# Patient Record
Sex: FEMALE | Race: WHITE | Employment: FULL TIME | URBAN - METROPOLITAN AREA
[De-identification: names, ages, dates, MRNs, and addresses within clinical notes are randomized per-mention and may not be internally consistent; named-entity substitution may affect disease eponyms.]

---

## 2017-01-25 RX ORDER — FLUTICASONE PROPIONATE 50 MCG
1 SPRAY, SUSPENSION (ML) NASAL DAILY
Qty: 16 G | Refills: 3 | Status: SHIPPED | OUTPATIENT
Start: 2017-01-25 | End: 2017-01-26 | Stop reason: SDUPTHER

## 2017-01-26 RX ORDER — FLUTICASONE PROPIONATE 50 MCG
1 SPRAY, SUSPENSION (ML) NASAL DAILY
Qty: 16 G | Refills: 3 | Status: SHIPPED | OUTPATIENT
Start: 2017-01-26 | End: 2017-09-06 | Stop reason: SDUPTHER

## 2017-03-27 ENCOUNTER — TELEPHONE (OUTPATIENT)
Dept: INTERNAL MEDICINE | Age: 57
End: 2017-03-27

## 2017-03-27 RX ORDER — MONTELUKAST SODIUM 10 MG/1
10 TABLET ORAL DAILY
Qty: 90 TABLET | Refills: 3 | Status: SHIPPED | OUTPATIENT
Start: 2017-03-27 | End: 2017-09-06 | Stop reason: SDUPTHER

## 2017-04-05 ENCOUNTER — TELEPHONE (OUTPATIENT)
Dept: INTERNAL MEDICINE | Age: 57
End: 2017-04-05

## 2017-04-06 RX ORDER — RIZATRIPTAN BENZOATE 10 MG/1
TABLET, ORALLY DISINTEGRATING ORAL
Qty: 27 TABLET | Refills: 3 | Status: SHIPPED | OUTPATIENT
Start: 2017-04-06 | End: 2017-09-06 | Stop reason: SDUPTHER

## 2017-04-28 ENCOUNTER — OFFICE VISIT (OUTPATIENT)
Dept: INTERNAL MEDICINE CLINIC | Age: 57
End: 2017-04-28

## 2017-04-28 VITALS
DIASTOLIC BLOOD PRESSURE: 78 MMHG | HEART RATE: 73 BPM | OXYGEN SATURATION: 98 % | BODY MASS INDEX: 23.91 KG/M2 | SYSTOLIC BLOOD PRESSURE: 128 MMHG | TEMPERATURE: 99.6 F | WEIGHT: 135 LBS

## 2017-04-28 DIAGNOSIS — R50.9 FEVER, UNSPECIFIED FEVER CAUSE: ICD-10-CM

## 2017-04-28 DIAGNOSIS — N20.0 KIDNEY STONES: Primary | ICD-10-CM

## 2017-04-28 DIAGNOSIS — R10.9 FLANK PAIN: ICD-10-CM

## 2017-04-28 LAB
BILIRUBIN, POC: NORMAL
BLOOD URINE, POC: NORMAL
CLARITY, POC: CLEAR
COLOR, POC: YELLOW
GLUCOSE URINE, POC: NORMAL
INFLUENZA A ANTIBODY: NORMAL
INFLUENZA B ANTIBODY: NORMAL
KETONES, POC: NORMAL
LEUKOCYTE EST, POC: NORMAL
NITRITE, POC: NORMAL
PH, POC: 6
PROTEIN, POC: NORMAL
SPECIFIC GRAVITY, POC: 1.02
UROBILINOGEN, POC: NORMAL

## 2017-04-28 PROCEDURE — 99213 OFFICE O/P EST LOW 20 MIN: CPT | Performed by: NURSE PRACTITIONER

## 2017-04-28 PROCEDURE — 81002 URINALYSIS NONAUTO W/O SCOPE: CPT | Performed by: NURSE PRACTITIONER

## 2017-04-28 PROCEDURE — 87804 INFLUENZA ASSAY W/OPTIC: CPT | Performed by: NURSE PRACTITIONER

## 2017-04-28 ASSESSMENT — ENCOUNTER SYMPTOMS
SORE THROAT: 0
BACK PAIN: 1
ABDOMINAL PAIN: 1
SINUS PRESSURE: 0
RHINORRHEA: 0
COUGH: 0
WHEEZING: 0

## 2017-04-30 LAB — URINE CULTURE, ROUTINE: NORMAL

## 2017-05-03 ENCOUNTER — TELEPHONE (OUTPATIENT)
Dept: INTERNAL MEDICINE | Age: 57
End: 2017-05-03

## 2017-05-04 ENCOUNTER — OFFICE VISIT (OUTPATIENT)
Dept: INTERNAL MEDICINE | Age: 57
End: 2017-05-04

## 2017-05-04 VITALS
DIASTOLIC BLOOD PRESSURE: 78 MMHG | TEMPERATURE: 99.3 F | SYSTOLIC BLOOD PRESSURE: 120 MMHG | WEIGHT: 136 LBS | BODY MASS INDEX: 24.09 KG/M2

## 2017-05-04 DIAGNOSIS — R50.9 FEVER, UNSPECIFIED FEVER CAUSE: ICD-10-CM

## 2017-05-04 DIAGNOSIS — M25.561 ARTHRALGIA OF BOTH KNEES: ICD-10-CM

## 2017-05-04 DIAGNOSIS — M25.562 ARTHRALGIA OF BOTH KNEES: ICD-10-CM

## 2017-05-04 DIAGNOSIS — N20.0 KIDNEY STONES: ICD-10-CM

## 2017-05-04 DIAGNOSIS — R73.01 ELEVATED FASTING BLOOD SUGAR: ICD-10-CM

## 2017-05-04 DIAGNOSIS — G43.709 CHRONIC MIGRAINE WITHOUT AURA WITHOUT STATUS MIGRAINOSUS, NOT INTRACTABLE: ICD-10-CM

## 2017-05-04 LAB
BASOPHILS ABSOLUTE: 0 K/UL (ref 0–0.2)
BASOPHILS RELATIVE PERCENT: 0.6 %
EOSINOPHILS ABSOLUTE: 0.1 K/UL (ref 0–0.6)
EOSINOPHILS RELATIVE PERCENT: 1.9 %
HCT VFR BLD CALC: 42 % (ref 36–48)
HEMOGLOBIN: 13.9 G/DL (ref 12–16)
LYMPHOCYTES ABSOLUTE: 1.5 K/UL (ref 1–5.1)
LYMPHOCYTES RELATIVE PERCENT: 22.4 %
MCH RBC QN AUTO: 28.7 PG (ref 26–34)
MCHC RBC AUTO-ENTMCNC: 33.2 G/DL (ref 31–36)
MCV RBC AUTO: 86.6 FL (ref 80–100)
MONOCYTES ABSOLUTE: 0.7 K/UL (ref 0–1.3)
MONOCYTES RELATIVE PERCENT: 10.7 %
NEUTROPHILS ABSOLUTE: 4.2 K/UL (ref 1.7–7.7)
NEUTROPHILS RELATIVE PERCENT: 64.4 %
PDW BLD-RTO: 13.5 % (ref 12.4–15.4)
PLATELET # BLD: 254 K/UL (ref 135–450)
PMV BLD AUTO: 8 FL (ref 5–10.5)
RBC # BLD: 4.85 M/UL (ref 4–5.2)
WBC # BLD: 6.5 K/UL (ref 4–11)

## 2017-05-04 PROCEDURE — 99214 OFFICE O/P EST MOD 30 MIN: CPT | Performed by: INTERNAL MEDICINE

## 2017-05-04 ASSESSMENT — PATIENT HEALTH QUESTIONNAIRE - PHQ9
1. LITTLE INTEREST OR PLEASURE IN DOING THINGS: 0
2. FEELING DOWN, DEPRESSED OR HOPELESS: 0
SUM OF ALL RESPONSES TO PHQ9 QUESTIONS 1 & 2: 0
SUM OF ALL RESPONSES TO PHQ QUESTIONS 1-9: 0

## 2017-05-04 ASSESSMENT — ENCOUNTER SYMPTOMS
BACK PAIN: 0
ABDOMINAL PAIN: 0
CHEST TIGHTNESS: 0
COLOR CHANGE: 0
WHEEZING: 0

## 2017-05-05 ENCOUNTER — TELEPHONE (OUTPATIENT)
Dept: INTERNAL MEDICINE | Age: 57
End: 2017-05-05

## 2017-05-05 DIAGNOSIS — M19.90 ARTHRITIS: Primary | ICD-10-CM

## 2017-05-05 LAB
ALBUMIN SERPL-MCNC: 4 G/DL (ref 3.4–5)
ANA INTERPRETATION: NORMAL
ANION GAP SERPL CALCULATED.3IONS-SCNC: 16 MMOL/L (ref 3–16)
ANTI-NUCLEAR ANTIBODY (ANA): NEGATIVE
BUN BLDV-MCNC: 15 MG/DL (ref 7–20)
CALCIUM SERPL-MCNC: 9.4 MG/DL (ref 8.3–10.6)
CHLORIDE BLD-SCNC: 100 MMOL/L (ref 99–110)
CO2: 29 MMOL/L (ref 21–32)
CREAT SERPL-MCNC: 0.6 MG/DL (ref 0.6–1.1)
ESTIMATED AVERAGE GLUCOSE: 122.6 MG/DL
GFR AFRICAN AMERICAN: >60
GFR NON-AFRICAN AMERICAN: >60
GLUCOSE BLD-MCNC: 99 MG/DL (ref 70–99)
HBA1C MFR BLD: 5.9 %
PHOSPHORUS: 3.4 MG/DL (ref 2.5–4.9)
POTASSIUM SERPL-SCNC: 3.4 MMOL/L (ref 3.5–5.1)
RHEUMATOID FACTOR: <10 IU/ML
SEDIMENTATION RATE, ERYTHROCYTE: 46 MM/HR (ref 0–30)
SODIUM BLD-SCNC: 145 MMOL/L (ref 136–145)
URIC ACID, SERUM: 2.8 MG/DL (ref 2.6–6)

## 2017-05-10 ENCOUNTER — TELEPHONE (OUTPATIENT)
Dept: INTERNAL MEDICINE | Age: 57
End: 2017-05-10

## 2017-05-10 ENCOUNTER — HOSPITAL ENCOUNTER (OUTPATIENT)
Dept: OTHER | Age: 57
Discharge: HOME | End: 2017-05-10
Attending: INTERNAL MEDICINE | Admitting: INTERNAL MEDICINE

## 2017-05-10 DIAGNOSIS — M25.471 SWOLLEN R ANKLE: Primary | ICD-10-CM

## 2017-05-10 DIAGNOSIS — M25.471 SWOLLEN R ANKLE: ICD-10-CM

## 2017-05-11 ENCOUNTER — EMPLOYEE WELLNESS (OUTPATIENT)
Dept: OTHER | Age: 57
End: 2017-05-11

## 2017-05-11 LAB
CHOLESTEROL, TOTAL: 247 MG/DL (ref 0–199)
GLUCOSE BLD-MCNC: 91 MG/DL (ref 70–99)
HDLC SERPL-MCNC: 53 MG/DL (ref 40–60)
LDL CHOLESTEROL CALCULATED: 165 MG/DL
TRIGL SERPL-MCNC: 143 MG/DL (ref 0–150)

## 2017-05-17 ENCOUNTER — OFFICE VISIT (OUTPATIENT)
Dept: RHEUMATOLOGY | Age: 57
End: 2017-05-17

## 2017-05-17 VITALS
DIASTOLIC BLOOD PRESSURE: 82 MMHG | HEART RATE: 72 BPM | BODY MASS INDEX: 25.68 KG/M2 | TEMPERATURE: 98.8 F | HEIGHT: 61 IN | WEIGHT: 136 LBS | SYSTOLIC BLOOD PRESSURE: 136 MMHG

## 2017-05-17 DIAGNOSIS — M02.30 REACTIVE ARTHRITIS (HCC): Primary | ICD-10-CM

## 2017-05-17 DIAGNOSIS — M02.30 REACTIVE ARTHRITIS (HCC): ICD-10-CM

## 2017-05-17 LAB
C-REACTIVE PROTEIN: 5.1 MG/L (ref 0–5.1)
SEDIMENTATION RATE, ERYTHROCYTE: 22 MM/HR (ref 0–30)

## 2017-05-17 PROCEDURE — 99244 OFF/OP CNSLTJ NEW/EST MOD 40: CPT | Performed by: INTERNAL MEDICINE

## 2017-05-17 RX ORDER — PREDNISONE 10 MG/1
TABLET ORAL
Qty: 35 TABLET | Refills: 0 | Status: SHIPPED | OUTPATIENT
Start: 2017-05-17 | End: 2017-09-06

## 2017-05-19 LAB — CCP IGG ANTIBODIES: 10 UNITS (ref 0–19)

## 2017-06-14 ENCOUNTER — HOSPITAL ENCOUNTER (OUTPATIENT)
Dept: MAMMOGRAPHY | Age: 57
Discharge: HOME | End: 2017-06-14
Attending: INTERNAL MEDICINE | Admitting: INTERNAL MEDICINE

## 2017-06-14 DIAGNOSIS — Z12.31 VISIT FOR SCREENING MAMMOGRAM: ICD-10-CM

## 2017-06-16 RX ORDER — BUTALBITAL, ACETAMINOPHEN AND CAFFEINE 50; 325; 40 MG/1; MG/1; MG/1
TABLET ORAL
Qty: 30 TABLET | Refills: 3 | Status: SHIPPED | OUTPATIENT
Start: 2017-06-16 | End: 2020-01-16

## 2017-07-05 ENCOUNTER — OFFICE VISIT (OUTPATIENT)
Dept: RHEUMATOLOGY | Age: 57
End: 2017-07-05

## 2017-07-05 VITALS
HEIGHT: 61 IN | BODY MASS INDEX: 26.06 KG/M2 | SYSTOLIC BLOOD PRESSURE: 124 MMHG | DIASTOLIC BLOOD PRESSURE: 84 MMHG | WEIGHT: 138 LBS | TEMPERATURE: 98.9 F

## 2017-07-05 DIAGNOSIS — M25.50 ARTHRALGIA, UNSPECIFIED JOINT: Primary | ICD-10-CM

## 2017-07-05 PROCEDURE — 99213 OFFICE O/P EST LOW 20 MIN: CPT | Performed by: INTERNAL MEDICINE

## 2017-07-05 RX ORDER — FOLIC ACID 1 MG/1
1 TABLET ORAL DAILY
COMMUNITY

## 2017-09-06 ENCOUNTER — OFFICE VISIT (OUTPATIENT)
Dept: INTERNAL MEDICINE | Age: 57
End: 2017-09-06

## 2017-09-06 ENCOUNTER — TELEPHONE (OUTPATIENT)
Dept: INTERNAL MEDICINE | Age: 57
End: 2017-09-06

## 2017-09-06 VITALS
BODY MASS INDEX: 24.92 KG/M2 | SYSTOLIC BLOOD PRESSURE: 122 MMHG | WEIGHT: 132 LBS | DIASTOLIC BLOOD PRESSURE: 82 MMHG | HEIGHT: 61 IN

## 2017-09-06 DIAGNOSIS — G47.00 INSOMNIA, UNSPECIFIED TYPE: ICD-10-CM

## 2017-09-06 DIAGNOSIS — F32.A DEPRESSION, UNSPECIFIED DEPRESSION TYPE: ICD-10-CM

## 2017-09-06 DIAGNOSIS — Z11.4 SCREENING FOR HIV (HUMAN IMMUNODEFICIENCY VIRUS): ICD-10-CM

## 2017-09-06 DIAGNOSIS — Z11.59 ENCOUNTER FOR HEPATITIS C SCREENING TEST FOR LOW RISK PATIENT: ICD-10-CM

## 2017-09-06 DIAGNOSIS — E87.6 HYPOKALEMIA: ICD-10-CM

## 2017-09-06 DIAGNOSIS — Z00.00 ANNUAL PHYSICAL EXAM: ICD-10-CM

## 2017-09-06 DIAGNOSIS — G43.709 CHRONIC MIGRAINE WITHOUT AURA WITHOUT STATUS MIGRAINOSUS, NOT INTRACTABLE: ICD-10-CM

## 2017-09-06 DIAGNOSIS — E78.5 HYPERLIPIDEMIA, UNSPECIFIED HYPERLIPIDEMIA TYPE: ICD-10-CM

## 2017-09-06 DIAGNOSIS — N20.0 KIDNEY STONES: ICD-10-CM

## 2017-09-06 DIAGNOSIS — Z00.00 ANNUAL PHYSICAL EXAM: Primary | ICD-10-CM

## 2017-09-06 LAB
A/G RATIO: 1.7 (ref 1.1–2.2)
ALBUMIN SERPL-MCNC: 4.2 G/DL (ref 3.4–5)
ALP BLD-CCNC: 72 U/L (ref 40–129)
ALT SERPL-CCNC: 16 U/L (ref 10–40)
ANION GAP SERPL CALCULATED.3IONS-SCNC: 13 MMOL/L (ref 3–16)
AST SERPL-CCNC: 20 U/L (ref 15–37)
BILIRUB SERPL-MCNC: 0.3 MG/DL (ref 0–1)
BUN BLDV-MCNC: 21 MG/DL (ref 7–20)
CALCIUM SERPL-MCNC: 9.1 MG/DL (ref 8.3–10.6)
CHLORIDE BLD-SCNC: 100 MMOL/L (ref 99–110)
CO2: 28 MMOL/L (ref 21–32)
CREAT SERPL-MCNC: 0.5 MG/DL (ref 0.6–1.1)
GFR AFRICAN AMERICAN: >60
GFR NON-AFRICAN AMERICAN: >60
GLOBULIN: 2.5 G/DL
GLUCOSE BLD-MCNC: 100 MG/DL (ref 70–99)
HEPATITIS C ANTIBODY INTERPRETATION: NORMAL
POTASSIUM SERPL-SCNC: 3.5 MMOL/L (ref 3.5–5.1)
SODIUM BLD-SCNC: 141 MMOL/L (ref 136–145)
TOTAL PROTEIN: 6.7 G/DL (ref 6.4–8.2)
TSH SERPL DL<=0.05 MIU/L-ACNC: 1.71 UIU/ML (ref 0.27–4.2)

## 2017-09-06 PROCEDURE — 99396 PREV VISIT EST AGE 40-64: CPT | Performed by: INTERNAL MEDICINE

## 2017-09-06 RX ORDER — MONTELUKAST SODIUM 10 MG/1
10 TABLET ORAL DAILY
Qty: 90 TABLET | Refills: 3 | Status: SHIPPED | OUTPATIENT
Start: 2017-09-06 | End: 2018-09-12 | Stop reason: SDUPTHER

## 2017-09-06 RX ORDER — CITALOPRAM 10 MG/1
10 TABLET ORAL NIGHTLY
Qty: 270 TABLET | Refills: 3 | Status: SHIPPED | OUTPATIENT
Start: 2017-09-06 | End: 2017-09-06 | Stop reason: SDUPTHER

## 2017-09-06 RX ORDER — CITALOPRAM 10 MG/1
TABLET ORAL
Qty: 270 TABLET | Refills: 3 | OUTPATIENT
Start: 2017-09-06 | End: 2018-09-12 | Stop reason: SDUPTHER

## 2017-09-06 RX ORDER — POTASSIUM CHLORIDE 750 MG/1
TABLET, EXTENDED RELEASE ORAL
Qty: 90 TABLET | Refills: 3 | Status: SHIPPED | OUTPATIENT
Start: 2017-09-06 | End: 2018-10-14 | Stop reason: SDUPTHER

## 2017-09-06 RX ORDER — CITALOPRAM 10 MG/1
10 TABLET ORAL NIGHTLY
Qty: 270 TABLET | Refills: 3 | OUTPATIENT
Start: 2017-09-06 | End: 2017-09-06 | Stop reason: SDUPTHER

## 2017-09-06 RX ORDER — RIZATRIPTAN BENZOATE 10 MG/1
TABLET, ORALLY DISINTEGRATING ORAL
Qty: 27 TABLET | Refills: 3 | Status: SHIPPED | OUTPATIENT
Start: 2017-09-06 | End: 2020-03-05

## 2017-09-06 RX ORDER — FLUTICASONE PROPIONATE 50 MCG
1 SPRAY, SUSPENSION (ML) NASAL DAILY
Qty: 3 BOTTLE | Refills: 3 | Status: SHIPPED | OUTPATIENT
Start: 2017-09-06 | End: 2018-04-26 | Stop reason: SDUPTHER

## 2017-09-06 ASSESSMENT — ENCOUNTER SYMPTOMS
WHEEZING: 0
ABDOMINAL PAIN: 0
CHEST TIGHTNESS: 0
COLOR CHANGE: 0
BACK PAIN: 0

## 2017-09-07 LAB — HIV-1 AND HIV-2 ANTIBODIES: NORMAL

## 2018-03-09 ENCOUNTER — HOSPITAL ENCOUNTER (OUTPATIENT)
Dept: OTHER | Age: 58
Discharge: HOME | End: 2018-03-09
Attending: UROLOGY | Admitting: UROLOGY

## 2018-03-09 DIAGNOSIS — N20.0 CALCULUS OF KIDNEY: ICD-10-CM

## 2018-03-20 VITALS — WEIGHT: 135 LBS | BODY MASS INDEX: 23.91 KG/M2

## 2018-04-04 RX ORDER — MONTELUKAST SODIUM 10 MG/1
TABLET ORAL
Qty: 90 TABLET | Refills: 3 | Status: SHIPPED | OUTPATIENT
Start: 2018-04-04 | End: 2019-09-18

## 2018-04-27 RX ORDER — FLUTICASONE PROPIONATE 50 MCG
SPRAY, SUSPENSION (ML) NASAL
Qty: 16 G | Refills: 3 | Status: SHIPPED | OUTPATIENT
Start: 2018-04-27 | End: 2019-07-27 | Stop reason: SDUPTHER

## 2018-05-14 ENCOUNTER — EMPLOYEE WELLNESS (OUTPATIENT)
Dept: OTHER | Age: 58
End: 2018-05-14

## 2018-05-14 LAB
CHOLESTEROL, TOTAL: 252 MG/DL (ref 0–199)
GLUCOSE BLD-MCNC: 100 MG/DL (ref 70–99)
HDLC SERPL-MCNC: 69 MG/DL (ref 40–60)
LDL CHOLESTEROL CALCULATED: 168 MG/DL
TRIGL SERPL-MCNC: 74 MG/DL (ref 0–150)

## 2018-05-21 VITALS — BODY MASS INDEX: 24.94 KG/M2 | WEIGHT: 132 LBS

## 2018-07-02 ENCOUNTER — HOSPITAL ENCOUNTER (OUTPATIENT)
Dept: MAMMOGRAPHY | Age: 58
Discharge: HOME | End: 2018-07-02
Attending: INTERNAL MEDICINE | Admitting: INTERNAL MEDICINE

## 2018-07-02 DIAGNOSIS — Z12.31 VISIT FOR SCREENING MAMMOGRAM: ICD-10-CM

## 2018-08-29 ENCOUNTER — TELEPHONE (OUTPATIENT)
Dept: INTERNAL MEDICINE | Age: 58
End: 2018-08-29

## 2018-08-29 DIAGNOSIS — Z00.00 WELL ADULT EXAM: ICD-10-CM

## 2018-08-29 DIAGNOSIS — E55.9 VITAMIN D DEFICIENCY: ICD-10-CM

## 2018-08-29 DIAGNOSIS — Z13.1 DIABETES MELLITUS SCREENING: ICD-10-CM

## 2018-08-29 DIAGNOSIS — E78.5 HYPERLIPIDEMIA, UNSPECIFIED HYPERLIPIDEMIA TYPE: Primary | ICD-10-CM

## 2018-09-06 DIAGNOSIS — E55.9 VITAMIN D DEFICIENCY: ICD-10-CM

## 2018-09-06 DIAGNOSIS — Z00.00 WELL ADULT EXAM: ICD-10-CM

## 2018-09-06 DIAGNOSIS — E78.5 HYPERLIPIDEMIA, UNSPECIFIED HYPERLIPIDEMIA TYPE: ICD-10-CM

## 2018-09-06 DIAGNOSIS — Z13.1 DIABETES MELLITUS SCREENING: ICD-10-CM

## 2018-09-06 LAB
A/G RATIO: 1.8 (ref 1.1–2.2)
ALBUMIN SERPL-MCNC: 4.2 G/DL (ref 3.4–5)
ALP BLD-CCNC: 81 U/L (ref 40–129)
ALT SERPL-CCNC: 17 U/L (ref 10–40)
ANION GAP SERPL CALCULATED.3IONS-SCNC: 13 MMOL/L (ref 3–16)
AST SERPL-CCNC: 19 U/L (ref 15–37)
BASOPHILS ABSOLUTE: 0 K/UL (ref 0–0.2)
BASOPHILS RELATIVE PERCENT: 0.9 %
BILIRUB SERPL-MCNC: <0.2 MG/DL (ref 0–1)
BUN BLDV-MCNC: 21 MG/DL (ref 7–20)
CALCIUM SERPL-MCNC: 8.8 MG/DL (ref 8.3–10.6)
CHLORIDE BLD-SCNC: 101 MMOL/L (ref 99–110)
CHOLESTEROL, TOTAL: 226 MG/DL (ref 0–199)
CO2: 27 MMOL/L (ref 21–32)
CREAT SERPL-MCNC: 0.6 MG/DL (ref 0.6–1.1)
EOSINOPHILS ABSOLUTE: 0.2 K/UL (ref 0–0.6)
EOSINOPHILS RELATIVE PERCENT: 3.6 %
ESTIMATED AVERAGE GLUCOSE: 119.8 MG/DL
GFR AFRICAN AMERICAN: >60
GFR NON-AFRICAN AMERICAN: >60
GLOBULIN: 2.4 G/DL
GLUCOSE BLD-MCNC: 100 MG/DL (ref 70–99)
HBA1C MFR BLD: 5.8 %
HCT VFR BLD CALC: 43.2 % (ref 36–48)
HDLC SERPL-MCNC: 60 MG/DL (ref 40–60)
HEMOGLOBIN: 14.4 G/DL (ref 12–16)
LDL CHOLESTEROL CALCULATED: 147 MG/DL
LYMPHOCYTES ABSOLUTE: 1.8 K/UL (ref 1–5.1)
LYMPHOCYTES RELATIVE PERCENT: 36.1 %
MCH RBC QN AUTO: 29.1 PG (ref 26–34)
MCHC RBC AUTO-ENTMCNC: 33.3 G/DL (ref 31–36)
MCV RBC AUTO: 87.3 FL (ref 80–100)
MONOCYTES ABSOLUTE: 0.5 K/UL (ref 0–1.3)
MONOCYTES RELATIVE PERCENT: 11.3 %
NEUTROPHILS ABSOLUTE: 2.3 K/UL (ref 1.7–7.7)
NEUTROPHILS RELATIVE PERCENT: 48.1 %
PDW BLD-RTO: 13.2 % (ref 12.4–15.4)
PLATELET # BLD: 179 K/UL (ref 135–450)
PMV BLD AUTO: 8.2 FL (ref 5–10.5)
POTASSIUM SERPL-SCNC: 3.5 MMOL/L (ref 3.5–5.1)
RBC # BLD: 4.95 M/UL (ref 4–5.2)
SODIUM BLD-SCNC: 141 MMOL/L (ref 136–145)
T4 FREE: 0.9 NG/DL (ref 0.9–1.8)
TOTAL PROTEIN: 6.6 G/DL (ref 6.4–8.2)
TRIGL SERPL-MCNC: 97 MG/DL (ref 0–150)
TSH SERPL DL<=0.05 MIU/L-ACNC: 2.33 UIU/ML (ref 0.27–4.2)
VITAMIN D 25-HYDROXY: 74.1 NG/ML
VLDLC SERPL CALC-MCNC: 19 MG/DL
WBC # BLD: 4.9 K/UL (ref 4–11)

## 2018-09-12 ENCOUNTER — OFFICE VISIT (OUTPATIENT)
Dept: INTERNAL MEDICINE | Age: 58
End: 2018-09-12

## 2018-09-12 VITALS
BODY MASS INDEX: 24.73 KG/M2 | SYSTOLIC BLOOD PRESSURE: 122 MMHG | DIASTOLIC BLOOD PRESSURE: 80 MMHG | WEIGHT: 131 LBS | HEIGHT: 61 IN

## 2018-09-12 DIAGNOSIS — F41.9 ANXIETY: ICD-10-CM

## 2018-09-12 DIAGNOSIS — Z12.11 SCREENING FOR COLON CANCER: ICD-10-CM

## 2018-09-12 DIAGNOSIS — N95.2 VAGINAL ATROPHY: ICD-10-CM

## 2018-09-12 DIAGNOSIS — R73.9 HYPERGLYCEMIA: ICD-10-CM

## 2018-09-12 DIAGNOSIS — G47.00 INSOMNIA, UNSPECIFIED TYPE: ICD-10-CM

## 2018-09-12 DIAGNOSIS — M25.561 ARTHRALGIA OF BOTH KNEES: ICD-10-CM

## 2018-09-12 DIAGNOSIS — M25.562 ARTHRALGIA OF BOTH KNEES: ICD-10-CM

## 2018-09-12 DIAGNOSIS — F32.A DEPRESSION, UNSPECIFIED DEPRESSION TYPE: ICD-10-CM

## 2018-09-12 DIAGNOSIS — H04.123 DRY EYES: ICD-10-CM

## 2018-09-12 DIAGNOSIS — Z00.00 WELL ADULT EXAM: Primary | ICD-10-CM

## 2018-09-12 PROCEDURE — 99396 PREV VISIT EST AGE 40-64: CPT | Performed by: INTERNAL MEDICINE

## 2018-09-12 RX ORDER — CITALOPRAM 10 MG/1
TABLET ORAL
Qty: 270 TABLET | Refills: 3 | Status: SHIPPED | OUTPATIENT
Start: 2018-09-12 | End: 2019-09-18 | Stop reason: SDUPTHER

## 2018-09-12 RX ORDER — MONTELUKAST SODIUM 10 MG/1
10 TABLET ORAL DAILY
Qty: 90 TABLET | Refills: 3 | Status: SHIPPED | OUTPATIENT
Start: 2018-09-12 | End: 2019-09-18 | Stop reason: SDUPTHER

## 2018-09-12 ASSESSMENT — ENCOUNTER SYMPTOMS
ABDOMINAL PAIN: 0
WHEEZING: 0
CHEST TIGHTNESS: 0
BACK PAIN: 1
COLOR CHANGE: 0

## 2018-09-12 ASSESSMENT — PATIENT HEALTH QUESTIONNAIRE - PHQ9
SUM OF ALL RESPONSES TO PHQ QUESTIONS 1-9: 0
1. LITTLE INTEREST OR PLEASURE IN DOING THINGS: 0
2. FEELING DOWN, DEPRESSED OR HOPELESS: 0
SUM OF ALL RESPONSES TO PHQ9 QUESTIONS 1 & 2: 0
SUM OF ALL RESPONSES TO PHQ QUESTIONS 1-9: 0

## 2018-09-12 NOTE — PATIENT INSTRUCTIONS
Get apps to help w sleep quality  I like     Insight Timer hue  Look for guided meditations especially the \"body scan\" meditations  [facility] Stress Meditations  Multiple meditation tapes

## 2018-09-12 NOTE — PROGRESS NOTES
Subjective:      Patient ID: Andrew Whitlock is a 56 y.o. female. Chief Complaint   Patient presents with    Annual Exam     yearly/review labs,colonoscopy needed? Has had a stressful year-son w seizure disorder- other son w some depression- very stressed out-sees counselor once monthly- but not really enough -still unable to sleep well-having more knee pain recently but no swelling or redness- foot pain persists-never has time to take care of herself          Andrew Whitlock  1960    No Known Allergies  Current Outpatient Prescriptions   Medication Sig Dispense Refill    fluticasone (FLONASE) 50 MCG/ACT nasal spray SPRAY ONE SPRAY IN EACH NOSTRIL DAILY 16 g 3    montelukast (SINGULAIR) 10 MG tablet TAKE ONE TABLET BY MOUTH ONE TIME A DAY 90 tablet 3    potassium chloride (KLOR-CON M) 10 MEQ extended release tablet TAKE ONE TABLET BY MOUTH DAILY 90 tablet 3    rizatriptan (MAXALT-MLT) 10 MG disintegrating tablet DISSOLVE 1 TABLET BY MOUTH FOR 1 DOSE AS NEEDED .(MAY REPEAT IN 2 HOURS IF NEEDED) 27 tablet 3    montelukast (SINGULAIR) 10 MG tablet Take 1 tablet by mouth daily 90 tablet 3    citalopram (CELEXA) 10 MG tablet TAKE ONE TAB. THREE TIMES A  tablet 3    folic acid (FOLVITE) 1 MG tablet Take 1 mg by mouth daily      butalbital-acetaminophen-caffeine (FIORICET, ESGIC) -40 MG per tablet TAKE ONE TABLET BY MOUTH EVERY 4 HOURS AS NEEDED FOR PAIN UP TO 10 DAYS 30 tablet 3    LORazepam (ATIVAN) 0.5 MG tablet Take 1 tablet by mouth every 8 hours as needed 30 tablet 3    prenatal vitamin (FORMULA 3) TABS Take 1 tablet by mouth daily.  calcium citrate-vitamin D (CALCITRATE/VITAMIN D) 315-200 MG-UNIT per tablet Take 1 tablet by mouth daily. No current facility-administered medications for this visit. Vitals:    09/12/18 1108   BP: 122/80   Weight: 131 lb (59.4 kg)   Height: 5' 1\" (1.549 m)     Body mass index is 24.75 kg/m².      Wt Readings from Last 3 Encounters: Constitutional: Negative for activity change, appetite change and fatigue. HENT: Negative for congestion. Eyes: Positive for visual disturbance. Respiratory: Negative for chest tightness and wheezing. Cardiovascular: Negative for chest pain and palpitations. Gastrointestinal: Negative for abdominal pain. Musculoskeletal: Positive for arthralgias, back pain and myalgias. Skin: Negative for color change and rash. Neurological: Negative for weakness, light-headedness and headaches. Psychiatric/Behavioral: Positive for dysphoric mood and sleep disturbance. The patient is nervous/anxious. Objective:   Physical Exam   Constitutional: She is oriented to person, place, and time. She appears well-developed and well-nourished. HENT:   Head: Normocephalic and atraumatic. Eyes: Pupils are equal, round, and reactive to light. Conjunctivae and EOM are normal.   Neck: Normal range of motion. Neck supple. Cardiovascular: Normal rate, regular rhythm and normal heart sounds. No carotid bruit auscultated bilaterally   Pulmonary/Chest: Effort normal and breath sounds normal. No respiratory distress. Abdominal: She exhibits no distension. There is no tenderness. Musculoskeletal: She exhibits tenderness. Lymphadenopathy:     She has no cervical adenopathy. Neurological: She is alert and oriented to person, place, and time. Skin: Skin is warm and dry. Psychiatric: She has a normal mood and affect. Her behavior is normal. Judgment and thought content normal.         Assessment and Plan:      Insomnia  Work w therapist more     Depression  Try to work on mindfulness     Anxiety  Try to work on mindfulness and see therapist     Dry eyes  Cont to f/u w eye md     Hyperglycemia  Improved- cont to monitor     Arthralgia of both knees  Still bothersome off and on     Vaginal atrophy  Cont activella          Complete physical completed.  Patient now up to date with all routine screening including Pap and colonoscopy if needed, yearly eye and dental exams,labs, dexa if indicated. Counseled re: nutrition/calcium and vit d supplementation,seat belt use, no cell phone use with driving.

## 2018-09-27 ENCOUNTER — HOSPITAL ENCOUNTER (OUTPATIENT)
Age: 58
Discharge: HOME | End: 2018-09-27
Payer: COMMERCIAL

## 2018-09-27 ENCOUNTER — HOSPITAL ENCOUNTER (OUTPATIENT)
Dept: GENERAL RADIOLOGY | Age: 58
Discharge: HOME | End: 2018-09-27
Payer: COMMERCIAL

## 2018-09-27 DIAGNOSIS — N20.0 CALCULUS OF KIDNEY: ICD-10-CM

## 2018-09-27 PROCEDURE — 74018 RADEX ABDOMEN 1 VIEW: CPT

## 2018-10-05 LAB
PAP SMEAR: NORMAL
PAP SMEAR: NORMAL

## 2018-10-15 RX ORDER — POTASSIUM CHLORIDE 750 MG/1
TABLET, EXTENDED RELEASE ORAL
Qty: 90 TABLET | Refills: 3 | Status: SHIPPED | OUTPATIENT
Start: 2018-10-15 | End: 2019-09-18 | Stop reason: SDUPTHER

## 2018-11-15 ENCOUNTER — OFFICE VISIT (OUTPATIENT)
Dept: ORTHOPEDIC SURGERY | Age: 58
End: 2018-11-15
Payer: COMMERCIAL

## 2018-11-15 VITALS — BODY MASS INDEX: 24.11 KG/M2 | HEIGHT: 62 IN | WEIGHT: 131 LBS

## 2018-11-15 DIAGNOSIS — M25.521 RIGHT ELBOW PAIN: ICD-10-CM

## 2018-11-15 DIAGNOSIS — M77.11 LATERAL EPICONDYLITIS OF RIGHT ELBOW: Primary | ICD-10-CM

## 2018-11-15 PROCEDURE — MISCD86 TENNIS ELBOW STRAP-BREG: Performed by: NURSE PRACTITIONER

## 2018-11-15 PROCEDURE — 99242 OFF/OP CONSLTJ NEW/EST SF 20: CPT | Performed by: NURSE PRACTITIONER

## 2018-11-15 RX ORDER — METHYLPREDNISOLONE 4 MG/1
TABLET ORAL
Qty: 1 KIT | Refills: 0 | Status: SHIPPED | OUTPATIENT
Start: 2018-11-15 | End: 2018-11-21

## 2018-11-29 ENCOUNTER — OFFICE VISIT (OUTPATIENT)
Dept: ORTHOPEDIC SURGERY | Age: 58
End: 2018-11-29
Payer: COMMERCIAL

## 2018-11-29 VITALS — BODY MASS INDEX: 24.11 KG/M2 | HEIGHT: 62 IN | WEIGHT: 131 LBS

## 2018-11-29 DIAGNOSIS — M25.521 RIGHT ELBOW PAIN: ICD-10-CM

## 2018-11-29 DIAGNOSIS — M77.11 LATERAL EPICONDYLITIS OF RIGHT ELBOW: Primary | ICD-10-CM

## 2018-11-29 PROCEDURE — 20605 DRAIN/INJ JOINT/BURSA W/O US: CPT | Performed by: NURSE PRACTITIONER

## 2018-11-29 PROCEDURE — 99213 OFFICE O/P EST LOW 20 MIN: CPT | Performed by: NURSE PRACTITIONER

## 2018-11-29 RX ORDER — BETAMETHASONE SODIUM PHOSPHATE AND BETAMETHASONE ACETATE 3; 3 MG/ML; MG/ML
12 INJECTION, SUSPENSION INTRA-ARTICULAR; INTRALESIONAL; INTRAMUSCULAR; SOFT TISSUE ONCE
Status: COMPLETED | OUTPATIENT
Start: 2018-11-29 | End: 2018-11-29

## 2018-11-29 RX ADMIN — BETAMETHASONE SODIUM PHOSPHATE AND BETAMETHASONE ACETATE 12 MG: 3; 3 INJECTION, SUSPENSION INTRA-ARTICULAR; INTRALESIONAL; INTRAMUSCULAR; SOFT TISSUE at 11:12

## 2018-12-20 ENCOUNTER — OFFICE VISIT (OUTPATIENT)
Dept: ORTHOPEDIC SURGERY | Age: 58
End: 2018-12-20
Payer: COMMERCIAL

## 2018-12-20 VITALS
BODY MASS INDEX: 24.11 KG/M2 | HEART RATE: 68 BPM | SYSTOLIC BLOOD PRESSURE: 132 MMHG | WEIGHT: 131 LBS | HEIGHT: 62 IN | DIASTOLIC BLOOD PRESSURE: 81 MMHG

## 2018-12-20 DIAGNOSIS — M77.11 LATERAL EPICONDYLITIS OF RIGHT ELBOW: Primary | ICD-10-CM

## 2018-12-20 PROCEDURE — 99213 OFFICE O/P EST LOW 20 MIN: CPT | Performed by: ORTHOPAEDIC SURGERY

## 2018-12-20 NOTE — PROGRESS NOTES
lateral epicondylitis    Plan:  The patient was encouraged to continue to ice the elbow. She was again advised on stretching exercises. She will modify her activities and was given a new work note to extend her restrictions for an additional month. We will attempt to get another injection approved through [facility]. She will follow-up with us in 1 month for reevaluation. She will continue with her ibuprofen and Tylenol in the interim.

## 2018-12-31 ENCOUNTER — TELEPHONE (OUTPATIENT)
Dept: ORTHOPEDIC SURGERY | Age: 58
End: 2018-12-31

## 2019-01-21 ENCOUNTER — OFFICE VISIT (OUTPATIENT)
Dept: ORTHOPEDIC SURGERY | Age: 59
End: 2019-01-21
Payer: COMMERCIAL

## 2019-01-21 ENCOUNTER — TELEPHONE (OUTPATIENT)
Dept: ORTHOPEDIC SURGERY | Age: 59
End: 2019-01-21

## 2019-01-21 VITALS
HEIGHT: 62 IN | SYSTOLIC BLOOD PRESSURE: 146 MMHG | BODY MASS INDEX: 25.58 KG/M2 | HEART RATE: 73 BPM | DIASTOLIC BLOOD PRESSURE: 95 MMHG | WEIGHT: 139 LBS

## 2019-01-21 DIAGNOSIS — M77.11 RIGHT LATERAL EPICONDYLITIS: Primary | ICD-10-CM

## 2019-01-21 PROCEDURE — 99213 OFFICE O/P EST LOW 20 MIN: CPT | Performed by: ORTHOPAEDIC SURGERY

## 2019-01-21 PROCEDURE — 20605 DRAIN/INJ JOINT/BURSA W/O US: CPT | Performed by: ORTHOPAEDIC SURGERY

## 2019-01-21 RX ORDER — BETAMETHASONE SODIUM PHOSPHATE AND BETAMETHASONE ACETATE 3; 3 MG/ML; MG/ML
12 INJECTION, SUSPENSION INTRA-ARTICULAR; INTRALESIONAL; INTRAMUSCULAR; SOFT TISSUE ONCE
Status: COMPLETED | OUTPATIENT
Start: 2019-01-21 | End: 2019-01-21

## 2019-01-21 RX ADMIN — BETAMETHASONE SODIUM PHOSPHATE AND BETAMETHASONE ACETATE 12 MG: 3; 3 INJECTION, SUSPENSION INTRA-ARTICULAR; INTRALESIONAL; INTRAMUSCULAR; SOFT TISSUE at 11:48

## 2019-02-27 ENCOUNTER — OFFICE VISIT (OUTPATIENT)
Dept: ORTHOPEDIC SURGERY | Age: 59
End: 2019-02-27
Payer: COMMERCIAL

## 2019-02-27 VITALS
HEIGHT: 61 IN | BODY MASS INDEX: 25.49 KG/M2 | HEART RATE: 83 BPM | SYSTOLIC BLOOD PRESSURE: 137 MMHG | DIASTOLIC BLOOD PRESSURE: 85 MMHG | WEIGHT: 135 LBS

## 2019-02-27 DIAGNOSIS — M77.11 RIGHT LATERAL EPICONDYLITIS: Primary | ICD-10-CM

## 2019-02-27 PROCEDURE — 99213 OFFICE O/P EST LOW 20 MIN: CPT | Performed by: ORTHOPAEDIC SURGERY

## 2019-02-27 RX ORDER — MELOXICAM 15 MG/1
15 TABLET ORAL DAILY
Qty: 30 TABLET | Refills: 1 | Status: SHIPPED | OUTPATIENT
Start: 2019-02-27 | End: 2019-05-01 | Stop reason: SDUPTHER

## 2019-03-11 ENCOUNTER — TELEPHONE (OUTPATIENT)
Dept: ORTHOPEDIC SURGERY | Age: 59
End: 2019-03-11

## 2019-03-11 NOTE — TELEPHONE ENCOUNTER
I called patient because we received approved C-9 for cortisone injection to rt elbow if needed. She has an appt on 4/2/19 but C-9 expires on 3/27/19 so she needs to come in before 3/27. If she still having pain in her elbow she can get injection that day.

## 2019-03-25 ENCOUNTER — OFFICE VISIT (OUTPATIENT)
Dept: ORTHOPEDIC SURGERY | Age: 59
End: 2019-03-25
Payer: COMMERCIAL

## 2019-03-25 VITALS — HEIGHT: 61 IN | WEIGHT: 135 LBS | BODY MASS INDEX: 25.49 KG/M2

## 2019-03-25 DIAGNOSIS — M77.11 RIGHT LATERAL EPICONDYLITIS: Primary | ICD-10-CM

## 2019-03-25 PROCEDURE — 99213 OFFICE O/P EST LOW 20 MIN: CPT | Performed by: ORTHOPAEDIC SURGERY

## 2019-03-25 PROCEDURE — 20605 DRAIN/INJ JOINT/BURSA W/O US: CPT | Performed by: ORTHOPAEDIC SURGERY

## 2019-03-25 PROCEDURE — 96372 THER/PROPH/DIAG INJ SC/IM: CPT | Performed by: ORTHOPAEDIC SURGERY

## 2019-03-25 RX ORDER — BETAMETHASONE SODIUM PHOSPHATE AND BETAMETHASONE ACETATE 3; 3 MG/ML; MG/ML
12 INJECTION, SUSPENSION INTRA-ARTICULAR; INTRALESIONAL; INTRAMUSCULAR; SOFT TISSUE ONCE
Status: COMPLETED | OUTPATIENT
Start: 2019-03-25 | End: 2019-03-25

## 2019-03-25 RX ADMIN — BETAMETHASONE SODIUM PHOSPHATE AND BETAMETHASONE ACETATE 12 MG: 3; 3 INJECTION, SUSPENSION INTRA-ARTICULAR; INTRALESIONAL; INTRAMUSCULAR; SOFT TISSUE at 13:04

## 2019-04-03 ENCOUNTER — TELEPHONE (OUTPATIENT)
Dept: ORTHOPEDIC SURGERY | Age: 59
End: 2019-04-03

## 2019-04-05 ENCOUNTER — TELEPHONE (OUTPATIENT)
Dept: ORTHOPEDIC SURGERY | Age: 59
End: 2019-04-05

## 2019-04-05 NOTE — TELEPHONE ENCOUNTER
I called patient left message to call me about her light duty will  on 19 per Brent 85 letter she left here with Safia Mcfarland

## 2019-04-09 ENCOUNTER — TELEPHONE (OUTPATIENT)
Dept: ORTHOPEDIC SURGERY | Age: 59
End: 2019-04-09

## 2019-04-10 ENCOUNTER — TELEPHONE (OUTPATIENT)
Dept: ORTHOPEDIC SURGERY | Age: 59
End: 2019-04-10

## 2019-04-10 DIAGNOSIS — M77.11 RIGHT LATERAL EPICONDYLITIS: Primary | ICD-10-CM

## 2019-04-14 ENCOUNTER — NURSE TRIAGE (OUTPATIENT)
Dept: OTHER | Facility: CLINIC | Age: 59
End: 2019-04-14

## 2019-04-14 NOTE — TELEPHONE ENCOUNTER
Reason for Disposition   Neck swelling, bruise or pain from direct blow to the neck    Protocols used: NECK INJURY-ADULT-AH    Patient's location of employment: [employer]  Location of injury: [employer] [facility] inpatient unit. .   Time of injury: 0825  Last 4 of patient's SSN: [id number]  Location recommended for treatment: Home care. 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 She already has problems with her shoulder/arm from a previous patient incident. She states she has full range of motion, but neck is feeling quite sore, with ache in arm. Neck pain 4/10. Is on Meloxicam already. She is filling out safe care. Will notified Supervisor.

## 2019-04-15 ENCOUNTER — TELEPHONE (OUTPATIENT)
Dept: ORTHOPEDIC SURGERY | Age: 59
End: 2019-04-15

## 2019-04-17 ENCOUNTER — HOSPITAL ENCOUNTER (OUTPATIENT)
Dept: PHYSICAL THERAPY | Age: 59
Setting detail: THERAPIES SERIES
Discharge: HOME | End: 2019-04-17
Payer: COMMERCIAL

## 2019-04-17 PROCEDURE — 97530 THERAPEUTIC ACTIVITIES: CPT

## 2019-04-17 PROCEDURE — 97162 PT EVAL MOD COMPLEX 30 MIN: CPT

## 2019-04-17 PROCEDURE — 97140 MANUAL THERAPY 1/> REGIONS: CPT

## 2019-04-17 PROCEDURE — 97035 APP MDLTY 1+ULTRASOUND EA 15: CPT

## 2019-04-17 NOTE — FLOWSHEET NOTE
Physical Therapy Daily Treatment Note  Date:  2019    Patient Name:  Fred Urbina    :  1960  MRN: [id number]  Restrictions/Precautions:    Pertinent Medical History: B CTS  Medical/Treatment Diagnosis Information:  · Diagnosis: Rt elbow  · Treatment Diagnosis: Pain. Decreased R wrist strength and AROM  Insurance/Certification information:  PT Insurance Information: BWC- C9 thru 19  Physician Information:  Referring Practitioner: Dr. 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 She has been on light duty since and has had 3 cortisone injections. States she was off for a vacation a few weeks ago and the rest helped it. States she worked this past weekend and her arm is hurting again. Pain is 2-4/10. Aching in her R forearm. 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 Subjective:       Objective:  Observation:   Test measurements:      Exercises:  Exercise/Equipment Resistance/Repetitions Other comments                                           HEP      Wrist Extensor stretch light    Ice Massage                      Other Therapeutic Activities:    Discussed purpose, risks and benefits of PT with pt who is agreeable to POC. Home Exercise Program:    Instructed patient on an HEP. Patient demonstrated exercises correctly. Handout with pictures and # of reps/sets was given. Exercises are listed above.     Manual Treatments:    DTM to R lateral epicondyle and extensors  Ice Massage x 4 min to R lateral epicondyle  K tape to R wrist extensors    Modalities:    US: 1.3 w/cm2 x 7 min, 20% to R lateral epicondyle    Timed Code Treatment Minutes:

## 2019-04-17 NOTE — PLAN OF CARE
Outpatient Physical Therapy  [] [facility]    Phone: [phone]   Fax: [phone]   [x] [facility]  Phone: [phone]              Fax: [phone]  [] [facility]   Phone: [phone]   Fax: [phone]     To: Referring Practitioner: Dr. Sherley Giordano      Patient: Liz Hwang   : 1960   MRN: [id number]  Evaluation Date: 2019      Diagnosis Information:  · Diagnosis: Rt elbow    · Treatment Diagnosis: Pain. Decreased R wrist strength and AROM     Physical Therapy Certification Form  Dear Dr. Sherley Giordano,  The following patient has been evaluated for physical therapy services and for therapy to continue, Medicare requires monthly physician review of the treatment plan. Please review the attached evaluation and/or summary of the patient's plan of care, and verify that you agree therapy should continue by signing the attached document and sending it back to our office. Plan of Care/Treatment to date:  [x] Therapeutic Exercise    [x] Modalities:  [x] Therapeutic Activity     [x] Ultrasound  [x] Electrical Stimulation  [] Gait Training      [] Cervical Traction [] Lumbar Traction  [] Neuromuscular Re-education    [x] Cold/hotpack [] Iontophoresis   [x] Instruction in HEP     Other:  [x] Manual Therapy      []             [] Aquatic Therapy      []           ? Frequency/Duration:  # Days per week: [] 1 day # Weeks: [] 1 week [] 5 weeks     [x] 2 days? [] 2 weeks [] 6 weeks     [] 3 days   [] 3 weeks [] 7 weeks     [] 4 days   [] 4 weeks [x] 8 weeks    Rehab Potential: [] Excellent [] Good [] Fair  [] Poor       Electronically signed by:  Kaylyn Dance, PT      If you have any questions or concerns, please don't hesitate to call.   Thank you for your referral.      Physician Signature:________________________________Date:__________________  By signing above, therapists plan is approved by physician

## 2019-04-17 NOTE — PROGRESS NOTES
Physical Therapy  Initial Assessment  Date: 2019  Patient Name: Marii Edwards  MRN: [id number]  : 1960     Treatment Diagnosis: Pain. Decreased R wrist strength and AROM    Restrictions  Restrictions/Precautions  Restrictions/Precautions: Fall Risk(low)    Subjective   General  Chart Reviewed: Yes  Referring Practitioner: Dr. 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 States she was off for a vacation a few weeks ago and the rest helped it. States she worked this past weekend and her arm is hurting again. Pain is 2-4/10. Aching in her R forearm. 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 Vision/Hearing  Vision  Vision: Within Functional Limits  Hearing  Hearing: Within functional limits    Orientation  Orientation  Overall Orientation Status: Within Normal Limits    Social/Functional History       Objective     Observation/Palpation  Palpation: Moderate to severe pain with palpation of origin of R wrist extensors around lateral epicondyle  Observation: Swelling in lateral R elbow- mild. Pt wearing brace on R forearm    AROM RUE (degrees)  RUE AROM : WNL  RUE General AROM: Except wrist extension AROM: limited 25%  AROM LUE (degrees)  LUE AROM : WNL    Strength RUE  Strength RUE: WNL  Comment: Except wrist extension: 4/5 *pain  Strength LUE  Strength LUE: WNL        Sensation  Overall Sensation Status: WNL             Assessment   Conditions Requiring Skilled Therapeutic Intervention  Body structures, Functions, Activity limitations: Decreased functional mobility ; Decreased ROM; Decreased strength; Increased Pain;Decreased ADL status  Assessment: PLOF- no pain in R elbow. CLOF- moderate amounts of pain in R elbow for a few months that is affecting work and ADLs. Treatment Diagnosis: Pain.  Decreased R wrist strength and AROM  Prognosis: Good  Decision Making: Medium Complexity  REQUIRES PT FOLLOW UP: Yes         Plan   Plan  Times per week: 2  Times per day: Daily  Plan weeks: 6  Current Treatment Recommendations: Strengthening, ROM, Manual Therapy - Joint Manipulation, Pain Management, Modalities, Manual Therapy - Soft Tissue Mobilization, Home Exercise Program    OutComes Score                 QuickDASH Total Score: 38       QuickDASH Disability/Symptom Score : 61.36 %                        Goals  Short term goals  Time Frame for Short term goals: 2 Weeks  Short term goal 1: Pt will show independence in HEP  Short term goal 2: Pt will show no swelling in R elbow  Long term goals  Time Frame for Long term goals : 6 Weeks  Long term goal 1: Pt will report <2/10 pain consistently so she can return to full duty  Long term goal 2: Pt will show 4+/5 R wrist extension strength and full R wrist extension AROM  Patient Goals   Patient goals : \"return to ADLs, full duty, relief of pain, prevention methods\"       Therapy Time   Individual Concurrent Group Co-treatment   Time In           Time Out           Minutes                   Melanie lo, PT

## 2019-04-18 ENCOUNTER — HOSPITAL ENCOUNTER (OUTPATIENT)
Dept: PHYSICAL THERAPY | Age: 59
Setting detail: THERAPIES SERIES
Discharge: HOME | End: 2019-04-18
Payer: COMMERCIAL

## 2019-04-18 PROCEDURE — 97140 MANUAL THERAPY 1/> REGIONS: CPT

## 2019-04-18 PROCEDURE — 97035 APP MDLTY 1+ULTRASOUND EA 15: CPT

## 2019-04-18 NOTE — FLOWSHEET NOTE
Physical Therapy Daily Treatment Note  Date:  2019    Patient Name:  Malcolm Travis    :  1960  MRN: [id number]  Restrictions/Precautions:    Pertinent Medical History: B CTS  Medical/Treatment Diagnosis Information:  · Diagnosis: Rt elbow  · Treatment Diagnosis: Pain. Decreased R wrist strength and AROM  Insurance/Certification information:  PT Insurance Information: BWC- C9 thru 19  Physician Information:  Referring Practitioner: Dr. 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 States she was off for a vacation a few weeks ago and the rest helped it. States she worked this past weekend and her arm is hurting again. Pain is 2-4/10. Aching in her R forearm. 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 Subjective:  19 Patient reports elbow and wrist has been sore. Objective:  Observation:   Test measurements:      Exercises:  Exercise/Equipment Resistance/Repetitions Other comments                                           HEP      Wrist Extensor stretch light    Ice Massage                      Other Therapeutic Activities:    Discussed purpose, risks and benefits of PT with pt who is agreeable to POC. Home Exercise Program:    Instructed patient on an HEP. Patient demonstrated exercises correctly. Handout with pictures and # of reps/sets was given. Exercises are listed above.     Manual Treatments:    DTM to R lateral epicondyle and extensors,pronators  Ice Massage x 4 min to R lateral epicondyle  K tape to R wrist extensors    Modalities:    US: 1.3 w/cm2 x 7 min, 20%

## 2019-04-25 ENCOUNTER — HOSPITAL ENCOUNTER (OUTPATIENT)
Dept: PHYSICAL THERAPY | Age: 59
Setting detail: THERAPIES SERIES
Discharge: HOME | End: 2019-04-25
Payer: COMMERCIAL

## 2019-04-25 PROCEDURE — 97035 APP MDLTY 1+ULTRASOUND EA 15: CPT

## 2019-04-25 PROCEDURE — 97140 MANUAL THERAPY 1/> REGIONS: CPT

## 2019-05-01 ENCOUNTER — OFFICE VISIT (OUTPATIENT)
Dept: ORTHOPEDIC SURGERY | Age: 59
End: 2019-05-01
Payer: COMMERCIAL

## 2019-05-01 ENCOUNTER — HOSPITAL ENCOUNTER (OUTPATIENT)
Dept: PHYSICAL THERAPY | Age: 59
Setting detail: THERAPIES SERIES
Discharge: HOME | End: 2019-05-01
Payer: COMMERCIAL

## 2019-05-01 VITALS
BODY MASS INDEX: 25.49 KG/M2 | SYSTOLIC BLOOD PRESSURE: 134 MMHG | DIASTOLIC BLOOD PRESSURE: 94 MMHG | HEART RATE: 82 BPM | WEIGHT: 135 LBS | HEIGHT: 61 IN

## 2019-05-01 DIAGNOSIS — M77.11 RIGHT LATERAL EPICONDYLITIS: Primary | ICD-10-CM

## 2019-05-01 PROCEDURE — 6360000002 HC RX W HCPCS

## 2019-05-01 PROCEDURE — 99213 OFFICE O/P EST LOW 20 MIN: CPT | Performed by: ORTHOPAEDIC SURGERY

## 2019-05-01 PROCEDURE — 97033 APP MDLTY 1+IONTPHRSIS EA 15: CPT

## 2019-05-01 PROCEDURE — 97140 MANUAL THERAPY 1/> REGIONS: CPT

## 2019-05-01 PROCEDURE — 97035 APP MDLTY 1+ULTRASOUND EA 15: CPT

## 2019-05-01 RX ORDER — MELOXICAM 15 MG/1
15 TABLET ORAL DAILY
Qty: 30 TABLET | Refills: 1 | Status: SHIPPED | OUTPATIENT
Start: 2019-05-01 | End: 2019-05-22 | Stop reason: SDUPTHER

## 2019-05-01 NOTE — PROGRESS NOTES
Geeta Antonio  [id number]  May 1, 2019    Chief Complaint   Patient presents with    Elbow Injury     [facility] 2 week FULast injection 03/25/2019         History: The patient is here follow-up regarding her right elbow. The patient tells me she is continue taking the meloxicam.  She has continued wearing the tennis elbow strap. She reports she continues working with therapy. She reports the last injection 5 weeks ago provided mild relief. She did note improvement in her symptoms after she took a week off work. She reports her symptoms return when she return to work. She has continued working light duty status. She tells me 3 weeks ago a patient fell onto her right arm/shoulder. She feels this increased her pain. She rates her pain 2/10 at rest today. 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  Again this is a Worker's Compensation case. BP (!) 134/94   Pulse 82   Ht 5' 1\" (1.549 m)   Wt 135 lb (61.2 kg)   LMP 04/30/2010   BMI 25.51 kg/m²   Pertinent items are noted in HPI. Review of systems reviewed from Patient History Form dated on 11/15/18 and available in the patient's chart under the Media tab. Physical:  Ms. Geeta Antonio appears well. She is in no apparent distress. She is alert and oriented to person, place and time. She displays appropriate mood and affect. Tenderness to palpation located: lateral epicondyle tenderness. Range of motion: from 0 extension to 145 flexion. Instability with varus stressing: No.   Instability with valgus stressing: No.   Pain with resisted wrist extension: Yes. Pain with resisted wrist flexion: No.  Examination reveals full range of motion to the shoulders and wrists. she is non tender about the wrist. The patient is neurovascularly intact distally. Sensation is grossly intact to light touch. Radial and ulnar pulses are palpable and 2+. The hand shows brisk capillary refill. The patient is able to wiggle all digits.  The skin reveals no lesions or ulcerations. There is no associated ecchymosis. Impression:  #1 right elbow lateral epicondylitis     Plan: At this time, the patient will continue light duty position for the next 4 weeks. She will continue with her meloxicam and her home exercises. She will continue with activity modification. We will include iontophoresis for physical therapy. She'll follow-up with us in 4 weeks. If she has significant pain we can consider repeat injection at that time. If still symptomatic can also consider dry needling at [facility].

## 2019-05-01 NOTE — LETTER
[facility]  [address]  [address] [facility] [address]  Phone: [phone]  Fax: [phone]    Iván Tellez MD        May 1, 2019     Patient: April Mendoza   YOB: 1960   Date of Visit: 5/1/2019       To Whom It May Concern: It is my medical opinion that April Mendoza may return to light duty immediately with the following restrictions: no lifting, pushing, or pulling with the right upper extremity until 6/3/19. If you have any questions or concerns, please don't hesitate to call.     Sincerely,          Iván Tellez MD

## 2019-05-01 NOTE — FLOWSHEET NOTE
Physical Therapy Daily Treatment Note  Date:  2019    Patient Name:  Bandar Mayes    :  1960  MRN: [id number]  Restrictions/Precautions:    Pertinent Medical History: B CTS  Medical/Treatment Diagnosis Information:  · Diagnosis: Rt elbow  · Treatment Diagnosis: Pain. Decreased R wrist strength and AROM  Insurance/Certification information:  PT Insurance Information: BWC- C9 thru 19  Physician Information:  Referring Practitioner: Dr. 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 States she was off for a vacation a few weeks ago and the rest helped it. States she worked this past weekend and her arm is hurting again. Pain is 2-4/10. Aching in her R forearm. 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 Subjective:    19 Patient reports elbow and wrist has been sore. 19: Pt states overall she might be a little better. Not as sore today. States on  she was having a lot of pain in her elbow. 19: Pt states she is doing a little better overall, pain has localized more to the bone rather than the forearm.  Saw the MD and he wants her to start iontophoresis    Objective:  Observation:   Test measurements:      Exercises:  Exercise/Equipment Resistance/Repetitions Other comments                                           HEP     Wrist Extensor stretch light    Ice Massage                      Other Therapeutic Activities:      Home Exercise Program:      Manual Treatments:    DTM to R lateral epicondyle and extensors, pronators  Ice Massage x 4 min to R lateral epicondyle  K tape to R wrist extensors    Modalities:    US: 1.3 w/cm 2 x 7 min, 20% to R lateral epicondyle  Iontophoresis x 20 min to R lateral epicondyle- 2 Ma hybresis    Timed Code Treatment Minutes:  45    Total Treatment Minutes:  67    Assessment  [x] Patient tolerated treatment well [] Patient limited by fatigue  [] Patient limited by pain  [] Patient limited by other medical complications  [] Other: Pt still has pain and inflammation in lateral epicondyle with palpation        Prognosis: [x] Good [x] Fair  [] Poor    Patient Requires Follow-up: [x] Yes  [] No    Plan:   [x] Continue per plan of care [] Alter current plan (see comments)  [] Plan of care initiated [] Hold pending MD visit [] Discharge  Plan for Next Session:    Continue iontophoresis  Continue DTM, Ice, US to R lateral epicondyle  Add massage tool if needed  Light stretching  Refer for dry needling if pt is not improving    Electronically signed by:  Balwinder Barrientos PT

## 2019-05-03 ENCOUNTER — HOSPITAL ENCOUNTER (OUTPATIENT)
Dept: PHYSICAL THERAPY | Age: 59
Setting detail: THERAPIES SERIES
Discharge: HOME | End: 2019-05-03
Payer: COMMERCIAL

## 2019-05-03 PROCEDURE — 97035 APP MDLTY 1+ULTRASOUND EA 15: CPT

## 2019-05-03 PROCEDURE — 97033 APP MDLTY 1+IONTPHRSIS EA 15: CPT

## 2019-05-03 PROCEDURE — 97140 MANUAL THERAPY 1/> REGIONS: CPT

## 2019-05-03 NOTE — FLOWSHEET NOTE
Physical Therapy Daily Treatment Note  Date:  5/3/2019    Patient Name:  Izaiah Reagan    :  1960  MRN: [id number]  Restrictions/Precautions:    Pertinent Medical History: B CTS  Medical/Treatment Diagnosis Information:  · Diagnosis: Rt elbow  · Treatment Diagnosis: Pain. Decreased R wrist strength and AROM  Insurance/Certification information:  PT Insurance Information: BWC- C9 thru 19  Physician Information:  Referring Practitioner: Dr. 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 States she was off for a vacation a few weeks ago and the rest helped it. States she worked this past weekend and her arm is hurting again. Pain is 2-4/10. Aching in her R forearm. 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 Subjective:    19 Patient reports elbow and wrist has been sore. 19: Pt states overall she might be a little better. Not as sore today. States on  she was having a lot of pain in her elbow. 19: Pt states she is doing a little better overall, pain has localized more to the bone rather than the forearm. Saw the MD and he wants her to start iontophoresis  19: Patient reports soreness is still localized is in elbow.     Objective:  Observation:   Test measurements:      Exercises:  Exercise/Equipment Resistance/Repetitions Other comments                                           HEP     Wrist Extensor stretch light    Ice Massage                      Other Therapeutic Activities:      Home Exercise Program:      Manual Treatments:    DTM to R lateral epicondyle

## 2019-05-06 ENCOUNTER — HOSPITAL ENCOUNTER (OUTPATIENT)
Dept: PHYSICAL THERAPY | Age: 59
Setting detail: THERAPIES SERIES
Discharge: HOME | End: 2019-05-06
Payer: COMMERCIAL

## 2019-05-06 ENCOUNTER — EMPLOYEE WELLNESS (OUTPATIENT)
Dept: OTHER | Age: 59
End: 2019-05-06

## 2019-05-06 LAB
CHOLESTEROL, TOTAL: 248 MG/DL (ref 0–199)
GLUCOSE BLD-MCNC: 97 MG/DL (ref 70–99)
HDLC SERPL-MCNC: 60 MG/DL (ref 40–60)
LDL CHOLESTEROL CALCULATED: 158 MG/DL
TRIGL SERPL-MCNC: 148 MG/DL (ref 0–150)

## 2019-05-06 PROCEDURE — 97140 MANUAL THERAPY 1/> REGIONS: CPT

## 2019-05-06 PROCEDURE — 97033 APP MDLTY 1+IONTPHRSIS EA 15: CPT

## 2019-05-06 PROCEDURE — 97035 APP MDLTY 1+ULTRASOUND EA 15: CPT

## 2019-05-06 NOTE — FLOWSHEET NOTE
Physical Therapy Daily Treatment Note  Date:  2019    Patient Name:  Loretta Cannon    :  1960  MRN: [id number]  Restrictions/Precautions:    Pertinent Medical History: B CTS  Medical/Treatment Diagnosis Information:  · Diagnosis: Rt elbow  · Treatment Diagnosis: Pain. Decreased R wrist strength and AROM  Insurance/Certification information:  PT Insurance Information: [employer]- C9 thru 19  Physician Information:  Referring Practitioner: Dr. 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 She has been on light duty since and has had 3 cortisone injections. States she was off for a vacation a few weeks ago and the rest helped it. States she worked this past weekend and her arm is hurting again. Pain is 2-4/10. Aching in her R forearm. 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 Subjective:    19 Patient reports elbow and wrist has been sore. 19: Pt states overall she might be a little better. Not as sore today. States on  she was having a lot of pain in her elbow. 19: Pt states she is doing a little better overall, pain has localized more to the bone rather than the forearm. Saw the MD and he wants her to start iontophoresis  19: Patient reports soreness is still localized is in elbow.   19: States she has been good other than working , was getting sharp twinges of pain throughout the day    Objective:  Observation:   Test measurements:      Exercises:  Exercise/Equipment Resistance/Repetitions Other comments                                           HEP Wrist Extensor stretch light 4/17   Ice Massage  4/17                    Other Therapeutic Activities:      Home Exercise Program:      Manual Treatments:    DTM to R lateral epicondyle and extensors, pronators    K tape to R wrist extensors    Modalities:    US: 1.3 w/cm 2 x 7 min, 20% to R lateral epicondyle  Iontophoresis x 20 min to R lateral epicondyle- 3 Ma hybresis    Timed Code Treatment Minutes:  35      [facility] TIME CODES    MODALITY                      START TIME   STOP TIME  manual 1:00 1:25   US   1:25 1:34   ionto 1:35 2:00   ICE massage   Total Treatment Minutes:  60    Assessment  [x] Patient tolerated treatment well [] Patient limited by fatigue  [] Patient limited by pain  [] Patient limited by other medical complications  [x] Other: Pt still has pain and inflammation in lateral epicondyle with palpation        Prognosis: [x] Good [x] Fair  [] Poor    Patient Requires Follow-up: [x] Yes  [] No    Plan:   [x] Continue per plan of care [] Alter current plan (see comments)  [] Plan of care initiated [] Hold pending MD visit [] Discharge    Plan for Next Session:    Continue iontophoresis  Continue DTM, Ice, US to R lateral epicondyle  Add massage tool if needed  Light stretching  Refer for dry needling if pt is not improving    Electronically signed by:  Declan Garcia PT

## 2019-05-08 ENCOUNTER — HOSPITAL ENCOUNTER (OUTPATIENT)
Dept: PHYSICAL THERAPY | Age: 59
Setting detail: THERAPIES SERIES
Discharge: HOME | End: 2019-05-08
Payer: COMMERCIAL

## 2019-05-08 PROCEDURE — 97035 APP MDLTY 1+ULTRASOUND EA 15: CPT

## 2019-05-08 PROCEDURE — 97140 MANUAL THERAPY 1/> REGIONS: CPT

## 2019-05-08 PROCEDURE — 97033 APP MDLTY 1+IONTPHRSIS EA 15: CPT

## 2019-05-08 NOTE — FLOWSHEET NOTE
Physical Therapy Daily Treatment Note  Date:  2019    Patient Name:  Jennifer Roberson    :  1960  MRN: [id number]  Restrictions/Precautions:    Pertinent Medical History: B CTS  Medical/Treatment Diagnosis Information:  · Diagnosis: Rt elbow  · Treatment Diagnosis: Pain. Decreased R wrist strength and AROM  Insurance/Certification information:  PT Insurance Information: [employer]- C9 thru 19  Physician Information:  Referring Practitioner: Dr. 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 States she was off for a vacation a few weeks ago and the rest helped it. States she worked this past weekend and her arm is hurting again. Pain is 2-4/10. Aching in her R forearm. 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 Subjective:    19 Patient reports elbow and wrist has been sore. 19: Pt states overall she might be a little better. Not as sore today. States on  she was having a lot of pain in her elbow. 19: Pt states she is doing a little better overall, pain has localized more to the bone rather than the forearm. Saw the MD and he wants her to start iontophoresis  19: Patient reports soreness is still localized is in elbow.   19: States she has been good other than working , was getting sharp twinges of pain throughout the day  19: States busy day at work, pain up to 3/10 at work    Objective:  Observation:   Test measurements:      Exercises:  Exercise/Equipment Resistance/Repetitions Other comments                                           HEP     Wrist Extensor stretch light 4/17   Ice Massage  4/17                    Other Therapeutic Activities:      Home Exercise Program:      Manual Treatments:    DTM to R lateral epicondyle and extensors, pronators    K tape to R wrist extensors    Modalities:    US: 1.3 w/cm 2 x 7 min, 20% to R lateral epicondyle  Iontophoresis x 20 min to R lateral epicondyle- 3 Ma hybresis    Timed Code Treatment Minutes:  35      [facility] TIME CODES    MODALITY                      START TIME   STOP TIME  manual [facility] [id number]   ICE massage   Total Treatment Minutes:  60    Assessment  [x] Patient tolerated treatment well [] Patient limited by fatigue  [] Patient limited by pain  [] Patient limited by other medical complications  [x] Other: Pt still has pain and inflammation in lateral epicondyle with palpation        Prognosis: [x] Good [x] Fair  [] Poor    Patient Requires Follow-up: [x] Yes  [] No    Plan:   [x] Continue per plan of care [] Alter current plan (see comments)  [] Plan of care initiated [] Hold pending MD visit [] Discharge    Plan for Next Session:    Continue iontophoresis  Continue DTM, Ice, US to R lateral epicondyle  Add massage tool if needed  Light stretching  Refer for dry needling if pt is not improving    Electronically signed by:  Isa Rawls PT

## 2019-05-09 ENCOUNTER — HOSPITAL ENCOUNTER (OUTPATIENT)
Dept: PHYSICAL THERAPY | Age: 59
Setting detail: THERAPIES SERIES
Discharge: HOME | End: 2019-05-09
Payer: COMMERCIAL

## 2019-05-09 PROCEDURE — 97033 APP MDLTY 1+IONTPHRSIS EA 15: CPT

## 2019-05-09 PROCEDURE — 97035 APP MDLTY 1+ULTRASOUND EA 15: CPT

## 2019-05-09 PROCEDURE — 97140 MANUAL THERAPY 1/> REGIONS: CPT

## 2019-05-09 NOTE — FLOWSHEET NOTE
Physical Therapy Daily Treatment Note  Date:  2019    Patient Name:  Mario Arriola    :  1960  MRN: [id number]  Restrictions/Precautions:    Pertinent Medical History: B CTS  Medical/Treatment Diagnosis Information:  · Diagnosis: Rt elbow  · Treatment Diagnosis: Pain. Decreased R wrist strength and AROM  Insurance/Certification information:  PT Insurance Information: [employer]- C9 thru 19  Physician Information:  Referring Practitioner: Dr. 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 States she was off for a vacation a few weeks ago and the rest helped it. States she worked this past weekend and her arm is hurting again. Pain is 2-4/10. Aching in her R forearm. 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 Subjective:    19 Patient reports elbow and wrist has been sore. 19: Pt states overall she might be a little better. Not as sore today. States on  she was having a lot of pain in her elbow. 19: Pt states she is doing a little better overall, pain has localized more to the bone rather than the forearm. Saw the MD and he wants her to start iontophoresis  19: Patient reports soreness is still localized is in elbow. 19: States she has been good other than working , was getting sharp twinges of pain throughout the day  19: States busy day at work, pain up to 3/10 at work  19: Patient reports she is more achy today for some reason.     Objective:  Observation:   Test measurements:      Exercises:  Exercise/Equipment Resistance/Repetitions Other comments                                           HEP     Wrist Extensor stretch light 4/17   Ice Massage  4/17                    Other Therapeutic Activities:      Home Exercise Program:      Manual Treatments:    DTM to R lateral epicondyle and extensors, pronatorsIASTM to the same.   ce Massage x 4 min to R lateral epicondyle,medicupping 5 min  K tape to R wrist extensors    Modalities:    US: 1.3 w/cm 2 x 7 min, 20% to R lateral epicondyle  Iontophoresis x 20 min to R lateral epicondyle- 2 Ma hybresis    Timed Code Treatment Minutes:  40      [facility] TIME CODES    MODALITY                      START TIME   STOP TIME  manual 1200 1220   US  1220 1227   ionto 1230 1250   ICE massage  medicupping 1250  [id number] 51:00  Total Treatment Minutes:  60    Assessment  [x] Patient tolerated treatment well [] Patient limited by fatigue  [] Patient limited by pain  [] Patient limited by other medical complications  [x] Other: Pt still has pain and inflammation in lateral epicondyle with palpation        Prognosis: [x] Good [x] Fair  [] Poor    Patient Requires Follow-up: [x] Yes  [] No    Plan:   [x] Continue per plan of care [] Alter current plan (see comments)  [] Plan of care initiated [] Hold pending MD visit [] Discharge    Plan for Next Session:    Continue iontophoresis  Continue DTM, Ice, US to R lateral epicondyle  Add massage tool if needed  Light stretching  Refer for dry needling if pt is not improving    Electronically signed by:  Vicki Paez PTA

## 2019-05-13 VITALS — WEIGHT: 132 LBS | BODY MASS INDEX: 24.94 KG/M2

## 2019-05-15 ENCOUNTER — HOSPITAL ENCOUNTER (OUTPATIENT)
Dept: PHYSICAL THERAPY | Age: 59
Setting detail: THERAPIES SERIES
Discharge: HOME | End: 2019-05-15
Payer: COMMERCIAL

## 2019-05-15 PROCEDURE — 97033 APP MDLTY 1+IONTPHRSIS EA 15: CPT

## 2019-05-15 PROCEDURE — 97035 APP MDLTY 1+ULTRASOUND EA 15: CPT

## 2019-05-15 PROCEDURE — 97140 MANUAL THERAPY 1/> REGIONS: CPT

## 2019-05-15 NOTE — FLOWSHEET NOTE
Physical Therapy Daily Treatment Note  Date:  5/15/2019    Patient Name:  Tony Sun    :  1960  MRN: [id number]  Restrictions/Precautions:    Pertinent Medical History: B CTS  Medical/Treatment Diagnosis Information:  · Diagnosis: Rt elbow  · Treatment Diagnosis: Pain. Decreased R wrist strength and AROM  Insurance/Certification information:  PT Insurance Information: [employer]WC- C9 thru 19  Physician Information:  Referring Practitioner: Dr. 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 States she was off for a vacation a few weeks ago and the rest helped it. States she worked this past weekend and her arm is hurting again. Pain is 2-4/10. Aching in her R forearm. 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 Subjective:    19 Patient reports elbow and wrist has been sore. 19: Pt states overall she might be a little better. Not as sore today. States on  she was having a lot of pain in her elbow. 19: Pt states she is doing a little better overall, pain has localized more to the bone rather than the forearm. Saw the MD and he wants her to start iontophoresis  19: Patient reports soreness is still localized is in elbow. 19: States she has been good other than working , was getting sharp twinges of pain throughout the day  19: States busy day at work, pain up to 3/10 at work  19: Patient reports she is more achy today for some reason.   05/15/19: Patient reports she has been more sore over the weekend pain was more constant too for some reason. States today is not as bad,back to usual soreness. Objective:  Observation:   Test measurements:      Exercises:  Exercise/Equipment Resistance/Repetitions Other comments                                           HEP     Wrist Extensor stretch light 4/17   Ice Massage  4/17                    Other Therapeutic Activities:      Home Exercise Program:      Manual Treatments:    DTM to R lateral epicondyle and extensors, pronatorsIASTM to the same.   ce Massage x 4 min to R lateral epicondyle,  K tape to R wrist extensors    Modalities:    US: 1.3 w/cm 2 x 7 min, 20% to R lateral epicondyle  Iontophoresis x 20 min to R lateral epicondyle- 2 Ma hybresis    Timed Code Treatment Minutes:  40      [facility] TIME CODES    MODALITY                      START TIME   STOP TIME  manual 1200 1220   US  1220 1227   ionto 1230 1250   ICE massage  medicupping 1250  12:5 5  Total Treatment Minutes:  55    Assessment  [x] Patient tolerated treatment well [] Patient limited by fatigue  [] Patient limited by pain  [] Patient limited by other medical complications  [x] Other: Pt still has pain and inflammation in lateral epicondyle with palpation        Prognosis: [x] Good [x] Fair  [] Poor    Patient Requires Follow-up: [x] Yes  [] No    Plan:   [x] Continue per plan of care [] Alter current plan (see comments)  [] Plan of care initiated [] Hold pending MD visit [] Discharge    Plan for Next Session:    Continue iontophoresis  Continue DTM, Ice, US to R lateral epicondyle  Add massage tool if needed  Light stretching  Refer for dry needling if pt is not improving    Electronically signed by:  Frank Panchal PTA

## 2019-05-16 ENCOUNTER — HOSPITAL ENCOUNTER (OUTPATIENT)
Dept: PHYSICAL THERAPY | Age: 59
Setting detail: THERAPIES SERIES
Discharge: HOME | End: 2019-05-16
Payer: COMMERCIAL

## 2019-05-16 PROCEDURE — 97140 MANUAL THERAPY 1/> REGIONS: CPT

## 2019-05-16 PROCEDURE — 97035 APP MDLTY 1+ULTRASOUND EA 15: CPT

## 2019-05-16 PROCEDURE — 97033 APP MDLTY 1+IONTPHRSIS EA 15: CPT

## 2019-05-16 NOTE — FLOWSHEET NOTE
Physical Therapy Daily Treatment Note  Date:  2019    Patient Name:  Marisol Crespo    :  1960  MRN: [id number]  Restrictions/Precautions:    Pertinent Medical History: B CTS  Medical/Treatment Diagnosis Information:  · Diagnosis: Rt elbow  · Treatment Diagnosis: Pain. Decreased R wrist strength and AROM  Insurance/Certification information:  PT Insurance Information: [employer]WC- C9 thru 19  Physician Information:  Referring Practitioner: Dr. 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 States she was off for a vacation a few weeks ago and the rest helped it. States she worked this past weekend and her arm is hurting again. Pain is 2-4/10. Aching in her R forearm. 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 Subjective:    19 Patient reports elbow and wrist has been sore. 19: Pt states overall she might be a little better. Not as sore today. States on  she was having a lot of pain in her elbow. 19: Pt states she is doing a little better overall, pain has localized more to the bone rather than the forearm. Saw the MD and he wants her to start iontophoresis  19: Patient reports soreness is still localized is in elbow. 19: States she has been good other than working , was getting sharp twinges of pain throughout the day  19: States busy day at work, pain up to 3/10 at work  19: Patient reports she is more achy today for some reason.   05/15/19: Patient reports she has been more sore over the weekend pain was more constant too for some reason. States today is not as bad,back to usual soreness. 05/16/19: Patient reports elbow is not as sore today,was able to sleep a little better last night. Objective:  Observation:   Test measurements:      Exercises:  Exercise/Equipment Resistance/Repetitions Other comments                                           HEP     Wrist Extensor stretch light 4/17   Ice Massage  4/17                    Other Therapeutic Activities:      Home Exercise Program:      Manual Treatments:    DTM to R lateral epicondyle and extensors, pronatorsIASTM to the same.   ce Massage x 4 min to R lateral epicondyle,  K tape to R wrist extensors    Modalities:    US: 1.3 w/cm 2 x 7 min, 20% to R lateral epicondyle  Iontophoresis x 20 min to R lateral epicondyle- 2 Ma hybresis    Timed Code Treatment Minutes:  40      [facility] TIME CODES    MODALITY                      START TIME   STOP TIME  manual 1200 1220   US  1220 1227   ionto 1230 1250   ICE massage  medicupping 1250  12:5 5  Total Treatment Minutes:  55    Assessment  [x] Patient tolerated treatment well [] Patient limited by fatigue  [] Patient limited by pain  [] Patient limited by other medical complications  [x] Other: Pt still has pain and inflammation in lateral epicondyle with palpation        Prognosis: [x] Good [x] Fair  [] Poor    Patient Requires Follow-up: [x] Yes  [] No    Plan:   [x] Continue per plan of care [] Alter current plan (see comments)  [] Plan of care initiated [] Hold pending MD visit [] Discharge    Plan for Next Session:    Continue iontophoresis  Continue DTM, Ice, US to R lateral epicondyle  Add massage tool if needed  Light stretching  Refer for dry needling if pt is not improving    Electronically signed by:  Shanthi Marrero PTA

## 2019-05-20 ENCOUNTER — TELEPHONE (OUTPATIENT)
Dept: INTERNAL MEDICINE CLINIC | Age: 59
End: 2019-05-20

## 2019-05-20 NOTE — TELEPHONE ENCOUNTER
JUST AN FYI. I'M having  WORKMANS COM. COVERAGE FROM INJURY AT WORK FROM NOV. 2018. Tennis elbow rt. arm. See notes and PT tx.'s. Workmans comp. is going to ask for records from you about. this. I've not seen you or told you about this till now. Didn't want you surprised by the inquiry.  PST

## 2019-05-20 NOTE — TELEPHONE ENCOUNTER
Tell her thanks for letting us know and we are happy to do anything she needs -and get better soon!!!

## 2019-05-22 ENCOUNTER — HOSPITAL ENCOUNTER (OUTPATIENT)
Dept: PHYSICAL THERAPY | Age: 59
Setting detail: THERAPIES SERIES
Discharge: HOME | End: 2019-05-22
Payer: COMMERCIAL

## 2019-05-22 PROCEDURE — 97140 MANUAL THERAPY 1/> REGIONS: CPT

## 2019-05-22 PROCEDURE — 97033 APP MDLTY 1+IONTPHRSIS EA 15: CPT

## 2019-05-22 PROCEDURE — 97035 APP MDLTY 1+ULTRASOUND EA 15: CPT

## 2019-05-22 NOTE — FLOWSHEET NOTE
Physical Therapy Daily Treatment Note  Date:  2019    Patient Name:  Izaiah Reagan    :  1960  MRN: [id number]  Restrictions/Precautions:    Pertinent Medical History: B CTS  Medical/Treatment Diagnosis Information:  · Diagnosis: Rt elbow  · Treatment Diagnosis: Pain. Decreased R wrist strength and AROM  Insurance/Certification information:  PT Insurance Information: BWC- C9 thru 19  Physician Information:  Referring Practitioner: Dr. 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 States she was off for a vacation a few weeks ago and the rest helped it. States she worked this past weekend and her arm is hurting again. Pain is 2-4/10. Aching in her R forearm. 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 Subjective:    19 Patient reports elbow and wrist has been sore. 19: Pt states overall she might be a little better. Not as sore today. States on  she was having a lot of pain in her elbow. 19: Pt states she is doing a little better overall, pain has localized more to the bone rather than the forearm. Saw the MD and he wants her to start iontophoresis  19: Patient reports soreness is still localized is in elbow. 19: States she has been good other than working , was getting sharp twinges of pain throughout the day  19: States busy day at work, pain up to 3/10 at work  19: Patient reports she is more achy today for some reason.   05/15/19: Patient reports she has been more sore over the weekend pain was more constant too for some reason. States today is not as bad,back to usual soreness. 05/16/19: Patient reports elbow is not as sore today,was able to sleep a little better last night.  5/23/19: Pt states she is doing a little better, not much pain the past few days, just tightness    Objective:  Observation:   Test measurements:      Exercises:  Exercise/Equipment Resistance/Repetitions Other comments                                           HEP     Wrist Extensor stretch light 4/17   Ice Massage  4/17                    Other Therapeutic Activities:      Home Exercise Program:      Manual Treatments:    DTM to R lateral epicondyle and extensors, pronatorsIASTM to the same.   ce Massage x 4 min to R lateral epicondyle,  K tape to R wrist extensors    Modalities:    US: 1.3 w/cm 2 x 7 min, 20% to R lateral epicondyle  Iontophoresis x 20 min to R lateral epicondyle- 2 Ma hybresis    Timed Code Treatment Minutes:  39      [facility] TIME CODES    MODALITY                      START TIME   STOP TIME  manual 1200 1230   US  1230 1240   ionto 1240 1250   ICE massage  medicupping 1250  12:59  Total Treatment Minutes:  59    Assessment  [x] Patient tolerated treatment well [] Patient limited by fatigue  [] Patient limited by pain  [] Patient limited by other medical complications  [x] Other: Pt still has pain and inflammation in lateral epicondyle with palpation but is improving        Prognosis: [x] Good [x] Fair  [] Poor    Patient Requires Follow-up: [x] Yes  [] No    Plan:   [x] Continue per plan of care [] Alter current plan (see comments)  [] Plan of care initiated [] Hold pending MD visit [] Discharge    Plan for Next Session:    Continue iontophoresis  Continue DTM, Ice, US to R lateral epicondyle  Add massage tool if needed  Light stretching  Refer for dry needling if pt is not improving    Electronically signed by:  Hemant Segal, PT

## 2019-05-23 RX ORDER — MELOXICAM 15 MG/1
TABLET ORAL
Qty: 30 TABLET | Refills: 1 | Status: SHIPPED | OUTPATIENT
Start: 2019-05-23 | End: 2019-07-25 | Stop reason: SDUPTHER

## 2019-05-24 ENCOUNTER — HOSPITAL ENCOUNTER (OUTPATIENT)
Dept: PHYSICAL THERAPY | Age: 59
Setting detail: THERAPIES SERIES
Discharge: HOME | End: 2019-05-24
Payer: COMMERCIAL

## 2019-05-24 PROCEDURE — 97033 APP MDLTY 1+IONTPHRSIS EA 15: CPT

## 2019-05-24 PROCEDURE — 97140 MANUAL THERAPY 1/> REGIONS: CPT

## 2019-05-24 PROCEDURE — 97035 APP MDLTY 1+ULTRASOUND EA 15: CPT

## 2019-05-24 NOTE — FLOWSHEET NOTE
Physical Therapy Daily Treatment Note  Date:  2019    Patient Name:  Junior Norman    :  1960  MRN: [id number]  Restrictions/Precautions:    Pertinent Medical History: B CTS  Medical/Treatment Diagnosis Information:  · Diagnosis: Rt elbow  · Treatment Diagnosis: Pain. Decreased R wrist strength and AROM  Insurance/Certification information:  PT Insurance Information: [employer]WC- C9 thru 19  Physician Information:  Referring Practitioner: Dr. 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 She has been on light duty since and has had 3 cortisone injections. States she was off for a vacation a few weeks ago and the rest helped it. States she worked this past weekend and her arm is hurting again. Pain is 2-4/10. Aching in her R forearm. 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 Subjective:    19 Patient reports elbow and wrist has been sore. 19: Pt states overall she might be a little better. Not as sore today. States on  she was having a lot of pain in her elbow. 19: Pt states she is doing a little better overall, pain has localized more to the bone rather than the forearm. Saw the MD and he wants her to start iontophoresis  19: Patient reports soreness is still localized is in elbow. 19: States she has been good other than working , was getting sharp twinges of pain throughout the day  19: States busy day at work, pain up to 3/10 at work  19: Patient reports she is more achy today for some reason.   05/15/19: Patient reports she has been more sore over the weekend pain was more constant too for some reason. States today is not as bad,back to usual soreness. 05/16/19: Patient reports elbow is not as sore today,was able to sleep a little better last night.  5/23/19: Pt states she is doing a little better, not much pain the past few days, just tightness  05/24/19: Patient reports elbow just tight,pain intensity is decreasing. Objective:  Observation:   Test measurements:      Exercises:  Exercise/Equipment Resistance/Repetitions Other comments                                           HEP     Wrist Extensor stretch light 4/17   Ice Massage  4/17                    Other Therapeutic Activities:      Home Exercise Program:      Manual Treatments:    DTM to R lateral epicondyle and extensors, pronatorsIASTM to the same.   ce Massage x 4 min to R lateral epicondyle,  K tape to R wrist extensors    Modalities:    US: 1.3 w/cm 2 x 7 min, 20% to R lateral epicondyle  Iontophoresis x 20 min to R lateral epicondyle- 2 Ma hybresis    Timed Code Treatment Minutes:  39      [facility] TIME CODES    MODALITY                      START TIME   STOP TIME  manual 1200 1230   US  1230 1240   ionto 1240 1250   ICE massage  medicupping 1250  12:59  Total Treatment Minutes:  59    Assessment  [x] Patient tolerated treatment well [] Patient limited by fatigue  [] Patient limited by pain  [] Patient limited by other medical complications  [x] Other: Pt still has pain and inflammation in lateral epicondyle with palpation but is improving        Prognosis: [x] Good [x] Fair  [] Poor    Patient Requires Follow-up: [x] Yes  [] No    Plan:   [x] Continue per plan of care [] Alter current plan (see comments)  [] Plan of care initiated [] Hold pending MD visit [] Discharge    Plan for Next Session:    Continue iontophoresis  Continue DTM, Ice, US to R lateral epicondyle  Add massage tool if needed  Light stretching  Refer for dry needling if pt is not improving    Electronically signed by:  Dionicio Resendiz, PTA

## 2019-05-29 ENCOUNTER — HOSPITAL ENCOUNTER (OUTPATIENT)
Dept: PHYSICAL THERAPY | Age: 59
Setting detail: THERAPIES SERIES
Discharge: HOME | End: 2019-05-29
Payer: COMMERCIAL

## 2019-05-29 ENCOUNTER — OFFICE VISIT (OUTPATIENT)
Dept: ORTHOPEDIC SURGERY | Age: 59
End: 2019-05-29
Payer: COMMERCIAL

## 2019-05-29 VITALS
SYSTOLIC BLOOD PRESSURE: 142 MMHG | HEIGHT: 61 IN | WEIGHT: 135 LBS | DIASTOLIC BLOOD PRESSURE: 94 MMHG | BODY MASS INDEX: 25.49 KG/M2 | HEART RATE: 76 BPM

## 2019-05-29 DIAGNOSIS — M77.11 LATERAL EPICONDYLITIS OF RIGHT ELBOW: Primary | ICD-10-CM

## 2019-05-29 PROCEDURE — 97033 APP MDLTY 1+IONTPHRSIS EA 15: CPT

## 2019-05-29 PROCEDURE — 99213 OFFICE O/P EST LOW 20 MIN: CPT | Performed by: ORTHOPAEDIC SURGERY

## 2019-05-29 PROCEDURE — 97140 MANUAL THERAPY 1/> REGIONS: CPT

## 2019-05-29 NOTE — PROGRESS NOTES
Teresa Palomares  [id number]  May 29, 2019    Chief Complaint   Patient presents with    Elbow Pain     right elbow pain         History: The patient is here follow-up regarding her right elbow. The patient continues taking the meloxicam.  She notes significant improvement with her therapy since her last visit. She is receiving iontophoresis with are sessions. She continues to work light duty. 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 BP (!) 142/94   Pulse 76   Ht 5' 1\" (1.549 m)   Wt 135 lb (61.2 kg)   LMP 04/30/2010   BMI 25.51 kg/m²   Pertinent items are noted in HPI. Review of systems reviewed from Patient History Form dated on 11/15/18 and available in the patient's chart under the Media tab. Physical:  Ms. Teresa Palomares appears well. She is in no apparent distress. She is alert and oriented to person, place and time. She displays appropriate mood and affect. Tenderness to palpation located: lateral epicondyle tenderness. Range of motion: from 0 extension to 145 flexion. Instability with varus stressing: No.   Instability with valgus stressing: No.   Pain with resisted wrist extension: Yes. Pain with resisted wrist flexion: No.  Examination reveals full range of motion to the shoulders and wrists. she is non tender about the wrist. The patient is neurovascularly intact distally. Sensation is grossly intact to light touch. Radial and ulnar pulses are palpable and 2+. The hand shows brisk capillary refill. The patient is able to wiggle all digits. The skin reveals no lesions or ulcerations. There is no associated ecchymosis. Impression:  #1 right elbow lateral epicondylitis     Plan: At this time, we will continue our current treatment. The patient will continue with light duty work as a nurse at [employer].  We will limit her 15 pound weight restriction.   She was instructed to continue taking the meloxicam.  We will continue with the therapy. She will continue iontophoresis during her therapy sessions. We will hold off dry needling. We plan on getting her back to full duty work after next visit.

## 2019-05-29 NOTE — LETTER
[facility]  [address]  [address] [place] [address] [address]  Phone: [phone]  Fax: [phone]    Javan Velarde MD        May 29, 2019     Patient: Junior Norman   YOB: 1960   Date of Visit: 5/29/2019       To Whom It May Concern: It is my medical opinion that Junior Norman may return to light duty immediately with the following restrictions: 15 lb weight restriction with the right upper extremity. If you have any questions or concerns, please don't hesitate to call.     Sincerely,          Javan Velarde MD

## 2019-05-29 NOTE — FLOWSHEET NOTE
Physical Therapy Daily Treatment Note  Date:  2019    Patient Name:  Jayson Mohamud    :  1960  MRN: [id number]  Restrictions/Precautions:    Pertinent Medical History: B CTS  Medical/Treatment Diagnosis Information:  · Diagnosis: Rt elbow  · Treatment Diagnosis: Pain. Decreased R wrist strength and AROM  Insurance/Certification information:  PT Insurance Information: BWC- C9 thru 19  Physician Information:  Referring Practitioner: Dr. 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 States she was off for a vacation a few weeks ago and the rest helped it. States she worked this past weekend and her arm is hurting again. Pain is 2-4/10. Aching in her R forearm. 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 Subjective:    19 Patient reports elbow and wrist has been sore. 19: Pt states overall she might be a little better. Not as sore today. States on  she was having a lot of pain in her elbow. 19: Pt states she is doing a little better overall, pain has localized more to the bone rather than the forearm. Saw the MD and he wants her to start iontophoresis  19: Patient reports soreness is still localized is in elbow. 19: States she has been good other than working , was getting sharp twinges of pain throughout the day  19: States busy day at work, pain up to 3/10 at work  19: Patient reports she is more achy today for some reason.   05/15/19: Patient reports she has been more sore over the weekend pain was more constant too for some reason. States today is not as bad,back to usual soreness. 05/16/19: Patient reports elbow is not as sore today,was able to sleep a little better last night.  5/23/19: Pt states she is doing a little better, not much pain the past few days, just tightness  05/24/19: Patient reports elbow just tight,pain intensity is decreasing. 5/29/19: States she is continuing to do a little better overall. Just saw the MD and he kept her on light duty    Objective:  Observation:   Test measurements:      Exercises:  Exercise/Equipment Resistance/Repetitions Other comments                                           HEP     Wrist Extensor stretch light 4/17   Ice Massage  4/17                    Other Therapeutic Activities:      Home Exercise Program:      Manual Treatments:    DTM to R lateral epicondyle and extensors, pronatorsIASTM to the same.    R elbow distraction mobilization  ce Massage x 4 min to R lateral epicondyle,  K tape to R wrist extensors    Modalities:      Iontophoresis x 20 min to R lateral epicondyle- 2 Ma hybresis    Timed Code Treatment Minutes:  40      [facility] TIME CODES    MODALITY                      START TIME   STOP TIME  manual 1200 1230   US  ionto 1240 100   ICE massage  medicupping [id number]  5738  Total Treatment Minutes:  60    Assessment  [x] Patient tolerated treatment well [] Patient limited by fatigue  [] Patient limited by pain  [] Patient limited by other medical complications  [x] Other: Pt still has pain and inflammation in lateral epicondyle with palpation but is improving        Prognosis: [x] Good [x] Fair  [] Poor    Patient Requires Follow-up: [x] Yes  [] No    Plan:   [x] Continue per plan of care [] Alter current plan (see comments)  [] Plan of care initiated [] Hold pending MD visit [] Discharge    Plan for Next Session:    Continue iontophoresis  Continue DTM, Ice, US to R lateral epicondyle  Add massage tool if needed  Light stretching  Refer for dry needling if pt is not improving    Electronically signed by:  Kaylyn Dance, PT

## 2019-05-30 ENCOUNTER — HOSPITAL ENCOUNTER (OUTPATIENT)
Dept: PHYSICAL THERAPY | Age: 59
Setting detail: THERAPIES SERIES
Discharge: HOME | End: 2019-05-30
Payer: COMMERCIAL

## 2019-05-30 PROCEDURE — 97140 MANUAL THERAPY 1/> REGIONS: CPT

## 2019-05-30 PROCEDURE — 97035 APP MDLTY 1+ULTRASOUND EA 15: CPT

## 2019-05-30 PROCEDURE — 97033 APP MDLTY 1+IONTPHRSIS EA 15: CPT

## 2019-05-30 NOTE — FLOWSHEET NOTE
the weekend pain was more constant too for some reason. States today is not as bad,back to usual soreness. 05/16/19: Patient reports elbow is not as sore today,was able to sleep a little better last night.  5/23/19: Pt states she is doing a little better, not much pain the past few days, just tightness  05/24/19: Patient reports elbow just tight,pain intensity is decreasing. 5/29/19: States she is continuing to do a little better overall. Just saw the MD and he kept her on light duty  5/30/19: Pt states she is a little more stiff and sore today. Seeing the MD today    Objective:  Observation:   Test measurements:      Exercises:  Exercise/Equipment Resistance/Repetitions Other comments                                           HEP     Wrist Extensor stretch light 4/17   Ice Massage  4/17                    Other Therapeutic Activities:      Home Exercise Program:      Manual Treatments:    DTM to R lateral epicondyle and extensors, pronatorsIASTM to the same.    R elbow distraction mobilization      Modalities:    US: 1.3 w/cm 2 x 7 min, 20% to R lateral epicondyle  Iontophoresis x 20 min to R lateral epicondyle- 2 Ma hybresis    Timed Code Treatment Minutes:  40      [facility] TIME CODES    MODALITY                      START TIME   STOP TIME  manual 1232 1257   US  1257 107   ionto 110 130   ICE massage  medicupping   Total Treatment Minutes:  58    Assessment  [x] Patient tolerated treatment well [] Patient limited by fatigue  [] Patient limited by pain  [] Patient limited by other medical complications  [x] Other: Pt still has pain and inflammation in lateral epicondyle with palpation but is improving        Prognosis: [x] Good [x] Fair  [] Poor    Patient Requires Follow-up: [x] Yes  [] No    Plan:   [x] Continue per plan of care [] Alter current plan (see comments)  [] Plan of care initiated [] Hold pending MD visit [] Discharge    Plan for Next Session:    Continue iontophoresis  Continue DTM, Ice, US to R lateral epicondyle  Add massage tool if needed  Light stretching  Refer for dry needling if pt is not improving    Electronically signed by:  Flavia De Jesus PT

## 2019-05-31 ENCOUNTER — TELEPHONE (OUTPATIENT)
Dept: ORTHOPEDIC SURGERY | Age: 59
End: 2019-05-31

## 2019-05-31 NOTE — TELEPHONE ENCOUNTER
I spoke with patient she said that the forms I have were wrong and that they will fax the correct forms today at [place]. I told her that I was at the [place] office today and I will check the faxes on Monday to see about the forms.  I will call her on Monday If I have other questions

## 2019-06-03 ENCOUNTER — HOSPITAL ENCOUNTER (OUTPATIENT)
Dept: PHYSICAL THERAPY | Age: 59
Setting detail: THERAPIES SERIES
Discharge: HOME | End: 2019-06-03
Payer: COMMERCIAL

## 2019-06-03 PROCEDURE — 97033 APP MDLTY 1+IONTPHRSIS EA 15: CPT

## 2019-06-03 PROCEDURE — 97140 MANUAL THERAPY 1/> REGIONS: CPT

## 2019-06-03 PROCEDURE — 97035 APP MDLTY 1+ULTRASOUND EA 15: CPT

## 2019-06-03 NOTE — FLOWSHEET NOTE
Physical Therapy Daily Treatment Note  Date:  6/3/2019    Patient Name:  Larisa Frank    :  1960  MRN: [id number]  Restrictions/Precautions:    Pertinent Medical History: B CTS  Medical/Treatment Diagnosis Information:  · Diagnosis: Rt elbow  · Treatment Diagnosis: Pain. Decreased R wrist strength and AROM  Insurance/Certification information:  PT Insurance Information: BWC- C9 thru 19  Physician Information:  Referring Practitioner: Dr. 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 States she was off for a vacation a few weeks ago and the rest helped it. States she worked this past weekend and her arm is hurting again. Pain is 2-4/10. Aching in her R forearm. 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 Subjective:    19: Patient reports elbow is not as sore today,was able to sleep a little better last night.  19: Pt states she is doing a little better, not much pain the past few days, just tightness  19: Patient reports elbow just tight,pain intensity is decreasing. 19: States she is continuing to do a little better overall. Just saw the MD and he kept her on light duty  19: Pt states she is a little more stiff and sore today. Seeing the MD today  6/3/19: States it has been achy over the weekend. She has been off work and was not doing anything to aggravate it.  It woke her up at night    Objective:  Observation:   Test measurements:      Exercises:  Exercise/Equipment Resistance/Repetitions Other

## 2019-06-04 ENCOUNTER — TELEPHONE (OUTPATIENT)
Dept: ORTHOPEDIC SURGERY | Age: 59
End: 2019-06-04

## 2019-06-05 ENCOUNTER — HOSPITAL ENCOUNTER (OUTPATIENT)
Dept: PHYSICAL THERAPY | Age: 59
Setting detail: THERAPIES SERIES
Discharge: HOME | End: 2019-06-05
Payer: COMMERCIAL

## 2019-06-05 PROCEDURE — 97140 MANUAL THERAPY 1/> REGIONS: CPT

## 2019-06-05 PROCEDURE — 97033 APP MDLTY 1+IONTPHRSIS EA 15: CPT

## 2019-06-05 PROCEDURE — 97035 APP MDLTY 1+ULTRASOUND EA 15: CPT

## 2019-06-05 NOTE — FLOWSHEET NOTE
measurements:      Exercises:  Exercise/Equipment Resistance/Repetitions Other comments                                           HEP     Wrist Extensor stretch light 4/17   Ice Massage  4/17                    Other Therapeutic Activities:      Home Exercise Program:      Manual Treatments:    DTM to R lateral epicondyle and extensors, pronatorsIASTM to the same. Ice Massage x 4 min to R lateral epicondyle,  K tape to R wrist extensors    Modalities:    US: 1.3 w/cm 2 x 7 min, 20% to R lateral epicondyle  Iontophoresis x 20 min to R lateral epicondyle- 2 Ma hybresis    Timed Code Treatment Minutes:  45      [facility] TIME CODES    MODALITY                      START TIME   STOP TIME  manual 1130 1155   US  1155 1205   ionto 1205 1225   ICE massage  medicupping [id number]  Total Treatment Minutes:  63    Assessment  [x] Patient tolerated treatment well [] Patient limited by fatigue  [] Patient limited by pain  [] Patient limited by other medical complications  [x] Other: Pt still has pain and inflammation in lateral epicondyle with palpation but is improving. Aching at night.         Prognosis: [x] Good [x] Fair  [] Poor    Patient Requires Follow-up: [x] Yes  [] No    Plan:   [x] Continue per plan of care [] Alter current plan (see comments)  [] Plan of care initiated [] Hold pending MD visit [] Discharge    Plan for Next Session:    Continue iontophoresis  Continue DTM, Ice, US to R lateral epicondyle  Add massage tool if needed  Light stretching  Refer for dry needling if pt is not improving    Electronically signed by:  Christiane Mix PT

## 2019-06-06 ENCOUNTER — HOSPITAL ENCOUNTER (OUTPATIENT)
Dept: PHYSICAL THERAPY | Age: 59
Setting detail: THERAPIES SERIES
Discharge: HOME | End: 2019-06-06
Payer: COMMERCIAL

## 2019-06-06 PROCEDURE — 97033 APP MDLTY 1+IONTPHRSIS EA 15: CPT

## 2019-06-06 PROCEDURE — 97140 MANUAL THERAPY 1/> REGIONS: CPT

## 2019-06-06 PROCEDURE — 97035 APP MDLTY 1+ULTRASOUND EA 15: CPT

## 2019-06-06 NOTE — FLOWSHEET NOTE
been achy,still wakes her up. Objective:  Observation:   Test measurements:      Exercises:  Exercise/Equipment Resistance/Repetitions Other comments                                           HEP     Wrist Extensor stretch light 4/17   Ice Massage  4/17                    Other Therapeutic Activities:      Home Exercise Program:      Manual Treatments:    DTM to R lateral epicondyle and extensors, pronatorsIASTM to the same. Ice Massage x 4 min to R lateral epicondyle,  K tape to R wrist extensors    Modalities:    US: 1.3 w/cm 2 x 7 min, 20% to R lateral epicondyle  Iontophoresis x 20 min to R lateral epicondyle- 2 Ma hybresis    Timed Code Treatment Minutes:  45      C TIME CODES    MODALITY                      START TIME   STOP TIME  manual 1130 1155   US  1155 1205   ionto 1205 1225   ICE massage  medicupping [id number]  Total Treatment Minutes:  63    Assessment  [x] Patient tolerated treatment well [] Patient limited by fatigue  [] Patient limited by pain  [] Patient limited by other medical complications  [x] Other: Pt still has pain and inflammation in lateral epicondyle with palpation but is improving. Aching at night.         Prognosis: [x] Good [x] Fair  [] Poor    Patient Requires Follow-up: [x] Yes  [] No    Plan:   [x] Continue per plan of care [] Alter current plan (see comments)  [] Plan of care initiated [] Hold pending MD visit [] Discharge    Plan for Next Session:    Continue iontophoresis  Continue DTM, Ice, US to R lateral epicondyle  Add massage tool if needed  Light stretching  Refer for dry needling if pt is not improving    Electronically signed by:  Kuldeep Jaquez PTA

## 2019-06-10 ENCOUNTER — HOSPITAL ENCOUNTER (OUTPATIENT)
Dept: PHYSICAL THERAPY | Age: 59
Setting detail: THERAPIES SERIES
Discharge: HOME | End: 2019-06-10
Payer: COMMERCIAL

## 2019-06-10 NOTE — FLOWSHEET NOTE
Physical Therapy Daily Treatment Note  Date:  6/10/2019    Patient Name:  Leonora Awad    :  1960  MRN: [id number]  Restrictions/Precautions:    Pertinent Medical History: B CTS  Medical/Treatment Diagnosis Information:  · Diagnosis: Rt elbow  · Treatment Diagnosis: Pain. Decreased R wrist strength and AROM  Insurance/Certification information:  PT Insurance Information: BWC- C9 thru 19  Physician Information:  Referring Practitioner: Dr. 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 States she was off for a vacation a few weeks ago and the rest helped it. States she worked this past weekend and her arm is hurting again. Pain is 2-4/10. Aching in her R forearm. 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 Subjective:    19: States she is continuing to do a little better overall. Just saw the MD and he kept her on light duty  19: Pt states she is a little more stiff and sore today. Seeing the MD today  6/3/19: States it has been achy over the weekend. She has been off work and was not doing anything to aggravate it. It woke her up at night  19: Pt states she has been sore and achy. It is waking her up at night. 19: Patient reports elbow is been achy,still wakes her up. 6/10/19: Pt states she had a good weekend, elbow did not bother her much.     Objective:  Observation:   Test measurements:      Exercises:  Exercise/Equipment Resistance/Repetitions Other comments                                           HEP Wrist Extensor stretch light 4/17   Ice Massage  4/17                    Other Therapeutic Activities:      Home Exercise Program:      Manual Treatments:    DTM to R lateral epicondyle and extensors, pronatorsIASTM to the same. R elbow distraction mobilization  Ice Massage x 4 min to R lateral epicondyle,  K tape to R wrist extensors    Modalities:    US: 1.3 w/cm 2 x 7 min, 20% to R lateral epicondyle  Iontophoresis x 20 min to R lateral epicondyle- 2 Ma hybresis    Timed Code Treatment Minutes:  42      [facility] TIME CODES    MODALITY                      START TIME   STOP TIME  manual 1628 1656   US  1656 1706   ionto 1706 1726   K Tape  ICE massage  medicupping [id number]  Total Treatment Minutes:  62    Assessment  [x] Patient tolerated treatment well [] Patient limited by fatigue  [] Patient limited by pain  [] Patient limited by other medical complications  [x] Other: Pt still has pain and inflammation in lateral epicondyle with palpation but is improving. Aching at night.         Prognosis: [x] Good [x] Fair  [] Poor    Patient Requires Follow-up: [x] Yes  [] No    Plan:   [x] Continue per plan of care [] Alter current plan (see comments)  [] Plan of care initiated [] Hold pending MD visit [] Discharge    Plan for Next Session:    Do ice before iontophoresis  Continue DTM, Ice, US to R lateral epicondyle  Add massage tool if needed  Light stretching  Refer for dry needling if pt is not improving    Electronically signed by:  Theresa Zambrano, PT

## 2019-06-11 PROCEDURE — 6360000002 HC RX W HCPCS

## 2019-06-11 PROCEDURE — 97035 APP MDLTY 1+ULTRASOUND EA 15: CPT

## 2019-06-11 PROCEDURE — 97033 APP MDLTY 1+IONTPHRSIS EA 15: CPT

## 2019-06-11 PROCEDURE — 97140 MANUAL THERAPY 1/> REGIONS: CPT

## 2019-06-12 ENCOUNTER — HOSPITAL ENCOUNTER (OUTPATIENT)
Dept: PHYSICAL THERAPY | Age: 59
Setting detail: THERAPIES SERIES
Discharge: HOME | End: 2019-06-12
Payer: COMMERCIAL

## 2019-06-12 PROCEDURE — 97033 APP MDLTY 1+IONTPHRSIS EA 15: CPT

## 2019-06-12 PROCEDURE — 97140 MANUAL THERAPY 1/> REGIONS: CPT

## 2019-06-12 PROCEDURE — 97035 APP MDLTY 1+ULTRASOUND EA 15: CPT

## 2019-06-12 NOTE — FLOWSHEET NOTE
Physical Therapy Daily Treatment Note  Date:  2019    Patient Name:  Geeta Antonio    :  1960  MRN: [id number]  Restrictions/Precautions:    Pertinent Medical History: B CTS  Medical/Treatment Diagnosis Information:  · Diagnosis: Rt elbow  · Treatment Diagnosis: Pain. Decreased R wrist strength and AROM  Insurance/Certification information:  PT Insurance Information: [employer]- C9 thru 19  Physician Information:  Referring Practitioner: Dr. 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 She has been on light duty since and has had 3 cortisone injections. States she was off for a vacation a few weeks ago and the rest helped it. States she worked this past weekend and her arm is hurting again. Pain is 2-4/10. Aching in her R forearm. 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 Subjective:    19: States she is continuing to do a little better overall. Just saw the MD and he kept her on light duty  19: Pt states she is a little more stiff and sore today. Seeing the MD today  6/3/19: States it has been achy over the weekend. She has been off work and was not doing anything to aggravate it. It woke her up at night  19: Pt states she has been sore and achy. It is waking her up at night. 19: Patient reports elbow is been achy,still wakes her up. 6/10/19: Pt states she had a good weekend, elbow did not bother her much.   19: States last night and today it started aching    Objective:  Observation:   Test measurements:      Exercises:  Exercise/Equipment Resistance/Repetitions Other comments                                           HEP     Wrist Extensor stretch light 4/17   Ice Massage  4/17                    Other Therapeutic Activities:      Home Exercise Program:      Manual Treatments:    DTM to R lateral epicondyle and extensors, pronatorsIASTM to the same. R elbow distraction mobilization  Ice Massage x 7 min to R lateral epicondyle,  K tape to R wrist extensors    Modalities:    US: 1.3 w/cm 2 x 7 min, 20% to R lateral epicondyle  Iontophoresis x 20 min to R lateral epicondyle- 2 Ma hybresis    Timed Code Treatment Minutes:  45      [facility] TIME CODES    MODALITY                      START TIME   STOP TIME  manual 1163 [id number]   SC  [id number]   XGNND 7784 [id number]   K Tape  ICE massage  medicupping [phone] 0161  5937     Total Treatment Minutes:  67    Assessment  [x] Patient tolerated treatment well [] Patient limited by fatigue  [] Patient limited by pain  [] Patient limited by other medical complications  [x] Other: Pt still has pain and inflammation in lateral epicondyle with palpation but is improving. Aching at night.         Prognosis: [x] Good [x] Fair  [] Poor    Patient Requires Follow-up: [x] Yes  [] No    Plan:   [x] Continue per plan of care [] Alter current plan (see comments)  [] Plan of care initiated [] Hold pending MD visit [] Discharge    Plan for Next Session:    Do ice before iontophoresis  Continue DTM, Ice, US to R lateral epicondyle  Add massage tool if needed  Light stretching  Refer for dry needling if pt is not improving    Electronically signed by:  Theresa Zambrano PT

## 2019-06-13 ENCOUNTER — HOSPITAL ENCOUNTER (OUTPATIENT)
Dept: PHYSICAL THERAPY | Age: 59
Setting detail: THERAPIES SERIES
Discharge: HOME | End: 2019-06-13
Payer: COMMERCIAL

## 2019-06-13 PROCEDURE — 97035 APP MDLTY 1+ULTRASOUND EA 15: CPT

## 2019-06-13 PROCEDURE — 97140 MANUAL THERAPY 1/> REGIONS: CPT

## 2019-06-13 PROCEDURE — 97033 APP MDLTY 1+IONTPHRSIS EA 15: CPT

## 2019-06-13 NOTE — FLOWSHEET NOTE
Physical Therapy Daily Treatment Note  Date:  2019    Patient Name:  Izaiah Reagan    :  1960  MRN: [id number]  Restrictions/Precautions:    Pertinent Medical History: B CTS  Medical/Treatment Diagnosis Information:  · Diagnosis: Rt elbow  · Treatment Diagnosis: Pain. Decreased R wrist strength and AROM  Insurance/Certification information:  PT Insurance Information: [employer]- C9 thru 19  Physician Information:  Referring Practitioner: Dr. 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 States she was off for a vacation a few weeks ago and the rest helped it. States she worked this past weekend and her arm is hurting again. Pain is 2-4/10. Aching in her R forearm. 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 Subjective:    19: States she is continuing to do a little better overall. Just saw the MD and he kept her on light duty  19: Pt states she is a little more stiff and sore today. Seeing the MD today  6/3/19: States it has been achy over the weekend. She has been off work and was not doing anything to aggravate it. It woke her up at night  19: Pt states she has been sore and achy. It is waking her up at night. 19: Patient reports elbow is been achy,still wakes her up. 6/10/19: Pt states she had a good weekend, elbow did not bother her much.   19: States last night and today it started aching  19: Feeling a little better today    Objective:  Observation:   Test measurements: Exercises:  Exercise/Equipment Resistance/Repetitions Other comments                                           HEP     Wrist Extensor stretch light 4/17   Ice Massage  4/17                    Other Therapeutic Activities:      Home Exercise Program:      Manual Treatments:    DTM to R lateral epicondyle and extensors, pronatorsIASTM to the same. R elbow distraction mobilization  Ice Massage x 7 min to R lateral epicondyle,  K tape to R wrist extensors    Modalities:    US: 1.3 w/cm 2 x 7 min, 20% to R lateral epicondyle  Iontophoresis x 20 min to R lateral epicondyle- 2 Ma hybresis    Timed Code Treatment Minutes:  55      [facility] TIME CODES    MODALITY                      START TIME   STOP TIME  manual 835 900   US  900 910   ionto 920 940   K Tape  ICE massage  medicupping 940  910 945  918     Total Treatment Minutes:  70    Assessment  [x] Patient tolerated treatment well [] Patient limited by fatigue  [] Patient limited by pain  [] Patient limited by other medical complications  [x] Other: Pt still has pain and inflammation in lateral epicondyle with palpation but is improving. Aching at night.         Prognosis: [x] Good [x] Fair  [] Poor    Patient Requires Follow-up: [x] Yes  [] No    Plan:   [x] Continue per plan of care [] Alter current plan (see comments)  [] Plan of care initiated [] Hold pending MD visit [] Discharge    Plan for Next Session:    Do ice before iontophoresis  Continue DTM, Ice, US to R lateral epicondyle  Add massage tool if needed  Light stretching  Refer for dry needling if pt is not improving    Electronically signed by:  Jania Zapata, PT

## 2019-06-18 ENCOUNTER — HOSPITAL ENCOUNTER (OUTPATIENT)
Dept: PHYSICAL THERAPY | Age: 59
Setting detail: THERAPIES SERIES
Discharge: HOME | End: 2019-06-18
Payer: COMMERCIAL

## 2019-06-18 PROCEDURE — 97140 MANUAL THERAPY 1/> REGIONS: CPT

## 2019-06-18 PROCEDURE — 97035 APP MDLTY 1+ULTRASOUND EA 15: CPT

## 2019-06-18 PROCEDURE — 97033 APP MDLTY 1+IONTPHRSIS EA 15: CPT

## 2019-06-18 NOTE — FLOWSHEET NOTE
her up. Objective:  Observation:   Test measurements:      Exercises:  Exercise/Equipment Resistance/Repetitions Other comments                                           HEP     Wrist Extensor stretch light 4/17   Ice Massage  4/17                    Other Therapeutic Activities:      Home Exercise Program:      Manual Treatments:    DTM to R lateral epicondyle and extensors, pronatorsIASTM to the same. R elbow distraction mobilization  Ice Massage x 7 min to R lateral epicondyle,  K tape to R wrist extensors    Modalities:    US: 1.3 w/cm 2 x 7 min, 20% to R lateral epicondyle  Iontophoresis x 20 min to R lateral epicondyle- 2 Ma hybresis    Timed Code Treatment Minutes:  65      [facility] TIME CODES    MODALITY                      START TIME   STOP TIME  manual 900 925   US  925 935   ionto 940 1000   K Tape  ICE massage  medicupping 1000  935 1005  940     Total Treatment Minutes:  70    Assessment  [x] Patient tolerated treatment well [] Patient limited by fatigue  [] Patient limited by pain  [] Patient limited by other medical complications  [x] Other: Pt still has pain and inflammation in lateral epicondyle with palpation but is improving. Aching at night.         Prognosis: [x] Good [x] Fair  [] Poor    Patient Requires Follow-up: [x] Yes  [] No    Plan:   [x] Continue per plan of care [] Alter current plan (see comments)  [] Plan of care initiated [] Hold pending MD visit [] Discharge    Plan for Next Session:    Do ice before iontophoresis  Continue DTM, Ice, US to R lateral epicondyle  Add massage tool if needed  Light stretching  Refer for dry needling if pt is not improving    Electronically signed by:  Chapin Woodall PTA

## 2019-06-19 ENCOUNTER — HOSPITAL ENCOUNTER (OUTPATIENT)
Dept: PHYSICAL THERAPY | Age: 59
Setting detail: THERAPIES SERIES
Discharge: HOME | End: 2019-06-19
Payer: COMMERCIAL

## 2019-06-19 PROCEDURE — 97035 APP MDLTY 1+ULTRASOUND EA 15: CPT

## 2019-06-19 PROCEDURE — 97140 MANUAL THERAPY 1/> REGIONS: CPT

## 2019-06-19 PROCEDURE — 97033 APP MDLTY 1+IONTPHRSIS EA 15: CPT

## 2019-06-19 NOTE — FLOWSHEET NOTE
Physical Therapy Daily Treatment Note  Date:  2019    Patient Name:  Marie Camacho    :  1960  MRN: [id number]  Restrictions/Precautions:    Pertinent Medical History: B CTS  Medical/Treatment Diagnosis Information:  · Diagnosis: Rt elbow  · Treatment Diagnosis: Pain. Decreased R wrist strength and AROM  Insurance/Certification information:  PT Insurance Information: [employer]- C9 thru 19  Physician Information:  Referring Practitioner: Dr. 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 States she was off for a vacation a few weeks ago and the rest helped it. States she worked this past weekend and her arm is hurting again. Pain is 2-4/10. Aching in her R forearm. 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 Subjective:    19: States she is continuing to do a little better overall. Just saw the MD and he kept her on light duty  19: Pt states she is a little more stiff and sore today. Seeing the MD today  6/3/19: States it has been achy over the weekend. She has been off work and was not doing anything to aggravate it. It woke her up at night  19: Pt states she has been sore and achy. It is waking her up at night. 19: Patient reports elbow is been achy,still wakes her up. 6/10/19: Pt states she had a good weekend, elbow did not bother her much.   19: States last night and today it started aching  19: Feeling a little better today  19: Patient reports elbow has been achy last few days and waking her up. 6/19/19: States she has been feeling about same. Little achy    Objective:  Observation:   Test measurements:      Exercises:  Exercise/Equipment Resistance/Repetitions Other comments                                           HEP     Wrist Extensor stretch light 4/17   Ice Massage  4/17                    Other Therapeutic Activities:      Home Exercise Program:      Manual Treatments:    DTM to R lateral epicondyle and extensors, pronatorsIASTM to the same. R elbow distraction mobilization  Ice Massage x 7 min to R lateral epicondyle,  K tape to R wrist extensors    Modalities:    US: 1.3 w/cm 2 x 7 min, 20% to R lateral epicondyle  Iontophoresis x 20 min to R lateral epicondyle- 2 Ma hybresis    Timed Code Treatment Minutes:  44      [facility] TIME CODES    MODALITY                      START TIME   STOP TIME  manual 900 927   US  935 945   ionto 945 1005   K Tape  ICE massage  medicupping 1005  930 1010  935     Total Treatment Minutes:  70    Assessment  [x] Patient tolerated treatment well [] Patient limited by fatigue  [] Patient limited by pain  [] Patient limited by other medical complications  [x] Other: Pt still has pain and inflammation in lateral epicondyle with palpation but is improving. Aching at night.         Prognosis: [x] Good [x] Fair  [] Poor    Patient Requires Follow-up: [x] Yes  [] No    Plan:   [x] Continue per plan of care [] Alter current plan (see comments)  [] Plan of care initiated [] Hold pending MD visit [] Discharge    Plan for Next Session:    Do ice before iontophoresis  Continue DTM, Ice, US to R lateral epicondyle  Add massage tool if needed  Light stretching  Refer for dry needling if pt is not improving    Electronically signed by:  Marleny Beatty, PT

## 2019-06-24 ENCOUNTER — HOSPITAL ENCOUNTER (OUTPATIENT)
Dept: PHYSICAL THERAPY | Age: 59
Setting detail: THERAPIES SERIES
Discharge: HOME | End: 2019-06-24
Payer: COMMERCIAL

## 2019-06-24 PROCEDURE — 97140 MANUAL THERAPY 1/> REGIONS: CPT

## 2019-06-24 PROCEDURE — 97035 APP MDLTY 1+ULTRASOUND EA 15: CPT

## 2019-06-24 PROCEDURE — 97033 APP MDLTY 1+IONTPHRSIS EA 15: CPT

## 2019-06-24 NOTE — FLOWSHEET NOTE
Physical Therapy Daily Treatment Note  Date:  2019    Patient Name:  Marie Camacho    :  1960  MRN: [id number]  Restrictions/Precautions:    Pertinent Medical History: B CTS  Medical/Treatment Diagnosis Information:  · Diagnosis: Rt elbow  · Treatment Diagnosis: Pain. Decreased R wrist strength and AROM  Insurance/Certification information:  PT Insurance Information: [employer]- C9 thru 19  Physician Information:  Referring Practitioner: Dr. 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 States she was off for a vacation a few weeks ago and the rest helped it. States she worked this past weekend and her arm is hurting again. Pain is 2-4/10. Aching in her R forearm. 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 Subjective:    19: States she is continuing to do a little better overall. Just saw the MD and he kept her on light duty  19: Pt states she is a little more stiff and sore today. Seeing the MD today  6/3/19: States it has been achy over the weekend. She has been off work and was not doing anything to aggravate it. It woke her up at night  19: Pt states she has been sore and achy. It is waking her up at night. 19: Patient reports elbow is been achy,still wakes her up. 6/10/19: Pt states she had a good weekend, elbow did not bother her much.   19: States last night and today it started aching  19: Feeling a little better today  19: Patient reports elbow has been achy last few days and waking her up. 6/19/19: States she has been feeling about same. Little achy  06/24/19: Patient reports achyness is about the same. Objective:  Observation:   Test measurements:      Exercises:  Exercise/Equipment Resistance/Repetitions Other comments                                           HEP     Wrist Extensor stretch light 4/17   Ice Massage  4/17                    Other Therapeutic Activities:      Home Exercise Program:      Manual Treatments:    DTM to R lateral epicondyle and extensors, pronatorsIASTM to the same. R elbow distraction mobilization  Ice Massage x 7 min to R lateral epicondyle,  K tape to R wrist extensors    Modalities:    US: 1.3 w/cm 2 x 7 min, 20% to R lateral epicondyle  Iontophoresis x 20 min to R lateral epicondyle- 2 Ma hybresis    Timed Code Treatment Minutes:  44      [facility] TIME CODES    MODALITY                      START TIME   STOP TIME  manual 1130 1155   US  1155 1205   ionto 1210 1230   K Tape  ICE massage  medicupping 1230  1205 1235  1210     Total Treatment Minutes:  70    Assessment  [x] Patient tolerated treatment well [] Patient limited by fatigue  [] Patient limited by pain  [] Patient limited by other medical complications  [x] Other: Pt still has pain and inflammation in lateral epicondyle with palpation but is improving. Aching at night.         Prognosis: [x] Good [x] Fair  [] Poor    Patient Requires Follow-up: [x] Yes  [] No    Plan:   [x] Continue per plan of care [] Alter current plan (see comments)  [] Plan of care initiated [] Hold pending MD visit [] Discharge    Plan for Next Session:    Do ice before iontophoresis  Continue DTM, Ice, US to R lateral epicondyle  Add massage tool if needed  Light stretching  Refer for dry needling if pt is not improving    Electronically signed by:  Magdalena Fontenot PTA

## 2019-06-26 ENCOUNTER — OFFICE VISIT (OUTPATIENT)
Dept: ORTHOPEDIC SURGERY | Age: 59
End: 2019-06-26
Payer: COMMERCIAL

## 2019-06-26 ENCOUNTER — HOSPITAL ENCOUNTER (OUTPATIENT)
Dept: PHYSICAL THERAPY | Age: 59
Setting detail: THERAPIES SERIES
Discharge: HOME | End: 2019-06-26
Payer: COMMERCIAL

## 2019-06-26 VITALS — WEIGHT: 135 LBS | HEIGHT: 61 IN | BODY MASS INDEX: 25.49 KG/M2

## 2019-06-26 DIAGNOSIS — M77.11 LATERAL EPICONDYLITIS OF RIGHT ELBOW: Primary | ICD-10-CM

## 2019-06-26 PROCEDURE — 99213 OFFICE O/P EST LOW 20 MIN: CPT | Performed by: ORTHOPAEDIC SURGERY

## 2019-06-26 PROCEDURE — 97033 APP MDLTY 1+IONTPHRSIS EA 15: CPT

## 2019-06-26 PROCEDURE — 97035 APP MDLTY 1+ULTRASOUND EA 15: CPT

## 2019-06-26 PROCEDURE — 97140 MANUAL THERAPY 1/> REGIONS: CPT

## 2019-06-26 NOTE — FLOWSHEET NOTE
Physical Therapy Daily Treatment Note  Date:  2019    Patient Name:  Leyda Craig    :  1960  MRN: [id number]  Restrictions/Precautions:    Pertinent Medical History: B CTS  Medical/Treatment Diagnosis Information:  · Diagnosis: Rt elbow  · Treatment Diagnosis: Pain. Decreased R wrist strength and AROM  Insurance/Certification information:  PT Insurance Information: [employer]- C9 thru 19  Physician Information:  Referring Practitioner: Dr. 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 States she was off for a vacation a few weeks ago and the rest helped it. States she worked this past weekend and her arm is hurting again. Pain is 2-4/10. Aching in her R forearm. 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 Subjective:    6/10/19: Pt states she had a good weekend, elbow did not bother her much. 19: States last night and today it started aching  19: Feeling a little better today  19: Patient reports elbow has been achy last few days and waking her up. 19: States she has been feeling about same. Little achy  19: Patient reports achyness is about the same.   19: States elbow is about the same, staying at 2-3/10    Objective:  Observation:   Test measurements:      Exercises:  Exercise/Equipment Resistance/Repetitions Other comments                                           HEP     Wrist Extensor stretch light    Ice Massage                      Other Therapeutic Activities:      Home Exercise Program:      Manual Treatments:    DTM to R lateral epicondyle and extensors, pronatorsIASTM to the same.    R elbow distraction mobilization  Ice Massage x 7 min to R lateral epicondyle,  K tape to R wrist extensors    Modalities:    US: 1.3 w/cm 2 x 7 min, 20% to R lateral epicondyle  Iontophoresis x 20 min to R lateral epicondyle- 2 Ma hybresis    Timed Code Treatment Minutes:  50      [facility] TIME CODES    MODALITY                      START TIME   STOP TIME  manual 930 956   US  956 1005   ionto 1005 1025   K Tape  ICE massage  Medicupping 1025  1035  1030 1030  1045   1035     Total Treatment Minutes:  75    Assessment  [x] Patient tolerated treatment well [] Patient limited by fatigue  [] Patient limited by pain  [] Patient limited by other medical complications  [x] Other: Pt still has pain and inflammation in lateral epicondyle with palpation, has stayed consistent the past few weeks        Prognosis: [x] Good [x] Fair  [] Poor    Patient Requires Follow-up: [x] Yes  [] No    Plan:   [x] Continue per plan of care [] Alter current plan (see comments)  [] Plan of care initiated [] Hold pending MD visit [] Discharge    Plan for Next Session:    Continue DTM, Ice, US to R lateral epicondyle  Add massage tool if needed  Light stretching  Refer for dry needling if pt is not improving    Electronically signed by:  Jennifer Busch PT

## 2019-06-26 NOTE — PROGRESS NOTES
Brad Martini  [id number]  June 26, 2019    Chief Complaint   Patient presents with    Follow-up     right elbow [facility]          History: The patient is here follow-up regarding her right elbow. The patient continues taking the meloxicam.  She continues to participate in therapy. Her pain remains at 3/10. She has been wearing the brace. She does feel that the iontophoresis is providing some benefit. 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  Again this is a Worker's Compensation case. Ht 5' 1\" (1.549 m)   Wt 135 lb (61.2 kg)   LMP 04/30/2010   BMI 25.51 kg/m²   Pertinent items are noted in HPI. Review of systems reviewed from Patient History Form dated on 11/15/18 and available in the patient's chart under the Media tab. Physical:  Ms. Brad Martini appears well. She is in no apparent distress. She is alert and oriented to person, place and time. She displays appropriate mood and affect. Tenderness to palpation located: lateral epicondyle tenderness. Range of motion: from 0 extension to 145 flexion. Instability with varus stressing: No.   Instability with valgus stressing: No.   Pain with resisted wrist extension: Yes. Pain with resisted wrist flexion: No.  Examination reveals full range of motion to the shoulders and wrists. she is non tender about the wrist. The patient is neurovascularly intact distally. Sensation is grossly intact to light touch. Radial and ulnar pulses are palpable and 2+. The hand shows brisk capillary refill. The patient is able to wiggle all digits. The skin reveals no lesions or ulcerations. There is no associated ecchymosis. Impression:  #1 right elbow lateral epicondylitis     Plan: The patient will continue with the current restrictions. She currently is off work since she has met the 6-month period of light duty. We did review Dr. Santo Tripp report. She may eventually require permanent restrictions, but I would like to try dry needling.   The patient will be

## 2019-06-27 ENCOUNTER — HOSPITAL ENCOUNTER (OUTPATIENT)
Dept: PHYSICAL THERAPY | Age: 59
Setting detail: THERAPIES SERIES
Discharge: HOME | End: 2019-06-27
Payer: COMMERCIAL

## 2019-06-27 PROCEDURE — 97033 APP MDLTY 1+IONTPHRSIS EA 15: CPT

## 2019-06-27 PROCEDURE — 97035 APP MDLTY 1+ULTRASOUND EA 15: CPT

## 2019-06-27 PROCEDURE — 97140 MANUAL THERAPY 1/> REGIONS: CPT

## 2019-06-27 NOTE — FLOWSHEET NOTE
Physical Therapy Daily Treatment Note  Date:  2019    Patient Name:  Marisol Crespo    :  1960  MRN: [id number]  Restrictions/Precautions:    Pertinent Medical History: B CTS  Medical/Treatment Diagnosis Information:  · Diagnosis: Rt elbow  · Treatment Diagnosis: Pain. Decreased R wrist strength and AROM  Insurance/Certification information:  PT Insurance Information: [employer]- C9 thru 19  Physician Information:  Referring Practitioner: Dr. 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 She has been on light duty since and has had 3 cortisone injections. States she was off for a vacation a few weeks ago and the rest helped it. States she worked this past weekend and her arm is hurting again. Pain is 2-4/10. Aching in her R forearm. 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 Subjective:    6/10/19: Pt states she had a good weekend, elbow did not bother her much. 19: States last night and today it started aching  19: Feeling a little better today  19: Patient reports elbow has been achy last few days and waking her up. 19: States she has been feeling about same. Little achy  19: Patient reports achyness is about the same. 19: States elbow is about the same, staying at 2-3/10  19: States last night she slept through the night and hasn't ached today yet. Just a little sore. Pt saw MD and today will be her last visit. She is going to start dry needling on Monday.     Objective:  Observation:   Test measurements:      Exercises:  Exercise/Equipment Resistance/Repetitions Other comments                                           HEP     Wrist Extensor stretch light 4/17   Ice Massage  4/17                    Other Therapeutic Activities:      Home Exercise Program:      Manual Treatments:    DTM to R lateral epicondyle and extensors, pronatorsIASTM to the same.    R elbow distraction mobilization  Ice Massage x 7 min to R lateral epicondyle,  K tape to R wrist extensors    Modalities:    US: 1.3 w/cm 2 x 7 min, 20% to R lateral epicondyle  Iontophoresis x 20 min to R lateral epicondyle- 2 Ma hybresis    Timed Code Treatment Minutes:  44      [facility] TIME CODES    MODALITY                      START TIME   STOP TIME  manual 930 955   US  955 1005   ionto 1005 1025   K Tape  ICE massage   1025  1030   1030  1035        Total Treatment Minutes:  65    Assessment  [x] Patient tolerated treatment well [] Patient limited by fatigue  [] Patient limited by pain  [] Patient limited by other medical complications  [x] Other: Pt still has pain and inflammation in lateral epicondyle with palpation, has stayed consistent the past few weeks        Prognosis: [x] Good [x] Fair  [] Poor    Patient Requires Follow-up: [x] Yes  [x] No    Plan:   [x] Continue per plan of care [] Alter current plan (see comments)  [] Plan of care initiated [] Hold pending MD visit [x] Discharge    Plan for Next Session:      Electronically signed by:  Dayna Cohen PT

## 2019-06-28 ENCOUNTER — TELEPHONE (OUTPATIENT)
Dept: ORTHOPEDIC SURGERY | Age: 59
End: 2019-06-28

## 2019-07-01 ENCOUNTER — TELEPHONE (OUTPATIENT)
Dept: INTERNAL MEDICINE CLINIC | Age: 59
End: 2019-07-01

## 2019-07-01 ENCOUNTER — HOSPITAL ENCOUNTER (OUTPATIENT)
Dept: PHYSICAL THERAPY | Age: 59
Setting detail: THERAPIES SERIES
Discharge: HOME | End: 2019-07-01
Payer: COMMERCIAL

## 2019-07-01 PROCEDURE — 97140 MANUAL THERAPY 1/> REGIONS: CPT | Performed by: PHYSICAL THERAPIST

## 2019-07-01 PROCEDURE — 97112 NEUROMUSCULAR REEDUCATION: CPT | Performed by: PHYSICAL THERAPIST

## 2019-07-01 PROCEDURE — 97110 THERAPEUTIC EXERCISES: CPT | Performed by: PHYSICAL THERAPIST

## 2019-07-01 NOTE — TELEPHONE ENCOUNTER
Pt experienced an Anatomic Response to dry needling today, [facility] reports  hands,wrists, and feet inflection  And calves got tight. She was able to rest  for 40 mins before going home pt will call them when she gets home to report how she is feeling. [facility] would like to know if they should try the dry needling again?

## 2019-07-03 ENCOUNTER — HOSPITAL ENCOUNTER (OUTPATIENT)
Dept: PHYSICAL THERAPY | Age: 59
Setting detail: THERAPIES SERIES
Discharge: HOME | End: 2019-07-03
Payer: COMMERCIAL

## 2019-07-03 PROCEDURE — 97140 MANUAL THERAPY 1/> REGIONS: CPT | Performed by: PHYSICAL THERAPIST

## 2019-07-03 PROCEDURE — 97110 THERAPEUTIC EXERCISES: CPT | Performed by: PHYSICAL THERAPIST

## 2019-07-03 PROCEDURE — 97112 NEUROMUSCULAR REEDUCATION: CPT | Performed by: PHYSICAL THERAPIST

## 2019-07-17 ENCOUNTER — OFFICE VISIT (OUTPATIENT)
Dept: RHEUMATOLOGY | Age: 59
End: 2019-07-17
Payer: COMMERCIAL

## 2019-07-17 ENCOUNTER — HOSPITAL ENCOUNTER (OUTPATIENT)
Dept: MAMMOGRAPHY | Age: 59
Discharge: HOME | End: 2019-07-17
Payer: COMMERCIAL

## 2019-07-17 VITALS
WEIGHT: 132 LBS | DIASTOLIC BLOOD PRESSURE: 82 MMHG | BODY MASS INDEX: 24.29 KG/M2 | SYSTOLIC BLOOD PRESSURE: 134 MMHG | HEIGHT: 62 IN

## 2019-07-17 DIAGNOSIS — M77.11 LATERAL EPICONDYLITIS OF RIGHT ELBOW: Primary | ICD-10-CM

## 2019-07-17 DIAGNOSIS — Z12.31 VISIT FOR SCREENING MAMMOGRAM: ICD-10-CM

## 2019-07-17 PROCEDURE — 99213 OFFICE O/P EST LOW 20 MIN: CPT | Performed by: INTERNAL MEDICINE

## 2019-07-17 PROCEDURE — 77067 SCR MAMMO BI INCL CAD: CPT

## 2019-07-18 ENCOUNTER — HOSPITAL ENCOUNTER (OUTPATIENT)
Dept: PHYSICAL THERAPY | Age: 59
Setting detail: THERAPIES SERIES
Discharge: HOME | End: 2019-07-18
Payer: COMMERCIAL

## 2019-07-18 PROCEDURE — 97140 MANUAL THERAPY 1/> REGIONS: CPT | Performed by: PHYSICAL THERAPIST

## 2019-07-18 PROCEDURE — 97530 THERAPEUTIC ACTIVITIES: CPT | Performed by: PHYSICAL THERAPIST

## 2019-07-18 PROCEDURE — 97110 THERAPEUTIC EXERCISES: CPT | Performed by: PHYSICAL THERAPIST

## 2019-07-18 NOTE — FLOWSHEET NOTE
[facility], [address] [place], [address]  Phone: [phone]   Fax: [phone]      Physical Therapy Daily Treatment Note  Date:  2019    Patient Name:  Stacie Starkey    :  1960  MRN: [id number]  Restrictions/Precautions:    Medical/Treatment Diagnosis Information:  · Diagnosis: Rt elbow  · Treatment Diagnosis: Pain. Decreased R wrist strength and AROM  Insurance/Certification information: PT Insurance Information: [employer]   Physician Information:  Referring Practitioner: Dr. Scot Lovell of care signed (Y/N): Y    Date of Patient follow up with Physician: ~     Functional Disability:  UEFI - 56% limitation -      Progress Note: []  Yes  [x]  No  Next due by:        Latex Allergy:  [x]NO      []YES  Preferred Language for Healthcare:   [x]English       []other:      Visit # Insurance Allowable   2 BMN (25 used)       Pain level:  2/10 7/18     SUBJECTIVE:  States she had 0-3 pain while away on vacation. She states she did have one really bad episode last week that she had constant achiness. She states she did have a few episodes of spasms, where she applied pressure to the arm to get them to stop. She states she does think the HEP is making a difference and kept up with them while on vacation.         OBJECTIVE:    Observation:    Test measurements:      ROM AROM Comments    Left Right    Elbow flexion 157 140    Elbow extension 1 hyper ext  0    Pronation 74 81 + pain     Supination 89 88    Wrist flexion 73 63 + pain     Wrist extension 77 61      Strength Left Right Comments   Elbow flexion 4+/5 3+/5 + pain     Elbow extension 4+/5 3+/5 + pain     Pronation 4/5 3+/5 + pain     Supination 4/5 4-/5 + min pain     Wrist flexion 4+/5 4/5 + pain    Wrist extension 4+/5 4-/5 + pain        Left Right Comments   Level 1 10 1    Level 2 10 1    Level 3 5 2    other        Joint mobility: activities related to strengthening, flexibility, endurance, ROM of scapular, scapulothoracic and UE control with self care, reaching, carrying, lifting, house/yardwork, driving/computer work  [] (89164) Reviewed/Progressed HEP activities related to improving balance, coordination, kinesthetic sense, posture, motor skill, proprioception of scapular, scapulothoracic and UE control with self care, reaching, carrying, lifting, house/yardwork, driving/computer work      Manual Treatments:  PROM / STM / Oscillations-Mobs:  G-I, II, III, IV (PA's, Inf., Post.)  [x] (42270) Provided manual therapy to mobilize soft tissue/joints of cervical/CT, scapular GHJ and UE for the purpose of modulating pain, promoting relaxation,  increasing ROM, reducing/eliminating soft tissue swelling/inflammation/restriction, improving soft tissue extensibility and allowing for proper ROM for normal function with self care, reaching, carrying, lifting, house/yardwork, driving/computer work    Judene Peer referred patient for Dry Needling, giving written consent. (per MD note on 6/26)    PT reviewed precautions, contraindications, and indications with pt in addition to application of dry needling within established plan of care and expected outcomes; pt verbalized understanding with all questions answered. Dry needling manual therapy: consisted on the placement of 2 needles in the following muscles: extensor digitorum and extensor carpi radialis brevis. A 60 mm needle was inserted, piston, rotated, and coned to produce intramuscular mobilization. These techniques were used to restore functional range of motion, reduce muscle spasm and induce healing in the corresponding musculature. (81047)  Clean Technique was utilized today while applying Dry needling treatment. The treatment sites where cleaned with 70% solution of  isopropyl alcohol .   The PT washed their hands and utilized treatment gloves along with hand  prior to inserting the needles. All needles where removed and discarded in the appropriate sharps container. 7' 7/18      Instrument Assisted Soft Tissue Mobilization (IASTM): was applied to the following muscles: R triceps and R biceps with Hawk  tools including  and HG9 (tongue depressor). Treatment consisted of IASTM strokes including sweeping, fanning, brushing, strumming, filleting, pinning and framing, based on body region contours, nature of the soft tissue restriction and desired treatment outcomes. These techniques were used to restore neurophysiology, improve mechanotransduction, enhance fluid dynamics and break collagen crosslinks. The treatment area was exposed and the patient was draped in an appropriate manner. Upon completion the clinician cleaned the IASTM tools as per [facility] recommendations. Skin check pre: Normal  Skin check post: slight normal redness  Intermittent tx time: 8' 7/18      Modalities:  CP - 15'  7/18    Charges:  Timed Code Treatment Minutes: 39'    Total Treatment Minutes: 95'      [] EVAL (LOW) 56829 (typically 20 minutes face-to-face)  [] EVAL (MOD) 45177 (typically 30 minutes face-to-face)  [] EVAL (HIGH) 20177 (typically 45 minutes face-to-face)  [] RE-EVAL   [x] SHANT(99170) x  1   [] IONTO  [] NMR (47179) x      [] VASO  [x] Manual (28518) x  1    [] Other:  [x] TA x  1    [] Mech Traction (87368)  [] ES(attended) (69479)      [] ES (un) (69546):         GOALS: 7/1  Patient stated goal: \"return to ADLs, full duty, relief of pain, prevention methods\" - Not MET     Therapist goals for Patient:   Short Term Goals: To be achieved in: 2 weeks  1. Independent in HEP and progression per patient tolerance, in order to prevent re-injury. - New HEP given   2. Patient will have a decrease in pain to facilitate improvement in movement, function, and ADLs as indicated by Functional Deficits. -     Long Term Goals: To be achieved in: 6 weeks  Added 7/1: 1.  Disability index score of 28% or less for the UEFS to assist with reaching prior level of function. - First taken today   Added 7/1: 2. Patient will demonstrate increased right wrist extension AROM to 75 deg and right wrist flexion AROM to greater than or equal to 70 deg to allow for proper joint functioning as indicated by patients Functional Deficits. - Not MET  Edited: 7/1: 3. Patient will demonstrate an increase in elbow (flex, ext, pronation, and supination) and wrist (flex and ext) strength to 4+/5 to allow for proper functional mobility as indicated by patients Functional Deficits. - Not MET   Added 7/1: 4. Patient will return to ADLs and work without increased symptoms or restriction.   - Not MET      Progression Towards Functional goals:  [] Patient is progressing as expected towards functional goals listed. [] Progression is slowed due to complexities listed. [] Progression has been slowed due to co-morbidities. [x] Plan just implemented, too soon to assess goals progression  [] Other:     ASSESSMENT:      Treatment/Activity Tolerance:  [x] Patient tolerated treatment well [] Patient limited by fatique  [] Patient limited by pain  [] Patient limited by other medical complications  [] Other: Patient rox session well. She rox dry needling today well without any reactions today. PT did observe one LTR. She required VCs for proper sitting posture and proper mechanics with exercises throughout the session. She rox the addition of scapular retraction pain free, but had increased pain with ulnar nerve glides. 7/18      Patient education: Patient was educated to call PT in the afternoon to follow-up on how she was feeling. Patient was also educated that her PCP and referring MD would be called about the response to dry needling.  She was educated on the importance of strength training to return to PLOF. 7/1    Prognosis: [x] Good [] Fair  [] Poor    Patient Requires Follow-up: [x] Yes  [] No    PLAN: 1-2x/ week for 4-6 weeks (7/1 - 8/12)  [] Continue per plan of care [x] Alter current plan (see comments)  [] Plan of care initiated [] Hold pending MD visit [] Discharge    Electronically signed by: Shelton Holliday PT, DPT

## 2019-07-22 ENCOUNTER — HOSPITAL ENCOUNTER (OUTPATIENT)
Dept: PHYSICAL THERAPY | Age: 59
Setting detail: THERAPIES SERIES
Discharge: HOME | End: 2019-07-22
Payer: COMMERCIAL

## 2019-07-22 PROCEDURE — 97530 THERAPEUTIC ACTIVITIES: CPT | Performed by: PHYSICAL THERAPIST

## 2019-07-22 PROCEDURE — 97110 THERAPEUTIC EXERCISES: CPT | Performed by: PHYSICAL THERAPIST

## 2019-07-22 PROCEDURE — 97140 MANUAL THERAPY 1/> REGIONS: CPT | Performed by: PHYSICAL THERAPIST

## 2019-07-22 NOTE — FLOWSHEET NOTE
([id number])    Charges:   7102-3045  Timed Code Treatment Minutes: 38'   Total Treatment Minutes: 59'     [] EVAL (LOW) 455 1011 (typically 20 minutes face-to-face)  [] EVAL (MOD) 89276 (typically 30 minutes face-to-face)  [] EVAL (HIGH) 46708 (typically 45 minutes face-to-face)  [] RE-EVAL   [x] WR(10146) x  1 (9571-7043, 18')  [] IONTO  [] NMR (36970) x       [] VASO  [x] Manual (31483) x  1  (3253-1066, 10') [] Other:  [x] TA x  1 (7211 -0456, 10')   [] Mech Traction (54113)  [] ES(attended) (71998)       [] ES (un) (92437):         GOALS: 7/1  Patient stated goal: \"return to ADLs, full duty, relief of pain, prevention methods\" - Not MET     Therapist goals for Patient:   Short Term Goals: To be achieved in: 2 weeks  1. Independent in HEP and progression per patient tolerance, in order to prevent re-injury. - New HEP given   2. Patient will have a decrease in pain to facilitate improvement in movement, function, and ADLs as indicated by Functional Deficits. -     Long Term Goals: To be achieved in: 6 weeks  Added 7/1: 1. Disability index score of 28% or less for the UEFS to assist with reaching prior level of function. - First taken today   Added 7/1: 2. Patient will demonstrate increased right wrist extension AROM to 75 deg and right wrist flexion AROM to greater than or equal to 70 deg to allow for proper joint functioning as indicated by patients Functional Deficits. - Not MET  Edited: 7/1: 3. Patient will demonstrate an increase in elbow (flex, ext, pronation, and supination) and wrist (flex and ext) strength to 4+/5 to allow for proper functional mobility as indicated by patients Functional Deficits. - Not MET   Added 7/1: 4. Patient will return to ADLs and work without increased symptoms or restriction.   - Not MET      Progression Towards Functional goals:  [] Patient is progressing as expected towards functional goals listed. [] Progression is slowed due to complexities listed.   [] Progression has been

## 2019-07-24 ENCOUNTER — HOSPITAL ENCOUNTER (OUTPATIENT)
Dept: PHYSICAL THERAPY | Age: 59
Setting detail: THERAPIES SERIES
Discharge: HOME | End: 2019-07-24
Payer: COMMERCIAL

## 2019-07-24 PROCEDURE — 97110 THERAPEUTIC EXERCISES: CPT | Performed by: PHYSICAL THERAPIST

## 2019-07-24 PROCEDURE — 97140 MANUAL THERAPY 1/> REGIONS: CPT | Performed by: PHYSICAL THERAPIST

## 2019-07-24 PROCEDURE — 97530 THERAPEUTIC ACTIVITIES: CPT | Performed by: PHYSICAL THERAPIST

## 2019-07-24 NOTE — FLOWSHEET NOTE
lifting, house/yardwork, driving/computer work        Hugo Board referred patient for Dry Needling, giving written consent. (per MD note on 6/26)    PT reviewed precautions, contraindications, and indications with pt in addition to application of dry needling within established plan of care and expected outcomes; pt verbalized understanding with all questions answered. Dry needling manual therapy: consisted on the placement of 4 needles in the following muscles: triceps brachii, extensor digitorum, and extensor carpi radialis brevis. A 60 mm needle was inserted, piston, rotated, and coned to produce intramuscular mobilization. These techniques were used to restore functional range of motion, reduce muscle spasm and induce healing in the corresponding musculature. (72080)  Clean Technique was utilized today while applying Dry needling treatment. The treatment sites where cleaned with 70% solution of  isopropyl alcohol . The PT washed their hands and utilized treatment gloves along with hand  prior to inserting the needles. All needles where removed and discarded in the appropriate sharps container. Linnea technique- Lateral glide Grade III/IV     Total manual 12' 7/24          Modalities:  CP - 15'  7/24 (7479-5903)    Charges:   9761-9332   Timed Code Treatment Minutes: 38'   Total Treatment Minutes: 76'     [] EVAL (LOW) 64667 (typically 20 minutes face-to-face)  [] EVAL (MOD) 96799 (typically 30 minutes face-to-face)  [] EVAL (HIGH) 15119 (typically 45 minutes face-to-face)  [] RE-EVAL   [x] PY(45197) x  1 (6980-2821, 18')  [] IONTO  [] NMR (03438) x       [] VASO  [x] Manual (54514) x  1  (8658-4874, 10') [] Other:  [x] TA x  1 (2338 -8531, 10')   [] Mech Traction (89630)  [] ES(attended) (21468)       [] ES (un) (32877):         GOALS: 7/1  Patient stated goal: \"return to ADLs, full duty, relief of pain, prevention methods\" - Not MET     Therapist goals for Patient:   Short Term Goals:  To

## 2019-07-26 RX ORDER — MELOXICAM 15 MG/1
TABLET ORAL
Qty: 30 TABLET | Refills: 1 | Status: SHIPPED | OUTPATIENT
Start: 2019-07-26 | End: 2019-08-14 | Stop reason: SDUPTHER

## 2019-07-29 ENCOUNTER — HOSPITAL ENCOUNTER (OUTPATIENT)
Dept: PHYSICAL THERAPY | Age: 59
Setting detail: THERAPIES SERIES
Discharge: HOME | End: 2019-07-29
Payer: COMMERCIAL

## 2019-07-29 PROCEDURE — 97530 THERAPEUTIC ACTIVITIES: CPT | Performed by: PHYSICAL THERAPIST

## 2019-07-29 PROCEDURE — 97110 THERAPEUTIC EXERCISES: CPT | Performed by: PHYSICAL THERAPIST

## 2019-07-29 PROCEDURE — 97140 MANUAL THERAPY 1/> REGIONS: CPT | Performed by: PHYSICAL THERAPIST

## 2019-07-29 RX ORDER — FLUTICASONE PROPIONATE 50 MCG
SPRAY, SUSPENSION (ML) NASAL
Qty: 16 G | Refills: 3 | Status: SHIPPED | OUTPATIENT
Start: 2019-07-29 | End: 2020-04-24

## 2019-07-29 NOTE — FLOWSHEET NOTE
Manual interventions              Therapeutic Exercise and NMR EXR  [x] (89791) Provided verbal/tactile cueing for activities related to strengthening, flexibility, endurance, ROM  for improvements in scapular, scapulothoracic and UE control with self care, reaching, carrying, lifting, house/yardwork, driving/computer work.    [] (86218) Provided verbal/tactile cueing for activities related to improving balance, coordination, kinesthetic sense, posture, motor skill, proprioception  to assist with  scapular, scapulothoracic and UE control with self care, reaching, carrying, lifting, house/yardwork, driving/computer work. Therapeutic Activities:    [x] (42572 or 96875) Provided verbal/tactile cueing for activities related to improving balance, coordination, kinesthetic sense, posture, motor skill, proprioception and motor activation to allow for proper function of scapular, scapulothoracic and UE control with self care, carrying, lifting, driving/computer work.      Home Exercise Program:    [x] (10768) Reviewed/Progressed HEP activities related to strengthening, flexibility, endurance, ROM of scapular, scapulothoracic and UE control with self care, reaching, carrying, lifting, house/yardwork, driving/computer work  [] (76700) Reviewed/Progressed HEP activities related to improving balance, coordination, kinesthetic sense, posture, motor skill, proprioception of scapular, scapulothoracic and UE control with self care, reaching, carrying, lifting, house/yardwork, driving/computer work      Manual Treatments:  PROM / STM / Oscillations-Mobs:  G-I, II, III, IV (PA's, Inf., Post.)  [x] (21127) Provided manual therapy to mobilize soft tissue/joints of cervical/CT, scapular GHJ and UE for the purpose of modulating pain, promoting relaxation,  increasing ROM, reducing/eliminating soft tissue swelling/inflammation/restriction, improving soft tissue extensibility and allowing for proper ROM for normal function with self care, reaching, carrying, lifting, house/yardwork, driving/computer work        Darlyn Peer referred patient for Dry Needling, giving written consent. (per MD note on 6/26)    PT reviewed precautions, contraindications, and indications with pt in addition to application of dry needling within established plan of care and expected outcomes; pt verbalized understanding with all questions answered. Dry needling manual therapy: consisted on the placement of 3 needles in the following muscles: extensor digitorum and extensor carpi radialis brevis. A 60 mm needle was inserted, piston, rotated, and coned to produce intramuscular mobilization. These techniques were used to restore functional range of motion, reduce muscle spasm and induce healing in the corresponding musculature. (20174)  Clean Technique was utilized today while applying Dry needling treatment. The treatment sites where cleaned with 70% solution of  isopropyl alcohol . The PT washed their hands and utilized treatment gloves along with hand  prior to inserting the needles. All needles where removed and discarded in the appropriate sharps container.       Linnea technique- Lateral glide Grade III/IV 7/29    Total manual 12' 7/29          Modalities:  CP - 15'  7/29 (9209-4115)    Charges:   8491-951  Timed Code Treatment Minutes: 48'   Total Treatment Minutes: 77'     [] EVAL (LOW) 12013 (typically 20 minutes face-to-face)  [] EVAL (MOD) 25931 (typically 30 minutes face-to-face)  [] EVAL (HIGH) 33451 (typically 45 minutes face-to-face)  [] RE-EVAL   [x] VG(51790) x  2 (2827-5553, 30')  [] IONTO  [] NMR (47777) x       [] VASO  [x] Manual (32729) x  1  (2260-0347, 10') [] Other:  [x] TA x  1 (1036 -0777, 13')   [] Mech Traction (14604)  [] ES(attended) (45441)       [] ES (un) (01042):       GOALS: 7/1  Patient stated goal: \"return to ADLs, full duty, relief of pain, prevention methods\" - Not MET     Therapist goals for Patient:

## 2019-07-31 ENCOUNTER — HOSPITAL ENCOUNTER (OUTPATIENT)
Dept: PHYSICAL THERAPY | Age: 59
Setting detail: THERAPIES SERIES
Discharge: HOME | End: 2019-07-31
Payer: COMMERCIAL

## 2019-07-31 PROCEDURE — 97110 THERAPEUTIC EXERCISES: CPT | Performed by: PHYSICAL THERAPIST

## 2019-07-31 PROCEDURE — 97140 MANUAL THERAPY 1/> REGIONS: CPT | Performed by: PHYSICAL THERAPIST

## 2019-07-31 PROCEDURE — 97112 NEUROMUSCULAR REEDUCATION: CPT | Performed by: PHYSICAL THERAPIST

## 2019-07-31 PROCEDURE — 97530 THERAPEUTIC ACTIVITIES: CPT | Performed by: PHYSICAL THERAPIST

## 2019-07-31 NOTE — PLAN OF CARE
1960  MRN: [id number]  Restrictions/Precautions:    Medical/Treatment Diagnosis Information:  · Diagnosis: Rt elbow  · Treatment Diagnosis: Pain. Decreased R wrist strength and AROM  Insurance/Certification information: PT Insurance Information: [facility]  Physician Information:  Referring Practitioner: Dr. Gretchen Gregorio of care signed (Y/N): Y    Date of Patient follow up with Physician: ~8/7     Functional Disability:   UEFI - 56% limitation - 7/1    UEFI - 34% limitation 7/31    Progress Note: [x]  Yes  []  No  Next due by:    7/31     Latex Allergy:  [x]NO      []YES  Preferred Language for Healthcare:   [x]English       []other:      Visit # Insurance Allowable   6 BMN (25 used)       Pain level:  2/10 7/31     SUBJECTIVE:  Patient states pain woke her up several times last night. She was sore after last session. She states she is able to do more at home, but is still sore.      7/31    OBJECTIVE:7/31    Observation:    Test measurements:            ROM AROM Comments     Left Right     Elbow flexion 150     Elbow extension 0     Pronation 86     Supination 84     Wrist flexion 58     Wrist extension 64  +pain         Strength Left Right Comments   Elbow flexion 4-/5     Elbow extension 4-/5     Pronation 4-/5 + pain      Supination 4/5 + min pain      Wrist flexion 4+/5     Wrist extension 4-/5 + pain          Left Right Comments   Level 1 6     Level 2 10 + pain     Level 3 10     other            Joint mobility:               []Normal               [x]Hypo              []Hyper     Palpation: TTP - Wrist extensor muscle belly      Functional Mobility/Transfers: Decreased ability to WB through RUE in bed and sit to stand      Posture: rounded shoulders     RESTRICTIONS/PRECAUTIONS:     Exercises/Interventions:   ROM/STRETCHES  COMMENTS   Wrist flexion 30 sec x 3 Added 7/1   Wrist extension 30 sec x 3 Added 7/1   Elbow flexion     Elbow extension          PRES      wrist flex 10 x 3 1# Added 7/31   Wrist AROM to 75 deg and right wrist flexion AROM to greater than or equal to 70 deg to allow for proper joint functioning as indicated by patients Functional Deficits. - Progressing towards  Edited: 7/1: 3. Patient will demonstrate an increase in elbow (flex, ext, pronation, and supination) and wrist (flex and ext) strength to 4+/5 to allow for proper functional mobility as indicated by patients Functional Deficits. - Progressing towards  Added 7/1: 4. Patient will return to ADLs and work without increased symptoms or restriction.    - Progressing towards      Progression Towards Functional goals:  [x] Patient is progressing as expected towards functional goals listed. [] Progression is slowed due to complexities listed. [] Progression has been slowed due to co-morbidities. [] Plan just implemented, too soon to assess goals progression  [] Other:     ASSESSMENT:      Treatment/Activity Tolerance:  [x] Patient tolerated treatment well [] Patient limited by fatique  [] Patient limited by pain  [] Patient limited by other medical complications  [] Other: Patient demonstrates improved elbow ROM with decreased pain, but continues to lack wrist flex and ext ROM. She demonstrates improved strength from 7/1, but continues to have overall R UE weakness, noted by MMT. She continues to have pain with resisted pronation, supination, and wrist extension. She demonstrates improved right  strength. She has not met her goals yet, but is progressing towards them. She is complaint with HEP. Recommend pt continue with 1-2x/ week for 4 weeks to regain full ROM and progress strength training to return to PLOF.   7/31    Patient education: Patient was educated to call PT in the afternoon to follow-up on how she was feeling. Patient was also educated that her PCP and referring MD would be called about the response to dry needling.  She was educated on the importance of strength training to return to PLOF. 7/1    Prognosis: [x]

## 2019-08-05 ENCOUNTER — HOSPITAL ENCOUNTER (OUTPATIENT)
Dept: PHYSICAL THERAPY | Age: 59
Setting detail: THERAPIES SERIES
Discharge: HOME | End: 2019-08-05
Payer: COMMERCIAL

## 2019-08-05 PROCEDURE — 97140 MANUAL THERAPY 1/> REGIONS: CPT | Performed by: PHYSICAL THERAPIST

## 2019-08-05 PROCEDURE — 97530 THERAPEUTIC ACTIVITIES: CPT | Performed by: PHYSICAL THERAPIST

## 2019-08-05 PROCEDURE — 97110 THERAPEUTIC EXERCISES: CPT | Performed by: PHYSICAL THERAPIST

## 2019-08-05 NOTE — FLOWSHEET NOTE
tissue extensibility and allowing for proper ROM for normal function with self care, reaching, carrying, lifting, house/yardwork, driving/computer work          Mulligan technique- Lateral glide Grade III/IV        Total manual 8' 8/5          Modalities:  CP - 15'  8/5 (9175-2918)    Charges:   [id number]  Timed Code Treatment Minutes: 48'    Total Treatment Minutes: 80'      [] EVAL (LOW) 08803 (typically 20 minutes face-to-face)  [] EVAL (MOD) 61631 (typically 30 minutes face-to-face)  [] EVAL (HIGH) 423 2329 (typically 45 minutes face-to-face)  [] RE-EVAL   [x] JT(85099) x  2 (07:26 -8:01, 35')  [] IONTO  [] NMR (08838) x       [] VASO  [x] Manual (84986) x  1  (08:05-08:13, 8') [] Other:  [x] TA x  1 (08:15 - 8:25, 10')   [] Mech Traction (29895)  [] ES(attended) (89971)       [] ES (un) (23474):       GOALS: 7/31  Patient stated goal: \"return to ADLs, full duty, relief of pain, prevention methods\" - Not MET     Therapist goals for Patient:   Short Term Goals: To be achieved in: 2 weeks  1. Independent in HEP and progression per patient tolerance, in order to prevent re-injury. - MET  2. Patient will have a decrease in pain to facilitate improvement in movement, function, and ADLs as indicated by Functional Deficits. -- MET      Long Term Goals: To be achieved in: 6 weeks  Added 7/1: 1. Disability index score of 28% or less for the UEFS to assist with reaching prior level of function. - Progressing towards   Added 7/1: 2. Patient will demonstrate increased right wrist extension AROM to 75 deg and right wrist flexion AROM to greater than or equal to 70 deg to allow for proper joint functioning as indicated by patients Functional Deficits. - Progressing towards  Edited: 7/1: 3. Patient will demonstrate an increase in elbow (flex, ext, pronation, and supination) and wrist (flex and ext) strength to 4+/5 to allow for proper functional mobility as indicated by patients Functional Deficits.    - Progressing towards  Added 7/1: 4. Patient will return to ADLs and work without increased symptoms or restriction.    - Progressing towards      Progression Towards Functional goals:  [x] Patient is progressing as expected towards functional goals listed. [] Progression is slowed due to complexities listed. [] Progression has been slowed due to co-morbidities. [] Plan just implemented, too soon to assess goals progression  [] Other:     ASSESSMENT:      Treatment/Activity Tolerance:  [x] Patient tolerated treatment well [] Patient limited by fatique  [] Patient limited by pain  [] Patient limited by other medical complications  [] Other: Patient tolerated all treatment well. Patient able to perform eccentric exercise with 1#, with less discomfort today. Patient continues to note improvement with mulligan extension technique. Patient will continue to benefit from skilled physical therapy to improve strength, ROM, and functional mobility. 8/5    Patient education: Patient was educated to call PT in the afternoon to follow-up on how she was feeling. Patient was also educated that her PCP and referring MD would be called about the response to dry needling. She was educated on the importance of strength training to return to PLOF. 7/1    Prognosis: [x] Good [] Fair  [] Poor    Patient Requires Follow-up: [x] Yes  [] No    PLAN: 1-2x/week for 4 weeks (7/31 - 8/28)  [x] Continue per plan of care [] Alter current plan (see comments)  [] Plan of care initiated [] Hold pending MD visit [] Discharge    Electronically signed by: Esther Meyers, Physical Therapist Student  Therapist was present, directed the patient's care, made skilled judgement, and was responsible for assessment and treatment of the patient.       Cami Ayala PT, DPT

## 2019-08-07 ENCOUNTER — HOSPITAL ENCOUNTER (OUTPATIENT)
Dept: PHYSICAL THERAPY | Age: 59
Setting detail: THERAPIES SERIES
Discharge: HOME | End: 2019-08-07
Payer: COMMERCIAL

## 2019-08-07 PROCEDURE — 97530 THERAPEUTIC ACTIVITIES: CPT | Performed by: PHYSICAL THERAPIST

## 2019-08-07 PROCEDURE — 97110 THERAPEUTIC EXERCISES: CPT | Performed by: PHYSICAL THERAPIST

## 2019-08-07 PROCEDURE — 97140 MANUAL THERAPY 1/> REGIONS: CPT | Performed by: PHYSICAL THERAPIST

## 2019-08-07 NOTE — FLOWSHEET NOTE
[facility], [address] [place], [address]  Phone: [phone]   Fax: [phone]      Physical Therapy Daily Treatment Note  Date:  2019    Patient Name:  Nicholas Velasquez    :  1960  MRN: [id number]  Restrictions/Precautions:    Medical/Treatment Diagnosis Information:  · Diagnosis: Rt elbow  · Treatment Diagnosis: Pain. Decreased R wrist strength and AROM  Insurance/Certification information: PT Insurance Information: [facility]  Physician Information:  Referring Practitioner: Dr. Hunter Goins of care signed (Y/N): Y    Date of Patient follow up with Physician: ~     Functional Disability:   UEFI - 56% limitation -     UEFI - 34% limitation     Progress Note: []  Yes  [x]  No  Next due by:         Latex Allergy:  [x]NO      []YES  Preferred Language for Healthcare:   [x]English       []other:      Visit # Insurance Allowable   8 BMN (25 used)        Pain level:  2/10 8/7     SUBJECTIVE:  Patient states that her elbow feels better, with minor soreness and that she has no complaints at this time. Patient notes that she has no problem lifting a half gallon of milk out of the refrigerator but continues to have issues with one gallon.            OBJECTIVE:    Observation:    Test measurements:      RESTRICTIONS/PRECAUTIONS:     Exercises/Interventions:   ROM/STRETCHES  COMMENTS   Wrist flexion 30 sec x 3 Added    Wrist extension 30 sec x 3 Added    Elbow flexion     Elbow extension          PRES     wrist flex 10 x 3 1# Added    Wrist extension ECC   2 x 10   2# with 5 sec lowering   ^   Wrist radial deviation     Wrist ulnar deviation     Pronation/supination     bicep 10 x 3 2#  ^     Tricep  10 x 3  Added 7/3   Finger extension  45 sec - thin rubberband Added 7/3   Gripping  1 min - red gripping ball ^    Scapular retraction 10 sec x 10  Added    Ulnar nerve glides 10 x 3 ^         ER/IR improving soft tissue extensibility and allowing for proper ROM for normal function with self care, reaching, carrying, lifting, house/yardwork, driving/computer work          Linnea technique- Lateral glide Grade III/IV      Total manual 12' 8/7    Instrument Assisted Soft Tissue Mobilization (IASTM): was applied to the following muscles: R extensor tendon with [employer]  tools including HG2 (handle-bar), HG4 (small can-opener), HG5 (medium can-opener), HG 8 (biscotti), and HG9 (tongue depressor). Treatment consisted of IASTM strokes including sweeping, fanning, brushing, strumming, filleting, pinning and framing, based on body region contours, nature of the soft tissue restriction and desired treatment outcomes. These techniques were used to restore neurophysiology, improve mechanotransduction, enhance fluid dynamics and break collagen crosslinks. The treatment area was exposed and the patient was draped in an appropriate manner. Upon completion the clinician cleaned the IASTM tools as [facility] recommendations. Skin check pre: Normal  Skin check post: slight normal redness  Intermittent tx time: 8' 8/7      Modalities:  CP - 15'  8/7 (6968-1433)    Charges:   9387-522  Timed Code Treatment Minutes: 60'    Total Treatment Minutes: 80'      [] EVAL (LOW) 25830 (typically 20 minutes face-to-face)  [] EVAL (MOD) 87607 (typically 30 minutes face-to-face)  [] EVAL (HIGH) 19175 (typically 45 minutes face-to-face)  [] RE-EVAL   [x] GC(38457) x  2 (07:21 -7:56, 35')  [] IONTO  [] NMR (55122) x       [] VASO  [x] Manual (09530) x  1  (08:00-08:12, 12') [] Other:  [x] TA x  1 (08:17 - 8:30, 13')   [] Mech Traction (05975)  [] ES(attended) (59524)       [] ES (un) (08006):       GOALS: 7/31  Patient stated goal: \"return to ADLs, full duty, relief of pain, prevention methods\" - Not MET     Therapist goals for Patient:   Short Term Goals: To be achieved in: 2 weeks  1.  Independent in HEP and progression per patient

## 2019-08-12 ENCOUNTER — HOSPITAL ENCOUNTER (OUTPATIENT)
Dept: PHYSICAL THERAPY | Age: 59
Setting detail: THERAPIES SERIES
Discharge: HOME | End: 2019-08-12
Payer: COMMERCIAL

## 2019-08-12 PROCEDURE — 97110 THERAPEUTIC EXERCISES: CPT | Performed by: PHYSICAL THERAPIST

## 2019-08-12 PROCEDURE — 97530 THERAPEUTIC ACTIVITIES: CPT | Performed by: PHYSICAL THERAPIST

## 2019-08-12 PROCEDURE — 97140 MANUAL THERAPY 1/> REGIONS: CPT | Performed by: PHYSICAL THERAPIST

## 2019-08-12 NOTE — FLOWSHEET NOTE
function with self care, reaching, carrying, lifting, house/yardwork, driving/computer work          Linnea technique- Lateral glide Grade III/IV - 10'     Instrument Assisted Soft Tissue Mobilization (IASTM): was applied to the following muscles: R wrist extensor tendon and supinator with [facility] tools including HG2 (handle-bar), HG4 (small can-opener), HG5 (medium can-opener), HG 8 (biscotti), and HG9 (tongue depressor). Treatment consisted of IASTM strokes including sweeping, fanning, brushing, strumming, filleting, pinning and framing, based on body region contours, nature of the soft tissue restriction and desired treatment outcomes. These techniques were used to restore neurophysiology, improve mechanotransduction, enhance fluid dynamics and break collagen crosslinks. The treatment area was exposed and the patient was draped in an appropriate manner. Upon completion the clinician cleaned the IASTM tools as per [facility] recommendations. Skin check pre: Normal  Skin check post: slight normal redness  Intermittent tx time: 8' 8/12    Total manual 18' 8/12      Modalities:  CP - 15' 8/12 (8:49 -9:04)    Charges:   07:25 - 9:04  Timed Code Treatment Minutes: 60'    Total Treatment Minutes: 99'      [] EVAL (LOW) 16202 (typically 20 minutes face-to-face)  [] EVAL (MOD) 37113 (typically 30 minutes face-to-face)  [] EVAL (HIGH) 17013 (typically 45 minutes face-to-face)  [] RE-EVAL   [x] VA(23771) x  2 (07:25 - 7:55, 30')  [] IONTO  [] NMR (10127) x       [] VASO   [x] Manual (74384) x  1  (08:27-08:45, 18') [] Other:  [x] TA x  1 (8:10- 8:22, 12')   [] Mech Traction (43730)  [] ES(attended) (28898)       [] ES (un) (41112):       GOALS: 7/31  Patient stated goal: \"return to ADLs, full duty, relief of pain, prevention methods\" - Not MET     Therapist goals for Patient:   Short Term Goals: To be achieved in: 2 weeks  1. Independent in [facility] and progression per patient tolerance, in order to prevent re-injury.

## 2019-08-13 ENCOUNTER — TELEPHONE (OUTPATIENT)
Dept: ORTHOPEDIC SURGERY | Age: 59
End: 2019-08-13

## 2019-08-14 ENCOUNTER — HOSPITAL ENCOUNTER (OUTPATIENT)
Dept: PHYSICAL THERAPY | Age: 59
Setting detail: THERAPIES SERIES
Discharge: HOME | End: 2019-08-14
Payer: COMMERCIAL

## 2019-08-14 ENCOUNTER — OFFICE VISIT (OUTPATIENT)
Dept: ORTHOPEDIC SURGERY | Age: 59
End: 2019-08-14
Payer: COMMERCIAL

## 2019-08-14 VITALS
SYSTOLIC BLOOD PRESSURE: 148 MMHG | HEART RATE: 79 BPM | HEIGHT: 61 IN | WEIGHT: 135 LBS | DIASTOLIC BLOOD PRESSURE: 96 MMHG | BODY MASS INDEX: 25.49 KG/M2

## 2019-08-14 DIAGNOSIS — M77.11 LATERAL EPICONDYLITIS OF RIGHT ELBOW: Primary | ICD-10-CM

## 2019-08-14 PROCEDURE — 97140 MANUAL THERAPY 1/> REGIONS: CPT | Performed by: PHYSICAL THERAPIST

## 2019-08-14 PROCEDURE — 99213 OFFICE O/P EST LOW 20 MIN: CPT | Performed by: ORTHOPAEDIC SURGERY

## 2019-08-14 PROCEDURE — 97110 THERAPEUTIC EXERCISES: CPT | Performed by: PHYSICAL THERAPIST

## 2019-08-14 PROCEDURE — 97530 THERAPEUTIC ACTIVITIES: CPT | Performed by: PHYSICAL THERAPIST

## 2019-08-14 RX ORDER — MELOXICAM 15 MG/1
15 TABLET ORAL DAILY
Qty: 30 TABLET | Refills: 1 | Status: SHIPPED | OUTPATIENT
Start: 2019-08-14 | End: 2020-06-05

## 2019-08-14 NOTE — FLOWSHEET NOTE
Therapeutic Exercise and NMR EXR  [x] (23916) Provided verbal/tactile cueing for activities related to strengthening, flexibility, endurance, ROM  for improvements in scapular, scapulothoracic and UE control with self care, reaching, carrying, lifting, house/yardwork, driving/computer work.    [] (15764) Provided verbal/tactile cueing for activities related to improving balance, coordination, kinesthetic sense, posture, motor skill, proprioception  to assist with  scapular, scapulothoracic and UE control with self care, reaching, carrying, lifting, house/yardwork, driving/computer work. Therapeutic Activities:    [x] (88668 or 07037) Provided verbal/tactile cueing for activities related to improving balance, coordination, kinesthetic sense, posture, motor skill, proprioception and motor activation to allow for proper function of scapular, scapulothoracic and UE control with self care, carrying, lifting, driving/computer work.      Home Exercise Program:    [x] (62457) Reviewed/Progressed HEP activities related to strengthening, flexibility, endurance, ROM of scapular, scapulothoracic and UE control with self care, reaching, carrying, lifting, house/yardwork, driving/computer work  [] (55772) Reviewed/Progressed HEP activities related to improving balance, coordination, kinesthetic sense, posture, motor skill, proprioception of scapular, scapulothoracic and UE control with self care, reaching, carrying, lifting, house/yardwork, driving/computer work      Manual Treatments:  PROM / STM / Oscillations-Mobs:  G-I, II, III, IV (PA's, Inf., Post.)  [x] (20194) Provided manual therapy to mobilize soft tissue/joints of cervical/CT, scapular GHJ and UE for the purpose of modulating pain, promoting relaxation,  increasing ROM, reducing/eliminating soft tissue swelling/inflammation/restriction, improving soft tissue extensibility and allowing for proper ROM for normal function with self care, reaching, carrying,

## 2019-08-19 ENCOUNTER — HOSPITAL ENCOUNTER (OUTPATIENT)
Dept: PHYSICAL THERAPY | Age: 59
Setting detail: THERAPIES SERIES
Discharge: HOME | End: 2019-08-19
Payer: COMMERCIAL

## 2019-08-19 PROCEDURE — 97530 THERAPEUTIC ACTIVITIES: CPT | Performed by: PHYSICAL THERAPIST

## 2019-08-19 PROCEDURE — 97110 THERAPEUTIC EXERCISES: CPT | Performed by: PHYSICAL THERAPIST

## 2019-08-19 PROCEDURE — 97140 MANUAL THERAPY 1/> REGIONS: CPT | Performed by: PHYSICAL THERAPIST

## 2019-08-19 NOTE — FLOWSHEET NOTE
MET     Therapist goals for Patient:   Short Term Goals: To be achieved in: 2 weeks  1. Independent in HEP and progression per patient tolerance, in order to prevent re-injury. - MET  2. Patient will have a decrease in pain to facilitate improvement in movement, function, and ADLs as indicated by Functional Deficits. -- MET      Long Term Goals: To be achieved in: 6 weeks  Added 7/1: 1. Disability index score of 28% or less for the UEFS to assist with reaching prior level of function. - Progressing towards   Added 7/1: 2. Patient will demonstrate increased right wrist extension AROM to 75 deg and right wrist flexion AROM to greater than or equal to 70 deg to allow for proper joint functioning as indicated by patients Functional Deficits. - Progressing towards  Edited: 7/1: 3. Patient will demonstrate an increase in elbow (flex, ext, pronation, and supination) and wrist (flex and ext) strength to 4+/5 to allow for proper functional mobility as indicated by patients Functional Deficits. - Progressing towards  Added 7/1: 4. Patient will return to ADLs and work without increased symptoms or restriction.    - Progressing towards      Progression Towards Functional goals:  [x] Patient is progressing as expected towards functional goals listed. [] Progression is slowed due to complexities listed. [] Progression has been slowed due to co-morbidities. [] Plan just implemented, too soon to assess goals progression  [] Other:     ASSESSMENT:      Treatment/Activity Tolerance:  [x] Patient tolerated treatment well [] Patient limited by fatique  [] Patient limited by pain  [] Patient limited by other medical complications  [x] Other:   Focused IASTM tx over areas that were more sore and followed up with elbow joint mobilizations and then scapular mobilizations. Pt tolerated tx well. This was followed by stretching and strengthening of forearm muscles.  Pt was able to do 2 lbs on triceps and increase to 3 sets of wrist

## 2019-08-22 ENCOUNTER — TELEPHONE (OUTPATIENT)
Dept: ORTHOPEDIC SURGERY | Age: 59
End: 2019-08-22

## 2019-08-26 ENCOUNTER — HOSPITAL ENCOUNTER (OUTPATIENT)
Dept: PHYSICAL THERAPY | Age: 59
Setting detail: THERAPIES SERIES
Discharge: HOME | End: 2019-08-26
Payer: COMMERCIAL

## 2019-08-26 PROCEDURE — 97530 THERAPEUTIC ACTIVITIES: CPT | Performed by: PHYSICAL THERAPIST

## 2019-08-26 PROCEDURE — 97110 THERAPEUTIC EXERCISES: CPT | Performed by: PHYSICAL THERAPIST

## 2019-08-26 PROCEDURE — 97140 MANUAL THERAPY 1/> REGIONS: CPT | Performed by: PHYSICAL THERAPIST

## 2019-08-26 NOTE — PLAN OF CARE
^8/26   Finger extension  1 min - thin rubberband Added 7/3   Gripping  2 min - red gripping ball ^ 8/7   Scapular retraction 10 sec x 10  Added 7/18   Ulnar nerve glides 10 x 3 ^ 8/7        ER/IR 10 x 3 lime IR  10 x 3 orange ER ^ 8/12  ^8/7   Bernardo twist 3 x 10 (red therabar) ^ 8/7                       Manual interventions See Below             Therapeutic Exercise and NMR EXR  [x] (88555) Provided verbal/tactile cueing for activities related to strengthening, flexibility, endurance, ROM  for improvements in scapular, scapulothoracic and UE control with self care, reaching, carrying, lifting, house/yardwork, driving/computer work.    [] (97655) Provided verbal/tactile cueing for activities related to improving balance, coordination, kinesthetic sense, posture, motor skill, proprioception  to assist with  scapular, scapulothoracic and UE control with self care, reaching, carrying, lifting, house/yardwork, driving/computer work. Therapeutic Activities:    [x] (86820 or 25935) Provided verbal/tactile cueing for activities related to improving balance, coordination, kinesthetic sense, posture, motor skill, proprioception and motor activation to allow for proper function of scapular, scapulothoracic and UE control with self care, carrying, lifting, driving/computer work.      Home Exercise Program:    [x] (90721) Reviewed/Progressed HEP activities related to strengthening, flexibility, endurance, ROM of scapular, scapulothoracic and UE control with self care, reaching, carrying, lifting, house/yardwork, driving/computer work  [] (95815) Reviewed/Progressed HEP activities related to improving balance, coordination, kinesthetic sense, posture, motor skill, proprioception of scapular, scapulothoracic and UE control with self care, reaching, carrying, lifting, house/yardwork, driving/computer work      Manual Treatments:  PROM / STM / Oscillations-Mobs:  G-I, II, III, IV (PA's, Inf., Post.)  [x] (46987) Provided manual -841, 26')   [] [facility] Traction (81806)  [] ES(attended) (98402)       [] ES (un) (10602):       GOALS: 8/26  Patient stated goal: \"return to ADLs, full duty, relief of pain, prevention methods\" - Progressing towards    Therapist goals for Patient:   Short Term Goals: To be achieved in: 2 weeks  1. Independent in HEP and progression per patient tolerance, in order to prevent re-injury. - MET  2. Patient will have a decrease in pain to facilitate improvement in movement, function, and ADLs as indicated by Functional Deficits. -- MET      Long Term Goals: To be achieved in: 6 weeks  Added 7/1: 1. Disability index score of 28% or less for the UEFS to assist with reaching prior level of function. - MET   Added 7/1: 2. Patient will demonstrate increased right wrist extension AROM to 75 deg and right wrist flexion AROM to greater than or equal to 70 deg to allow for proper joint functioning as indicated by patients Functional Deficits. - Progressing towards  Edited: 7/1: 3. Patient will demonstrate an increase in elbow (flex, ext, pronation, and supination) and wrist (flex and ext) strength to 4+/5 to allow for proper functional mobility as indicated by patients Functional Deficits. - Progressing towards  Added 7/1: 4. Patient will return to ADLs and work without increased symptoms or restriction.    - Progressing towards      Progression Towards Functional goals:  [x] Patient is progressing as expected towards functional goals listed. [] Progression is slowed due to complexities listed. [] Progression has been slowed due to co-morbidities.   [] Plan just implemented, too soon to assess goals progression  [] Other:     ASSESSMENT:      Treatment/Activity Tolerance:  [x] Patient tolerated treatment well [] Patient limited by fatique  [] Patient limited by pain  [] Patient limited by other medical complications  [x] Other:   Pt maintains elbow and wrist ROM, but demonstrates improved elbow flex, pronation, and wrist

## 2019-08-28 ENCOUNTER — HOSPITAL ENCOUNTER (OUTPATIENT)
Dept: PHYSICAL THERAPY | Age: 59
Setting detail: THERAPIES SERIES
Discharge: HOME | End: 2019-08-28
Payer: COMMERCIAL

## 2019-08-28 PROCEDURE — 97110 THERAPEUTIC EXERCISES: CPT | Performed by: PHYSICAL THERAPIST

## 2019-08-28 PROCEDURE — 97530 THERAPEUTIC ACTIVITIES: CPT | Performed by: PHYSICAL THERAPIST

## 2019-08-28 PROCEDURE — 97112 NEUROMUSCULAR REEDUCATION: CPT | Performed by: PHYSICAL THERAPIST

## 2019-08-28 PROCEDURE — 97140 MANUAL THERAPY 1/> REGIONS: CPT | Performed by: PHYSICAL THERAPIST

## 2019-08-28 NOTE — FLOWSHEET NOTE
Mobility/Transfers: Decreased ability to WB through RUE in bed and sit to stand      RESTRICTIONS/PRECAUTIONS:     Exercises/Interventions:   ROM/STRETCHES  COMMENTS   Wrist flexion 30 sec x 3 Added 7/1   Wrist extension 30 sec x 3 Added 7/1   Elbow flexion     Elbow extension          PRES     wrist flex 10 x 3 3# ^8/26   Wrist extension ECC   3 x 10   3# with 5 sec lowering   ^8/26   Wrist radial deviation     Wrist ulnar deviation     Pronation/supination     bicep 10 x 3 3#  ^8/26   Tricep  10 x 3 3# ^8/26   Finger extension  1 min - thin rubberband Added 7/3   Gripping  2 min - red gripping ball ^ 8/7   Scapular retraction 10 sec x 10  Added 7/18   Ulnar nerve glides 10 x 3 ^ 8/7        ER/IR 10 x 3 lime IR  10 x 3 orange ER ^ 8/12  ^8/7   Bernardo twist 3 x 10 (red therabar) ^ 8/7        Prone T 10 x 3 Added 8/28   Wall push-ups 10 x 3 Added 8/28             Manual interventions See Below             Therapeutic Exercise and NMR EXR  [x] (16658) Provided verbal/tactile cueing for activities related to strengthening, flexibility, endurance, ROM  for improvements in scapular, scapulothoracic and UE control with self care, reaching, carrying, lifting, house/yardwork, driving/computer work.    [] (01187) Provided verbal/tactile cueing for activities related to improving balance, coordination, kinesthetic sense, posture, motor skill, proprioception  to assist with  scapular, scapulothoracic and UE control with self care, reaching, carrying, lifting, house/yardwork, driving/computer work. Therapeutic Activities:    [x] (29256 or 89095) Provided verbal/tactile cueing for activities related to improving balance, coordination, kinesthetic sense, posture, motor skill, proprioception and motor activation to allow for proper function of scapular, scapulothoracic and UE control with self care, carrying, lifting, driving/computer work.      Home Exercise Program:    [x] (58160) Reviewed/Progressed HEP activities related to

## 2019-09-03 ENCOUNTER — HOSPITAL ENCOUNTER (OUTPATIENT)
Dept: PHYSICAL THERAPY | Age: 59
Setting detail: THERAPIES SERIES
Discharge: HOME | End: 2019-09-03
Payer: COMMERCIAL

## 2019-09-03 PROCEDURE — 97110 THERAPEUTIC EXERCISES: CPT | Performed by: PHYSICAL THERAPIST

## 2019-09-03 PROCEDURE — 97530 THERAPEUTIC ACTIVITIES: CPT | Performed by: PHYSICAL THERAPIST

## 2019-09-03 PROCEDURE — 97140 MANUAL THERAPY 1/> REGIONS: CPT | Performed by: PHYSICAL THERAPIST

## 2019-09-03 PROCEDURE — 97112 NEUROMUSCULAR REEDUCATION: CPT | Performed by: PHYSICAL THERAPIST

## 2019-09-03 NOTE — FLOWSHEET NOTE
was draped in an appropriate manner. Upon completion the clinician cleaned the IASTM tools as per [facility] recommendations. Skin check pre: Normal  Skin check post: slight normal redness  Intermittent tx time: 8' 9/3  Total manual 8'  9/3      Modalities:  Educated to ice in a few hours  9/3    Charges:   Anthony Thrasher [address] Treatment Minutes: 79'    Total Treatment Minutes: 108'      [] EVAL (LOW) 46929 (typically 20 minutes face-to-face)  [] EVAL (MOD) 20876 (typically 30 minutes face-to-face)  [] EVAL (HIGH) 93981 (typically 45 minutes face-to-face)  [] RE-EVAL   [x] ZI(05321) x  2 (9315 - 3539, 30')  [] IONTO  [x] NMR (09102) x  1 (445 - 478, 14')   [] VASO   [x] Manual (57385) x  1  (925 - 278, 8') [] Other:  [x] TA x  1 (840  021, 18')   [] Mech Traction (25596)  [] ES(attended) (24341)       [] ES (un) (23211):       GOALS: 8/26  Patient stated goal: \"return to ADLs, full duty, relief of pain, prevention methods\" - Progressing towards    Therapist goals for Patient:   Short Term Goals: To be achieved in: 2 weeks  1. Independent in HEP and progression per patient tolerance, in order to prevent re-injury. - MET  2. Patient will have a decrease in pain to facilitate improvement in movement, function, and ADLs as indicated by Functional Deficits. -- MET      Long Term Goals: To be achieved in: 6 weeks  Added 7/1: 1. Disability index score of 28% or less for the UEFS to assist with reaching prior level of function. - MET   Added 7/1: 2. Patient will demonstrate increased right wrist extension AROM to 75 deg and right wrist flexion AROM to greater than or equal to 70 deg to allow for proper joint functioning as indicated by patients Functional Deficits. - Progressing towards  Edited: 7/1: 3. Patient will demonstrate an increase in elbow (flex, ext, pronation, and supination) and wrist (flex and ext) strength to 4+/5 to allow for proper functional mobility as indicated by patients Functional Deficits.

## 2019-09-04 ENCOUNTER — TELEPHONE (OUTPATIENT)
Dept: INTERNAL MEDICINE CLINIC | Age: 59
End: 2019-09-04

## 2019-09-04 ENCOUNTER — TELEPHONE (OUTPATIENT)
Dept: PHYSICAL THERAPY | Age: 59
End: 2019-09-04

## 2019-09-04 ENCOUNTER — HOSPITAL ENCOUNTER (OUTPATIENT)
Dept: PHYSICAL THERAPY | Age: 59
Setting detail: THERAPIES SERIES
Discharge: HOME | End: 2019-09-04
Payer: COMMERCIAL

## 2019-09-04 ENCOUNTER — TELEPHONE (OUTPATIENT)
Dept: ORTHOPEDIC SURGERY | Age: 59
End: 2019-09-04

## 2019-09-04 DIAGNOSIS — Z00.00 ROUTINE GENERAL MEDICAL EXAMINATION AT A HEALTH CARE FACILITY: Primary | ICD-10-CM

## 2019-09-04 PROCEDURE — 97110 THERAPEUTIC EXERCISES: CPT | Performed by: PHYSICAL THERAPIST

## 2019-09-04 PROCEDURE — 97140 MANUAL THERAPY 1/> REGIONS: CPT | Performed by: PHYSICAL THERAPIST

## 2019-09-04 PROCEDURE — 97530 THERAPEUTIC ACTIVITIES: CPT | Performed by: PHYSICAL THERAPIST

## 2019-09-04 NOTE — FLOWSHEET NOTE
[address] [facility],[address], [address] [facility], [address]  Phone: [phone]   Fax: [phone]        Physical Therapy Daily Treatment Note  Date:  2019    Patient Name:  Jess Cochran    :  1960  MRN: [id number]  Restrictions/Precautions:    Medical/Treatment Diagnosis Information:  · Diagnosis: Rt elbow  · Treatment Diagnosis: Pain. Decreased R wrist strength and AROM  Insurance/Certification information: PT Insurance Information: [facility]  Physician Information:  Referring Practitioner: Dr. Nelly Donald of care signed (Y/N): Y    Date of Patient follow up with Physician: ~     Functional Disability:   UEFI - 56% limitation -     UEFI - 34% limitation   UEFI - 24% limitation -       Progress Note: []  Yes  [x]  No  Next due by:         Latex Allergy:  [x]NO      []YES  Preferred Language for Healthcare:   [x]English       []other:      Visit # Insurance Allowable   15 BMN (25 used)        Pain level: 1 /10 9/4    SUBJECTIVE:   Pt states she is sore from yesterday.       OBJECTIVE:    Observation:    Test measurements:              ROM AROM Comments     Left Right     Elbow flexion  149     Elbow extension  0     Pronation  86     Supination  85     Wrist flexion  66     Wrist extension  57        Strength Left Right Comments   Elbow flexion  4+/5     Elbow extension  4-/5     Pronation  4/5      Supination  4/5      Wrist flexion  4+/5     Wrist extension  4/5 + pain          Left Right Comments   Level 1  9     Level 2  9     Level 3  12     other            Joint mobility:               [x]Normal               []Hypo              []Hyper     Palpation: TTP - Wrist extensor muscle belly      Functional Mobility/Transfers: Decreased ability to WB through RUE in bed and sit to stand      RESTRICTIONS/PRECAUTIONS:     Exercises/Interventions:   ROM/STRETCHES  COMMENTS   Wrist flexion 30 sec x 5 Added    Wrist

## 2019-09-06 ENCOUNTER — TELEPHONE (OUTPATIENT)
Dept: PHYSICAL THERAPY | Age: 59
End: 2019-09-06

## 2019-09-06 DIAGNOSIS — Z00.00 ROUTINE GENERAL MEDICAL EXAMINATION AT A HEALTH CARE FACILITY: ICD-10-CM

## 2019-09-06 LAB
A/G RATIO: 2 (ref 1.1–2.2)
ALBUMIN SERPL-MCNC: 4.4 G/DL (ref 3.4–5)
ALP BLD-CCNC: 71 U/L (ref 40–129)
ALT SERPL-CCNC: 19 U/L (ref 10–40)
ANION GAP SERPL CALCULATED.3IONS-SCNC: 13 MMOL/L (ref 3–16)
AST SERPL-CCNC: 23 U/L (ref 15–37)
BASOPHILS ABSOLUTE: 0 K/UL (ref 0–0.2)
BASOPHILS RELATIVE PERCENT: 0.6 %
BILIRUB SERPL-MCNC: 0.4 MG/DL (ref 0–1)
BUN BLDV-MCNC: 17 MG/DL (ref 7–20)
CALCIUM SERPL-MCNC: 9.4 MG/DL (ref 8.3–10.6)
CHLORIDE BLD-SCNC: 100 MMOL/L (ref 99–110)
CHOLESTEROL, TOTAL: 260 MG/DL (ref 0–199)
CO2: 29 MMOL/L (ref 21–32)
CREAT SERPL-MCNC: 0.7 MG/DL (ref 0.6–1.1)
EOSINOPHILS ABSOLUTE: 0.1 K/UL (ref 0–0.6)
EOSINOPHILS RELATIVE PERCENT: 2.7 %
GFR AFRICAN AMERICAN: >60
GFR NON-AFRICAN AMERICAN: >60
GLOBULIN: 2.2 G/DL
GLUCOSE BLD-MCNC: 98 MG/DL (ref 70–99)
HCT VFR BLD CALC: 44.7 % (ref 36–48)
HDLC SERPL-MCNC: 61 MG/DL (ref 40–60)
HEMOGLOBIN: 14.6 G/DL (ref 12–16)
LDL CHOLESTEROL CALCULATED: 164 MG/DL
LYMPHOCYTES ABSOLUTE: 1.7 K/UL (ref 1–5.1)
LYMPHOCYTES RELATIVE PERCENT: 36.9 %
MCH RBC QN AUTO: 28.7 PG (ref 26–34)
MCHC RBC AUTO-ENTMCNC: 32.6 G/DL (ref 31–36)
MCV RBC AUTO: 88 FL (ref 80–100)
MONOCYTES ABSOLUTE: 0.5 K/UL (ref 0–1.3)
MONOCYTES RELATIVE PERCENT: 10.7 %
NEUTROPHILS ABSOLUTE: 2.2 K/UL (ref 1.7–7.7)
NEUTROPHILS RELATIVE PERCENT: 49.1 %
PDW BLD-RTO: 12.4 % (ref 12.4–15.4)
PLATELET # BLD: 199 K/UL (ref 135–450)
PMV BLD AUTO: 8.2 FL (ref 5–10.5)
POTASSIUM SERPL-SCNC: 3.9 MMOL/L (ref 3.5–5.1)
RBC # BLD: 5.08 M/UL (ref 4–5.2)
SODIUM BLD-SCNC: 142 MMOL/L (ref 136–145)
TOTAL PROTEIN: 6.6 G/DL (ref 6.4–8.2)
TRIGL SERPL-MCNC: 175 MG/DL (ref 0–150)
TSH SERPL DL<=0.05 MIU/L-ACNC: 3.22 UIU/ML (ref 0.27–4.2)
VLDLC SERPL CALC-MCNC: 35 MG/DL
WBC # BLD: 4.5 K/UL (ref 4–11)

## 2019-09-06 NOTE — TELEPHONE ENCOUNTER
Patient called in stating that the recent hearing decision about her [facility] claim status is being appealed again. She said do not make any changes to her account and it would still be going through her private insurance until this appeal is decided.

## 2019-09-07 LAB
ESTIMATED AVERAGE GLUCOSE: 111.2 MG/DL
HBA1C MFR BLD: 5.5 %

## 2019-09-09 ENCOUNTER — HOSPITAL ENCOUNTER (OUTPATIENT)
Dept: PHYSICAL THERAPY | Age: 59
Setting detail: THERAPIES SERIES
Discharge: HOME | End: 2019-09-09
Payer: COMMERCIAL

## 2019-09-09 PROCEDURE — 97110 THERAPEUTIC EXERCISES: CPT | Performed by: PHYSICAL THERAPIST

## 2019-09-09 PROCEDURE — 97530 THERAPEUTIC ACTIVITIES: CPT | Performed by: PHYSICAL THERAPIST

## 2019-09-09 PROCEDURE — 97140 MANUAL THERAPY 1/> REGIONS: CPT | Performed by: PHYSICAL THERAPIST

## 2019-09-09 NOTE — FLOWSHEET NOTE
stand      RESTRICTIONS/PRECAUTIONS:     Exercises/Interventions:   ROM/STRETCHES  COMMENTS   Wrist flexion 30 sec x 5 Added 7/1   Wrist extension 30 sec x 5 Added 7/1   Elbow flexion     Elbow extension          PRES     wrist flex 10 x 3 3# ^8/26   Wrist extension ECC   3 x 10   3# with 5 sec lowering   ^8/26   Wrist radial deviation     Wrist ulnar deviation     Pronation/supination     bicep 10 x 3 4#  ^9/4   Tricep  10 x 3 3# ^8/26   Finger extension  1 min - thin rubberband Added 7/3   Gripping  2 min - blue gripping ball ^9/3   Scapular retraction 10 sec x 10  Added 7/18   Ulnar nerve glides 10 x 3 ^ 8/7        ER/IR 10 x 3 green IR  10 x 3 red ER ^ 8/12  ^8/7   Bernardo twist 3 x 10 (red therabar) ^ 8/7        Prone Y 10 x 2 Added 9/4   Prone T 10 x 3 Added 8/28   Wall push-ups 10 x 3 Added 8/28   Shoulder isometrics - flex, ABD, IR, and ER 10 sec x 2 BUE each direction Added 9/3        Manual interventions See Below             Therapeutic Exercise and NMR EXR  [x] (66696) Provided verbal/tactile cueing for activities related to strengthening, flexibility, endurance, ROM  for improvements in scapular, scapulothoracic and UE control with self care, reaching, carrying, lifting, house/yardwork, driving/computer work.    [] (88728) Provided verbal/tactile cueing for activities related to improving balance, coordination, kinesthetic sense, posture, motor skill, proprioception  to assist with  scapular, scapulothoracic and UE control with self care, reaching, carrying, lifting, house/yardwork, driving/computer work. Therapeutic Activities:    [x] (62453 or 51876) Provided verbal/tactile cueing for activities related to improving balance, coordination, kinesthetic sense, posture, motor skill, proprioception and motor activation to allow for proper function of scapular, scapulothoracic and UE control with self care, carrying, lifting, driving/computer work.      Home Exercise Program:    [x] (65679)

## 2019-09-11 ENCOUNTER — HOSPITAL ENCOUNTER (OUTPATIENT)
Dept: PHYSICAL THERAPY | Age: 59
Setting detail: THERAPIES SERIES
Discharge: HOME | End: 2019-09-11
Payer: COMMERCIAL

## 2019-09-11 PROCEDURE — 97140 MANUAL THERAPY 1/> REGIONS: CPT | Performed by: PHYSICAL THERAPIST

## 2019-09-11 PROCEDURE — 97110 THERAPEUTIC EXERCISES: CPT | Performed by: PHYSICAL THERAPIST

## 2019-09-11 PROCEDURE — 97530 THERAPEUTIC ACTIVITIES: CPT | Performed by: PHYSICAL THERAPIST

## 2019-09-11 NOTE — FLOWSHEET NOTE
Exercises/Interventions:   ROM/STRETCHES  COMMENTS   Wrist flexion 30 sec x 5 Added 7/1   Wrist extension 30 sec x 5 Added 7/1   Elbow flexion     Elbow extension          PRES     wrist flex 10 x 3 3# ^8/26   Wrist extension ECC   3 x 10   3# with 5 sec lowering   ^8/26   Wrist radial deviation     Wrist ulnar deviation     Pronation/supination     bicep 10 x 3 4#  ^9/4   Tricep  10 x 3 3# ^8/26   Finger extension  1 min - thin rubberband Added 7/3   Gripping  2 min - blue gripping ball ^9/3   Scapular retraction 10 sec x 10  Added 7/18   Ulnar nerve glides 10 x 3 ^ 8/7        ER/IR 10 x 3 green IR  10 x 3 red ER ^ 8/12  ^8/7   Bernardo twist 3 x 10 (red therabar) ^ 8/7        Prone Y 10 x 2 Added 9/4   Prone T 10 x 3 Added 8/28   Wall push-ups 10 x 3 Added 8/28   Shoulder isometrics - flex, ABD, IR, and ER 10 sec x 2 BUE each direction Added 9/3        Manual interventions See Below             Therapeutic Exercise and NMR EXR  [x] (08745) Provided verbal/tactile cueing for activities related to strengthening, flexibility, endurance, ROM  for improvements in scapular, scapulothoracic and UE control with self care, reaching, carrying, lifting, house/yardwork, driving/computer work.    [] (41096) Provided verbal/tactile cueing for activities related to improving balance, coordination, kinesthetic sense, posture, motor skill, proprioception  to assist with  scapular, scapulothoracic and UE control with self care, reaching, carrying, lifting, house/yardwork, driving/computer work. Therapeutic Activities:    [x] (00435 or 58109) Provided verbal/tactile cueing for activities related to improving balance, coordination, kinesthetic sense, posture, motor skill, proprioception and motor activation to allow for proper function of scapular, scapulothoracic and UE control with self care, carrying, lifting, driving/computer work.      Home Exercise Program:    [x] (46652) Reviewed/Progressed HEP activities related to tools as per [employer] recommendations. Skin check pre: Normal  Skin check post: slight normal redness  Intermittent tx time: 5' 9/11    Total manual 10'  9/11      Modalities:  Educated to ice in a couple hours  9/11    Charges:   0564 - 2852  Timed Code Treatment Minutes: 48'   Total Treatment Minutes: 68'      [] EVAL (LOW) 81406 (typically 20 minutes face-to-face)  [] EVAL (MOD) 64609 (typically 30 minutes face-to-face)  [] EVAL (HIGH) 66454 (typically 45 minutes face-to-face)  [] RE-EVAL   [x] LY(83614) x  2 (9809 - 5335, 28')  [] IONTO  [] NMR (47242) x      [] VASO   [x] Manual (63199) x  1  (9053 - 6429, 10') [] Other:  [x] TA x  1 (5390 - 4948, 15')   [] Mech Traction (20584)  [] ES(attended) (72416)       [] ES (un) (66384):       GOALS: 8/26  Patient stated goal: \"return to ADLs, full duty, relief of pain, prevention methods\" - Progressing towards    Therapist goals for Patient:   Short Term Goals: To be achieved in: 2 weeks  1. Independent in HEP and progression per patient tolerance, in order to prevent re-injury. - MET  2. Patient will have a decrease in pain to facilitate improvement in movement, function, and ADLs as indicated by Functional Deficits. -- MET      Long Term Goals: To be achieved in: 6 weeks  Added 7/1: 1. Disability index score of 28% or less for the UEFS to assist with reaching prior level of function. - MET   Added 7/1: 2. Patient will demonstrate increased right wrist extension AROM to 75 deg and right wrist flexion AROM to greater than or equal to 70 deg to allow for proper joint functioning as indicated by patients Functional Deficits. - Progressing towards  Edited: 7/1: 3. Patient will demonstrate an increase in elbow (flex, ext, pronation, and supination) and wrist (flex and ext) strength to 4+/5 to allow for proper functional mobility as indicated by patients Functional Deficits. - Progressing towards  Added 7/1: 4.  Patient will return to ADLs and work without increased

## 2019-09-16 ENCOUNTER — HOSPITAL ENCOUNTER (OUTPATIENT)
Dept: PHYSICAL THERAPY | Age: 59
Setting detail: THERAPIES SERIES
Discharge: HOME | End: 2019-09-16
Payer: COMMERCIAL

## 2019-09-16 PROCEDURE — 97530 THERAPEUTIC ACTIVITIES: CPT | Performed by: PHYSICAL THERAPIST

## 2019-09-16 PROCEDURE — 97140 MANUAL THERAPY 1/> REGIONS: CPT | Performed by: PHYSICAL THERAPIST

## 2019-09-16 PROCEDURE — 97110 THERAPEUTIC EXERCISES: CPT | Performed by: PHYSICAL THERAPIST

## 2019-09-16 NOTE — FLOWSHEET NOTE
COMMENTS   Wrist flexion 30 sec x 5 Added 7/1   Wrist extension 30 sec x 5 Added 7/1   Elbow flexion     Elbow extension          PRES     wrist flex 10 x 3 3# ^8/26   Wrist extension ECC   3 x 10   3# with 5 sec lowering   ^8/26   Wrist radial deviation     Wrist ulnar deviation     Pronation/supination     bicep 10 x 3 4#  ^9/4   Tricep  10 x 3 3# ^8/26   Finger extension  1 min - thin rubberband Added 7/3   Gripping  2 min - blue gripping ball ^9/3   Scapular retraction 10 sec x 10  Added 7/18   Ulnar nerve glides 10 x 3 ^ 8/7   Laundry roll  10 x 2 Added 9/16        ER/IR 10 x 3 green IR  10 x 3 red ER ^ 8/12  ^8/7   Bernardo twist 3 x 10 (red therabar) ^ 8/7        Prone Y 10 x 2 Added 9/4   Prone T 10 x 3 Added 8/28   Wall push-ups 10 x 3 Added 8/28   Shoulder isometrics - flex, ABD, IR, and ER 10 sec x 2 BUE each direction Added 9/3   Weighted bin slides 10 x 3 10# Added 9/16        Manual interventions See Below             Therapeutic Exercise and NMR EXR  [x] (40052) Provided verbal/tactile cueing for activities related to strengthening, flexibility, endurance, ROM  for improvements in scapular, scapulothoracic and UE control with self care, reaching, carrying, lifting, house/yardwork, driving/computer work.    [] (78612) Provided verbal/tactile cueing for activities related to improving balance, coordination, kinesthetic sense, posture, motor skill, proprioception  to assist with  scapular, scapulothoracic and UE control with self care, reaching, carrying, lifting, house/yardwork, driving/computer work. Therapeutic Activities:    [x] (60101 or 92986) Provided verbal/tactile cueing for activities related to improving balance, coordination, kinesthetic sense, posture, motor skill, proprioception and motor activation to allow for proper function of scapular, scapulothoracic and UE control with self care, carrying, lifting, driving/computer work.      Home Exercise Program:    [x] (38457) Reviewed/Progressed HEP activities related to strengthening, flexibility, endurance, ROM of scapular, scapulothoracic and UE control with self care, reaching, carrying, lifting, house/yardwork, driving/computer work  [] (59842) Reviewed/Progressed HEP activities related to improving balance, coordination, kinesthetic sense, posture, motor skill, proprioception of scapular, scapulothoracic and UE control with self care, reaching, carrying, lifting, house/yardwork, driving/computer work     Exercises recommended for patient were given for left shoulder pain to complete independently prn. 8/28    Manual Treatments:  PROM / STM / Oscillations-Mobs:  G-I, II, III, IV (PA's, Inf., Post.)  [x] (73806) Provided manual therapy to mobilize soft tissue/joints of cervical/CT, scapular GHJ and UE for the purpose of modulating pain, promoting relaxation,  increasing ROM, reducing/eliminating soft tissue swelling/inflammation/restriction, improving soft tissue extensibility and allowing for proper ROM for normal function with self care, reaching, carrying, lifting, house/yardwork, driving/computer work          Linnea technique- elbow Lateral glide Grade III/IV, proximal and distal radio-humeral glide- 5' 9/16    Instrument Assisted Soft Tissue Mobilization (IASTM): was applied to the following muscles: R wrist extensor tendon and muscle and supinator with Hawk  tools including HG2 (handle-bar), HG4 (small can-opener), HG5 (medium can-opener), HG 8 (biscotti), and HG9 (tongue depressor). Treatment consisted of IASTM strokes including sweeping, fanning, brushing, strumming, filleting, pinning and framing, based on body region contours, nature of the soft tissue restriction and desired treatment outcomes. These techniques were used to restore neurophysiology, improve mechanotransduction, enhance fluid dynamics and break collagen crosslinks. The treatment area was exposed and the patient was draped in an appropriate manner.  Upon completion the

## 2019-09-18 ENCOUNTER — OFFICE VISIT (OUTPATIENT)
Dept: ORTHOPEDIC SURGERY | Age: 59
End: 2019-09-18
Payer: COMMERCIAL

## 2019-09-18 ENCOUNTER — OFFICE VISIT (OUTPATIENT)
Dept: INTERNAL MEDICINE CLINIC | Age: 59
End: 2019-09-18
Payer: COMMERCIAL

## 2019-09-18 ENCOUNTER — HOSPITAL ENCOUNTER (OUTPATIENT)
Dept: PHYSICAL THERAPY | Age: 59
Setting detail: THERAPIES SERIES
Discharge: HOME | End: 2019-09-18
Payer: COMMERCIAL

## 2019-09-18 VITALS
BODY MASS INDEX: 25.49 KG/M2 | HEIGHT: 61 IN | WEIGHT: 135 LBS | HEART RATE: 67 BPM | SYSTOLIC BLOOD PRESSURE: 137 MMHG | DIASTOLIC BLOOD PRESSURE: 87 MMHG

## 2019-09-18 VITALS
WEIGHT: 128 LBS | SYSTOLIC BLOOD PRESSURE: 122 MMHG | HEIGHT: 61 IN | BODY MASS INDEX: 24.17 KG/M2 | DIASTOLIC BLOOD PRESSURE: 82 MMHG

## 2019-09-18 DIAGNOSIS — E87.6 HYPOKALEMIA: ICD-10-CM

## 2019-09-18 DIAGNOSIS — M77.11 LATERAL EPICONDYLITIS OF RIGHT ELBOW: Primary | ICD-10-CM

## 2019-09-18 DIAGNOSIS — E78.5 HYPERLIPIDEMIA, UNSPECIFIED HYPERLIPIDEMIA TYPE: ICD-10-CM

## 2019-09-18 DIAGNOSIS — F32.A DEPRESSION, UNSPECIFIED DEPRESSION TYPE: ICD-10-CM

## 2019-09-18 DIAGNOSIS — M77.11 LATERAL EPICONDYLITIS OF RIGHT ELBOW: ICD-10-CM

## 2019-09-18 DIAGNOSIS — G47.00 INSOMNIA, UNSPECIFIED TYPE: ICD-10-CM

## 2019-09-18 DIAGNOSIS — G43.709 CHRONIC MIGRAINE WITHOUT AURA WITHOUT STATUS MIGRAINOSUS, NOT INTRACTABLE: ICD-10-CM

## 2019-09-18 PROCEDURE — 97110 THERAPEUTIC EXERCISES: CPT | Performed by: PHYSICAL THERAPIST

## 2019-09-18 PROCEDURE — 99213 OFFICE O/P EST LOW 20 MIN: CPT | Performed by: ORTHOPAEDIC SURGERY

## 2019-09-18 PROCEDURE — 99396 PREV VISIT EST AGE 40-64: CPT | Performed by: INTERNAL MEDICINE

## 2019-09-18 PROCEDURE — 97530 THERAPEUTIC ACTIVITIES: CPT | Performed by: PHYSICAL THERAPIST

## 2019-09-18 RX ORDER — POTASSIUM CHLORIDE 750 MG/1
TABLET, EXTENDED RELEASE ORAL
Qty: 90 TABLET | Refills: 3 | Status: SHIPPED | OUTPATIENT
Start: 2019-09-18 | End: 2020-12-04

## 2019-09-18 RX ORDER — ROSUVASTATIN CALCIUM 5 MG/1
5 TABLET, COATED ORAL NIGHTLY
Qty: 30 TABLET | Refills: 3 | Status: SHIPPED | OUTPATIENT
Start: 2019-09-18 | End: 2019-09-18

## 2019-09-18 RX ORDER — MONTELUKAST SODIUM 10 MG/1
10 TABLET ORAL DAILY
Qty: 90 TABLET | Refills: 3 | Status: SHIPPED | OUTPATIENT
Start: 2019-09-18 | End: 2020-09-11

## 2019-09-18 RX ORDER — CITALOPRAM 10 MG/1
TABLET ORAL
Qty: 180 TABLET | Refills: 3 | Status: SHIPPED | OUTPATIENT
Start: 2019-09-18 | End: 2020-12-18

## 2019-09-18 ASSESSMENT — PATIENT HEALTH QUESTIONNAIRE - PHQ9
SUM OF ALL RESPONSES TO PHQ QUESTIONS 1-9: 0
SUM OF ALL RESPONSES TO PHQ QUESTIONS 1-9: 0
2. FEELING DOWN, DEPRESSED OR HOPELESS: 0
SUM OF ALL RESPONSES TO PHQ9 QUESTIONS 1 & 2: 0
1. LITTLE INTEREST OR PLEASURE IN DOING THINGS: 0

## 2019-09-18 ASSESSMENT — ENCOUNTER SYMPTOMS
WHEEZING: 0
COLOR CHANGE: 0
BACK PAIN: 0
CHEST TIGHTNESS: 0
ABDOMINAL PAIN: 0

## 2019-09-18 NOTE — FLOWSHEET NOTE
sit to stand      RESTRICTIONS/PRECAUTIONS:     Exercises/Interventions:   ROM/STRETCHES  COMMENTS   Wrist flexion 30 sec x 5 Added 7/1   Wrist extension 30 sec x 5 Added 7/1   Elbow flexion     Elbow extension          PRES     wrist flex 10 x 3 3# ^8/26   Wrist extension ECC   3 x 10   3# with 5 sec lowering   ^8/26   Wrist radial deviation     Wrist ulnar deviation     Pronation/supination     bicep 10 x 3 4#  ^9/4   Tricep  10 x 3 3# ^8/26   Finger extension  1 min - thin rubberband Added 7/3   Gripping  2 min - blue gripping ball ^9/3   Scapular retraction 10 sec x 10  Added 7/18   Ulnar nerve glides 10 x 3 ^ 8/7   Laundry roll  10 x 2 Added 9/16        ER/IR 10 x 3 green IR  10 x 3 red ER ^ 8/12  ^8/7   Bernardo twist 3 x 10 (red therabar) ^ 8/7        Prone Y 10 x 2 Added 9/4   Prone T 10 x 3 Added 8/28   Wall push-ups 10 x 3 Added 8/28   Shoulder isometrics - flex, ABD, IR, and ER 10 sec x 2 BUE each direction Added 9/3   Weighted bin slides 10 x 3 50# ^9/18   Weight shift 10 x 3 35# Added 9/18        Manual interventions See Below             Therapeutic Exercise and NMR EXR  [x] (68043) Provided verbal/tactile cueing for activities related to strengthening, flexibility, endurance, ROM  for improvements in scapular, scapulothoracic and UE control with self care, reaching, carrying, lifting, house/yardwork, driving/computer work.    [] (26666) Provided verbal/tactile cueing for activities related to improving balance, coordination, kinesthetic sense, posture, motor skill, proprioception  to assist with  scapular, scapulothoracic and UE control with self care, reaching, carrying, lifting, house/yardwork, driving/computer work.     Therapeutic Activities:    [x] (41950 or 54323) Provided verbal/tactile cueing for activities related to improving balance, coordination, kinesthetic sense, posture, motor skill, proprioception and motor activation to allow for proper function of scapular, scapulothoracic and UE

## 2019-09-23 ENCOUNTER — TELEPHONE (OUTPATIENT)
Dept: ORTHOPEDIC SURGERY | Age: 59
End: 2019-09-23

## 2019-09-23 ENCOUNTER — HOSPITAL ENCOUNTER (OUTPATIENT)
Dept: PHYSICAL THERAPY | Age: 59
Setting detail: THERAPIES SERIES
Discharge: HOME | End: 2019-09-23
Payer: COMMERCIAL

## 2019-09-23 PROCEDURE — 97530 THERAPEUTIC ACTIVITIES: CPT | Performed by: PHYSICAL THERAPIST

## 2019-09-23 PROCEDURE — 97110 THERAPEUTIC EXERCISES: CPT | Performed by: PHYSICAL THERAPIST

## 2019-09-23 PROCEDURE — 97112 NEUROMUSCULAR REEDUCATION: CPT | Performed by: PHYSICAL THERAPIST

## 2019-09-23 NOTE — PLAN OF CARE
coordination, kinesthetic sense, posture, motor skill, proprioception of scapular, scapulothoracic and UE control with self care, reaching, carrying, lifting, house/yardwork, driving/computer work     Exercises recommended for patient were given for left shoulder pain to complete independently prn. 8/28    Manual Treatments:  PROM / STM / Oscillations-Mobs:  G-I, II, III, IV (PA's, Inf., Post.)  [x] (68720) Provided manual therapy to mobilize soft tissue/joints of cervical/CT, scapular GHJ and UE for the purpose of modulating pain, promoting relaxation,  increasing ROM, reducing/eliminating soft tissue swelling/inflammation/restriction, improving soft tissue extensibility and allowing for proper ROM for normal function with self care, reaching, carrying, lifting, house/yardwork, driving/computer work                    Modalities:  CP - 15'   9/23    Charges:   11:00 -12:22   Timed Code Treatment Minutes: 48'    Total Treatment Minutes: 80'        [] EVAL (LOW) 87344 (typically 20 minutes face-to-face)  [] EVAL (MOD) 74643 (typically 30 minutes face-to-face)  [] EVAL (HIGH) 17956 (typically 45 minutes face-to-face)  [] RE-EVAL   [x] LP(13558) x  2 (11:00- 11:30, 30')  [] IONTO  [x] NMR (76583) x  1 (11:55 - 12:03, 8')   [] VASO   [] Manual (47532) x     [] Other:  [x] TA x  1 (11:37 - 11:52, 15')   [] [facility] (53560)  [] ES(attended) (02495)       [] ES (un) (57463):       GOALS: 9/23  Patient stated goal: \"return to ADLs, full duty, relief of pain, prevention methods\" - Progressing towards    Therapist goals for Patient:   Short Term Goals: To be achieved in: 2 weeks  1. Independent in HEP and progression per patient tolerance, in order to prevent re-injury. - MET  2. Patient will have a decrease in pain to facilitate improvement in movement, function, and ADLs as indicated by Functional Deficits. -- MET      Long Term Goals: To be achieved in: 6 weeks  Added 7/1: 1.  Disability index score of 28% or less for

## 2019-09-25 ENCOUNTER — HOSPITAL ENCOUNTER (OUTPATIENT)
Dept: PHYSICAL THERAPY | Age: 59
Setting detail: THERAPIES SERIES
Discharge: HOME | End: 2019-09-25
Payer: COMMERCIAL

## 2019-09-25 PROCEDURE — 97530 THERAPEUTIC ACTIVITIES: CPT | Performed by: PHYSICAL THERAPIST

## 2019-09-25 PROCEDURE — 97110 THERAPEUTIC EXERCISES: CPT | Performed by: PHYSICAL THERAPIST

## 2019-09-25 PROCEDURE — 97112 NEUROMUSCULAR REEDUCATION: CPT | Performed by: PHYSICAL THERAPIST

## 2019-09-25 NOTE — FLOWSHEET NOTE
[address],[address], [address] [facility], [address]  Phone: [phone]   Fax: [phone]        Physical Therapy Daily Treatment Note  Date:  2019    Patient Name:  Milagros Jimenez    :  1960  MRN: [id number]  Restrictions/Precautions:    Medical/Treatment Diagnosis Information:  · Diagnosis: Rt elbow  · Treatment Diagnosis: Pain. Decreased R wrist strength and AROM  Insurance/Certification information: PT Insurance Information: [facility]  Physician Information:  Referring Practitioner: Dr. Jef Haque of care signed (Y/N): Y    Date of Patient follow up with Physician: ~     Functional Disability:   UEFI - 56% limitation -     UEFI - 34% limitation   UEFI - 24% limitation -   UEFI - 13% limitation -      Progress Note: []  Yes  [x]  No  Next due by:    10/21     Latex Allergy:  [x]NO      []YES  Preferred Language for Healthcare:   [x]English       []other:      Visit # Insurance Allowable   21 BMN (25 used)        Pain level: 1/10 9/25    SUBJECTIVE:   Pt states she feels like her elbow is on \"fire. \" She states she slept alright last night and sleeping is improving. She states she tried to lift boxes overhead last night, which did not increase any pain.         OBJECTIVE:    Observation:    Test measurements:              ROM AROM Comments     Left Right     Elbow flexion  152     Elbow extension  0     Pronation  86     Supination  85     Wrist flexion  64     Wrist extension  65        Strength Left Right Comments   Elbow flexion  4+/5     Elbow extension  4/5 + min pain      Pronation  4/5 + min pain      Supination  4+/5      Wrist flexion  4+/5     Wrist extension  4/5 + pain          Left Right Comments   Level 1  12     Level 2  11     Level 3  14     other            Joint mobility:               [x]Normal               []Hypo              []Hyper     Palpation: TTP - Wrist extensor tendon

## 2019-09-30 ENCOUNTER — HOSPITAL ENCOUNTER (OUTPATIENT)
Dept: PHYSICAL THERAPY | Age: 59
Setting detail: THERAPIES SERIES
Discharge: HOME | End: 2019-09-30
Payer: COMMERCIAL

## 2019-09-30 PROCEDURE — 97110 THERAPEUTIC EXERCISES: CPT | Performed by: PHYSICAL THERAPIST

## 2019-09-30 PROCEDURE — 97530 THERAPEUTIC ACTIVITIES: CPT | Performed by: PHYSICAL THERAPIST

## 2019-09-30 PROCEDURE — 97140 MANUAL THERAPY 1/> REGIONS: CPT | Performed by: PHYSICAL THERAPIST

## 2019-10-02 ENCOUNTER — HOSPITAL ENCOUNTER (OUTPATIENT)
Dept: PHYSICAL THERAPY | Age: 59
Setting detail: THERAPIES SERIES
Discharge: HOME | End: 2019-10-02
Payer: COMMERCIAL

## 2019-10-02 ENCOUNTER — TELEPHONE (OUTPATIENT)
Dept: ORTHOPEDIC SURGERY | Age: 59
End: 2019-10-02

## 2019-10-02 PROCEDURE — 97110 THERAPEUTIC EXERCISES: CPT | Performed by: PHYSICAL THERAPIST

## 2019-10-02 PROCEDURE — 97530 THERAPEUTIC ACTIVITIES: CPT | Performed by: PHYSICAL THERAPIST

## 2019-10-09 ENCOUNTER — HOSPITAL ENCOUNTER (OUTPATIENT)
Dept: PHYSICAL THERAPY | Age: 59
Setting detail: THERAPIES SERIES
Discharge: HOME | End: 2019-10-09
Payer: COMMERCIAL

## 2019-10-09 PROCEDURE — 97112 NEUROMUSCULAR REEDUCATION: CPT | Performed by: PHYSICAL THERAPIST

## 2019-10-09 PROCEDURE — 97110 THERAPEUTIC EXERCISES: CPT | Performed by: PHYSICAL THERAPIST

## 2019-10-09 PROCEDURE — 97530 THERAPEUTIC ACTIVITIES: CPT | Performed by: PHYSICAL THERAPIST

## 2019-10-16 ENCOUNTER — HOSPITAL ENCOUNTER (OUTPATIENT)
Dept: PHYSICAL THERAPY | Age: 59
Setting detail: THERAPIES SERIES
Discharge: HOME | End: 2019-10-16
Payer: COMMERCIAL

## 2019-10-16 PROCEDURE — 97530 THERAPEUTIC ACTIVITIES: CPT | Performed by: PHYSICAL THERAPIST

## 2019-10-16 PROCEDURE — 97112 NEUROMUSCULAR REEDUCATION: CPT | Performed by: PHYSICAL THERAPIST

## 2019-10-16 PROCEDURE — 97110 THERAPEUTIC EXERCISES: CPT | Performed by: PHYSICAL THERAPIST

## 2019-10-23 ENCOUNTER — HOSPITAL ENCOUNTER (OUTPATIENT)
Dept: PHYSICAL THERAPY | Age: 59
Setting detail: THERAPIES SERIES
Discharge: HOME | End: 2019-10-23
Payer: COMMERCIAL

## 2019-10-23 PROCEDURE — 97112 NEUROMUSCULAR REEDUCATION: CPT | Performed by: PHYSICAL THERAPIST

## 2019-10-23 PROCEDURE — 97530 THERAPEUTIC ACTIVITIES: CPT | Performed by: PHYSICAL THERAPIST

## 2019-10-23 PROCEDURE — 97110 THERAPEUTIC EXERCISES: CPT | Performed by: PHYSICAL THERAPIST

## 2019-10-30 ENCOUNTER — OFFICE VISIT (OUTPATIENT)
Dept: ORTHOPEDIC SURGERY | Age: 59
End: 2019-10-30
Payer: COMMERCIAL

## 2019-10-30 ENCOUNTER — HOSPITAL ENCOUNTER (OUTPATIENT)
Dept: PHYSICAL THERAPY | Age: 59
Setting detail: THERAPIES SERIES
Discharge: HOME | End: 2019-10-30
Payer: COMMERCIAL

## 2019-10-30 VITALS
WEIGHT: 131 LBS | DIASTOLIC BLOOD PRESSURE: 86 MMHG | HEIGHT: 61 IN | BODY MASS INDEX: 24.73 KG/M2 | HEART RATE: 73 BPM | SYSTOLIC BLOOD PRESSURE: 148 MMHG

## 2019-10-30 DIAGNOSIS — M77.11 LATERAL EPICONDYLITIS OF RIGHT ELBOW: Primary | ICD-10-CM

## 2019-10-30 PROCEDURE — 99213 OFFICE O/P EST LOW 20 MIN: CPT | Performed by: ORTHOPAEDIC SURGERY

## 2019-10-30 PROCEDURE — 97530 THERAPEUTIC ACTIVITIES: CPT | Performed by: PHYSICAL THERAPIST

## 2019-10-30 PROCEDURE — 97110 THERAPEUTIC EXERCISES: CPT | Performed by: PHYSICAL THERAPIST

## 2019-11-22 RX ORDER — MELOXICAM 15 MG/1
TABLET ORAL
Qty: 30 TABLET | Refills: 1 | Status: SHIPPED | OUTPATIENT
Start: 2019-11-22 | End: 2020-01-20

## 2019-12-31 ENCOUNTER — HOSPITAL ENCOUNTER (OUTPATIENT)
Age: 59
Discharge: HOME | End: 2019-12-31
Payer: COMMERCIAL

## 2019-12-31 ENCOUNTER — HOSPITAL ENCOUNTER (OUTPATIENT)
Dept: GENERAL RADIOLOGY | Age: 59
Discharge: HOME | End: 2019-12-31
Payer: COMMERCIAL

## 2019-12-31 PROCEDURE — 74018 RADEX ABDOMEN 1 VIEW: CPT

## 2020-01-16 RX ORDER — BUTALBITAL, ACETAMINOPHEN AND CAFFEINE 50; 325; 40 MG/1; MG/1; MG/1
TABLET ORAL
Qty: 30 TABLET | Refills: 3 | Status: SHIPPED | OUTPATIENT
Start: 2020-01-16 | End: 2022-02-02 | Stop reason: SDUPTHER

## 2020-01-20 RX ORDER — MELOXICAM 15 MG/1
TABLET ORAL
Qty: 30 TABLET | Refills: 1 | Status: SHIPPED | OUTPATIENT
Start: 2020-01-20 | End: 2020-03-25

## 2020-04-24 RX ORDER — FLUTICASONE PROPIONATE 50 MCG
SPRAY, SUSPENSION (ML) NASAL
Qty: 16 G | Refills: 3 | Status: SHIPPED | OUTPATIENT
Start: 2020-04-24 | End: 2021-05-14

## 2020-05-29 RX ORDER — MELOXICAM 15 MG/1
TABLET ORAL
Qty: 30 TABLET | Refills: 1 | Status: SHIPPED | OUTPATIENT
Start: 2020-05-29 | End: 2020-10-28 | Stop reason: SDUPTHER

## 2020-06-05 ENCOUNTER — VIRTUAL VISIT (OUTPATIENT)
Dept: INTERNAL MEDICINE CLINIC | Age: 60
End: 2020-06-05
Payer: COMMERCIAL

## 2020-06-05 ENCOUNTER — HOSPITAL ENCOUNTER (OUTPATIENT)
Age: 60
Discharge: HOME | End: 2020-06-05
Payer: COMMERCIAL

## 2020-06-05 ENCOUNTER — HOSPITAL ENCOUNTER (OUTPATIENT)
Dept: GENERAL RADIOLOGY | Age: 60
Discharge: HOME | End: 2020-06-05
Payer: COMMERCIAL

## 2020-06-05 VITALS — HEIGHT: 62 IN | WEIGHT: 133 LBS | TEMPERATURE: 98.4 F | BODY MASS INDEX: 24.48 KG/M2

## 2020-06-05 PROBLEM — M54.2 NECK PAIN: Status: ACTIVE | Noted: 2020-06-05

## 2020-06-05 PROCEDURE — 99214 OFFICE O/P EST MOD 30 MIN: CPT | Performed by: INTERNAL MEDICINE

## 2020-06-05 PROCEDURE — 72040 X-RAY EXAM NECK SPINE 2-3 VW: CPT

## 2020-06-05 RX ORDER — TOPIRAMATE 25 MG/1
75 TABLET ORAL NIGHTLY
Qty: 90 TABLET | Refills: 3 | Status: SHIPPED
Start: 2020-06-05 | End: 2020-07-09

## 2020-06-05 RX ORDER — PREDNISONE 20 MG/1
40 TABLET ORAL DAILY
Qty: 20 TABLET | Refills: 0 | Status: SHIPPED | OUTPATIENT
Start: 2020-06-05 | End: 2020-06-15

## 2020-06-05 RX ORDER — ERGOCALCIFEROL (VITAMIN D2) 50 MCG
4000 CAPSULE ORAL
COMMUNITY

## 2020-06-05 ASSESSMENT — ENCOUNTER SYMPTOMS
COLOR CHANGE: 0
CHEST TIGHTNESS: 0
ABDOMINAL PAIN: 0
WHEEZING: 0
BACK PAIN: 0

## 2020-06-08 ENCOUNTER — TELEPHONE (OUTPATIENT)
Dept: INTERNAL MEDICINE CLINIC | Age: 60
End: 2020-06-08

## 2020-06-08 NOTE — TELEPHONE ENCOUNTER
Can you let pt know we put in the order for the MRI-neck xray definite cervical arthriitis so I really do think that this is the origin of her neck pain- then she is to make an appt w ortho as soon as she gets the appt date-get her a referral to Dr. Lupe Can if she likes- she is the best

## 2020-06-11 ENCOUNTER — HOSPITAL ENCOUNTER (OUTPATIENT)
Dept: MRI IMAGING | Age: 60
Discharge: HOME | End: 2020-06-11
Payer: COMMERCIAL

## 2020-06-11 ENCOUNTER — HOSPITAL ENCOUNTER (OUTPATIENT)
Dept: PHYSICAL THERAPY | Age: 60
Setting detail: THERAPIES SERIES
Discharge: HOME | End: 2020-06-11
Payer: COMMERCIAL

## 2020-06-11 PROCEDURE — 97112 NEUROMUSCULAR REEDUCATION: CPT | Performed by: PHYSICAL THERAPIST

## 2020-06-11 PROCEDURE — 97110 THERAPEUTIC EXERCISES: CPT | Performed by: PHYSICAL THERAPIST

## 2020-06-11 PROCEDURE — 97140 MANUAL THERAPY 1/> REGIONS: CPT | Performed by: PHYSICAL THERAPIST

## 2020-06-11 PROCEDURE — 97161 PT EVAL LOW COMPLEX 20 MIN: CPT | Performed by: PHYSICAL THERAPIST

## 2020-06-11 PROCEDURE — 72141 MRI NECK SPINE W/O DYE: CPT

## 2020-06-11 NOTE — PLAN OF CARE
cervical pain   []signs/symptoms consistent with rib dysfunction   []signs/symptoms consistent with postural dysfunction   []signs/symptoms consistent with shoulder pathology    []signs/symptoms consistent with post-surgical status including decreased ROM, strength and function. []signs/symptoms consistent with pathology which may benefit from Dry Needling   []signs/symptoms which may limit the use of advanced manual therapy techniques: (Hypertension, recent trauma, intolerance to end range positions, prior TIA, visual issues, UE myotomes loss )     Prognosis/Rehab Potential:      []Excellent   [x]Good    []Fair   []Poor    Tolerance of evaluation/treatment:    []Excellent   [x]Good    []Fair   []Poor      Physical Therapy Evaluation Complexity Justification  [x] A history of present problem with:  [] no personal factors and/or comorbidities that impact the plan of care;  [x]1-2 personal factors and/or comorbidities that impact the plan of care  []3 personal factors and/or comorbidities that impact the plan of care  [x] An examination of body systems using standardized tests and measures addressing any of the following: body structures and functions (impairments), activity limitations, and/or participation restrictions;:  [] a total of 1-2 or more elements   [] a total of 3 or more elements   [x] a total of 4 or more elements   [x] A clinical presentation with:  [x] stable and/or uncomplicated characteristics   [] evolving clinical presentation with changing characteristics  [] unstable and unpredictable characteristics;   [x] Clinical decision making of [x] low, [] moderate, [] high complexity using standardized patient assessment instrument and/or measurable assessment of functional outcome.     [x] EVAL (LOW) 32639 (typically 20 minutes face-to-face)  [] EVAL (MOD) 35908 (typically 30 minutes face-to-face)  [] EVAL (HIGH) 79899 (typically 45 minutes face-to-face)  [] RE-EVAL     Reviewed insurance benefits for

## 2020-06-11 NOTE — FLOWSHEET NOTE
Activity (02463)      Wall posture                Therapeutic Exercise and NMR EXR  [x] (05799) Provided verbal/tactile cueing for activities related to strengthening, flexibility, endurance, ROM  for improvements in cervical, postural, scapular, scapulothoracic and UE control with self care, reaching, carrying, lifting, house/yardwork, driving/computer work. [x] (24448) Provided verbal/tactile cueing for activities related to improving balance, coordination, kinesthetic sense, posture, motor skill, proprioception  to assist with cervical, scapular, scapulothoracic and UE control with self care, reaching, carrying, lifting, house/yardwork, driving/computer work. Therapeutic Activities:    [] (55361 or 18290) Provided verbal/tactile cueing for activities related to improving balance, coordination, kinesthetic sense, posture, motor skill, proprioception and motor activation to allow for proper function of cervical, scapular, scapulothoracic and UE control with self care, carrying, lifting, driving/computer work.      Home Exercise Program:    [x] (75495) Reviewed/Progressed HEP activities related to strengthening, flexibility, endurance, ROM of cervical, scapular, scapulothoracic and UE control with self care, reaching, carrying, lifting, house/yardwork, driving/computer work  [] (58091) Reviewed/Progressed HEP activities related to improving balance, coordination, kinesthetic sense, posture, motor skill, proprioception of cervical, scapular, scapulothoracic and UE control with self care, reaching, carrying, lifting, house/yardwork, driving/computer work      Manual Treatments:  PROM / STM / Oscillations-Mobs:  G-I, II, III, IV (PA's, Inf., Post.)  [] (25229) Provided manual therapy to mobilize soft tissue/joints of cervical/CT, scapular GHJ and UE for the purpose of decreasing headache, modulating pain, promoting relaxation,  increasing ROM, reducing/eliminating soft tissue swelling/inflammation/restriction,

## 2020-06-17 ENCOUNTER — HOSPITAL ENCOUNTER (OUTPATIENT)
Dept: PHYSICAL THERAPY | Age: 60
Setting detail: THERAPIES SERIES
Discharge: HOME | End: 2020-06-17
Payer: COMMERCIAL

## 2020-06-17 PROCEDURE — 97112 NEUROMUSCULAR REEDUCATION: CPT

## 2020-06-17 PROCEDURE — 97110 THERAPEUTIC EXERCISES: CPT

## 2020-06-17 PROCEDURE — 97140 MANUAL THERAPY 1/> REGIONS: CPT

## 2020-06-17 NOTE — FLOWSHEET NOTE
[address] [place] [address]  and [facility], [address] [facility], [address]  Phone: [phone]   Fax: [phone]    Physical Therapy Treatment Note/ Progress Report:       Date:  2020    Patient Name:  Alton Hill    :  1960  MRN: [id number]  Restrictions/Precautions:    Medical/Treatment Diagnosis Information:  Diagnosis: Neck pain - M54.2  Treatment Diagnosis: decreased ADL status, ROM, strength, and posture  Insurance/Certification information:  PT Insurance Information: Med mutual  Physician Information:  Referring Practitioner: Dr. Blessing Cross  Has the plan of care been signed (Y/N):        []  Yes  [x]  No     Date of Patient follow up with Physician:       Is this a Progress Report:     []  Yes  [x]  No        Progress report/ Recertification will be due (10 Rx or 30 days whichever is less): 2020      Visit # Insurance Allowable Auth Required   2 BMN []  Yes []  No        Functional Scale:   NDI   - 56% limitation         Latex Allergy:  [x]NO      []YES  Preferred Language for Healthcare:   [x]English       []other:      Pain level:  4 /10 6/17    SUBJECTIVE: Pt notes she worked over the weekend and notes increased soreness. She also has numbness in palm of hand when she was driving with both hands on wheel. She feels like she gets more headaches and has pain behind ear, this can trigger migraines.       OBJECTIVE: See eval    Observation:    Test measurements:      RESTRICTIONS/PRECAUTIONS: none    Exercises/Interventions:       Therapeutic Ex (65117) Sets/sec Reps Notes/CUES   Upper trap stretch 30 sec 3 each side Added    Lev scap stretch      SCM stretch 30 sec 3  Added    Thoracic ext 10 sec 10 Added                Manual Intervention (46992)      Cerv mobs/manip 5'   Grade III-IV, Added     Grade III-IV, Added    CT manip      Rib mobilizations 3'   Grade III-IV, Added    Suboccipital release 5' Added 6/17    IASTM 5'   See below; 6/17         NMR re-education (82747)   CUES NEEDED   Chin tuck 10 sec 10 Added 6/11   Scapular retraction 10 sec 10 Added 6/11   Cervical isometrics (FLex, ext, SB)       Shoulder shrug 10sec 10 Added 6/17    Resisted scapular retraction      Resisted shoulder ext                   Therapeutic Activity (89202)      Wall posture                Therapeutic Exercise and NMR EXR  [x] (68770) Provided verbal/tactile cueing for activities related to strengthening, flexibility, endurance, ROM  for improvements in cervical, postural, scapular, scapulothoracic and UE control with self care, reaching, carrying, lifting, house/yardwork, driving/computer work. [x] (39364) Provided verbal/tactile cueing for activities related to improving balance, coordination, kinesthetic sense, posture, motor skill, proprioception  to assist with cervical, scapular, scapulothoracic and UE control with self care, reaching, carrying, lifting, house/yardwork, driving/computer work. Therapeutic Activities:    [] (96380 or 94682) Provided verbal/tactile cueing for activities related to improving balance, coordination, kinesthetic sense, posture, motor skill, proprioception and motor activation to allow for proper function of cervical, scapular, scapulothoracic and UE control with self care, carrying, lifting, driving/computer work.      Home Exercise Program:    [x] (18292) Reviewed/Progressed HEP activities related to strengthening, flexibility, endurance, ROM of cervical, scapular, scapulothoracic and UE control with self care, reaching, carrying, lifting, house/yardwork, driving/computer work  [] (13190) Reviewed/Progressed HEP activities related to improving balance, coordination, kinesthetic sense, posture, motor skill, proprioception of cervical, scapular, scapulothoracic and UE control with self care, reaching, carrying, lifting, house/yardwork, driving/computer work      Manual Treatments:  PROM / treatment well [] Patient limited by fatique  [] Patient limited by pain  [] Patient limited by other medical complications  [] Other: re-educated patient on chin tucks, added SCM stretch as pt noted this was specifically tender with IASTM and at times she gets a pain behind her ear like an ear ache. She notes this stretch is challenging. She notes chin tucks are very difficult even with cuing for proper form. With cervical traction she noted increased pain in L upper trap so this was dc'd and iastm performed to the mm. Continue with manual tx to alleviate symptoms as tolerated. Patient education: Patient education on PT and plan of care including diagnosis, prognosis, treatment goals and options. Patient agrees with discussed POC and treatment and is aware of rehab process. 6/11    PLAN: See eval 6/11  [] Continue per plan of care [] Alter current plan (see comments above)  [x] Plan of care initiated [] Hold pending MD visit [] Discharge    Electronically signed by: Bryn Reagan, PT, DPT       Note: If patient does not return for scheduled/ recommended follow up visits, this note will serve as a discharge from care along with most recent update on progress.

## 2020-07-01 ENCOUNTER — HOSPITAL ENCOUNTER (OUTPATIENT)
Dept: PHYSICAL THERAPY | Age: 60
Setting detail: THERAPIES SERIES
Discharge: HOME | End: 2020-07-01
Payer: COMMERCIAL

## 2020-07-01 PROCEDURE — 97140 MANUAL THERAPY 1/> REGIONS: CPT | Performed by: PHYSICAL THERAPIST

## 2020-07-01 PROCEDURE — 97110 THERAPEUTIC EXERCISES: CPT | Performed by: PHYSICAL THERAPIST

## 2020-07-01 NOTE — FLOWSHEET NOTE
[address] [place] [address]  and [facility], [address] [place], [address]  Phone: [phone]   Fax: [phone]    Physical Therapy Treatment Note/ Progress Report:       Date:  2020    Patient Name:  Teetee Richey    :  1960  MRN: [id number]  Restrictions/Precautions:    Medical/Treatment Diagnosis Information:  Diagnosis: Neck pain - M54.2  Treatment Diagnosis: decreased ADL status, ROM, strength, and posture  Insurance/Certification information:  PT Insurance Information: Med mutual  Physician Information:  Referring Practitioner: Dr. Mikki Hamm  Has the plan of care been signed (Y/N):        []  Yes  [x]  No     Date of Patient follow up with Physician:       Is this a Progress Report:     []  Yes  [x]  No        Progress report/ Recertification will be due (10 Rx or 30 days whichever is less): 2020      Visit # Insurance Allowable Auth Required   3 BMN []  Yes []  No        Functional Scale:   NDI   - 56% limitation         Latex Allergy:  [x]NO      []YES  Preferred Language for Healthcare:   [x]English       []other:      Pain level:  3-4  /10     SUBJECTIVE: Pt saw an neurologist on Monday. The neurologist is ordering an MRI, MRA, and an EEG. She states her migraine medication also switched. She states she continues to have headaches daily, but not a high intensity. She states same with her neck pain. She states she rode bikes close to everyday on vacation. If she had a cruiser style bike, the pain was tolerable, but if she had a speed bike, her pain was bad. She states she felt good after last session. She states work has been busy and she has to wear PAPR most of her work day.       OBJECTIVE: See eval    Observation:    Test measurements:      RESTRICTIONS/PRECAUTIONS: none    Exercises/Interventions:       Therapeutic Ex (90537) Sets/sec Reps Notes/CUES   Upper trap stretch 30 sec 3 each side Added    Lev scap stretch SCM stretch 30 sec 3  Added 6/17   Thoracic ext 10 sec 10 Added 6/11               Manual Intervention (30800)      Cerv mobs/manip 10'   Grade III-IV, Added 6/11    Grade III-IV, Added 6/11   CT manip 3'   Grade III-IV, Added 7/1   Rib mobilizations 6'   Grade III-IV, Added 6/11   Suboccipital release 5'   Added 6/17    IASTM            NMR re-education (71875)   CUES NEEDED   Chin tuck 10 sec 10 Added 6/11   Scapular retraction 10 sec 10 Added 6/11   Cervical isometrics (FLex, ext, SB)       Shoulder shrug 10sec 10 Added 6/17    Resisted scapular retraction      Resisted shoulder ext                   Therapeutic Activity (10773)      Wall posture                Therapeutic Exercise and NMR EXR  [x] (42201) Provided verbal/tactile cueing for activities related to strengthening, flexibility, endurance, ROM  for improvements in cervical, postural, scapular, scapulothoracic and UE control with self care, reaching, carrying, lifting, house/yardwork, driving/computer work. [x] (80504) Provided verbal/tactile cueing for activities related to improving balance, coordination, kinesthetic sense, posture, motor skill, proprioception  to assist with cervical, scapular, scapulothoracic and UE control with self care, reaching, carrying, lifting, house/yardwork, driving/computer work. Therapeutic Activities:    [] (18172 or 63291) Provided verbal/tactile cueing for activities related to improving balance, coordination, kinesthetic sense, posture, motor skill, proprioception and motor activation to allow for proper function of cervical, scapular, scapulothoracic and UE control with self care, carrying, lifting, driving/computer work.      Home Exercise Program:    [x] (57309) Reviewed/Progressed HEP activities related to strengthening, flexibility, endurance, ROM of cervical, scapular, scapulothoracic and UE control with self care, reaching, carrying, lifting, house/yardwork, driving/computer work  [] (54172) Reviewed/Progressed HEP activities related to improving balance, coordination, kinesthetic sense, posture, motor skill, proprioception of cervical, scapular, scapulothoracic and UE control with self care, reaching, carrying, lifting, house/yardwork, driving/computer work      Manual Treatments:  PROM / STM / Oscillations-Mobs:  G-I, II, III, IV (PA's, Inf., Post.)  [] (23679) Provided manual therapy to mobilize soft tissue/joints of cervical/CT, scapular GHJ and UE for the purpose of decreasing headache, modulating pain, promoting relaxation,  increasing ROM, reducing/eliminating soft tissue swelling/inflammation/restriction, improving soft tissue extensibility and allowing for proper ROM for normal function with self care, reaching, carrying, lifting, house/yardwork, driving/computer work    I      Modalities:  CP -10' 7/1   [] GAME READY (VASO)- for significant edema, swelling, pain control. Charges:  Timed Code Treatment Minutes: 48'     Total Treatment Minutes: 61'        [] EVAL (LOW) 455 1011 (typically 20 minutes face-to-face)  [] EVAL (MOD) 58720 (typically 30 minutes face-to-face)  [] EVAL (HIGH) 05305 (typically 45 minutes face-to-face)  [] RE-EVAL     [x] GS(13999) x  1   [] IONTO  [] NMR (23686)     [] VASO  [x] Manual (06265) x 2     [] Other:  [] TA x      [] Mech Traction (85499)  [] ES(attended) (38494)      [] ES (un) (01977):        ASSESSMENT:        GOALS:  Patient stated goal: pain relief and gain strength    [] Progressing: [] Met: [] Not Met: [] Adjusted    Therapist goals for Patient:   Short Term Goals: To be achieved in: 2 weeks  1. Independent in HEP and progression per patient tolerance, in order to prevent re-injury. [] Progressing: [] Met: [] Not Met: [] Adjusted   2. Patient will have a decrease in pain to facilitate improvement in movement, function, and ADLs as indicated by Functional Deficits. [] Progressing: [] Met: [] Not Met: [] Adjusted    Long Term Goals:  To be achieved care [] Alter current plan (see comments above)  [x] Plan of care initiated [] Hold pending MD visit [] Discharge    Electronically signed by: Jac Hsu, PT, DPT       Note: If patient does not return for scheduled/ recommended follow up visits, this note will serve as a discharge from care along with most recent update on progress.

## 2020-07-08 ENCOUNTER — HOSPITAL ENCOUNTER (OUTPATIENT)
Dept: PHYSICAL THERAPY | Age: 60
Setting detail: THERAPIES SERIES
Discharge: HOME | End: 2020-07-08
Payer: COMMERCIAL

## 2020-07-08 PROCEDURE — 97110 THERAPEUTIC EXERCISES: CPT | Performed by: PHYSICAL THERAPIST

## 2020-07-08 PROCEDURE — 97112 NEUROMUSCULAR REEDUCATION: CPT | Performed by: PHYSICAL THERAPIST

## 2020-07-08 PROCEDURE — 97140 MANUAL THERAPY 1/> REGIONS: CPT | Performed by: PHYSICAL THERAPIST

## 2020-07-08 NOTE — FLOWSHEET NOTE
[address] [place] [address]  and [facility], [address] [facility], [address]  Phone: [phone]   Fax: [phone]    Physical Therapy Treatment Note/ Progress Report:       Date:  2020    Patient Name:  Radha Coats    :  1960  MRN: [id number]  Restrictions/Precautions:    Medical/Treatment Diagnosis Information:  Diagnosis: Neck pain - M54.2  Treatment Diagnosis: decreased ADL status, ROM, strength, and posture  Insurance/Certification information:  PT Insurance Information: Med mutual  Physician Information:  Referring Practitioner: Dr. Vivian Espitia  Has the plan of care been signed (Y/N):        []  Yes  [x]  No     Date of Patient follow up with Physician:       Is this a Progress Report:     []  Yes  [x]  No        Progress report/ Recertification will be due (10 Rx or 30 days whichever is less): 2020      Visit # Insurance Allowable Auth Required   4 BMN []  Yes []  No        Functional Scale:   NDI   - 56% limitation         Latex Allergy:  [x]NO      []YES  Preferred Language for Healthcare:   [x]English       []other:      Pain level:  3  /10     SUBJECTIVE: Pt states she woke up with 1/10 pain, but after getting up it is increased. She states she has an ache/ pressure along the L upper trap. She states she felt good immediately after last session, but then it got really sore for a couple hours, then improved.       OBJECTIVE: See eval    Observation:    Test measurements:      RESTRICTIONS/PRECAUTIONS: none    Exercises/Interventions:       Therapeutic Ex (39493) Sets/sec Reps Notes/CUES   Upper trap stretch 30 sec 3 each side Added    Lev scap stretch      SCM stretch 30 sec 3  Added    Thoracic ext 10 sec 10 Added /               Manual Intervention (43129)      Cerv mobs/manip 10'   Grade III-IV, Added     Grade III-IV, Added    CT manip 3'   Grade III-IV, Added /   Rib mobilizations 6'   Grade III-IV, Added 6/11   Suboccipital release 5'   Added 6/17    IAS            NMR re-education (04211)   CUES NEEDED   Chin tuck 10 sec 10 Added 6/11   Scapular retraction 10 sec 10 Added 6/11   Cervical isometrics (FLex, ext, SB)       Shoulder shrug 10sec 10 Added 6/17    Resisted scapular retraction 3 10 Blue TB, Added 7/8   Resisted shoulder ext       Serratus punches 3 10 Added 7/8   Prone T 3 10 Added 7/8         Therapeutic Activity (49313)      Wall posture                Therapeutic Exercise and NMR EXR  [x] (59621) Provided verbal/tactile cueing for activities related to strengthening, flexibility, endurance, ROM  for improvements in cervical, postural, scapular, scapulothoracic and UE control with self care, reaching, carrying, lifting, house/yardwork, driving/computer work. [x] (57854) Provided verbal/tactile cueing for activities related to improving balance, coordination, kinesthetic sense, posture, motor skill, proprioception  to assist with cervical, scapular, scapulothoracic and UE control with self care, reaching, carrying, lifting, house/yardwork, driving/computer work. Therapeutic Activities:    [] (56382 or 16945) Provided verbal/tactile cueing for activities related to improving balance, coordination, kinesthetic sense, posture, motor skill, proprioception and motor activation to allow for proper function of cervical, scapular, scapulothoracic and UE control with self care, carrying, lifting, driving/computer work.      Home Exercise Program:    [x] (94356) Reviewed/Progressed HEP activities related to strengthening, flexibility, endurance, ROM of cervical, scapular, scapulothoracic and UE control with self care, reaching, carrying, lifting, house/yardwork, driving/computer work  [] (82606) Reviewed/Progressed HEP activities related to improving balance, coordination, kinesthetic sense, posture, motor skill, proprioception of cervical, scapular, scapulothoracic and UE control with self care, reaching, carrying, lifting, house/yardwork, driving/computer work      Manual Treatments:  PROM / STM / Oscillations-Mobs:  G-I, II, III, IV (PA's, Inf., Post.)  [] (23368) Provided manual therapy to mobilize soft tissue/joints of cervical/CT, scapular GHJ and UE for the purpose of decreasing headache, modulating pain, promoting relaxation,  increasing ROM, reducing/eliminating soft tissue swelling/inflammation/restriction, improving soft tissue extensibility and allowing for proper ROM for normal function with self care, reaching, carrying, lifting, house/yardwork, driving/computer work    I      Modalities:  CP -15' 7/8   [] GAME READY (VASO)- for significant edema, swelling, pain control. Charges:  Timed Code Treatment Minutes: 48'    Total Treatment Minutes: 79'       [] EVAL (LOW) 14411 (typically 20 minutes face-to-face)  [] EVAL (MOD) 67664 (typically 30 minutes face-to-face)  [] EVAL (HIGH) 43013 (typically 45 minutes face-to-face)  [] RE-EVAL     [x] HD(40138) x  1   [] IONTO  [x] NMR (78780)x 1   [] VASO  [x] Manual (96082) x 1     [] Other:  [] TA x      [] Mech Traction (40478)  [] ES(attended) (34749)      [] ES (un) (14731):        ASSESSMENT:        GOALS:  Patient stated goal: pain relief and gain strength    [] Progressing: [] Met: [] Not Met: [] Adjusted    Therapist goals for Patient:   Short Term Goals: To be achieved in: 2 weeks  1. Independent in HEP and progression per patient tolerance, in order to prevent re-injury. [] Progressing: [] Met: [] Not Met: [] Adjusted   2. Patient will have a decrease in pain to facilitate improvement in movement, function, and ADLs as indicated by Functional Deficits. [] Progressing: [] Met: [] Not Met: [] Adjusted    Long Term Goals: To be achieved in: 4-6 weeks  1. Disability index score of 28% or less for the NDI to assist with reaching prior level of function. [] Progressing: [] Met: [] Not Met: [] Adjusted  2.  Patient will demonstrate increased cervical flexion and ext  AROM to greater than or equal to 45 deg,  to allow for proper joint functioning as indicated by patients Functional Deficits. [] Progressing: [] Met: [] Not Met: [] Adjusted  3. Patient will return to ADLs and work without increased symptoms or restriction. [] Progressing: [] Met: [] Not Met: [] Adjusted  4. Patient will report returning to independent workouts pain free. [] Progressing: [] Met: [] Not Met: [] Adjusted     Overall Progression Towards Functional goals/ Treatment Progress Update:  [] Patient is progressing as expected towards functional goals listed. [] Progression is slowed due to complexities/Impairments listed. [] Progression has been slowed due to co-morbidities. [x] Plan just implemented, too soon to assess goals progression <30days   [] Goals require adjustment due to lack of progress  [] Patient is not progressing as expected and requires additional follow up with physician  [] Other    Prognosis for POC: [x] Good [] Fair  [] Poor      Patient requires continued skilled intervention: [x] Yes  [] No    Treatment/Activity Tolerance:  [x] Patient tolerated treatment well [] Patient limited by fatique  [] Patient limited by pain  [x] Patient limited by other medical complications  [] Other: Pt rox session well. She continues to have hypomobility with rib mobilizations. She had tenderness in her infraspinatus. She rox increased activity today, denying pain with resisted scapular resistance and prone Ts.  7/8    Patient education: Patient education on PT and plan of care including diagnosis, prognosis, treatment goals and options. Patient agrees with discussed POC and treatment and is aware of rehab process.  6/11    PLAN: See sonu 6/11  [] Continue per plan of care [] Alter current plan (see comments above)  [x] Plan of care initiated [] Hold pending MD visit [] Discharge    Electronically signed by: Donte Nieves, PT, DPT       Note: If patient does not return for scheduled/ recommended follow up visits, this note will serve as a discharge from care along with most recent update on progress.

## 2020-07-09 ENCOUNTER — OFFICE VISIT (OUTPATIENT)
Dept: ORTHOPEDIC SURGERY | Age: 60
End: 2020-07-09
Payer: COMMERCIAL

## 2020-07-09 VITALS — RESPIRATION RATE: 12 BRPM | BODY MASS INDEX: 24.46 KG/M2 | HEIGHT: 62 IN | TEMPERATURE: 98 F | WEIGHT: 132.94 LBS

## 2020-07-09 PROCEDURE — 99243 OFF/OP CNSLTJ NEW/EST LOW 30: CPT | Performed by: PHYSICAL MEDICINE & REHABILITATION

## 2020-07-09 NOTE — PROGRESS NOTES
seen a neurologist at [facility]. An EEG and MRA were ordered during that visit as well. Pain Assessment  Location of Pain: Neck  Location Modifiers: Left(SHOULDER)  Severity of Pain: 6  Quality of Pain: Dull, Aching(BURNING)  Duration of Pain: Persistent  Date Pain First Started: (5/1/2020)  Aggravating Factors: (CERVICAL FLEXION)  Limiting Behavior: Yes  Work-Related Injury: No  Are there other pain locations you wish to document?: No      Associated signs and symptoms:   Neurogenic bowel or bladder symptoms:  no   Perceived weakness:  yes   Difficulty walking:  no    Recent Imaging (within past one year)   Xrays: yes   MRI or CT of spine: yes    Current/Past Treatment:   · Physical Therapy:  Dates:  6/11-7/1/2020  #Visits: 3  · Chiropractic:  none  · Injection:  none  · Medications:   NSAIDS:  Motrin   Muscle relaxer:  none   Steriods:  Prednisone   Neuropathic medications:  none   Opioids:  none  · Previous surgery:  no  · Previous surgical consult:  no  · Other:  · Infection control  · Tested positive for MRSA in past 12 months:  no  · Tested positive for MSSA \"staph infection\" in past 12 months: no  · Tested positive for VRE (Vancomycin Resistant Enterococci) in past 12 months:   no  · Currently on any antibiotics for an infection: no  · Anticoagulants:  · On a blood thinner:  no   · Any history of bleeding disorder: no   · MRI Contraindication: no   · Previous Pain Management: no   · Goal for treatment decrease pain      Past medical, surgical, social and family history reviewed with the patient.  No pertinent relevant history            Past Medical History:   Past Medical History:   Diagnosis Date    Abnormal mammogram 6/8/2011    saw Dr. Lombardi or associate 6/2011 Ok to return for regular mammogram's     Anxiety 6/8/2011    Kidney stone     Medical history reviewed with no changes     Pyelonephritis 7/28/2011    Recurrent Change to augmentin and levaquin and ck blood cx's 7/27/2011 On daily paulabid per Dr. Velvet Grayson Stress disorder, acute 3/23/2013    Due to 's infidelity and leaving her 3/2013 To see counselor- increase celexa   still living w the family as of 6/2013       Past Surgical History:     Past Surgical History:   Procedure Laterality Date    CARPAL TUNNEL RELEASE  12/29/11    Left    CARPAL TUNNEL RELEASE Right 12-    CYSTOSCOPY      with stent placement-x3    SALIVARY GLAND SURGERY      L maxillary gland removal     Current Medications:     Current Outpatient Medications:     Vitamin D, Ergocalciferol, 50 MCG (2000 UT) CAPS, Take 4,000 Units by mouth, Disp: , Rfl:     meloxicam (MOBIC) 15 MG tablet, TAKE 1 TABLET BY MOUTH ONE TIME A DAY, Disp: 30 tablet, Rfl: 1    fluticasone (FLONASE) 50 MCG/ACT nasal spray, USE 1 SPRAY IN EACH NOSTRIL DAILY, Disp: 16 g, Rfl: 3    rizatriptan (MAXALT-MLT) 10 MG disintegrating tablet, TALE 1 TABLET BY MOUTH AS NEEDED FOR MIGRAINES MAY REPEAT 2 HOURS LATER IF NEEDED, Disp: 27 tablet, Rfl: 3    butalbital-acetaminophen-caffeine (FIORICET, ESGIC) -40 MG per tablet, TAKE ONE TABLET BY MOUTH EVERY 4 HOURS AS NEEDED FOR PAIN UP TO 10 DAYS, Disp: 30 tablet, Rfl: 3    potassium chloride (KLOR-CON M) 10 MEQ extended release tablet, TAKE 1 TABLET BY MOUTH DAILY, Disp: 90 tablet, Rfl: 3    montelukast (SINGULAIR) 10 MG tablet, Take 1 tablet by mouth daily, Disp: 90 tablet, Rfl: 3    citalopram (CELEXA) 10 MG tablet, TAKE 2 tabs daily (Patient taking differently: 10 mg TAKE 2 tabs daily), Disp: 180 tablet, Rfl: 3    folic acid (FOLVITE) 1 MG tablet, Take 1 mg by mouth daily, Disp: , Rfl:     calcium citrate-vitamin D (CALCITRATE/VITAMIN D) 315-200 MG-UNIT per tablet, Take 1 tablet by mouth daily. , Disp: , Rfl:   Allergies:  Patient has no known allergies. Social History:    reports that she has never smoked. She has never used smokeless tobacco. She reports that she does not drink alcohol or use drugs.   Family History: Family History   Problem Relation Age of Onset    Diabetes Mother     Kidney Cancer Mother         RIGHT    Cancer Maternal Grandmother     Diabetes Maternal Grandmother     Cancer Maternal Grandfather     Diabetes Maternal Grandfather     Heart Disease Other     High Blood Pressure Other        REVIEW OF SYSTEMS: ROS - 14 point    Constitutional: No fevers, chills, night sweats, unexplained weight loss  Eye: No vision changes or diplopia  ENT: No nasal congestion, postnasal drip or sore throat. No tinnitus  Respiratory: No cough or SOB  CV: No chest pain or palpitations  GI: No nausea, abdominal pain, stool changes  : No dysuria or hematuria  Skin: No new or changing skin lesions, no rashes  MSK: No joint swelling, morning stiffness, unusual joint pain  Neurological: No headache, confusion, syncope  Psychiatric: No excessive anxiety or depression  Endocrine: No polyuria or polydipsia  Hematologic: No lymph node enlargement or excessive bleeding  Immunologic:No history of immune deficiency or immunomodulating drugs           PHYSICAL EXAM:    Vitals: Temperature 98 °F (36.7 °C), resp. rate 12, height 5' 2.01\" (1.575 m), weight 132 lb 15 oz (60.3 kg), last menstrual period 10/07/2012. GENERAL EXAM:  · General Apparence: Patient is adequately groomed with no evidence of malnutrition. · Psychiatric: Orientation: The patient is oriented to time, place and person. The patient's mood and affect are appropriate   · Vascular: Examination reveals no swelling and palpation reveals no tenderness in upper or lower extremities. Good capillary refill.    · The lymphatic examination of the neck, axillae and groin reveals all areas to be without enlargement or induration   Sensation is intact without deficit in the upper and lower extremities to light touch and pinprick  · Coordination of the upper and lower extremities are normal.    CERVICAL EXAMINATION:  · Inspection: Local inspection shows no step-off or bruising. Cervical alignment is normal. No instability is noted. · Palpation and Percussion: No evidence of tenderness at the midline. Paraspinal tenderness is present. There is no paraspinal spasm. · Range of Motion:  limited by 25% in all planes due to pain   · Strength: 5/5 bilateral upper extremities  · Special Tests:   Spurling's and Nguyen's are negative bilaterally. Metcalf and Impingement tests are negative bilaterally. · Skin:There are no rashes, ulcerations or lesions. · Reflexes: Bilaterally triceps, biceps and brachioradialis are 3+. + Nguyen's on left  · Gait & station:  normal, patient ambulates without assistance and no ataxia  · Additional Examinations:  · RIGHT UPPER EXTREMITY:  Inspection/examination of the right upper extremity does not show any tenderness, deformity or injury. Range of motion is normal and pain-free. There is no gross instability. There are no rashes, ulcerations or lesions. Strength and tone are normal. No atrophy or abnormal movements are noted. · LEFT UPPER EXTREMITY: Inspection/examination of the left upper extremity does not show any tenderness, deformity or injury. Range of motion is normal and pain-free. There is no gross instability. There are no rashes, ulcerations or lesions. Strength and tone are normal. No atrophy or abnormal movements are noted. · Additional Examinations:  · RIGHT LOWER EXTREMITY: Inspection/examination of the right lower extremity does not show any tenderness, deformity or injury. Range of motion is unremarkable. There is no gross instability. There are no rashes, ulcerations or lesions. Strength and tone are normal. No atrophy or abnormal movements are noted. · LEFT LOWER EXTREMITY:  Inspection/examination of the left lower extremity does not show any tenderness, deformity or injury. Range of motion is unremarkable. There is no gross instability. There are no rashes, ulcerations or lesions.   Strength and tone are normal. No atrophy or abnormal movements are noted. Diagnostic Testing:    Xrays:   I personally reviewed images of the AP and lateral of the cervical spine taken on 6/5/2020 showing C5-6 degenerative disc disease  MRI or CT:  MR cervical spine which I personally viewed images show 6/11/2020  1. Mild bilateral neural foraminal narrowing at C5-C6 secondary to a disc   bulge and uncovertebral overgrowth. 2. Mild-to-moderate left neural foraminal narrowing at C4-C5 secondary to a   disc bulge and uncovertebral overgrowth.    3. Focal 2 mm central disc protrusion C3-C4 without significant spinal canal   stenosis or neural foraminal narrowing evident.           EMG:  None  Results for orders placed or performed in visit on 09/06/19   Hemoglobin A1C   Result Value Ref Range    Hemoglobin A1C 5.5 See comment %    eAG 111.2 mg/dL   TSH without Reflex   Result Value Ref Range    TSH 3.22 0.27 - 4.20 uIU/mL   Lipid Panel   Result Value Ref Range    Cholesterol, Total 260 (H) 0 - 199 mg/dL    Triglycerides 175 (H) 0 - 150 mg/dL    HDL 61 (H) 40 - 60 mg/dL    LDL Calculated 164 (H) <100 mg/dL    VLDL Cholesterol Calculated 35 Not Established mg/dL   Comprehensive Metabolic Panel   Result Value Ref Range    Sodium 142 136 - 145 mmol/L    Potassium 3.9 3.5 - 5.1 mmol/L    Chloride 100 99 - 110 mmol/L    CO2 29 21 - 32 mmol/L    Anion Gap 13 3 - 16    Glucose 98 70 - 99 mg/dL    BUN 17 7 - 20 mg/dL    CREATININE 0.7 0.6 - 1.1 mg/dL    GFR Non-African American >60 >60    GFR African American >60 >60    Calcium 9.4 8.3 - 10.6 mg/dL    Total Protein 6.6 6.4 - 8.2 g/dL    Alb 4.4 3.4 - 5.0 g/dL    Albumin/Globulin Ratio 2.0 1.1 - 2.2    Total Bilirubin 0.4 0.0 - 1.0 mg/dL    Alkaline Phosphatase 71 40 - 129 U/L    ALT 19 10 - 40 U/L    AST 23 15 - 37 U/L    Globulin 2.2 g/dL   CBC Auto Differential   Result Value Ref Range    WBC 4.5 4.0 - 11.0 K/uL    RBC 5.08 4.00 - 5.20 M/uL    Hemoglobin 14.6 12.0 - 16.0 g/dL    Hematocrit 44.7 36.0 - 48.0 %    MCV 88.0 80.0 - 100.0 fL    MCH 28.7 26.0 - 34.0 pg    MCHC 32.6 31.0 - 36.0 g/dL    RDW 12.4 12.4 - 15.4 %    Platelets 677 729 - 414 K/uL    MPV 8.2 5.0 - 10.5 fL    Neutrophils % 49.1 %    Lymphocytes % 36.9 %    Monocytes % 10.7 %    Eosinophils % 2.7 %    Basophils % 0.6 %    Neutrophils Absolute 2.2 1.7 - 7.7 K/uL    Lymphocytes Absolute 1.7 1.0 - 5.1 K/uL    Monocytes Absolute 0.5 0.0 - 1.3 K/uL    Eosinophils Absolute 0.1 0.0 - 0.6 K/uL    Basophils Absolute 0.0 0.0 - 0.2 K/uL       Impression (Medical Decision Making):       1. Cervical spondylosis without myelopathy    2. Myofascial muscle pain        Plan (Medical Decision Making):    I discussed the diagnosis and the treatment options with Lucia Snyder today. In Summary:  The various treatment options were outlined and discussed with Lucia Snyder including:  Conservative care options: physical therapy, ice and heat. The indications for therapeutic injections. The indications for additional imaging/laboratory studies. The indications for (possible future) interventions. After considering the various options discussed, Lucia Snyder elected to pursue a course of treatment that includes the followin. Medications: No further recommendations for new medications. 2. PT:  Continue PT for 5-8 more visits    3. Further studies: She is seeing neurology. 4. Interventional:  At this point, no interventional options are recommended.     5. Healthy Lifestyle Measures:  Patient education material reviewing the following was distributed to Lucia Snyder  Anatomic drawings  Healthy lifestyle education  Osteoporosis prevention,   Back and neck pain educational information   Advanced imaging preparedness    Posture education   Proper lifting and carrying techniques,   Weight management  Quitting smoking and   Minor ways to treat back pain  For further information regarding the spine conditions and to review interventional treatments the patient was directed to [employer].    6.  Follow up:  4-6 weeks    Sharyle Irish was instructed to call the office if her symptoms worsen or if new symptoms appear prior to the next scheduled visit. She is specifically instructed to contact the office between now & her scheduled appointment if she has concerns related to her condition or if she needs assistance in scheduling the above tests. She is welcome to call for an appointment sooner if she has any additional concerns or questions. Sharda Felder ATC, am scribing for and in the presence of Dr. Kaleigh Smith. 07/09/20 3:54 PM Toño Gray ATC    The physical examination was performed between the patient and Dr. Kaleigh Smith. All counseling during the appointment was performed between the patient and provider. Christine Hooker. Ariana Roblero MD, CESIA, [facility]  Board Certified in [address] [place] [address] Certified and Fellowship Trained in [facility] ([facility])     This dictation was performed with a verbal recognition program [facility]) and it was checked for errors. It is possible that there are still dictated errors within this office note. If so, please bring any errors to my attention for an addendum. All efforts were made to ensure that this office note is accurate.

## 2020-07-15 ENCOUNTER — HOSPITAL ENCOUNTER (OUTPATIENT)
Dept: MRI IMAGING | Age: 60
Discharge: HOME | End: 2020-07-15
Payer: COMMERCIAL

## 2020-07-15 ENCOUNTER — HOSPITAL ENCOUNTER (OUTPATIENT)
Dept: PHYSICAL THERAPY | Age: 60
Setting detail: THERAPIES SERIES
Discharge: HOME | End: 2020-07-15
Payer: COMMERCIAL

## 2020-07-15 PROCEDURE — 6360000004 HC RX CONTRAST MEDICATION: Performed by: PSYCHIATRY & NEUROLOGY

## 2020-07-15 PROCEDURE — 70553 MRI BRAIN STEM W/O & W/DYE: CPT

## 2020-07-15 PROCEDURE — A9577 INJ MULTIHANCE: HCPCS | Performed by: PSYCHIATRY & NEUROLOGY

## 2020-07-15 PROCEDURE — 97140 MANUAL THERAPY 1/> REGIONS: CPT | Performed by: PHYSICAL THERAPIST

## 2020-07-15 PROCEDURE — 97110 THERAPEUTIC EXERCISES: CPT | Performed by: PHYSICAL THERAPIST

## 2020-07-15 PROCEDURE — 97112 NEUROMUSCULAR REEDUCATION: CPT | Performed by: PHYSICAL THERAPIST

## 2020-07-15 PROCEDURE — 70544 MR ANGIOGRAPHY HEAD W/O DYE: CPT

## 2020-07-15 RX ADMIN — GADOBENATE DIMEGLUMINE 12 ML: 529 INJECTION, SOLUTION INTRAVENOUS at 17:01

## 2020-07-15 NOTE — PLAN OF CARE
[facility],[address], [address] [place], [address]  Phone: [phone]   Fax: [phone]     Physical Therapy Re-Certification Plan of Care    Dear Dr. Sudhir Wright,    We had the pleasure of treating the following patient for physical therapy services at [address] [place] [address]. A summary of our findings can be found in the updated assessment below. This includes our plan of care. If you have any questions or concerns regarding these findings, please do not hesitate to contact me at the office phone number checked above. Thank you for the referral.     Physician Signature:________________________________Date:__________________  By signing above (or electronic signature), therapists plan is approved by physician      Overall Response to Treatment:   []Patient is responding well to treatment and improvement is noted with regards  to goals   []Patient should continue to improve in reasonable time if they continue HEP   []Patient has plateaued and is no longer responding to skilled PT intervention    []Patient is getting worse and would benefit from return to referring MD   []Patient unable to adhere to initial POC   []Other:Pt demonstrates improved cervical SB and rotation, but continues to lack cervical flex and ext. She continues to demonstrate hypomobility with joint mobilizations. She rox the addition of repeated cervical flexion, denying any change in symptoms. Recommend pt continue with 1x/ week to manage symptoms and progress shoulder and postural strength to return to PLOF.      Date range of previous POC Visits: 20 - 7/15/20    Total Visits: 5        Physical Therapy Treatment Note/ Progress Report:       Date:  7/15/2020    Patient Name:  Enriqueta Blankenship    :  1960  MRN: [id number]  Restrictions/Precautions:    Medical/Treatment Diagnosis Information:  Diagnosis: Neck pain - M54.2  Treatment Diagnosis: decreased ADL status, ROM, strength, and posture  Insurance/Certification information:  PT Insurance Information: Med mutual  Physician Information:  Referring Practitioner: Dr. Rox Estevez  Has the plan of care been signed (Y/N):        []  Yes  [x]  No     Date of Patient follow up with Physician:       Is this a Progress Report:     []  Yes  [x]  No        Progress report/ Recertification will be due (10 Rx or 30 days whichever is less): 12 August 2020      Visit # Insurance Allowable Auth Required   5 BMN []  Yes []  No        Functional Scale:   NDI  6/11 - 56% limitation   7/15 - 32% limitation         Latex Allergy:  [x]NO      []YES  Preferred Language for Healthcare:   [x]English       []other:      Pain level: 2 /10 7/15    SUBJECTIVE: Pt states she thinks her headaches have improved. She is feeling better. She states work was stressful yesterday, but no increased pain. She states she had increased soreness after last session, but then felt better.  She saw the spine MD, who recommended more PT.   7/15    OBJECTIVE: 7/15   Observation:    Test measurements:          AROM Left  Right   Comments   Cervical Flexion       24 + discomfort   Cervical Extension       26 + pain    Cervical Side bend 30 + min pain 32       Cervical Rotation 55 60                      Special tests Normal Abnormal N/A Comments   Sharp-Brianne Spurling                Mobility Testing  Comments   Cervical Downglides  Hypomobile   Cervical Flexion mobility     Thoracic PA mobility  Hypomobile    Rib mobility  Hypomobile            Red Flags           Positive    Positive    Headaches (worst patient has ever had) Pos Altered consciousness Neg   Dizziness Pos - rotating L  Radiating Pain Pos   Diplopia Neg Gait disturbances Neg   Dysphagia Neg Multi-segmental weakness Neg   Dysarthria Neg Tru/quad paresis Neg   Drop Attacks Neg Fracture Neg   Facial symptoms  Neg Numbness Neg   Nystagmus Neg Nausea  Minimally      Joint mobility: activation to allow for proper function of cervical, scapular, scapulothoracic and UE control with self care, carrying, lifting, driving/computer work. Home Exercise Program:    [x] (32128) Reviewed/Progressed HEP activities related to strengthening, flexibility, endurance, ROM of cervical, scapular, scapulothoracic and UE control with self care, reaching, carrying, lifting, house/yardwork, driving/computer work  [] (67098) Reviewed/Progressed HEP activities related to improving balance, coordination, kinesthetic sense, posture, motor skill, proprioception of cervical, scapular, scapulothoracic and UE control with self care, reaching, carrying, lifting, house/yardwork, driving/computer work      Manual Treatments:  PROM / STM / Oscillations-Mobs:  G-I, II, III, IV (PA's, Inf., Post.)  [] (60065) Provided manual therapy to mobilize soft tissue/joints of cervical/CT, scapular GHJ and UE for the purpose of decreasing headache, modulating pain, promoting relaxation,  increasing ROM, reducing/eliminating soft tissue swelling/inflammation/restriction, improving soft tissue extensibility and allowing for proper ROM for normal function with self care, reaching, carrying, lifting, house/yardwork, driving/computer work    I      Modalities:  CP -15' 7/15   [] GAME READY (VASO)- for significant edema, swelling, pain control.      Charges:  Timed Code Treatment Minutes: 48'    Total Treatment Minutes: 79'       [] EVAL (LOW) 37267 (typically 20 minutes face-to-face)  [] EVAL (MOD) 58806 (typically 30 minutes face-to-face)  [] EVAL (HIGH) 44834 (typically 45 minutes face-to-face)  [] RE-EVAL     [x] FS(92257) x 2   [] IONTO  [x] NMR (55991)x 1   [] VASO  [x] Manual (61121) x 1     [] Other:  [] TA x      [] Mech Traction (51112)  [] ES(attended) (51987)      [] ES (un) (42146):        ASSESSMENT:        GOALS:7/15  Patient stated goal: pain relief and gain strength    [x] Progressing: [] Met: [] Not Met: [] Adjusted    Therapist goals for Patient:   Short Term Goals: To be achieved in: 2 weeks  1. Independent in HEP and progression per patient tolerance, in order to prevent re-injury. [] Progressing: [x] Met: [] Not Met: [] Adjusted   2. Patient will have a decrease in pain to facilitate improvement in movement, function, and ADLs as indicated by Functional Deficits. [] Progressing: [x] Met: [] Not Met: [] Adjusted    Long Term Goals: To be achieved in: 4-6 weeks  1. Disability index score of 28% or less for the NDI to assist with reaching prior level of function. [x] Progressing: [] Met: [] Not Met: [] Adjusted  2. Patient will demonstrate increased cervical flexion and ext  AROM to greater than or equal to 45 deg,  to allow for proper joint functioning as indicated by patients Functional Deficits. [x] Progressing: [] Met: [] Not Met: [] Adjusted  3. Patient will return to ADLs and work without increased symptoms or restriction. [x] Progressing: [] Met: [] Not Met: [] Adjusted  4. Patient will report returning to independent workouts pain free. [] Progressing: [] Met: [x] Not Met: [] Adjusted     Overall Progression Towards Functional goals/ Treatment Progress Update:  [] Patient is progressing as expected towards functional goals listed. [] Progression is slowed due to complexities/Impairments listed. [] Progression has been slowed due to co-morbidities.   [x] Plan just implemented, too soon to assess goals progression <30days   [] Goals require adjustment due to lack of progress  [] Patient is not progressing as expected and requires additional follow up with physician  [] Other    Prognosis for POC: [x] Good [] Fair  [] Poor      Patient requires continued skilled intervention: [x] Yes  [] No    Treatment/Activity Tolerance:  [x] Patient tolerated treatment well [] Patient limited by fatique  [] Patient limited by pain  [x] Patient limited by other medical complications  [] Other: Pt demonstrates improved cervical SB and rotation, but continues to lack cervical flex and ext. She continues to demonstrate hypomobility with joint mobilizations. She rox the addition of repeated cervical flexion, denying any change in symptoms. Recommend pt continue with 1x/ week to manage symptoms and progress shoulder and postural strength to return to PLOF. 7/15    Patient education: Patient education on PT and plan of care including diagnosis, prognosis, treatment goals and options. Patient agrees with discussed POC and treatment and is aware of rehab process. 6/11    PLAN: 1x/ week for 4 weeks (7/15 - 8/12)   [x] Continue per plan of care [] Alter current plan (see comments above)  [] Plan of care initiated [] Hold pending MD visit [] Discharge    Electronically signed by: Skylar Kitchen, PT, DPT       Note: If patient does not return for scheduled/ recommended follow up visits, this note will serve as a discharge from care along with most recent update on progress.

## 2020-07-20 ENCOUNTER — HOSPITAL ENCOUNTER (OUTPATIENT)
Dept: MAMMOGRAPHY | Age: 60
Discharge: HOME | End: 2020-07-20
Payer: COMMERCIAL

## 2020-07-20 PROCEDURE — 77067 SCR MAMMO BI INCL CAD: CPT

## 2020-07-22 ENCOUNTER — HOSPITAL ENCOUNTER (OUTPATIENT)
Dept: PHYSICAL THERAPY | Age: 60
Setting detail: THERAPIES SERIES
Discharge: HOME | End: 2020-07-22
Payer: COMMERCIAL

## 2020-07-22 PROCEDURE — 97110 THERAPEUTIC EXERCISES: CPT | Performed by: PHYSICAL THERAPIST

## 2020-07-22 PROCEDURE — 97140 MANUAL THERAPY 1/> REGIONS: CPT | Performed by: PHYSICAL THERAPIST

## 2020-07-22 PROCEDURE — 97112 NEUROMUSCULAR REEDUCATION: CPT | Performed by: PHYSICAL THERAPIST

## 2020-07-22 NOTE — PLAN OF CARE
[address] [place] [address] [facility], [address] [facility], [address]  Phone: [phone]   Fax: [phone]    Physical Therapy Treatment Note/ Progress Report:       Date:  2020    Patient Name:  Jonathan Franz    :  1960  MRN: [id number]  Restrictions/Precautions:    Medical/Treatment Diagnosis Information:  Diagnosis: Neck pain - M54.2  Treatment Diagnosis: decreased ADL status, ROM, strength, and posture  Insurance/Certification information:  PT Insurance Information: Med mutual  Physician Information:  Referring Practitioner: Dr. Gutiérrez Chol  Has the plan of care been signed (Y/N):        []  Yes  [x]  No     Date of Patient follow up with Physician:       Is this a Progress Report:     []  Yes  [x]  No        Progress report/ Recertification will be due (10 Rx or 30 days whichever is less): 2020      Visit # Insurance Allowable Auth Required   6 BMN []  Yes []  No        Functional Scale:   NDI   - 56% limitation   7/15 - 32% limitation         Latex Allergy:  [x]NO      []YES  Preferred Language for Healthcare:   [x]English       []other:      Pain level: 2  /10 7/22    SUBJECTIVE: Pt states over the weekend she had increased pain, just on  into Monday. She states her pain is not too bad now.  She states she saw the neurologist, who said the MRI was normal.        OBJECTIVE: 7/15   Observation:    Test measurements:          AROM Left  Right   Comments   Cervical Flexion       24 + discomfort   Cervical Extension       26 + pain    Cervical Side bend 30 + min pain 32       Cervical Rotation 55 60                      Special tests Normal Abnormal N/A Comments   Sharp-Brianne Spurling                Mobility Testing  Comments   Cervical Downglides  Hypomobile   Cervical Flexion mobility     Thoracic PA mobility  Hypomobile    Rib mobility  Hypomobile            Red Flags           Positive    Positive    Headaches (worst patient has ever had) Pos Altered consciousness Neg   Dizziness Pos - rotating L  Radiating Pain Pos   Diplopia Neg Gait disturbances Neg   Dysphagia Neg Multi-segmental weakness Neg   Dysarthria Neg Tru/quad paresis Neg   Drop Attacks Neg Fracture Neg   Facial symptoms  Neg Numbness Neg   Nystagmus Neg Nausea  Minimally      Joint mobility:               []?Normal                      [x]? Hypo              []?Hyper     Palpation:      Functional Mobility/Transfers: independent      Posture: guarded upper traps       RESTRICTIONS/PRECAUTIONS: none    Exercises/Interventions:       Therapeutic Ex (57800) Sets/sec Reps Notes/CUES   Upper trap stretch 30 sec 3 each side Added 6/11   Lev scap stretch      SCM stretch 30 sec 3  Added 6/17   Thoracic ext 10 sec 10 Added 6/11         Repeated cervical flexion 3 10 Added 7/15   Serratus punches 3 10 3#, ^7/22                     Manual Intervention (13772)      Cerv mobs/manip 8'   Grade III-IV, Added 6/11    Grade III-IV, Added 6/11   CT manip Rib mobilizations 6'   Grade III-IV, Added 6/11   Suboccipital release 5'   Added 6/17    IASTM            NMR re-education (74998)   CUES NEEDED         Cervical isometrics (FLex, ext, SB)       Shoulder shrug 10sec 10 Added 6/17    Resisted scapular retraction 3 10 Blue TB, Added 7/8   Resisted shoulder ext  3 10 Blue TB, Added 7/22               Therapeutic Activity (01604)      Wall posture            Chin tuck 10 sec 10 Added 6/11   Scapular retraction 10 sec 10 Added 6/11         Prone T 3 10 Added 7/8                   Therapeutic Exercise and NMR EXR  [x] (77682) Provided verbal/tactile cueing for activities related to strengthening, flexibility, endurance, ROM  for improvements in cervical, postural, scapular, scapulothoracic and UE control with self care, reaching, carrying, lifting, house/yardwork, driving/computer work.     [x] (46215) Provided verbal/tactile cueing for activities related to improving balance, coordination, kinesthetic sense, posture, motor skill, proprioception  to assist with cervical, scapular, scapulothoracic and UE control with self care, reaching, carrying, lifting, house/yardwork, driving/computer work. Therapeutic Activities:    [] (64179 or 81786) Provided verbal/tactile cueing for activities related to improving balance, coordination, kinesthetic sense, posture, motor skill, proprioception and motor activation to allow for proper function of cervical, scapular, scapulothoracic and UE control with self care, carrying, lifting, driving/computer work. Home Exercise Program:    [x] (73606) Reviewed/Progressed HEP activities related to strengthening, flexibility, endurance, ROM of cervical, scapular, scapulothoracic and UE control with self care, reaching, carrying, lifting, house/yardwork, driving/computer work  [] (69750) Reviewed/Progressed HEP activities related to improving balance, coordination, kinesthetic sense, posture, motor skill, proprioception of cervical, scapular, scapulothoracic and UE control with self care, reaching, carrying, lifting, house/yardwork, driving/computer work      Manual Treatments:  PROM / STM / Oscillations-Mobs:  G-I, II, III, IV (PA's, Inf., Post.)  [] (02693) Provided manual therapy to mobilize soft tissue/joints of cervical/CT, scapular GHJ and UE for the purpose of decreasing headache, modulating pain, promoting relaxation,  increasing ROM, reducing/eliminating soft tissue swelling/inflammation/restriction, improving soft tissue extensibility and allowing for proper ROM for normal function with self care, reaching, carrying, lifting, house/yardwork, driving/computer work    I      Modalities:  P -10' 7/22   [] GAME READY (VASO)- for significant edema, swelling, pain control.      Charges:  Timed Code Treatment Minutes: 45'    Total Treatment Minutes: 61'       [] EVAL (LOW) 455 1011 (typically 20 minutes face-to-face)  [] EVAL (MOD) 52775 (typically 30 is not progressing as expected and requires additional follow up with physician  [] Other    Prognosis for POC: [x] Good [] Fair  [] Poor      Patient requires continued skilled intervention: [x] Yes  [] No    Treatment/Activity Tolerance:  [x] Patient tolerated treatment well [] Patient limited by fatique  [] Patient limited by pain  [x] Patient limited by other medical complications  [] Other: Pt demonstrated hypomobility with rib mobility today. She demonstrated increased strength, noted by increased resistance. She reported increased soreness with the addition of shoulder extension. 7/22    Patient education: Patient education on PT and plan of care including diagnosis, prognosis, treatment goals and options. Patient agrees with discussed POC and treatment and is aware of rehab process. 6/11    PLAN: 1x/ week for 4 weeks (7/15 - 8/12)   [x] Continue per plan of care [] Alter current plan (see comments above)  [] Plan of care initiated [] Hold pending MD visit [] Discharge    Electronically signed by: Dustin Messina PT, DPT       Note: If patient does not return for scheduled/ recommended follow up visits, this note will serve as a discharge from care along with most recent update on progress.

## 2020-07-23 ENCOUNTER — TELEPHONE (OUTPATIENT)
Dept: ORTHOPEDIC SURGERY | Age: 60
End: 2020-07-23

## 2020-07-29 ENCOUNTER — HOSPITAL ENCOUNTER (OUTPATIENT)
Dept: PHYSICAL THERAPY | Age: 60
Setting detail: THERAPIES SERIES
Discharge: HOME | End: 2020-07-29
Payer: COMMERCIAL

## 2020-07-29 PROCEDURE — 97112 NEUROMUSCULAR REEDUCATION: CPT | Performed by: PHYSICAL THERAPIST

## 2020-07-29 PROCEDURE — 97140 MANUAL THERAPY 1/> REGIONS: CPT | Performed by: PHYSICAL THERAPIST

## 2020-07-29 PROCEDURE — 97110 THERAPEUTIC EXERCISES: CPT | Performed by: PHYSICAL THERAPIST

## 2020-07-29 NOTE — FLOWSHEET NOTE
[facility], [address] [place], [address]  Phone: [phone]   Fax: [phone]    Physical Therapy Treatment Note/ Progress Report:       Date:  2020    Patient Name:  Lucia Snyder    :  1960  MRN: [id number]  Restrictions/Precautions:    Medical/Treatment Diagnosis Information:  Diagnosis: Neck pain - M54.2  Treatment Diagnosis: decreased ADL status, ROM, strength, and posture  Insurance/Certification information:  PT Insurance Information: Med mutual  Physician Information:  Referring Practitioner: Dr. Linnell Burkitt  Has the plan of care been signed (Y/N):        []  Yes  [x]  No     Date of Patient follow up with Physician:       Is this a Progress Report:     []  Yes  [x]  No        Progress report/ Recertification will be due (10 Rx or 30 days whichever is less): 2020      Visit # Insurance Allowable Auth Required   7 BMN []  Yes []  No        Functional Scale:   NDI   - 56% limitation   7/15 - 32% limitation         Latex Allergy:  [x]NO      []YES  Preferred Language for Healthcare:   [x]English       []other:      Pain level:   2  /10 7/29    SUBJECTIVE: Pt states she was having some tightness on R side into the neck. She states she still has discomfort at the base of the skull. She states her headaches have been better.        OBJECTIVE: 7/15   Observation:    Test measurements:          AROM Left  Right   Comments   Cervical Flexion       24 + discomfort   Cervical Extension       26 + pain    Cervical Side bend 30 + min pain 32       Cervical Rotation 55 60                      Special tests Normal Abnormal N/A Comments   Sharp-Brianne Spurling                Mobility Testing  Comments   Cervical Downglides  Hypomobile   Cervical Flexion mobility     Thoracic PA mobility  Hypomobile    Rib mobility  Hypomobile            Red Flags           Positive    Positive    Headaches (worst patient has ever had) Pos Altered consciousness Neg   Dizziness Pos - rotating L  Radiating Pain Pos   Diplopia Neg Gait disturbances Neg   Dysphagia Neg Multi-segmental weakness Neg   Dysarthria Neg Tru/quad paresis Neg   Drop Attacks Neg Fracture Neg   Facial symptoms  Neg Numbness Neg   Nystagmus Neg Nausea  Minimally      Joint mobility:               []?Normal                      [x]? Hypo              []?Hyper     Palpation:      Functional Mobility/Transfers: independent      Posture: guarded upper traps       RESTRICTIONS/PRECAUTIONS: none    Exercises/Interventions:       Therapeutic Ex (27044) Sets/sec Reps Notes/CUES   Upper trap stretch 30 sec 3 each side Added 6/11   Lev scap stretch      SCM stretch 30 sec 3  Added 6/17   Thoracic ext 10 sec 10 Added 6/11         Repeated cervical flexion 3 10 Added 7/15   Serratus punches 3#, ^7/22                     Manual Intervention (73020)      Cerv mobs/manip 8'   Grade III-IV, Added 6/11    Grade III-IV, Added 6/11   CT manip Rib mobilizations 6'   Grade III-IV, Added 6/11   Suboccipital release 5'    Added 6/17    IASTM            NMR re-education (29537)   CUES NEEDED         Cervical isometrics (FLex, ext, SB)       Shoulder shrug 10sec 10 Added 6/17    Resisted scapular retraction 3 10 Blue TB, Added 7/8   Resisted shoulder ext  3 10 Blue TB, Added 7/22               Therapeutic Activity (98940)      Wall posture            Chin tuck 10 sec 10 Added 6/11   Scapular retraction 10 sec 10 Added 6/11         Prone T 3 10 1#, ^7/29         Prone chest lift 3 10 Added 7/29       Therapeutic Exercise and NMR EXR  [x] (31852) Provided verbal/tactile cueing for activities related to strengthening, flexibility, endurance, ROM  for improvements in cervical, postural, scapular, scapulothoracic and UE control with self care, reaching, carrying, lifting, house/yardwork, driving/computer work.     [x] (37310) Provided verbal/tactile cueing for activities related to improving balance, coordination, kinesthetic sense, posture, motor skill, proprioception  to assist with cervical, scapular, scapulothoracic and UE control with self care, reaching, carrying, lifting, house/yardwork, driving/computer work. Therapeutic Activities:    [] (07404 or 89166) Provided verbal/tactile cueing for activities related to improving balance, coordination, kinesthetic sense, posture, motor skill, proprioception and motor activation to allow for proper function of cervical, scapular, scapulothoracic and UE control with self care, carrying, lifting, driving/computer work. Home Exercise Program:    [x] (86241) Reviewed/Progressed HEP activities related to strengthening, flexibility, endurance, ROM of cervical, scapular, scapulothoracic and UE control with self care, reaching, carrying, lifting, house/yardwork, driving/computer work  [] (06603) Reviewed/Progressed HEP activities related to improving balance, coordination, kinesthetic sense, posture, motor skill, proprioception of cervical, scapular, scapulothoracic and UE control with self care, reaching, carrying, lifting, house/yardwork, driving/computer work      Manual Treatments:  PROM / STM / Oscillations-Mobs:  G-I, II, III, IV (PA's, Inf., Post.)  [] (03537) Provided manual therapy to mobilize soft tissue/joints of cervical/CT, scapular GHJ and UE for the purpose of decreasing headache, modulating pain, promoting relaxation,  increasing ROM, reducing/eliminating soft tissue swelling/inflammation/restriction, improving soft tissue extensibility and allowing for proper ROM for normal function with self care, reaching, carrying, lifting, house/yardwork, driving/computer work    I      Modalities: CP -15' 7/29   [] GAME READY (VASO)- for significant edema, swelling, pain control.      Charges:  Timed Code Treatment Minutes: 40'    Total Treatment Minutes: 62'       [] EVAL (LOW) 455 1011 (typically 20 minutes face-to-face)  [] EVAL (MOD) 71957 (typically Patient is not progressing as expected and requires additional follow up with physician  [] Other    Prognosis for POC: [x] Good [] Fair  [] Poor      Patient requires continued skilled intervention: [x] Yes  [] No    Treatment/Activity Tolerance:  [x] Patient tolerated treatment well [] Patient limited by fatique  [] Patient limited by pain  [x] Patient limited by other medical complications  [] Other: Pt rox session well. She demonstrated increased strength, noted by increased resistance. She rox the addition of prone chest ext, reporting fatigue upon completion. 7/29    Patient education: Patient education on PT and plan of care including diagnosis, prognosis, treatment goals and options. Patient agrees with discussed POC and treatment and is aware of rehab process. 6/11    PLAN: 1x/ week for 4 weeks (7/15 - 8/12)   [x] Continue per plan of care [] Alter current plan (see comments above)  [] Plan of care initiated [] Hold pending MD visit [] Discharge    Electronically signed by: Vaibhav Valdivia PT, DPT       Note: If patient does not return for scheduled/ recommended follow up visits, this note will serve as a discharge from care along with most recent update on progress.

## 2020-08-05 ENCOUNTER — TELEPHONE (OUTPATIENT)
Dept: INTERNAL MEDICINE CLINIC | Age: 60
End: 2020-08-05

## 2020-08-05 ENCOUNTER — HOSPITAL ENCOUNTER (OUTPATIENT)
Dept: PHYSICAL THERAPY | Age: 60
Setting detail: THERAPIES SERIES
Discharge: HOME | End: 2020-08-05
Payer: COMMERCIAL

## 2020-08-05 PROCEDURE — 97110 THERAPEUTIC EXERCISES: CPT | Performed by: PHYSICAL THERAPIST

## 2020-08-05 PROCEDURE — 97112 NEUROMUSCULAR REEDUCATION: CPT | Performed by: PHYSICAL THERAPIST

## 2020-08-05 PROCEDURE — 97140 MANUAL THERAPY 1/> REGIONS: CPT | Performed by: PHYSICAL THERAPIST

## 2020-08-05 PROCEDURE — 97530 THERAPEUTIC ACTIVITIES: CPT | Performed by: PHYSICAL THERAPIST

## 2020-08-05 NOTE — FLOWSHEET NOTE
[address] [facility],[address], [address] [facility], [address]  Phone: [phone]   Fax: [phone]    Physical Therapy Treatment Note/ Progress Report:       Date:  2020    Patient Name:  Tres Mcbride    :  1960  MRN: [id number]  Restrictions/Precautions:    Medical/Treatment Diagnosis Information:  Diagnosis: Neck pain - M54.2  Treatment Diagnosis: decreased ADL status, ROM, strength, and posture  Insurance/Certification information:  PT Insurance Information: Med mutual  Physician Information:  Referring Practitioner: Dr. Betzaida Arndt  Has the plan of care been signed (Y/N):        []  Yes  [x]  No     Date of Patient follow up with Physician:       Is this a Progress Report:     []  Yes  [x]  No        Progress report/ Recertification will be due (10 Rx or 30 days whichever is less): 2020      Visit # Insurance Allowable Auth Required   8 BMN []  Yes []  No        Functional Scale:   NDI   - 56% limitation   7/15 - 32% limitation         Latex Allergy:  [x]NO      []YES  Preferred Language for Healthcare:   [x]English       []other:      Pain level:   1  /10 8/5    SUBJECTIVE: Pt states she feels good today. She states headaches are gone. She still has moments of increased pain.          OBJECTIVE: 7/15   Observation:    Test measurements:          AROM Left  Right   Comments   Cervical Flexion       24 + discomfort   Cervical Extension       26 + pain    Cervical Side bend 30 + min pain 32       Cervical Rotation 55 60                      Special tests Normal Abnormal N/A Comments   Sharp-Brianne Spurling                Mobility Testing  Comments   Cervical Downglides  Hypomobile   Cervical Flexion mobility     Thoracic PA mobility  Hypomobile    Rib mobility  Hypomobile            Red Flags           Positive    Positive    Headaches (worst patient has ever had) Pos Altered consciousness Neg   Dizziness Pos - rotating L  Radiating Pain Pos   Diplopia Neg Gait disturbances Neg   Dysphagia Neg Multi-segmental weakness Neg   Dysarthria Neg Tru/quad paresis Neg   Drop Attacks Neg Fracture Neg   Facial symptoms  Neg Numbness Neg   Nystagmus Neg Nausea  Minimally      Joint mobility:               []?Normal                      [x]? Hypo              []?Hyper     Palpation:      Functional Mobility/Transfers: independent      Posture: guarded upper traps       RESTRICTIONS/PRECAUTIONS: none    Exercises/Interventions:       Therapeutic Ex (79761) Sets/sec Reps Notes/CUES   Upper trap stretch 30 sec 3 each side Added 6/11   Lev scap stretch      SCM stretch 30 sec 3  Added 6/17   Thoracic ext 10 sec 10 Added 6/11         Repeated cervical flexion 3 10 Added 7/15   Serratus punches 3#, ^7/22                     Manual Intervention (91303)      Cerv mobs/manip with suboccipital release 8'   Grade III-IV, 8/5   Thoracic mobs/manip 1'   Grade III-IV, 8/5   CT manip Rib mobilizations 5'   Grade III-IV, 8/5   IASTM            NMR re-education (21682)   CUES NEEDED         Cervical isometrics (FLex, ext, SB)       Shoulder shrug 10sec 10 Added 6/17    Resisted scapular retraction 3 10 Blue TB, Added 7/8   Resisted shoulder ext  3 10 Blue TB, Added 7/22   Shoulder ER 3 10 Red TB, Added 8/5         Therapeutic Activity (41641)      Wall posture            Chin tuck 10 sec 10 Added 6/11   Scapular retraction 10 sec 10 Added 6/11         Prone T 3 10 1#, ^7/29         Prone chest lift 3 10 Added 7/29       Therapeutic Exercise and NMR EXR  [x] (76180) Provided verbal/tactile cueing for activities related to strengthening, flexibility, endurance, ROM  for improvements in cervical, postural, scapular, scapulothoracic and UE control with self care, reaching, carrying, lifting, house/yardwork, driving/computer work.     [x] (92716) Provided verbal/tactile cueing for activities related to improving balance, coordination, kinesthetic sense, posture, motor skill, proprioception  to assist with cervical, scapular, scapulothoracic and UE control with self care, reaching, carrying, lifting, house/yardwork, driving/computer work. Therapeutic Activities:    [] (21654 or 86256) Provided verbal/tactile cueing for activities related to improving balance, coordination, kinesthetic sense, posture, motor skill, proprioception and motor activation to allow for proper function of cervical, scapular, scapulothoracic and UE control with self care, carrying, lifting, driving/computer work. Home Exercise Program:    [x] (15344) Reviewed/Progressed HEP activities related to strengthening, flexibility, endurance, ROM of cervical, scapular, scapulothoracic and UE control with self care, reaching, carrying, lifting, house/yardwork, driving/computer work  [] (72950) Reviewed/Progressed HEP activities related to improving balance, coordination, kinesthetic sense, posture, motor skill, proprioception of cervical, scapular, scapulothoracic and UE control with self care, reaching, carrying, lifting, house/yardwork, driving/computer work      Manual Treatments:  PROM / STM / Oscillations-Mobs:  G-I, II, III, IV (PA's, Inf., Post.)  [] (35962) Provided manual therapy to mobilize soft tissue/joints of cervical/CT, scapular GHJ and UE for the purpose of decreasing headache, modulating pain, promoting relaxation,  increasing ROM, reducing/eliminating soft tissue swelling/inflammation/restriction, improving soft tissue extensibility and allowing for proper ROM for normal function with self care, reaching, carrying, lifting, house/yardwork, driving/computer work    I      Modalities: CP -15' 8/5   [] GAME READY (VASO)- for significant edema, swelling, pain control.      Charges:  Timed Code Treatment Minutes: 48'    Total Treatment Minutes: 76'       [] EVAL (LOW) 455 1011 (typically 20 minutes face-to-face)  [] EVAL (MOD) 65834 (typically 30 minutes face-to-face)  [] EVAL (HIGH) 57891 (typically 45 minutes face-to-face)  [] RE-EVAL     [x] ED(53827) x 1   [] IONTO  [x] NMR (05684)x 1   [] VASO  [x] Manual (31121) x 1     [] Other:  [x] TA x 1     [] Mech Traction (77099)  [] ES(attended) (03552)      [] ES (un) (33949):        ASSESSMENT:        GOALS:7/15  Patient stated goal: pain relief and gain strength    [x] Progressing: [] Met: [] Not Met: [] Adjusted    Therapist goals for Patient:   Short Term Goals: To be achieved in: 2 weeks  1. Independent in HEP and progression per patient tolerance, in order to prevent re-injury. [] Progressing: [x] Met: [] Not Met: [] Adjusted   2. Patient will have a decrease in pain to facilitate improvement in movement, function, and ADLs as indicated by Functional Deficits. [] Progressing: [x] Met: [] Not Met: [] Adjusted    Long Term Goals: To be achieved in: 4-6 weeks  1. Disability index score of 28% or less for the NDI to assist with reaching prior level of function. [x] Progressing: [] Met: [] Not Met: [] Adjusted  2. Patient will demonstrate increased cervical flexion and ext  AROM to greater than or equal to 45 deg,  to allow for proper joint functioning as indicated by patients Functional Deficits. [x] Progressing: [] Met: [] Not Met: [] Adjusted  3. Patient will return to ADLs and work without increased symptoms or restriction. [x] Progressing: [] Met: [] Not Met: [] Adjusted  4. Patient will report returning to independent workouts pain free. [] Progressing: [] Met: [x] Not Met: [] Adjusted     Overall Progression Towards Functional goals/ Treatment Progress Update:  [] Patient is progressing as expected towards functional goals listed. [] Progression is slowed due to complexities/Impairments listed. [] Progression has been slowed due to co-morbidities.   [x] Plan just implemented, too soon to assess goals progression <30days   [] Goals require adjustment due to lack of progress  [] Patient is not progressing as expected and requires additional follow up with physician  [] Other    Prognosis for POC: [x] Good [] Fair  [] Poor      Patient requires continued skilled intervention: [x] Yes  [] No    Treatment/Activity Tolerance:  [x] Patient tolerated treatment well [] Patient limited by fatique  [] Patient limited by pain  [x] Patient limited by other medical complications  [] Other: Pt rox session well. She demonstrated improved hypomobility with cervical mobility. She rox the addition of resisted shoulder ER, reporting difficulty. 8/5    Patient education: Patient education on PT and plan of care including diagnosis, prognosis, treatment goals and options. Patient agrees with discussed POC and treatment and is aware of rehab process. 6/11    PLAN: 1x/ week for 4 weeks (7/15 - 8/12)   [x] Continue per plan of care [] Alter current plan (see comments above)  [] Plan of care initiated [] Hold pending MD visit [] Discharge    Electronically signed by: Ernst Rubinstein, PT, DPT       Note: If patient does not return for scheduled/ recommended follow up visits, this note will serve as a discharge from care along with most recent update on progress.

## 2020-08-05 NOTE — TELEPHONE ENCOUNTER
Pt called requesting lab work including the following due to medication for the neurologist:    Liver panel  depakote level      She is asking to have prior to visit in Sept.        Please advise

## 2020-08-12 ENCOUNTER — HOSPITAL ENCOUNTER (OUTPATIENT)
Dept: PHYSICAL THERAPY | Age: 60
Setting detail: THERAPIES SERIES
Discharge: HOME | End: 2020-08-12
Payer: COMMERCIAL

## 2020-08-12 PROCEDURE — 97530 THERAPEUTIC ACTIVITIES: CPT | Performed by: PHYSICAL THERAPIST

## 2020-08-12 PROCEDURE — 97140 MANUAL THERAPY 1/> REGIONS: CPT | Performed by: PHYSICAL THERAPIST

## 2020-08-12 PROCEDURE — 97110 THERAPEUTIC EXERCISES: CPT | Performed by: PHYSICAL THERAPIST

## 2020-08-12 NOTE — PLAN OF CARE
[address] [facility],[address], [address] [place], [address]  Phone: [phone]   Fax: [phone]     Physical Therapy Re-Certification Plan of Care    Dear Dr. Vivian Espitia,    We had the pleasure of treating the following patient for physical therapy services at [address] [place] [address]. A summary of our findings can be found in the updated assessment below. This includes our plan of care. If you have any questions or concerns regarding these findings, please do not hesitate to contact me at the office phone number checked above. Thank you for the referral.     Physician Signature:________________________________Date:__________________  By signing above (or electronic signature), therapists plan is approved by physician      Overall Response to Treatment:   []Patient is responding well to treatment and improvement is noted with regards  to goals   []Patient should continue to improve in reasonable time if they continue HEP   []Patient has plateaued and is no longer responding to skilled PT intervention    []Patient is getting worse and would benefit from return to referring MD   []Patient unable to adhere to initial POC   [x]Other: Pt demonstrates improved cervical flex and SB ROM, but continues to lack and have pain with cervical ext. Improved cervical mobility, but continues to have hypomobility with thoracic PAs and rib mobility. She continues to have headaches and radiating pain sytmptoms. Recommend pt continue with current POC to progress thoracic and rib mobility and progress strength and functional training to return to PLOF.      Date range of previous POC Visits: 7/15/20 - 20    Total Visits: 9        Physical Therapy Treatment Note/ Progress Report:       Date:  2020    Patient Name:  Viji Potts    :  1960  MRN: [id number]  Restrictions/Precautions:    Medical/Treatment Diagnosis Information:  Diagnosis: Neck pain - M54.2  Treatment Diagnosis: decreased ADL status, ROM, strength, and posture  Insurance/Certification information:  PT Insurance Information: [employer]  Physician Information:  Referring Practitioner: Dr. Patton Files  Has the plan of care been signed (Y/N):        []  Yes  [x]  No     Date of Patient follow up with Physician:       Is this a Progress Report:     [x]  Yes  []  No        Progress report/ Recertification will be due (10 Rx or 30 days whichever is less): 9 Sept 2020      Visit # Insurance Allowable Auth Required   9 BMN []  Yes []  No        Functional Scale:   NDI  6/11 - 56% limitation   7/15 - 32% limitation  8/12 - 20% limitation         Latex Allergy:  [x]NO      []YES  Preferred Language for Healthcare:   [x]English       []other:      Pain level:   2-3  /10 8/12    SUBJECTIVE: Pt states she has a little pain at the base of the skull, around the L side to her ear. She states her pain was on/off yesterday. She states she had a fed headaches since last session. She has tried several Zoom workout classes, but would have pain right after.      8/12    OBJECTIVE: 8/12   Observation:    Test measurements:          AROM Left  Right   Comments   Cervical Flexion       49    Cervical Extension       28 + pain    Cervical Side bend 46 + min pain 38       Cervical Rotation 58 60                      Special tests Normal Abnormal N/A Comments   Sharp-Brianne Spurling                Mobility Testing  Comments   Cervical Downglides  Normal   Cervical Flexion mobility     Thoracic PA mobility  Hypomobile    Rib mobility  Hypomobile            Red Flags           Positive    Positive    Headaches (worst patient has ever had) Pos Altered consciousness Neg   Dizziness Neg Radiating Pain Pos   Diplopia Neg Gait disturbances Neg   Dysphagia Neg Multi-segmental weakness Neg   Dysarthria Neg Tru/quad paresis Neg   Drop Attacks Neg Fracture Neg   Facial symptoms  Neg Numbness Neg   Nystagmus Neg Nausea Neg      Joint mobility:               []?Normal                      [x]? Hypo              []?Hyper     Palpation:      Functional Mobility/Transfers: independent      Posture: guarded upper traps       RESTRICTIONS/PRECAUTIONS: none    Exercises/Interventions:       Therapeutic Ex (93158) Sets/sec Reps Notes/CUES   Upper trap stretch 30 sec 3 each side Added 6/11   Lev scap stretch      SCM stretch 30 sec 3  Added 6/17   Thoracic ext 10 sec 10 Added 6/11         Repeated cervical flexion 3 10 Added 7/15   Serratus punches 3#, ^7/22                     Manual Intervention (46342)      Cerv mobs/manip with suboccipital release 8'   Grade III-IV, 8/5   Thoracic mobs/manip 1'   Grade III-V, 8/5   CT manip Rib mobilizations 5'   Grade III-IV, 8/5   IASTM            NMR re-education (48264)   CUES NEEDED         Cervical isometrics (FLex, ext, SB)       Shoulder shrug 10sec 10 Added 6/17    Resisted scapular retraction 3 10 Blue TB, Added 7/8   Resisted shoulder ext  3 10 Blue TB, Added 7/22   Shoulder ER 3 10 Red TB, Added 8/5         Therapeutic Activity (73510)      Wall posture            Chin tuck 10 sec 10 Added 6/11   Scapular retraction 10 sec 10 Added 6/11         Prone T 3 10 1#, ^7/29         Prone chest lift 3 10 Added 7/29       Therapeutic Exercise and NMR EXR  [x] (30413) Provided verbal/tactile cueing for activities related to strengthening, flexibility, endurance, ROM  for improvements in cervical, postural, scapular, scapulothoracic and UE control with self care, reaching, carrying, lifting, house/yardwork, driving/computer work. [x] (65743) Provided verbal/tactile cueing for activities related to improving balance, coordination, kinesthetic sense, posture, motor skill, proprioception  to assist with cervical, scapular, scapulothoracic and UE control with self care, reaching, carrying, lifting, house/yardwork, driving/computer work.     Therapeutic Activities:    [] (27751 or 06424) Provided verbal/tactile cueing for activities related to improving balance, coordination, kinesthetic sense, posture, motor skill, proprioception and motor activation to allow for proper function of cervical, scapular, scapulothoracic and UE control with self care, carrying, lifting, driving/computer work. Home Exercise Program:    [x] (52146) Reviewed/Progressed HEP activities related to strengthening, flexibility, endurance, ROM of cervical, scapular, scapulothoracic and UE control with self care, reaching, carrying, lifting, house/yardwork, driving/computer work  [] (32388) Reviewed/Progressed HEP activities related to improving balance, coordination, kinesthetic sense, posture, motor skill, proprioception of cervical, scapular, scapulothoracic and UE control with self care, reaching, carrying, lifting, house/yardwork, driving/computer work      Manual Treatments:  PROM / STM / Oscillations-Mobs:  G-I, II, III, IV (PA's, Inf., Post.)  [] (83256) Provided manual therapy to mobilize soft tissue/joints of cervical/CT, scapular GHJ and UE for the purpose of decreasing headache, modulating pain, promoting relaxation,  increasing ROM, reducing/eliminating soft tissue swelling/inflammation/restriction, improving soft tissue extensibility and allowing for proper ROM for normal function with self care, reaching, carrying, lifting, house/yardwork, driving/computer work    I      Modalities: CP -15' 8/12   [] GAME READY (VASO)- for significant edema, swelling, pain control.      Charges:  Timed Code Treatment Minutes: 48'    Total Treatment Minutes: [de-identified]'       [] EVAL (LOW) 79729 (typically 20 minutes face-to-face)  [] EVAL (MOD) 77254 (typically 30 minutes face-to-face)  [] EVAL (HIGH) 38042 (typically 45 minutes face-to-face)  [] RE-EVAL     [x] SN(47727) x 2  [] IONTO  [] NMR (83128)x  [] VASO  [x] Manual (54396) x 1     [] Other:  [x] TA x 1     [] Mech Traction (29161)  [] ES(attended) (82765)      [] ES (un) (00734):        ASSESSMENT:        Radha Whitmore  Patient stated goal: pain relief and gain strength    [x] Progressing: [] Met: [] Not Met: [] Adjusted    Therapist goals for Patient:   Short Term Goals: To be achieved in: 2 weeks  1. Independent in HEP and progression per patient tolerance, in order to prevent re-injury. [] Progressing: [x] Met: [] Not Met: [] Adjusted   2. Patient will have a decrease in pain to facilitate improvement in movement, function, and ADLs as indicated by Functional Deficits. [] Progressing: [x] Met: [] Not Met: [] Adjusted    Long Term Goals: To be achieved in: 4-6 weeks  1. Disability index score of 28% or less for the NDI to assist with reaching prior level of function. [x] Progressing: [] Met: [] Not Met: [] Adjusted  2. Patient will demonstrate increased cervical flexion and ext  AROM to greater than or equal to 45 deg,  to allow for proper joint functioning as indicated by patients Functional Deficits. [] Progressing: [x] Met - partially: [] Not Met: [] Adjusted  3. Patient will return to ADLs and work without increased symptoms or restriction. [x] Progressing: [] Met: [] Not Met: [] Adjusted  4. Patient will report returning to independent workouts pain free. [] Progressing: [] Met: [x] Not Met: [] Adjusted     Overall Progression Towards Functional goals/ Treatment Progress Update:  [x] Patient is progressing as expected towards functional goals listed. [] Progression is slowed due to complexities/Impairments listed. [] Progression has been slowed due to co-morbidities.   [] Plan just implemented, too soon to assess goals progression <30days   [] Goals require adjustment due to lack of progress  [] Patient is not progressing as expected and requires additional follow up with physician  [] Other    Prognosis for POC: [x] Good [] Fair  [] Poor      Patient requires continued skilled intervention: [x] Yes  [] No    Treatment/Activity Tolerance:  [x] Patient tolerated treatment well [] Patient limited by fatique  [] Patient limited by pain  [x] Patient limited by other medical complications  [] Other: Pt demonstrates improved cervical flex and SB ROM, but continues to lack and have pain with cervical ext. Improved cervical mobility, but continues to have hypomobility with thoracic PAs and rib mobility. She continues to have headaches and radiating pain sytmptoms. Recommend pt continue with current POC to progress thoracic and rib mobility and progress strength and functional training to return to PLOF.    8/12    Patient education: Patient education on PT and plan of care including diagnosis, prognosis, treatment goals and options. Patient agrees with discussed POC and treatment and is aware of rehab process. 6/11    PLAN: 1x/ week for 4 weeks (8/12 - 9/8)   [x] Continue per plan of care [] Alter current plan (see comments above)  [] Plan of care initiated [] Hold pending MD visit [] Discharge    Electronically signed by: Donte Nieves, PT, DPT       Note: If patient does not return for scheduled/ recommended follow up visits, this note will serve as a discharge from care along with most recent update on progress.

## 2020-08-19 ENCOUNTER — HOSPITAL ENCOUNTER (OUTPATIENT)
Dept: PHYSICAL THERAPY | Age: 60
Setting detail: THERAPIES SERIES
Discharge: HOME | End: 2020-08-19
Payer: COMMERCIAL

## 2020-08-19 PROCEDURE — 97110 THERAPEUTIC EXERCISES: CPT

## 2020-08-19 PROCEDURE — 97140 MANUAL THERAPY 1/> REGIONS: CPT

## 2020-08-19 PROCEDURE — 97530 THERAPEUTIC ACTIVITIES: CPT

## 2020-08-20 ENCOUNTER — TELEPHONE (OUTPATIENT)
Dept: ORTHOPEDIC SURGERY | Age: 60
End: 2020-08-20

## 2020-08-20 ENCOUNTER — OFFICE VISIT (OUTPATIENT)
Dept: ORTHOPEDIC SURGERY | Age: 60
End: 2020-08-20
Payer: COMMERCIAL

## 2020-08-20 ENCOUNTER — OFFICE VISIT (OUTPATIENT)
Dept: PRIMARY CARE CLINIC | Age: 60
End: 2020-08-20
Payer: COMMERCIAL

## 2020-08-20 VITALS — HEIGHT: 62 IN | BODY MASS INDEX: 24.29 KG/M2 | WEIGHT: 132 LBS | TEMPERATURE: 97.8 F

## 2020-08-20 PROCEDURE — 99214 OFFICE O/P EST MOD 30 MIN: CPT | Performed by: STUDENT IN AN ORGANIZED HEALTH CARE EDUCATION/TRAINING PROGRAM

## 2020-08-20 PROCEDURE — 99211 OFF/OP EST MAY X REQ PHY/QHP: CPT | Performed by: NURSE PRACTITIONER

## 2020-08-20 NOTE — LETTER
Please schedule the following with:     Date:   2020 @ 9:45AM    Account: [de-identified]  Patient: Gold Feldman    : 1960  Address:  [address] [address]    Phone (H):  [phone] (home) [phone] (work)     ----------------------------------------------------------------------------------------------  Diagnosis:     ICD-10-CM    1. Cervical spondylosis without myelopathy  M47.812    2. DDD (degenerative disc disease), cervical  M50.30    3. Foraminal stenosis of cervical region  M48.02        Procedure: Cervical Medial Branch Blocks     Levels:C2, TON, C3, C4, C5  Side: Left  CPT Codes 26724, 29281    ----------------------------------------------------------------------------------------------  Injection # 1  [facility]    Attending Physician       Woody Perkins.  Brad Portillo MD.      ----------------------------------------------------------------------------------------------  Injection Scheduled For:    At:    [facility]  Pre-Cert#    2nd Insurance NONE    Pre-Cert#    Comments:    · Infection control  · Tested positive for MRSA in past 12 months:  no  · Tested positive for MSSA \"staph infection\" in past 12 months: no  · Tested positive for VRE (Vancomycin Resistant Enterococci) in past 12 months:   no  · Currently on any antibiotics for an infection: no  · Anticoagulants:  · On a blood thinner:  no   · Any history of bleeding disorder: no   · Advanced Liver disease: no   · Advanced Renal disease: no   · Glaucoma: no   · Diabetes: no     Sedation:  No  -----------------------------------------------------------------------------------------------  No Known Allergies

## 2020-08-20 NOTE — TELEPHONE ENCOUNTER
Auth: NPR  Date: 8/26/2020  Reference # None  Spoke with: None  Type of SX: Outpatient  Location: [address] [place] [id number] , [address] [facility], [id number], [id number]   SX area: [place] [address], [address], [facility]  Insurance: [employer]

## 2020-08-20 NOTE — PROGRESS NOTES
Follow up: Deniz Hess  1960  [id number]      CHIEF COMPLAINT:    Chief Complaint   Patient presents with    Neck Pain     F/U [facility] PT         HISTORY OF PRESENT ILLNESS:  Ms. Christy Diaz is a 64 y.o. female returns for a follow up visit for multiple medical problems. Her current presenting problems are   1. Cervical spondylosis without myelopathy    2. DDD (degenerative disc disease), cervical    3. Foraminal stenosis of cervical region    . As per information/history obtained from the PADT(patient assessment and documentation tool) - She complains of pain in the neck with radiation to the head and left ear She rates the pain 3/10 and describes it as aching, burning. Pain is made worse by: movement, lying down. She denies side effects from the current pain regimen. Patient reports that since the last follow up visit the physical functioning is unchanged, family/social relationships are unchanged, mood is unchanged and sleep patterns are unchanged, and that the overall functioning is unchanged. Patient denies neurological bowel or bladder. Patient presents for follow up of ongoing neck pain. She has completed physical therapy from 6/11/2020 to 8/19/2020 for a total of 10 visits. She reports 50% improvement of her neck pain with physical therapy, however she does still experience neck pain which radiates to her left ear and jaw. She rates her pain 3-4/10 and describes it as aching and burning. The patient notes trouble with driving due to limitation with cervical rotation to the left. 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 For work she performs pulling and pushing motions often which seem to exacerbate the pain as well. Rest is the only alleviating measure she has found. She experiences headaches as well and has been following with neurology.  She is taking Depakote and Topamax that seem to be helping her headaches, however she continues to experience left sided neck pain.      Associated signs and symptoms:   Neurogenic bowel or bladder symptoms:  no   Perceived weakness:  no   Difficulty walking:  no              Past Medical History:   Past Medical History:   Diagnosis Date    Abnormal mammogram 6/8/2011    saw Dr. Neville wood or associate 6/2011 Ok to return for regular mammogram's     Anxiety 6/8/2011    Kidney stone     Medical history reviewed with no changes     Pyelonephritis 7/28/2011    Recurrent Change to augmentin and levaquin and ck blood cx's 7/27/2011 On daily macrobid per Dr. Mazin Mae disorder, acute 3/23/2013    Due to 's infidelity and leaving her 3/2013 To see counselor- increase celexa   still living w the family as of 6/2013       Past Surgical History:     Past Surgical History:   Procedure Laterality Date    CARPAL TUNNEL RELEASE  12/29/11    Left    CARPAL TUNNEL RELEASE Right 12-    CYSTOSCOPY      with stent placement-x3    SALIVARY GLAND SURGERY      L maxillary gland removal     Current Medications:     Current Outpatient Medications:     Vitamin D, Ergocalciferol, 50 MCG (2000 UT) CAPS, Take 4,000 Units by mouth, Disp: , Rfl:     meloxicam (MOBIC) 15 MG tablet, TAKE 1 TABLET BY MOUTH ONE TIME A DAY, Disp: 30 tablet, Rfl: 1    fluticasone (FLONASE) 50 MCG/ACT nasal spray, USE 1 SPRAY IN EACH NOSTRIL DAILY, Disp: 16 g, Rfl: 3    rizatriptan (MAXALT-MLT) 10 MG disintegrating tablet, TALE 1 TABLET BY MOUTH AS NEEDED FOR MIGRAINES MAY REPEAT 2 HOURS LATER IF NEEDED, Disp: 27 tablet, Rfl: 3    butalbital-acetaminophen-caffeine (FIORICET, ESGIC) -40 MG per tablet, TAKE ONE TABLET BY MOUTH EVERY 4 HOURS AS NEEDED FOR PAIN UP TO 10 DAYS, Disp: 30 tablet, Rfl: 3    potassium chloride (KLOR-CON M) 10 MEQ extended release tablet, TAKE 1 TABLET BY MOUTH DAILY, Disp: 90 tablet, Rfl: 3    montelukast (SINGULAIR) 10 MG tablet, Take 1 tablet by mouth daily, Disp: 90 tablet, Rfl: 3    citalopram (CELEXA) 10 MG tablet, TAKE 2 tabs daily (Patient taking differently: 10 mg TAKE 2 tabs daily), Disp: 180 tablet, Rfl: 3    folic acid (FOLVITE) 1 MG tablet, Take 1 mg by mouth daily, Disp: , Rfl:     calcium citrate-vitamin D (CALCITRATE/VITAMIN D) 315-200 MG-UNIT per tablet, Take 1 tablet by mouth daily. , Disp: , Rfl:   Allergies:  Patient has no known allergies. Social History:    reports that she has never smoked. She has never used smokeless tobacco. She reports that she does not drink alcohol or use drugs. Family History:   Family History   Problem Relation Age of Onset    Diabetes Mother     Kidney Cancer Mother         RIGHT    Cancer Maternal Grandmother     Diabetes Maternal Grandmother     Cancer Maternal Grandfather     Diabetes Maternal Grandfather     Heart Disease Other     High Blood Pressure Other        REVIEW OF SYSTEMS:   CONSTITUTIONAL: Denies unexplained weight loss, fevers, chills or fatigue  NEUROLOGICAL: Denies unsteady gait or progressive weakness  MUSCULOSKELETAL: Denies joint swelling or redness  GI: Denies nausea, vomiting, diarrhea   : Denies bowel or bladder issues       PHYSICAL EXAM:    Vitals: Temperature 97.8 °F (36.6 °C), height 5' 2\" (1.575 m), weight 132 lb (59.9 kg), last menstrual period 10/07/2012. GENERAL EXAM:  · General Apparence: Patient is adequately groomed with no evidence of malnutrition. · Psychiatric: Orientation: The patient is oriented to time, place and person. The patient's mood and affect are appropriate   · Vascular: Examination reveals no swelling and palpation reveals no tenderness in upper or lower extremities. Good capillary refill.    · The lymphatic examination of the neck, axillae and groin reveals all areas to be without enlargement or induration  · Sensation is intact without deficit in the upper and lower extremities to light touch and pinprick  · Coordination of the upper and lower extremities are normal.    CERVICAL EXAMINATION:  · Inspection: Local inspection shows no step-off or bruising. Cervical alignment is normal. No instability is noted. · Palpation and Percussion: No evidence of tenderness at the midline. Paraspinal tenderness is not present. There is no paraspinal spasm. Skin:There are no rashes, ulcerations or lesions  · Range of Motion:  Pain with extension and cervical rotation to the left. · Strength: 5/5 bilateral upper extremities  · Special Tests:   Spurling's and Nguyen's are negative bilaterally. Metcalf and Impingement tests are negative bilaterally. · Skin:There are no rashes, ulcerations or lesions in right & left upper extremities. · Reflexes: Bilaterally triceps, biceps and brachioradialis are 2+. Clonus absent bilaterally at the feet. No pathological reflexes are noted. · Gait & station: normal, patient ambulates without assistance  · Additional Examinations:  · RIGHT UPPER EXTREMITY:  Inspection/examination of the right upper extremity does not show any tenderness, deformity or injury. Range of motion is unremarkable and pain-free. There is no gross instability. There are no rashes, ulcerations or lesions. Strength and tone are normal. No atrophy or abnormal movements are noted. · LEFT UPPER EXTREMITY: Inspection/examination of the left upper extremity does not show any tenderness, deformity or injury. Range of motion is unremarkable and pain-free. There is no gross instability. There are no rashes, ulcerations or lesions. Strength and tone are normal. No atrophy or abnormal movements are noted. · RIGHT LOWER EXTREMITY: Inspection/examination of the right lower extremity does not show any tenderness, deformity or injury. Range of motion is normal and pain-free. There is no gross instability. There are no rashes, ulcerations or lesions. Strength and tone are normal. No atrophy or abnormal movements are noted.   · LEFT LOWER EXTREMITY:  Inspection/examination of the left lower extremity does not show any tenderness, deformity or injury. Range of motion is normal and pain-free. There is no gross instability. There are no rashes, ulcerations or lesions. Strength and tone are normal. No atrophy or abnormal movements are noted. Diagnostic Testing:    MR Lumbar spine shows  6/11/2020  C2-C3: There is no disc bulge or protrusion present.  There is no significant    spinal canal stenosis or neural foraminal narrowing present.         C3-C4: There is a focal 2 mm central disc protrusion.  There is no    significant spinal canal stenosis or neural foraminal narrowing.         C4-C5: There is a disc bulge and uncovertebral overgrowth.  There is no    significant spinal canal stenosis.  Mild to moderate left neural foraminal    narrowing is present.  There is no significant right neural foraminal    narrowing.         C5-C6: There is a disc bulge and uncovertebral overgrowth.  There is no    significant spinal canal stenosis.  Mild bilateral neural foraminal narrowing    is present.         C6-C7: There is no disc bulge or protrusion present.  There is no significant    spinal canal stenosis or neural foraminal narrowing present.         C7-T1: There is no disc bulge or protrusion present.  There is no significant    spinal canal stenosis or neural foraminal narrowing present.              Impression    1. Mild bilateral neural foraminal narrowing at C5-C6 secondary to a disc    bulge and uncovertebral overgrowth. 2. Mild-to-moderate left neural foraminal narrowing at C4-C5 secondary to a    disc bulge and uncovertebral overgrowth.     3. Focal 2 mm central disc protrusion C3-C4 without significant spinal canal    stenosis or neural foraminal narrowing evident.             Results for orders placed or performed in visit on 09/06/19   Hemoglobin A1C   Result Value Ref Range    Hemoglobin A1C 5.5 See comment %    eAG 111.2 mg/dL   TSH without Reflex   Result Value Ref Range TSH 3.22 0.27 - 4.20 uIU/mL   Lipid Panel   Result Value Ref Range    Cholesterol, Total 260 (H) 0 - 199 mg/dL    Triglycerides 175 (H) 0 - 150 mg/dL    HDL 61 (H) 40 - 60 mg/dL    LDL Calculated 164 (H) <100 mg/dL    VLDL Cholesterol Calculated 35 Not Established mg/dL   Comprehensive Metabolic Panel   Result Value Ref Range    Sodium 142 136 - 145 mmol/L    Potassium 3.9 3.5 - 5.1 mmol/L    Chloride 100 99 - 110 mmol/L    CO2 29 21 - 32 mmol/L    Anion Gap 13 3 - 16    Glucose 98 70 - 99 mg/dL    BUN 17 7 - 20 mg/dL    CREATININE 0.7 0.6 - 1.1 mg/dL    GFR Non-African American >60 >60    GFR African American >60 >60    Calcium 9.4 8.3 - 10.6 mg/dL    Total Protein 6.6 6.4 - 8.2 g/dL    Alb 4.4 3.4 - 5.0 g/dL    Albumin/Globulin Ratio 2.0 1.1 - 2.2    Total Bilirubin 0.4 0.0 - 1.0 mg/dL    Alkaline Phosphatase 71 40 - 129 U/L    ALT 19 10 - 40 U/L    AST 23 15 - 37 U/L    Globulin 2.2 g/dL   CBC Auto Differential   Result Value Ref Range    WBC 4.5 4.0 - 11.0 K/uL    RBC 5.08 4.00 - 5.20 M/uL    Hemoglobin 14.6 12.0 - 16.0 g/dL    Hematocrit 44.7 36.0 - 48.0 %    MCV 88.0 80.0 - 100.0 fL    MCH 28.7 26.0 - 34.0 pg    MCHC 32.6 31.0 - 36.0 g/dL    RDW 12.4 12.4 - 15.4 %    Platelets 823 346 - 558 K/uL    MPV 8.2 5.0 - 10.5 fL    Neutrophils % 49.1 %    Lymphocytes % 36.9 %    Monocytes % 10.7 %    Eosinophils % 2.7 %    Basophils % 0.6 %    Neutrophils Absolute 2.2 1.7 - 7.7 K/uL    Lymphocytes Absolute 1.7 1.0 - 5.1 K/uL    Monocytes Absolute 0.5 0.0 - 1.3 K/uL    Eosinophils Absolute 0.1 0.0 - 0.6 K/uL    Basophils Absolute 0.0 0.0 - 0.2 K/uL     Impression:       1. Cervical spondylosis without myelopathy    2. DDD (degenerative disc disease), cervical    3. Foraminal stenosis of cervical region        Plan:  Clinical Course: Above diagnoses are worsening    I discussed the diagnosis and the treatment options with Viji Potts today.      In Summary:  The various treatment options were outlined and discussed with Kenyetta Padmini including:  Conservative care options: physical therapy, ice, medications, bracing, and activity modification. The indications for therapeutic injections. The indications for additional imaging/laboratory studies. The indications for (possible future) interventions. After considering the various options discussed, Kenyetta Washington elected to pursue a course of treatment that includes the followin. Medications:  Continue anti-inflammatories with appropriate GI Precautions including to stop if develop dark tarry stools or GI upset and to take with food. 2. PT:  Encouraged to continue with HEP. 3. Further studies:  COVID-19 PCR testing prior to procedure. 4. Interventional:  We discussed pursuing cervical medial branch blocks for diagnostic purposes. Based on physical exam findings of increased pain with facet loading and radiologic imaging, we will pursue lumbar medial branch blocks at the left C2, TON, C3, C4, C5 levels. Risks, benefits and alternatives were discussed. These include but are not limited to bleeding, infection, increased pain, lack of pain relief and nerve injury. The patient verbalized understanding and would like to proceed. If there is significant pain relief and functional improvement, the patient may be a good candidate for Radiofrequency ablation. As such, I have confirmed the patient has met the general requirements including failure of conservative management including prescription strength analgesics, adjunctive medications were utilized , therapeutic exercise program, and no underlying addiction or behavioral disorders were identified to the ability of the provider. Symptoms are impacting their ADLs or iADLs such as driving, pushing, pulling and significant pain was noted in the HPI. Imaging studies as noted in the diagnostic imaging section of the consultation were reviewed and correlated with clinical findings.   Fluoroscopy is utilized for interventional procedures. For First Diagnostic MBB  The patient has moderate to severe pain with functional impairment for at least 3 months, predominant axial pain, not attributable to radiculopathy, physical exam findings consistent with facet joint as the presumed source of pain, absence of non-facet pathology, absence of a prior fusion at the proposed level and lack of improvement following 6 weeks of conservative management. Dual MBBs on 2 separate occasions to confirm the diagnosis will be undertaken. 5. Follow up:  1-2 weeks      Miguel Angel Forrest was instructed to call the office if her symptoms worsen or if new symptoms appear prior to the next scheduled visit. She is specifically instructed to contact the office between now & her scheduled appointment if she has concerns related to her condition or if she needs assistance in scheduling the above tests. She is welcome to call for an appointment sooner if she has any additional concerns or questions. Ambika Bee PA-C   Board Certified by the [employer] on Certification of Physician Assistants  [address]  Partner of [address]             This dictation was performed with a verbal recognition program [facility]) and it was checked for errors. It is possible that there are still dictated errors within this office note. If so, please bring any errors to my attention for an addendum. All efforts were made to ensure that this office note is accurate.

## 2020-08-24 LAB — SARS-COV-2, NAA: NOT DETECTED

## 2020-08-24 NOTE — PROGRESS NOTES
PATIENT REACHED   YES____NO_X___    PREOP INSTUCTIONS LEFT ON  [id number]__[phone]_____________  Patient instructed to get their COVID-19 test done as directed by their doctor (5-7 days prior to procedure)  or patient states will get on __8/20________. Patient was notified that they need to have an appointment,number to call provided. The day the COVID test is done is considered day one. Instructed to self quarantine after test until DOS. There is a one visitor policy at [facility] for all surgeries and endoscopies. Whether the visitor can stay or will be asked to wait in the car will depend on the current policy and if social distancing can be maintained. The policy is subject to change at any time. Please make sure the visitor has a cell phone that is on,charged and able to accept calls, as this may be the way that the staff communicates with them. Pain management is NO VISITOR policyThe patients ride is expected to remain in the car with a cell phone for communication. If the ride is leaving the hospital grounds please make sure they are back in time for pickup. Have the patient inform the staff on arrival what their rides plans are while the patient is in the facility. At the MAIN there is one visitor allowed. Please note that the visitor policy is subject to change. DATE__8/26/20_______ TIME__0945_______ARRIVAL_0845_______PLACE__masc__________  NOTHING TO EAT OR DRINK  AFTER MIDNIGHT THE EVENING PRIOR OR AS INSTRUCTED BY YOUR DR.  Laila Costa NEED A RESPONSIBLE ADULT AGE 18 OR OLDER TO DRIVE YOU HOME  PLEASE BRING INSURANCE CARD. PICTURE ID AND COMPLETE LIST OF MEDS  WEAR LOOSE COMFORTABLE CLOTHING  FOLLOW ANY INSTRUCTIONS YOUR DRS OFFICE HAS GIVEN YOU,INCLUDING WHAT MEDICATIONS TO TAKE THE AM OF PROCEDURE AND WHEN AND IF YOU NEED TO STOP ANY BLOOD THINNERS.  IF YOU HAVE QUESTIONS REGARDING THIS CALL THE OFFICE  THE GOAL BLOOD SUGAR THE AM OF PROCEDURE  OR LESS ABOVE THAT THE PROCEDURE MAY BE CANCELLED  ANY QUESTIONS CALL YOUR DOCTOR. ALSO,PLEASE READ THE INSTRUCTION PACKET FROM YOUR DR IF YOU RECEIVED ONE.   SPINE INTERVENTION NUMBER [phone]

## 2020-08-26 ENCOUNTER — APPOINTMENT (OUTPATIENT)
Dept: GENERAL RADIOLOGY | Age: 60
End: 2020-08-26
Attending: PHYSICAL MEDICINE & REHABILITATION
Payer: COMMERCIAL

## 2020-08-26 ENCOUNTER — APPOINTMENT (OUTPATIENT)
Dept: PHYSICAL THERAPY | Age: 60
End: 2020-08-26
Payer: COMMERCIAL

## 2020-08-26 ENCOUNTER — HOSPITAL ENCOUNTER (OUTPATIENT)
Age: 60
Setting detail: OUTPATIENT SURGERY
Discharge: HOME | End: 2020-08-26
Attending: PHYSICAL MEDICINE & REHABILITATION | Admitting: PHYSICAL MEDICINE & REHABILITATION
Payer: COMMERCIAL

## 2020-08-26 ENCOUNTER — HOSPITAL ENCOUNTER (OUTPATIENT)
Dept: PHYSICAL THERAPY | Age: 60
Setting detail: THERAPIES SERIES
Discharge: HOME | End: 2020-08-26
Payer: COMMERCIAL

## 2020-08-26 VITALS
DIASTOLIC BLOOD PRESSURE: 83 MMHG | SYSTOLIC BLOOD PRESSURE: 145 MMHG | WEIGHT: 130 LBS | HEART RATE: 65 BPM | BODY MASS INDEX: 23.92 KG/M2 | OXYGEN SATURATION: 99 % | RESPIRATION RATE: 18 BRPM | TEMPERATURE: 97 F | HEIGHT: 62 IN

## 2020-08-26 PROCEDURE — 3610000058 HC PAIN LEVEL 5 BASE (NON-OR): Performed by: PHYSICAL MEDICINE & REHABILITATION

## 2020-08-26 PROCEDURE — 2500000003 HC RX 250 WO HCPCS: Performed by: PHYSICAL MEDICINE & REHABILITATION

## 2020-08-26 PROCEDURE — 2709999900 HC NON-CHARGEABLE SUPPLY: Performed by: PHYSICAL MEDICINE & REHABILITATION

## 2020-08-26 PROCEDURE — 3209999900 FLUORO FOR SURGICAL PROCEDURES

## 2020-08-26 PROCEDURE — 6360000002 HC RX W HCPCS: Performed by: PHYSICAL MEDICINE & REHABILITATION

## 2020-08-26 PROCEDURE — 99152 MOD SED SAME PHYS/QHP 5/>YRS: CPT | Performed by: PHYSICAL MEDICINE & REHABILITATION

## 2020-08-26 RX ORDER — LORATADINE 10 MG/1
10 TABLET ORAL DAILY
COMMUNITY

## 2020-08-26 RX ORDER — BUPIVACAINE HYDROCHLORIDE 2.5 MG/ML
INJECTION, SOLUTION INFILTRATION; PERINEURAL
Status: COMPLETED | OUTPATIENT
Start: 2020-08-26 | End: 2020-08-26

## 2020-08-26 RX ORDER — MIDAZOLAM HYDROCHLORIDE 1 MG/ML
INJECTION INTRAMUSCULAR; INTRAVENOUS
Status: COMPLETED | OUTPATIENT
Start: 2020-08-26 | End: 2020-08-26

## 2020-08-26 RX ORDER — LIDOCAINE HYDROCHLORIDE 10 MG/ML
INJECTION, SOLUTION EPIDURAL; INFILTRATION; INTRACAUDAL; PERINEURAL
Status: COMPLETED | OUTPATIENT
Start: 2020-08-26 | End: 2020-08-26

## 2020-08-26 RX ORDER — DIVALPROEX SODIUM 250 MG/1
500 TABLET, EXTENDED RELEASE ORAL DAILY
COMMUNITY
End: 2021-03-31

## 2020-08-26 ASSESSMENT — PAIN - FUNCTIONAL ASSESSMENT: PAIN_FUNCTIONAL_ASSESSMENT: 0-10

## 2020-08-26 ASSESSMENT — PAIN SCALES - GENERAL: PAINLEVEL_OUTOF10: 0

## 2020-08-26 ASSESSMENT — PAIN DESCRIPTION - DESCRIPTORS: DESCRIPTORS: DULL

## 2020-08-26 NOTE — H&P
HISTORY AND PHYSICAL/PRE-SEDATION ASSESSMENT    Patient:  Keanu Au   :  1960  Medical Record No.:  [id number]   Date:  2020  Physician:  Socrates Rios M.D. Facility: [address] [place] [address]    HISTORY OF PRESENT ILLNESS:                 The patient is a 64 y.o. female whom presents with neck pain. Review of the imaging and physical exam of the patient confirmed the pre-procedure diagnosis. After a thorough discussion of risks, benefits and alternatives informed consent was obtained. Past Medical History:   Past Medical History:   Diagnosis Date    Abnormal mammogram 2011    saw Dr. Lombardi or associate 2011 Ok to return for regular mammogram's     Anxiety 2011    Hyperlipidemia     Kidney stone     Medical history reviewed with no changes     Pyelonephritis 2011    Recurrent Change to augmentin and levaquin and ck blood cx's 2011 On daily macrobid per Dr. Sravan Mir disorder, acute 3/23/2013    Due to 's infidelity and leaving her 3/2013 To see counselor- increase celexa   still living w the family as of 2013       Past Surgical History:     Past Surgical History:   Procedure Laterality Date    CARPAL TUNNEL RELEASE  11    Left    CARPAL TUNNEL RELEASE Right 2014    CYSTOSCOPY      with stent placement-x3    SALIVARY GLAND SURGERY      L maxillary gland removal     Current Medications:   Prior to Admission medications    Medication Sig Start Date End Date Taking?  Authorizing Provider   loratadine (CLARITIN) 10 MG tablet Take 10 mg by mouth daily   Yes Historical Provider, MD   divalproex (DEPAKOTE ER) 250 MG extended release tablet Take 250 mg by mouth daily   Yes Historical Provider, MD   Vitamin D, Ergocalciferol, 50 MCG ( UT) CAPS Take 4,000 Units by mouth   Yes Historical Provider, MD   fluticasone (FLONASE) 50 MCG/ACT nasal spray USE 1 SPRAY IN EACH NOSTRIL DAILY 20  Yes Hill Mir MD rhonchi. No rales. Abdominal: Soft. Bowel sounds are normal. No distension. Extremities: Moves all extremities equally  Cervical and Lumbar Spine: Painful range of motion, no midline tenderness       Diagnosis:Cervical spondylosis without radiculopathy  M47.812  M50.30  M48.02    Plan: Proceed with planned procedure      ASA CLASS:         []   I. Normal, healthy adult           [x]   II.  Mild systemic disease            []   III. Severe systemic disease      Mallampati: Mallampati Class II - (soft palate, fauces & uvula are visible)      Sedation plan:   [x]  Local              []  Minimal                  []  General anesthesia    Patient's condition acceptable for planned procedure/sedation. Post Procedure Plan   Return to same level of care   ______________________     The patient was counseled at length about the risks of maria eugenia Covid-19 in the matheus-operative and post-operative states including the recovery window of their procedure. The patient was made aware that maria eugenia Covid-19 after a surgical procedure may worsen their prognosis for recovering from the virus and lend to a higher morbidity and or mortality risk. The patient was given the options of postponing their procedure. All of the risks, benefits, and alternatives were discussed. The patient does wish to proceed with the procedure. The risks and benefits as well as alternatives to the procedure have been discussed with the patient and or family. The patient and or next of kin understands and agrees to proceed.     Anibal Zuniga M.D.

## 2020-08-26 NOTE — PROGRESS NOTES
IV discontinued, catheter intact, and dressing applied. Procedural dressing dry and intact. Bilateral upper extremities equal in strength. Discharge instructions reviewed with patient or responsible adult, signed and copy given. All home medications have been reviewed. All questions answered and patient or responsible adult verbalized understanding.   PAIN LEVEL AT DISCHARGE _0____

## 2020-08-27 ENCOUNTER — HOSPITAL ENCOUNTER (OUTPATIENT)
Dept: PHYSICAL THERAPY | Age: 60
Setting detail: THERAPIES SERIES
Discharge: HOME | End: 2020-08-27
Payer: COMMERCIAL

## 2020-08-27 PROCEDURE — 97530 THERAPEUTIC ACTIVITIES: CPT | Performed by: PHYSICAL THERAPIST

## 2020-08-27 PROCEDURE — 97110 THERAPEUTIC EXERCISES: CPT | Performed by: PHYSICAL THERAPIST

## 2020-08-27 PROCEDURE — 97140 MANUAL THERAPY 1/> REGIONS: CPT | Performed by: PHYSICAL THERAPIST

## 2020-08-27 NOTE — FLOWSHEET NOTE
[address] [place] [address]  and [facility], [address] [facility], [address]  Phone: [phone]   Fax: [phone]      Physical Therapy Treatment Note/ Progress Report:       Date:  2020    Patient Name:  Crow Mancia    :  1960  MRN: [id number]  Restrictions/Precautions:    Medical/Treatment Diagnosis Information:  Diagnosis: Neck pain - M54.2  Treatment Diagnosis: decreased ADL status, ROM, strength, and posture  Insurance/Certification information:  PT Insurance Information: Med mutual  Physician Information:  Referring Practitioner: Dr. Raechel Prader  Has the plan of care been signed (Y/N):        []  Yes  [x]  No     Date of Patient follow up with Physician:       Is this a Progress Report:     [x]  Yes  []  No        Progress report/ Recertification will be due (10 Rx or 30 days whichever is less): 2020      Visit # Insurance Allowable Auth Required   11 BMN []  Yes []  No        Functional Scale:   NDI   - 56% limitation   7/15 - 32% limitation   - 20% limitation         Latex Allergy:  [x]NO      []YES  Preferred Language for Healthcare:   [x]English       []other:      Pain level:  1 /10 8/27    SUBJECTIVE: Pt saw the MD yesterday and had a cervical Medial Branch Blocks - Left C2, Third Occipital Nerve, C3, C4, C5. She states it went ok, but she is really sore today. She states she feels like all her symptoms are heightened since yesterday.          OBJECTIVE:    Observation:    Test measurements:          AROM Left  Right   Comments   Cervical Flexion       49    Cervical Extension       28 + pain    Cervical Side bend 46 + min pain 38       Cervical Rotation 58 60                      Special tests Normal Abnormal N/A Comments   Sharp-Brianne Spurling                Mobility Testing  Comments   Cervical Downglides  Normal   Cervical Flexion mobility     Thoracic PA mobility  Hypomobile    Rib mobility  Hypomobile          Red Flags           Positive    Positive    Headaches (worst patient has ever had) Pos Altered consciousness Neg   Dizziness Neg Radiating Pain Pos   Diplopia Neg Gait disturbances Neg   Dysphagia Neg Multi-segmental weakness Neg   Dysarthria Neg Tru/quad paresis Neg   Drop Attacks Neg Fracture Neg   Facial symptoms  Neg Numbness Neg   Nystagmus Neg Nausea  Neg      Joint mobility:               []?Normal                      [x]? Hypo              []?Hyper     Palpation:      Functional Mobility/Transfers: independent      Posture: guarded upper traps       RESTRICTIONS/PRECAUTIONS: none    Exercises/Interventions:       Therapeutic Ex (96601) Sets/sec Reps Notes/CUES   Upper trap stretch 30 sec 3 each side Added 6/11   Lev scap stretch      SCM stretch 30 sec 3  Added 6/17   Thoracic ext 10 sec 10 Added 6/11         Repeated cervical flexion 3 10 Added 7/15   Serratus punches 3#, ^7/22                     Manual Intervention (34217)      Cerv mobs/manip with suboccipital release 8'   Grade III-IV, 8/5   Thoracic mobs/manip CT manip Rib mobilizations 2'   Grade III-IV, 8/5   IASTM            NMR re-education (54869)   CUES NEEDED         Cervical isometrics (FLex, ext, SB)       Shoulder shrug 10sec 10 Added 6/17    Resisted scapular retraction 3 10 Blue TB, Added 7/8   Resisted shoulder ext  3 10 Blue TB, Added 7/22   Shoulder ER 3 10 Blue  TB, ^ 8/18; form corrected         Therapeutic Activity (54361)      Wall posture      Standing scaption  Chin tuck 10 sec 10 Added 6/11   Scapular retraction 10 sec 10 Added 6/11         Prone T       Prone chest lift 3 10 Added 7/29       Therapeutic Exercise and NMR EXR  [x] (99724) Provided verbal/tactile cueing for activities related to strengthening, flexibility, endurance, ROM  for improvements in cervical, postural, scapular, scapulothoracic and UE control with self care, reaching, carrying, lifting, house/yardwork, driving/computer work.     [x] (07035) Provided verbal/tactile cueing for activities related to improving balance, coordination, kinesthetic sense, posture, motor skill, proprioception  to assist with cervical, scapular, scapulothoracic and UE control with self care, reaching, carrying, lifting, house/yardwork, driving/computer work. Therapeutic Activities:    [] (60869 or 25275) Provided verbal/tactile cueing for activities related to improving balance, coordination, kinesthetic sense, posture, motor skill, proprioception and motor activation to allow for proper function of cervical, scapular, scapulothoracic and UE control with self care, carrying, lifting, driving/computer work. Home Exercise Program:    [x] (03407) Reviewed/Progressed HEP activities related to strengthening, flexibility, endurance, ROM of cervical, scapular, scapulothoracic and UE control with self care, reaching, carrying, lifting, house/yardwork, driving/computer work  [] (15804) Reviewed/Progressed HEP activities related to improving balance, coordination, kinesthetic sense, posture, motor skill, proprioception of cervical, scapular, scapulothoracic and UE control with self care, reaching, carrying, lifting, house/yardwork, driving/computer work      Manual Treatments:  PROM / STM / Oscillations-Mobs:  G-I, II, III, IV (PA's, Inf., Post.)  [] (46520) Provided manual therapy to mobilize soft tissue/joints of cervical/CT, scapular GHJ and UE for the purpose of decreasing headache, modulating pain, promoting relaxation,  increasing ROM, reducing/eliminating soft tissue swelling/inflammation/restriction, improving soft tissue extensibility and allowing for proper ROM for normal function with self care, reaching, carrying, lifting, house/yardwork, driving/computer work    I      Modalities: CP -15' 8/27   [] GAME READY (VASO)- for significant edema, swelling, pain control.      Charges:  Timed Code Treatment Minutes: 40'    Total Treatment Minutes: 62'       [] EVAL (LOW) [phone] (typically 20 minutes face-to-face)  [] EVAL (MOD) 15029 (typically 30 minutes face-to-face)  [] EVAL (HIGH) 46109 (typically 45 minutes face-to-face)  [] RE-EVAL     [x] (22244) x 1  [] IONTO  [] NMR (11351)x  [] VASO  [x] Manual (25705) x 1     [] Other:  [x] TA x 1     [] Mech Traction (03522)  [] ES(attended) (31585)      [] ES (un) (85621):        ASSESSMENT:      GOALS:8/12  Patient stated goal: pain relief and gain strength    [x] Progressing: [] Met: [] Not Met: [] Adjusted    Therapist goals for Patient:   Short Term Goals: To be achieved in: 2 weeks  1. Independent in HEP and progression per patient tolerance, in order to prevent re-injury. [] Progressing: [x] Met: [] Not Met: [] Adjusted   2. Patient will have a decrease in pain to facilitate improvement in movement, function, and ADLs as indicated by Functional Deficits. [] Progressing: [x] Met: [] Not Met: [] Adjusted    Long Term Goals: To be achieved in: 4-6 weeks  1. Disability index score of 28% or less for the NDI to assist with reaching prior level of function. [x] Progressing: [] Met: [] Not Met: [] Adjusted  2. Patient will demonstrate increased cervical flexion and ext  AROM to greater than or equal to 45 deg,  to allow for proper joint functioning as indicated by patients Functional Deficits. [] Progressing: [x] Met - partially: [] Not Met: [] Adjusted  3. Patient will return to ADLs and work without increased symptoms or restriction. [x] Progressing: [] Met: [] Not Met: [] Adjusted  4. Patient will report returning to independent workouts pain free. [] Progressing: [] Met: [x] Not Met: [] Adjusted     Overall Progression Towards Functional goals/ Treatment Progress Update:  [x] Patient is progressing as expected towards functional goals listed. [] Progression is slowed due to complexities/Impairments listed. [] Progression has been slowed due to co-morbidities.   [] Plan just implemented, too soon to assess goals progression <30days [] Goals require adjustment due to lack of progress  [] Patient is not progressing as expected and requires additional follow up with physician  [] Other    Prognosis for POC: [x] Good [] Fair  [] Poor      Patient requires continued skilled intervention: [x] Yes  [] No    Treatment/Activity Tolerance:  [x] Patient tolerated treatment well [] Patient limited by fatique  [] Patient limited by pain  [x] Patient limited by other medical complications  [] Other:  Pt rox session well. She rox manual stretching well and resistance bands. Session was modified due to pt's procedure yesterday. Will return to full program next session to progress towards goals. 8/27    Patient education: Patient education on PT and plan of care including diagnosis, prognosis, treatment goals and options. Patient agrees with discussed POC and treatment and is aware of rehab process. 6/11    PLAN: 1x/ week for 4 weeks (8/12 - 9/8)   [x] Continue per plan of care [] Alter current plan (see comments above)  [] Plan of care initiated [] Hold pending MD visit [] Discharge    Electronically signed by: Michela Palmer PT, DPT       Note: If patient does not return for scheduled/ recommended follow up visits, this note will serve as a discharge from care along with most recent update on progress.

## 2020-09-02 ENCOUNTER — HOSPITAL ENCOUNTER (OUTPATIENT)
Dept: PHYSICAL THERAPY | Age: 60
Setting detail: THERAPIES SERIES
Discharge: HOME | End: 2020-09-02
Payer: COMMERCIAL

## 2020-09-02 PROCEDURE — 97110 THERAPEUTIC EXERCISES: CPT | Performed by: PHYSICAL THERAPIST

## 2020-09-02 PROCEDURE — 97530 THERAPEUTIC ACTIVITIES: CPT | Performed by: PHYSICAL THERAPIST

## 2020-09-02 PROCEDURE — 97112 NEUROMUSCULAR REEDUCATION: CPT | Performed by: PHYSICAL THERAPIST

## 2020-09-02 PROCEDURE — 97140 MANUAL THERAPY 1/> REGIONS: CPT | Performed by: PHYSICAL THERAPIST

## 2020-09-02 NOTE — FLOWSHEET NOTE
[address] [facility],[address], [address] [facility], [address]  Phone: [phone]   Fax: [phone]      Physical Therapy Treatment Note/ Progress Report:       Date:  2020    Patient Name:  Corrina Calhoun    :  1960  MRN: [id number]  Restrictions/Precautions:    Medical/Treatment Diagnosis Information:  Diagnosis: Neck pain - M54.2  Treatment Diagnosis: decreased ADL status, ROM, strength, and posture  Insurance/Certification information:  PT Insurance Information: Med mutual  Physician Information:  Referring Practitioner: Dr. Jazz Licona  Has the plan of care been signed (Y/N):        []  Yes  [x]  No     Date of Patient follow up with Physician:       Is this a Progress Report:     [x]  Yes  []  No        Progress report/ Recertification will be due (10 Rx or 30 days whichever is less): 2020      Visit # Insurance Allowable Auth Required   12 BMN []  Yes []  No        Functional Scale:   NDI   - 56% limitation   7/15 - 32% limitation   - 20% limitation         Latex Allergy:  [x]NO      []YES  Preferred Language for Healthcare:   [x]English       []other:      Pain level:  1-2 /10     SUBJECTIVE: Pt states she still has strain. She states yesterday by the end of her work day, her pain was a 3-4/10. She states she sees the MD tomorrow.        OBJECTIVE:    Observation:    Test measurements:          AROM Left  Right   Comments   Cervical Flexion       49    Cervical Extension       28 + pain    Cervical Side bend 46 + min pain 38       Cervical Rotation 58 60                      Special tests Normal Abnormal N/A Comments   Sharp-Brianne Spurling                Mobility Testing  Comments   Cervical Downglides  Normal   Cervical Flexion mobility     Thoracic PA mobility  Hypomobile    Rib mobility  Hypomobile            Red Flags           Positive    Positive    Headaches (worst patient has ever had) Pos Altered consciousness Neg   Dizziness Neg Radiating Pain Pos   Diplopia Neg Gait disturbances Neg   Dysphagia Neg Multi-segmental weakness Neg   Dysarthria Neg Tru/quad paresis Neg   Drop Attacks Neg Fracture Neg   Facial symptoms  Neg Numbness Neg   Nystagmus Neg Nausea  Neg      Joint mobility:               []?Normal                      [x]? Hypo              []?Hyper     Palpation:      Functional Mobility/Transfers: independent      Posture: guarded upper traps       RESTRICTIONS/PRECAUTIONS: none    Exercises/Interventions:       Therapeutic Ex (80589) Sets/sec Reps Notes/CUES   Upper trap stretch 30 sec 3 each side Added 6/11   Lev scap stretch      SCM stretch 30 sec 3  Added 6/17   Thoracic ext 10 sec 10 Added 6/11         Repeated cervical flexion 3 10 Added 7/15   Serratus punches 3#, ^7/22                     Manual Intervention (46906)      Cerv mobs/manip with suboccipital release 8'   Grade III-IV, 9/2   Thoracic mobs/manip 1'   Grade III-V, 9/2   CT manip Rib mobilizations 2'   Grade III-IV,9/2   IASTM            NMR re-education (47390)   CUES NEEDED         Cervical isometrics (FLex, ext, SB)       Shoulder shrug Resisted scapular retraction 3 10 Blue TB, Added 7/8   Resisted shoulder ext  3 10 Blue TB, Added 7/22   Shoulder ER 3 10 Blue  TB, ^ 8/18; form corrected   Lat pulldowns  3 10 Blue TB, Added 9/2         Therapeutic Activity (19799)      Wall posture      Standing scaption  3 10  1#; in mirror for visual cue to avoid any shoulder hiking. Chin tuck 10 sec 10 Added 6/11   Scapular retraction 10 sec 10 Added 6/11         Prone T       Prone chest lift 3 10 Added 7/29       Therapeutic Exercise and NMR EXR  [x] (22246) Provided verbal/tactile cueing for activities related to strengthening, flexibility, endurance, ROM  for improvements in cervical, postural, scapular, scapulothoracic and UE control with self care, reaching, carrying, lifting, house/yardwork, driving/computer work.     [x] (77169) Provided verbal/tactile cueing for activities related to improving balance, coordination, kinesthetic sense, posture, motor skill, proprioception  to assist with cervical, scapular, scapulothoracic and UE control with self care, reaching, carrying, lifting, house/yardwork, driving/computer work. Therapeutic Activities:    [] (62185 or 26038) Provided verbal/tactile cueing for activities related to improving balance, coordination, kinesthetic sense, posture, motor skill, proprioception and motor activation to allow for proper function of cervical, scapular, scapulothoracic and UE control with self care, carrying, lifting, driving/computer work. Home Exercise Program:    [x] (69084) Reviewed/Progressed HEP activities related to strengthening, flexibility, endurance, ROM of cervical, scapular, scapulothoracic and UE control with self care, reaching, carrying, lifting, house/yardwork, driving/computer work  [] (13038) Reviewed/Progressed HEP activities related to improving balance, coordination, kinesthetic sense, posture, motor skill, proprioception of cervical, scapular, scapulothoracic and UE control with self care, reaching, carrying, lifting, house/yardwork, driving/computer work      Manual Treatments:  PROM / STM / Oscillations-Mobs:  G-I, II, III, IV (PA's, Inf., Post.)  [] (87043) Provided manual therapy to mobilize soft tissue/joints of cervical/CT, scapular GHJ and UE for the purpose of decreasing headache, modulating pain, promoting relaxation,  increasing ROM, reducing/eliminating soft tissue swelling/inflammation/restriction, improving soft tissue extensibility and allowing for proper ROM for normal function with self care, reaching, carrying, lifting, house/yardwork, driving/computer work    I      Modalities: CP -15' 9/2   [] GAME READY (VASO)- for significant edema, swelling, pain control.      Charges:  Timed Code Treatment Minutes: 54'    Total Treatment Minutes: 66'       [] EVAL (LOW) 22965 (typically 20 minutes face-to-face)  [] EVAL (MOD) 47286 (typically 30 minutes face-to-face)  [] EVAL (HIGH) 99750 (typically 45 minutes face-to-face)  [] RE-EVAL     [x] FX(96387) x 1  [] IONTO  [x] NMR (74112)x 1  [] VASO  [x] Manual (03884) x 1     [] Other:  [x] TA x 1     [] Mech Traction (24594)  [] ES(attended) (12602)      [] ES (un) (54362):        ASSESSMENT:      GOALS:8/12  Patient stated goal: pain relief and gain strength    [x] Progressing: [] Met: [] Not Met: [] Adjusted    Therapist goals for Patient:   Short Term Goals: To be achieved in: 2 weeks  1. Independent in HEP and progression per patient tolerance, in order to prevent re-injury. [] Progressing: [x] Met: [] Not Met: [] Adjusted   2. Patient will have a decrease in pain to facilitate improvement in movement, function, and ADLs as indicated by Functional Deficits. [] Progressing: [x] Met: [] Not Met: [] Adjusted    Long Term Goals: To be achieved in: 4-6 weeks  1. Disability index score of 28% or less for the NDI to assist with reaching prior level of function. [x] Progressing: [] Met: [] Not Met: [] Adjusted  2. Patient will demonstrate increased cervical flexion and ext  AROM to greater than or equal to 45 deg,  to allow for proper joint functioning as indicated by patients Functional Deficits. [] Progressing: [x] Met - partially: [] Not Met: [] Adjusted  3. Patient will return to ADLs and work without increased symptoms or restriction. [x] Progressing: [] Met: [] Not Met: [] Adjusted  4. Patient will report returning to independent workouts pain free. [] Progressing: [] Met: [x] Not Met: [] Adjusted     Overall Progression Towards Functional goals/ Treatment Progress Update:  [x] Patient is progressing as expected towards functional goals listed. [] Progression is slowed due to complexities/Impairments listed. [] Progression has been slowed due to co-morbidities.   [] Plan just implemented, too soon to assess goals progression <30days   [] Goals require adjustment due to lack of progress  [] Patient is not progressing as expected and requires additional follow up with physician  [] Other    Prognosis for POC: [x] Good [] Fair  [] Poor      Patient requires continued skilled intervention: [x] Yes  [] No    Treatment/Activity Tolerance:  [x] Patient tolerated treatment well [] Patient limited by fatique  [] Patient limited by pain  [x] Patient limited by other medical complications  [] Other:  Pt rox session well. She required min VCs to decrease upper trap activation with resisted shoulder ER. She rox the addition of lat pull downs. She rox manual therapy well, PT noting hypomobility with thoracic mobility today. She demonstrated good form with scaption today. Will re-assess next session. 9/2    Patient education: Patient education on PT and plan of care including diagnosis, prognosis, treatment goals and options. Patient agrees with discussed POC and treatment and is aware of rehab process. 6/11    PLAN: 1x/ week for 4 weeks (8/12 - 9/8)   [x] Continue per plan of care [] Alter current plan (see comments above)  [] Plan of care initiated [] Hold pending MD visit [] Discharge    Electronically signed by: Bola Izaguirre, PT, DPT       Note: If patient does not return for scheduled/ recommended follow up visits, this note will serve as a discharge from care along with most recent update on progress.

## 2020-09-03 ENCOUNTER — TELEPHONE (OUTPATIENT)
Dept: ORTHOPEDIC SURGERY | Age: 60
End: 2020-09-03

## 2020-09-03 ENCOUNTER — OFFICE VISIT (OUTPATIENT)
Dept: ORTHOPEDIC SURGERY | Age: 60
End: 2020-09-03
Payer: COMMERCIAL

## 2020-09-03 ENCOUNTER — OFFICE VISIT (OUTPATIENT)
Dept: PRIMARY CARE CLINIC | Age: 60
End: 2020-09-03
Payer: COMMERCIAL

## 2020-09-03 VITALS — WEIGHT: 130 LBS | RESPIRATION RATE: 12 BRPM | HEIGHT: 62 IN | BODY MASS INDEX: 23.92 KG/M2 | TEMPERATURE: 97.3 F

## 2020-09-03 PROCEDURE — 99213 OFFICE O/P EST LOW 20 MIN: CPT | Performed by: STUDENT IN AN ORGANIZED HEALTH CARE EDUCATION/TRAINING PROGRAM

## 2020-09-03 PROCEDURE — 99211 OFF/OP EST MAY X REQ PHY/QHP: CPT | Performed by: NURSE PRACTITIONER

## 2020-09-03 NOTE — LETTER
Please schedule the following with:     Date:   20 @ 9:20     Account: [de-identified]  Patient: Petra Donahue    : 1960  Address:  [address] [place] [id number]    Phone (H):  [phone] (home) [phone] (work)     ----------------------------------------------------------------------------------------------  Diagnosis:     ICD-10-CM    1. Cervical spondylosis without myelopathy  M47.812    2. DDD (degenerative disc disease), cervical  M50.30    3. Foraminal stenosis of cervical region  M48.02        Procedure: Cervical medial branch blocks    Levels:C2, TON, C3, C4, C5  Side: Left  CPT Codes 25405, 90668    ----------------------------------------------------------------------------------------------  Injection # 2  [address] [facility], [place]    Attending Physician       Julian Navarrete. Cale Banuelos MD.      ----------------------------------------------------------------------------------------------  Injection Scheduled For:    At:    [facility]  Pre-Cert#    2nd Insurance     Pre-Cert#    Comments:    · Infection control  ? Tested positive for MRSA in past 12 months:  no  ? Tested positive for MSSA \"staph infection\" in past 12 months: no  ? Tested positive for VRE (Vancomycin Resistant Enterococci) in past 12 months:   no  ? Currently on any antibiotics for an infection: no  · Anticoagulants:  ? On a blood thinner:  no   ?  Any history of bleeding disorder: no   · Advanced Liver disease: no   · Advanced Renal disease: no   · Glaucoma: no   · Diabetes: no      Sedation:         No  -----------------------------------------------------------------------------------------------  No Known Allergies

## 2020-09-03 NOTE — PROGRESS NOTES
PATIENT REACHED   YES__X__NO____    PREOP INSTUCTIONS LEFT ON VM NUMBER_______________      [id number]________ TIME___0920______ARRIVAL__0820_____PLACE______SPINE______  NOTHING TO EAT OR DRINK  AFTER MIDNIGHT THE EVENING PRIOR OR AS INSTRUCTED BY YOUR DR.  Artem Cardona NEED A RESPONSIBLE ADULT AGE 18 OR OLDER TO DRIVE YOU HOME  PLEASE BRING INSURANCE CARD. PICTURE ID AND COMPLETE LIST OF MEDS  WEAR LOOSE COMFORTABLE CLOTHING  FOLLOW ANY INSTRUCTIONS YOUR DRS OFFICE HAS GIVEN YOU,INCLUDING WHAT MEDICATIONS TO TAKE THE AM OF PROCEDURE AND WHEN AND IF YOU NEED TO STOP ANY BLOOD THINNERS. IF YOU HAVE QUESTIONS REGARDING THIS CALL THE OFFICE  THE GOAL BLOOD SUGAR THE AM OF PROCEDURE  OR LESS ABOVE THAT THE PROCEDURE MAY BE CANCELLED  ANY QUESTIONS CALL YOUR DOCTOR. ALSO,PLEASE READ THE INSTRUCTION PACKET FROM YOUR DR IF YOU RECEIVED ONE.   SPINE INTERVENTION NUMBER [phone]

## 2020-09-03 NOTE — PROGRESS NOTES
Follow up: [address] [facility] [address]  1960  [id number]         Chief Complaint   Patient presents with    Neck Pain     F/u MBB Left C2, TON, C3, C4, C5 8/26  #1         HISTORY OF PRESENT ILLNESS:  Ms. Julian Macias is a 64 y.o. female returns for a follow up visit for multiple medical problems. Her current presenting problems are   1. Cervical spondylosis without myelopathy    2. DDD (degenerative disc disease), cervical    3. Foraminal stenosis of cervical region    . As per information/history obtained from the PADT(patient assessment and documentation tool) - She complains of pain in the neck with radiation to the head and left ear She rates the pain 4/10 and describes it as aching, pressure, burning. Pain is made worse by: movement, lying down, prolonged driving, working. She denies side effects from the current pain regimen. Patient reports that since the last follow up visit the physical functioning is unchanged, family/social relationships are unchanged, mood is unchanged and sleep patterns are unchanged, and that the overall functioning is unchanged. Patient denies neurological bowel or bladder. Ms. Julian Macias presents today for follow up of her ongoing neck and radiating head/left ear pain. She recently underwent a medial branch block of the left C2, third occipital nerve, C3, C4 and C5 on 8/26/20. This was block #1 for the patient. She states overall feeling 90% improvement for roughly 4 hours the day of her procedure. She states after that time frame the pain began to return quickly. The patient continues to complain of left-sided neck pain. She states she feels a pressure type pain along with left ear and jaw pain. She complains of associated headaches, however she denies any vision changes or dizziness. 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   Lying down, physical therapy and ice are alleviating measures. She has not had any new injuries or triggers to her neck at this time. She denies any numbness or tingling into her upper extremities currently. Ms. Karen Aponte does not present today using any ambulatory devices or braces. She does continue with physical therapy at this time. Associated signs and symptoms:   Neurogenic bowel or bladder symptoms:  no   Perceived weakness:  no   Difficulty walking:  no            Past medical, surgical, social and family history reviewed with the patient. No pertinent relevant history.    Past Medical History:   Past Medical History:   Diagnosis Date    Abnormal mammogram 6/8/2011    saw [facility] or associate 6/2011 Ok to return for regular mammogram's     Anxiety 6/8/2011    Hyperlipidemia     Kidney stone     Medical history reviewed with no changes     Pyelonephritis 7/28/2011    Recurrent Change to augmentin and levaquin and ck blood cx's 7/27/2011 On daily macrobid per Dr. López Cape disorder, acute 3/23/2013    Due to 's infidelity and leaving her 3/2013 To see counselor- increase celexa   still living w the family as of 6/2013       Past Surgical History:     Past Surgical History:   Procedure Laterality Date    CARPAL TUNNEL RELEASE  12/29/11    Left    CARPAL TUNNEL RELEASE Right 12-    CYSTOSCOPY      with stent placement-x3    PAIN MANAGEMENT PROCEDURE Left 8/26/2020    LEFT C2, THIRD OCCIPITAL NERVE, C3, C4, C5 MEDIAL BRANCH BLOCK WITH FLUOROSCOPY (00912, 34130)  #1 performed by Olivia Patino MD at [address] maxillary gland removal     Current Medications:     Current Outpatient Medications:     loratadine (CLARITIN) 10 MG tablet, Take 10 mg by mouth daily, Disp: , Rfl:     divalproex (DEPAKOTE ER) 250 MG extended release tablet, Take 250 mg by mouth daily, Disp: , Rfl:     Vitamin D, Ergocalciferol, 50 MCG (2000 UT) CAPS, Take 4,000 Units by mouth, Disp: , Rfl:    meloxicam (MOBIC) 15 MG tablet, TAKE 1 TABLET BY MOUTH ONE TIME A DAY, Disp: 30 tablet, Rfl: 1    fluticasone (FLONASE) 50 MCG/ACT nasal spray, USE 1 SPRAY IN EACH NOSTRIL DAILY, Disp: 16 g, Rfl: 3    rizatriptan (MAXALT-MLT) 10 MG disintegrating tablet, TALE 1 TABLET BY MOUTH AS NEEDED FOR MIGRAINES MAY REPEAT 2 HOURS LATER IF NEEDED, Disp: 27 tablet, Rfl: 3    butalbital-acetaminophen-caffeine (FIORICET, ESGIC) -40 MG per tablet, TAKE ONE TABLET BY MOUTH EVERY 4 HOURS AS NEEDED FOR PAIN UP TO 10 DAYS, Disp: 30 tablet, Rfl: 3    potassium chloride (KLOR-CON M) 10 MEQ extended release tablet, TAKE 1 TABLET BY MOUTH DAILY, Disp: 90 tablet, Rfl: 3    montelukast (SINGULAIR) 10 MG tablet, Take 1 tablet by mouth daily, Disp: 90 tablet, Rfl: 3    citalopram (CELEXA) 10 MG tablet, TAKE 2 tabs daily (Patient taking differently: 10 mg TAKE 2 tabs daily), Disp: 180 tablet, Rfl: 3    folic acid (FOLVITE) 1 MG tablet, Take 1 mg by mouth daily, Disp: , Rfl:     calcium citrate-vitamin D (CALCITRATE/VITAMIN D) 315-200 MG-UNIT per tablet, Take 1 tablet by mouth daily. , Disp: , Rfl:   Allergies:  Patient has no known allergies. Social History:    reports that she has never smoked. She has never used smokeless tobacco. She reports that she does not drink alcohol or use drugs.   Family History:   Family History   Problem Relation Age of Onset    Diabetes Mother     Kidney Cancer Mother         RIGHT    Cancer Maternal Grandmother     Diabetes Maternal Grandmother     Cancer Maternal Grandfather     Diabetes Maternal Grandfather     Heart Disease Other     High Blood Pressure Other        REVIEW OF SYSTEMS:   CONSTITUTIONAL: Denies unexplained weight loss, fevers, chills or fatigue  NEUROLOGICAL: Denies unsteady gait or progressive weakness  MUSCULOSKELETAL: Denies joint swelling or redness  GI: Denies nausea, vomiting, diarrhea   : Denies bowel or bladder issues       PHYSICAL EXAM:    Vitals: Temperature 97.3 °F (36.3 °C), resp. rate 12, height 5' 2\" (1.575 m), weight 130 lb (59 kg), last menstrual period 10/07/2012. GENERAL EXAM:  · General Apparence: Patient is adequately groomed with no evidence of malnutrition. · Psychiatric: Orientation: The patient is oriented to time, place and person. The patient's mood and affect are appropriate   · Vascular: Examination reveals no swelling and palpation reveals no tenderness in upper or lower extremities. Good capillary refill. · The lymphatic examination of the neck, axillae and groin reveals all areas to be without enlargement or induration  · Sensation is intact without deficit in the upper and lower extremities to light touch and pinprick  · Coordination of the upper and lower extremities are normal.    CERVICAL EXAMINATION:  · Inspection: Local inspection shows no step-off or bruising. Cervical alignment is normal. No instability is noted. · Palpation and Percussion: No evidence of tenderness at the midline. Paraspinal tenderness is not present. There is no paraspinal spasm. Tenderness along the sternocleidomastoid muscle on the left   Skin:There are no rashes, ulcerations or lesions  · Range of Motion: Pain with extension and cervical rotation to the left  · Strength: 5/5 bilateral upper extremities  · Special Tests:   Spurling's and Nguyen's are negative bilaterally. Metcalf and Impingement tests are negative bilaterally. · Skin:There are no rashes, ulcerations or lesions in right & left upper extremities. · Reflexes: Bilaterally triceps, biceps and brachioradialis are 2+. Clonus absent bilaterally at the feet. No pathological reflexes are noted. · Gait & station: normal, patient ambulates without assistance and no ataxia  · Additional Examinations:  · RIGHT UPPER EXTREMITY:  Inspection/examination of the right upper extremity does not show any tenderness, deformity or injury. Range of motion is unremarkable and pain-free.  There is no gross instability. There are no rashes, ulcerations or lesions. Strength and tone are normal. No atrophy or abnormal movements are noted. · LEFT UPPER EXTREMITY: Inspection/examination of the left upper extremity does not show any tenderness, deformity or injury. Range of motion is unremarkable and pain-free. There is no gross instability. There are no rashes, ulcerations or lesions. Strength and tone are normal. No atrophy or abnormal movements are noted. · RIGHT LOWER EXTREMITY: Inspection/examination of the right lower extremity does not show any tenderness, deformity or injury. Range of motion is normal and pain-free. There is no gross instability. There are no rashes, ulcerations or lesions. Strength and tone are normal. No atrophy or abnormal movements are noted. · LEFT LOWER EXTREMITY:  Inspection/examination of the left lower extremity does not show any tenderness, deformity or injury. Range of motion is normal and pain-free. There is no gross instability. There are no rashes, ulcerations or lesions. Strength and tone are normal. No atrophy or abnormal movements are noted. Diagnostic Testing:    MR cervical spine shows 6/11/20:  Impression    1. Mild bilateral neural foraminal narrowing at C5-C6 secondary to a disc    bulge and uncovertebral overgrowth. 2. Mild-to-moderate left neural foraminal narrowing at C4-C5 secondary to a    disc bulge and uncovertebral overgrowth. 3. Focal 2 mm central disc protrusion C3-C4 without significant spinal canal    stenosis or neural foraminal narrowing evident. Results for orders placed or performed in visit on 08/20/20   COVID-19   Result Value Ref Range    SARS-CoV-2, WAYNE NOT DETECTED NOT DETECTED       Impression:       1. Cervical spondylosis without myelopathy    2. DDD (degenerative disc disease), cervical    3.  Foraminal stenosis of cervical region        Plan:  Clinical Course: shows no change    I discussed the diagnosis and the treatment options with Enriqueta Blankenship today. In Summary:  The various treatment options were outlined and discussed with Enriqueta Blankenship including:  Conservative care options: physical therapy, ice, medications, bracing, and activity modification. The indications for therapeutic injections. The indications for additional imaging/laboratory studies. The indications for (possible future) interventions. After considering the various options discussed, Enriqueta Blankenship elected to pursue a course of treatment that includes the followin. Medications:  Continue anti-inflammatories with appropriate GI Precautions including to stop if develop dark tarry stools or GI upset and to take with food. 2. PT:  Encouraged to continue with Home exercise program.    3. Further studies: COVID-19 PCR testing prior to procedure. 4. Interventional:  We discussed pursuing cervical medial branch blocks for diagnostic purposes. Based on physical exam findings of increased pain with facet loading and radiologic imaging, we will pursue cervical medial branch blocks at the Left C2, TON, C3, C4, C5 levels. Risks, benefits and alternatives were discussed. These include but are not limited to bleeding, infection, increased pain, lack of pain relief and nerve injury. The patient verbalized understanding and would like to proceed. If there is significant pain relief and functional improvement, the patient may be a good candidate for Radiofrequency ablation. As such, I have confirmed the patient has met the general requirements including failure of conservative management including prescription strength analgesics, adjunctive medications were utilized , therapeutic exercise program, and no underlying addiction or behavioral disorders were identified to the ability of the provider. Symptoms are impacting their ADLs or iADLs such as pushing, pulling and significant pain was noted in the HPI.  Imaging studies as noted in the diagnostic imaging section of the consultation were reviewed and correlated with clinical findings. Fluoroscopy is utilized for interventional procedures. For second Diagnostic MBB  A confirmatory injection is being requested due to a positive response of 80% relief of the primary index pain with onset and duration of relief being consistent with local anesthetic utilized. 5. Follow up:  1-2 weeks      Bella Ornelas was instructed to call the office if her symptoms worsen or if new symptoms appear prior to the next scheduled visit. She is specifically instructed to contact the office between now & her scheduled appointment if she has concerns related to her condition or if she needs assistance in scheduling the above tests. She is welcome to call for an appointment sooner if she has any additional concerns or questions. ILigia ATC, am scribing for Lisa Cerrato.  09/03/20 11:03 AM Ligia Hoover. The physical examination was performed between the patient and GUIDO Cerrato. All counseling during the appointment was performed between the patient and the provider. Donato TAYLOR, personally performed the services described in this documentation as scribed by Ligia Marie ATC in my presence and it is both accurate and complete. Margarito Green PA-C  Board Certified by the [employer] on Certification of Physician Assistants  [address] [facility] ([facility])             This dictation was performed with a verbal recognition program [facility]) and it was checked for errors. It is possible that there are still dictated errors within this office note. If so, please bring any errors to my attention for an addendum. All efforts were made to ensure that this office note is accurate.

## 2020-09-03 NOTE — TELEPHONE ENCOUNTER
Auth: NPR  Date: 9/09/2020  Reference # None  Spoke with: None  Type of SX: Outpatient MBB # 2  Location: [facility]  CPT 00830, [id number], [id number]   SX area: Cervical SPine  Insurance: Medical [employer]

## 2020-09-05 LAB — SARS-COV-2, NAA: NOT DETECTED

## 2020-09-09 ENCOUNTER — HOSPITAL ENCOUNTER (OUTPATIENT)
Age: 60
Setting detail: OUTPATIENT SURGERY
Discharge: HOME | End: 2020-09-09
Attending: PHYSICAL MEDICINE & REHABILITATION | Admitting: PHYSICAL MEDICINE & REHABILITATION
Payer: COMMERCIAL

## 2020-09-09 ENCOUNTER — APPOINTMENT (OUTPATIENT)
Dept: PHYSICAL THERAPY | Age: 60
End: 2020-09-09
Payer: COMMERCIAL

## 2020-09-09 ENCOUNTER — APPOINTMENT (OUTPATIENT)
Dept: GENERAL RADIOLOGY | Age: 60
End: 2020-09-09
Attending: PHYSICAL MEDICINE & REHABILITATION
Payer: COMMERCIAL

## 2020-09-09 VITALS
DIASTOLIC BLOOD PRESSURE: 88 MMHG | OXYGEN SATURATION: 99 % | HEIGHT: 64 IN | RESPIRATION RATE: 16 BRPM | SYSTOLIC BLOOD PRESSURE: 141 MMHG | HEART RATE: 61 BPM | TEMPERATURE: 97.4 F | BODY MASS INDEX: 22.2 KG/M2 | WEIGHT: 130 LBS

## 2020-09-09 PROCEDURE — 6360000002 HC RX W HCPCS: Performed by: PHYSICAL MEDICINE & REHABILITATION

## 2020-09-09 PROCEDURE — 3209999900 FLUORO FOR SURGICAL PROCEDURES

## 2020-09-09 PROCEDURE — 99152 MOD SED SAME PHYS/QHP 5/>YRS: CPT | Performed by: PHYSICAL MEDICINE & REHABILITATION

## 2020-09-09 PROCEDURE — 2500000003 HC RX 250 WO HCPCS: Performed by: PHYSICAL MEDICINE & REHABILITATION

## 2020-09-09 PROCEDURE — 2709999900 HC NON-CHARGEABLE SUPPLY: Performed by: PHYSICAL MEDICINE & REHABILITATION

## 2020-09-09 PROCEDURE — 3610000058 HC PAIN LEVEL 5 BASE (NON-OR): Performed by: PHYSICAL MEDICINE & REHABILITATION

## 2020-09-09 RX ORDER — LIDOCAINE HYDROCHLORIDE 20 MG/ML
INJECTION, SOLUTION EPIDURAL; INFILTRATION; INTRACAUDAL; PERINEURAL
Status: COMPLETED | OUTPATIENT
Start: 2020-09-09 | End: 2020-09-09

## 2020-09-09 RX ORDER — BUPIVACAINE HYDROCHLORIDE 2.5 MG/ML
INJECTION, SOLUTION INFILTRATION; PERINEURAL
Status: DISCONTINUED | OUTPATIENT
Start: 2020-09-09 | End: 2020-09-09 | Stop reason: ALTCHOICE

## 2020-09-09 RX ORDER — LIDOCAINE HYDROCHLORIDE 10 MG/ML
INJECTION, SOLUTION EPIDURAL; INFILTRATION; INTRACAUDAL; PERINEURAL
Status: COMPLETED | OUTPATIENT
Start: 2020-09-09 | End: 2020-09-09

## 2020-09-09 RX ORDER — MIDAZOLAM HYDROCHLORIDE 1 MG/ML
INJECTION INTRAMUSCULAR; INTRAVENOUS
Status: COMPLETED | OUTPATIENT
Start: 2020-09-09 | End: 2020-09-09

## 2020-09-09 ASSESSMENT — PAIN - FUNCTIONAL ASSESSMENT
PAIN_FUNCTIONAL_ASSESSMENT: 0-10
PAIN_FUNCTIONAL_ASSESSMENT: PREVENTS OR INTERFERES SOME ACTIVE ACTIVITIES AND ADLS

## 2020-09-09 ASSESSMENT — PAIN DESCRIPTION - DESCRIPTORS: DESCRIPTORS: ACHING;SHARP

## 2020-09-09 ASSESSMENT — PAIN SCALES - GENERAL
PAINLEVEL_OUTOF10: 0
PAINLEVEL_OUTOF10: 0

## 2020-09-09 NOTE — H&P
HISTORY AND PHYSICAL/PRE-SEDATION ASSESSMENT    Patient:  Keith Shane   :  1960  Medical Record No.:  [id number]   Date:  2020  Physician:  Jason Salazar M.D. Facility: [address] [place] [address]    HISTORY OF PRESENT ILLNESS:                 The patient is a 64 y.o. female whom presents with neck pain. Review of the imaging and physical exam of the patient confirmed the pre-procedure diagnosis. After a thorough discussion of risks, benefits and alternatives informed consent was obtained. Past Medical History:   Past Medical History:   Diagnosis Date    Abnormal mammogram 2011    saw Dr. Lombardi or associate 2011 Ok to return for regular mammogram's     Anxiety 2011    Hyperlipidemia     Kidney stone     Medical history reviewed with no changes     Pyelonephritis 2011    Recurrent Change to augmentin and levaquin and ck blood cx's 2011 On daily macrobid per Dr. Ubaldo Etienne disorder, acute 3/23/2013    Due to 's infidelity and leaving her 3/2013 To see counselor- increase celexa   still living w the family as of 2013       Past Surgical History:     Past Surgical History:   Procedure Laterality Date    CARPAL TUNNEL RELEASE  11    Left    CARPAL TUNNEL RELEASE Right 2014    CYSTOSCOPY      with stent placement-x3    PAIN MANAGEMENT PROCEDURE Left 2020    LEFT C2, THIRD OCCIPITAL NERVE, C3, C4, C5 MEDIAL BRANCH BLOCK WITH FLUOROSCOPY (91706, 29071)  #1 performed by Jason Salazar MD at [address] maxillary gland removal     Current Medications:   Prior to Admission medications    Medication Sig Start Date End Date Taking?  Authorizing Provider   loratadine (CLARITIN) 10 MG tablet Take 10 mg by mouth daily   Yes Historical Provider, MD   divalproex (DEPAKOTE ER) 250 MG extended release tablet Take 250 mg by mouth daily   Yes Historical Provider, MD   Vitamin D, Ergocalciferol, 50 MCG (2000 UT) CAPS Take 4,000 Units by mouth   Yes Historical Provider, MD   meloxicam (MOBIC) 15 MG tablet TAKE 1 TABLET BY MOUTH ONE TIME A DAY 5/29/20  Yes Gigi Urbina MD   fluticasone (FLONASE) 50 MCG/ACT nasal spray USE 1 SPRAY IN EACH NOSTRIL DAILY 4/24/20  Yes Eliseo Mercado MD   potassium chloride (KLOR-CON M) 10 MEQ extended release tablet TAKE 1 TABLET BY MOUTH DAILY 9/18/19  Yes Eliseo Mercado MD   montelukast (SINGULAIR) 10 MG tablet Take 1 tablet by mouth daily 9/18/19  Yes Eliseo Mercado MD   citalopram (CELEXA) 10 MG tablet TAKE 2 tabs daily  Patient taking differently: 10 mg TAKE 2 tabs daily 9/18/19  Yes Eliseo Mercado MD   folic acid (FOLVITE) 1 MG tablet Take 1 mg by mouth daily   Yes Historical Provider, MD   calcium citrate-vitamin D (CALCITRATE/VITAMIN D) 315-200 MG-UNIT per tablet Take 1 tablet by mouth daily. Yes Historical Provider, MD   rizatriptan (MAXALT-MLT) 10 MG disintegrating tablet TALE 1 TABLET BY MOUTH AS NEEDED FOR MIGRAINES MAY REPEAT 2 HOURS LATER IF NEEDED 3/5/20   Eliseo Mercado MD   butalbital-acetaminophen-caffeine (FIORICET, ESGIC) -40 MG per tablet TAKE ONE TABLET BY MOUTH EVERY 4 HOURS AS NEEDED FOR PAIN UP TO 10 DAYS 1/16/20   Eliseo Mercado MD     Allergies:  Patient has no known allergies. Social History:    reports that she has never smoked. She has never used smokeless tobacco. She reports that she does not drink alcohol or use drugs. Family History:   Family History   Problem Relation Age of Onset    Diabetes Mother     Kidney Cancer Mother         RIGHT    Cancer Maternal Grandmother     Diabetes Maternal Grandmother     Cancer Maternal Grandfather     Diabetes Maternal Grandfather     Heart Disease Other     High Blood Pressure Other        Vitals: Blood pressure (!) 142/90, pulse 65, temperature 97.4 °F (36.3 °C), temperature source Temporal, resp.  rate 18, height 5' 4\" (1.626 m), weight 130 lb (59 kg), last menstrual period 10/07/2012, SpO2 96 %. PHYSICAL EXAM:including affected areas  HENT: Airway patent and reviewed  Cardiovascular: Normal rate, regular rhythm, normal heart sounds. Pulmonary/Chest: No wheezes. No rhonchi. No rales. Abdominal: Soft. Bowel sounds are normal. No distension. Extremities: Moves all extremities equally  Cervical and Lumbar Spine: Painful range of motion, no midline tenderness       Diagnosis:Cervical spondylosis without myelopathy   M47.812  M50.30   M48.02    Plan: Proceed with planned procedure      ASA CLASS:         []   I. Normal, healthy adult           [x]   II.  Mild systemic disease            []   III. Severe systemic disease      Mallampati: Mallampati Class II - (soft palate, fauces & uvula are visible)      Sedation plan:   [x]  Local              []  Minimal                  []  General anesthesia    Patient's condition acceptable for planned procedure/sedation. Post Procedure Plan   Return to same level of care   ______________________     The patient was counseled at length about the risks of maria eugenia Covid-19 in the matheus-operative and post-operative states including the recovery window of their procedure. The patient was made aware that maria eugenia Covid-19 after a surgical procedure may worsen their prognosis for recovering from the virus and lend to a higher morbidity and or mortality risk. The patient was given the options of postponing their procedure. All of the risks, benefits, and alternatives were discussed. The patient does wish to proceed with the procedure. The risks and benefits as well as alternatives to the procedure have been discussed with the patient and or family. The patient and or next of kin understands and agrees to proceed.     Bryce Obando M.D.

## 2020-09-09 NOTE — OP NOTE
Patient:  Florence Taylor  YOB: 1960  Medical Record #:  [id number]   Place:  [address] [place] [address]  Date:  9/9/2020   Physician:  Joaquina Hernandez MD, CESIA    Procedure:  Cervical Medial Branch Blocks - left C2, Third Occipital Nerve, C3, C4, and C5    CPT 60080, 60906, 50948    MBB #2    Pre-Procedure Diagnosis: Cervical spondylosis without radiculopathy    Post-Procedure Diagnosis: Same    Sedation: Local with 1% Lidocaine 3 ml and 1 mg of IV Versed  EBL: None    Complications: None    Procedure Summary:    The patient was seen in the office for complaints of neck pain. Review of the imaging and physical exam of the patient confirmed the pre-procedure diagnosis. After a thorough discussion of risks, benefits and alternatives informed consent was obtained. The patient was brought to the procedure suite and placed in the prone position. The skin overlying the cervical spine was prepped and draped in the usual sterile fashion. Using fluoroscopic guidance, the left C2, Third Occipital nerve, C3, C4 and C5 levels were identified. Through anesthetized skin a 22 gauge 3.5 inch curved tip spinal needle was advanced to the waist of the articular pillar at the C2. Isovue M300 was instilled showing a nerve root outline pattern, without evidence of vascular spread. A total of 0.5 ml of 0.25% Marcaine was instilled at C2 medial branch. This was repeated for the left Third Occipital Nerve, C3, C4, C5 medial branches. The needles were removed and a band-aid applied. The patient was transferred to the post-operative area in stable condition.

## 2020-09-10 ENCOUNTER — HOSPITAL ENCOUNTER (OUTPATIENT)
Dept: PHYSICAL THERAPY | Age: 60
Setting detail: THERAPIES SERIES
Discharge: HOME | End: 2020-09-10
Payer: COMMERCIAL

## 2020-09-10 PROCEDURE — 97530 THERAPEUTIC ACTIVITIES: CPT | Performed by: PHYSICAL THERAPIST

## 2020-09-10 PROCEDURE — 97110 THERAPEUTIC EXERCISES: CPT | Performed by: PHYSICAL THERAPIST

## 2020-09-10 PROCEDURE — 97140 MANUAL THERAPY 1/> REGIONS: CPT | Performed by: PHYSICAL THERAPIST

## 2020-09-10 NOTE — PLAN OF CARE
Tanna [address] [place], [address]  Phone: [phone]   Fax: [phone]     Physical Therapy Re-Certification Plan of Care    Dear Dr. Sancho Ramos,    We had the pleasure of treating the following patient for physical therapy services at [address] [place] [address]. A summary of our findings can be found in the updated assessment below. This includes our plan of care. If you have any questions or concerns regarding these findings, please do not hesitate to contact me at the office phone number checked above. Thank you for the referral.     Physician Signature:________________________________Date:__________________  By signing above (or electronic signature), therapists plan is approved by physician      Overall Response to Treatment:   []Patient is responding well to treatment and improvement is noted with regards  to goals   []Patient should continue to improve in reasonable time if they continue HEP   []Patient has plateaued and is no longer responding to skilled PT intervention    []Patient is getting worse and would benefit from return to referring MD   []Patient unable to adhere to initial POC   []Other: Pt demonstrated decreased cervical extension today due to soreness from procedure yesterday. She reported pain with certain movement, but different pain than last assessment. She continues to have discomfort at work. She has a follow-up with spine MD next week. Recommend pt continue with current POC to manage pain symptoms and progress postural training to return to PLOF.      Date range of previous POC Visits:  - 9/10    Total Visits: 13    Physical Therapy Treatment Note/ Progress Report:       Date:  9/10/2020    Patient Name:  Marychuy Muniz    :  1960  MRN: [id number]  Restrictions/Precautions:    Medical/Treatment Diagnosis Information:  Diagnosis: Neck pain - M54.2  Treatment Diagnosis: decreased ADL status, ROM, strength, and posture  Insurance/Certification information:  PT Insurance Information: Med mutual  Physician Information:  Referring Practitioner: Dr. Gutiérrez Chol  Has the plan of care been signed (Y/N):        []  Yes  [x]  No     Date of Patient follow up with Physician:       Is this a Progress Report:     [x]  Yes  []  No        Progress report/ Recertification will be due (10 Rx or 30 days whichever is less): 8 Oct 2020      Visit # Insurance Allowable Auth Required   13 BMN []  Yes []  No        Functional Scale:   NDI  6/11 - 56% limitation   7/15 - 32% limitation  8/12 - 20% limitation  9/10 - 30% limitation           Latex Allergy:  [x]NO      []YES  Preferred Language for Healthcare:   [x]English       []other:      Pain level:  1-2 /10 9/10    SUBJECTIVE: Pt states she saw the MD yesterday and had left C2-C5 medial branch blocks with fluoroscopy. She states she had more numbness after the procedure yesterday. She states her pain and the way she slept woke her up at night. She states her UEs seemed fatigued.    9/10    OBJECTIVE: 9/10   Observation:    Test measurements:          AROM Left  Right   Comments   Cervical Flexion       50   Cervical Extension       20 + pain     Cervical Side bend 42 38 + pain        Cervical Rotation 46 + pain  64                      Special tests Normal Abnormal N/A Comments   Sharp-Brianne Spurling                Mobility Testing  Comments   Cervical Downglides  Normal   Cervical Flexion mobility     Thoracic PA mobility  Hypomobile    Rib mobility  Hypomobile            Red Flags           Positive    Positive    Headaches (worst patient has ever had) Pos Altered consciousness Neg   Dizziness Neg Radiating Pain Pos   Diplopia Neg Gait disturbances Neg   Dysphagia Neg Multi-segmental weakness Neg   Dysarthria Neg Tru/quad paresis Neg   Drop Attacks Neg Fracture Neg   Facial symptoms  Neg Numbness Neg   Nystagmus Neg Nausea  Neg      Joint (90230)  [] ES(attended) (04376)      [] ES (un) (07476):        ASSESSMENT:      GOALS:9/10  Patient stated goal: pain relief and gain strength    [x] Progressing: [] Met: [] Not Met: [] Adjusted    Therapist goals for Patient:   Short Term Goals: To be achieved in: 2 weeks  1. Independent in HEP and progression per patient tolerance, in order to prevent re-injury. [] Progressing: [x] Met: [] Not Met: [] Adjusted   2. Patient will have a decrease in pain to facilitate improvement in movement, function, and ADLs as indicated by Functional Deficits. [] Progressing: [x] Met: [] Not Met: [] Adjusted    Long Term Goals: To be achieved in: 4-6 weeks  1. Disability index score of 28% or less for the NDI to assist with reaching prior level of function. [x] Progressing: [] Met: [] Not Met: [] Adjusted  2. Patient will demonstrate increased cervical flexion and ext  AROM to greater than or equal to 45 deg,  to allow for proper joint functioning as indicated by patients Functional Deficits. [] Progressing: [x] Met - partially: [] Not Met: [] Adjusted  3. Patient will return to ADLs and work without increased symptoms or restriction. [x] Progressing: [] Met: [] Not Met: [] Adjusted  4. Patient will report returning to independent workouts pain free. [] Progressing: [] Met: [x] Not Met: [] Adjusted     Overall Progression Towards Functional goals/ Treatment Progress Update:  [x] Patient is progressing as expected towards functional goals listed. [] Progression is slowed due to complexities/Impairments listed. [] Progression has been slowed due to co-morbidities.   [] Plan just implemented, too soon to assess goals progression <30days   [] Goals require adjustment due to lack of progress  [] Patient is not progressing as expected and requires additional follow up with physician  [] Other    Prognosis for POC: [x] Good [] Fair  [] Poor      Patient requires continued skilled intervention: [x] Yes  [] No    Treatment/Activity Tolerance:  [x] Patient tolerated treatment well [] Patient limited by fatique  [] Patient limited by pain  [x] Patient limited by other medical complications  [] Other:  Pt demonstrated decreased cervical extension today due to soreness from procedure yesterday. She reported pain with certain movement, but different pain than last assessment. She continues to have discomfort at work. She has a follow-up with MD next week. Recommend pt continue with current POC to manage pain symptoms and progress postural training to return to PLOF. 9/10    Patient education: Patient education on PT and plan of care including diagnosis, prognosis, treatment goals and options. Patient agrees with discussed POC and treatment and is aware of rehab process. 6/11    PLAN: 1x/ week for 4 weeks (9/10 - 10/8)   [x] Continue per plan of care [] Alter current plan (see comments above)  [] Plan of care initiated [] Hold pending MD visit [] Discharge    Electronically signed by: Arnel Cortes, PT, DPT       Note: If patient does not return for scheduled/ recommended follow up visits, this note will serve as a discharge from care along with most recent update on progress.

## 2020-09-11 RX ORDER — MONTELUKAST SODIUM 10 MG/1
TABLET ORAL
Qty: 90 TABLET | Refills: 3 | Status: SHIPPED | OUTPATIENT
Start: 2020-09-11 | End: 2021-09-25

## 2020-09-16 ENCOUNTER — HOSPITAL ENCOUNTER (OUTPATIENT)
Dept: PHYSICAL THERAPY | Age: 60
Setting detail: THERAPIES SERIES
Discharge: HOME | End: 2020-09-16
Payer: COMMERCIAL

## 2020-09-16 ENCOUNTER — OFFICE VISIT (OUTPATIENT)
Dept: ORTHOPEDIC SURGERY | Age: 60
End: 2020-09-16
Payer: COMMERCIAL

## 2020-09-16 VITALS — BODY MASS INDEX: 22.21 KG/M2 | HEIGHT: 64 IN | TEMPERATURE: 97.7 F | RESPIRATION RATE: 12 BRPM | WEIGHT: 130.07 LBS

## 2020-09-16 PROCEDURE — 97110 THERAPEUTIC EXERCISES: CPT | Performed by: PHYSICAL THERAPIST

## 2020-09-16 PROCEDURE — 99214 OFFICE O/P EST MOD 30 MIN: CPT | Performed by: STUDENT IN AN ORGANIZED HEALTH CARE EDUCATION/TRAINING PROGRAM

## 2020-09-16 PROCEDURE — 97140 MANUAL THERAPY 1/> REGIONS: CPT | Performed by: PHYSICAL THERAPIST

## 2020-09-16 PROCEDURE — 97530 THERAPEUTIC ACTIVITIES: CPT | Performed by: PHYSICAL THERAPIST

## 2020-09-16 NOTE — LETTER
Please schedule the following with:     Date:   10/07/20 @ 10:00     Account: [de-identified]  Patient: Angeli Harrison    : 1960  Address:  [address] [place] [address] [address] [facility]    Phone (H):  [phone] (home) [phone] (work)     ----------------------------------------------------------------------------------------------  Diagnosis:     ICD-10-CM    1. Cervical spondylosis without myelopathy  M47.812 [facility] PT - [facility]   2. DDD (degenerative disc disease), cervical  M50.30 [facility] - [facility]   3. Foraminal stenosis of cervical region  M48.02 [facility] - [facility]         Levels:C2, TON, C3, C4, C5  Side: Left   Procedure: Cervical Radiofrequency ablation  CPT Codes 06316, 87218    ----------------------------------------------------------------------------------------------  Injection # 1 - RFA  [facility]    Attending Physician       Roxanne Chase. Carmina Fraser MD.      ----------------------------------------------------------------------------------------------  Injection Scheduled For:    At:    [facility]   Pre-Cert#    2nd Insurance     Pre-Cert#    Comments:    · Infection control  ? Tested positive for MRSA in past 12 months:  no  ? Tested positive for MSSA \"staph infection\" in past 12 months: no  ? Tested positive for VRE (Vancomycin Resistant Enterococci) in past 12 months:   no  ? Currently on any antibiotics for an infection: no  · Anticoagulants:  ? On a blood thinner:  no   ?  Any history of bleeding disorder: no   · Advanced Liver disease: no   · Advanced Renal disease: no   · Glaucoma: no   · Diabetes: no      Sedation:         No  -----------------------------------------------------------------------------------------------  No Known Allergies

## 2020-09-16 NOTE — PLAN OF CARE
[address] [place] [address] [facility], [address] [facility], [address]  Phone: [phone]   Fax: [phone]      Physical Therapy Treatment Note/ Progress Report:       Date:  2020    Patient Name:  Corrina Calhoun    :  1960  MRN: [id number]  Restrictions/Precautions:    Medical/Treatment Diagnosis Information:  Diagnosis: Neck pain - M54.2  Treatment Diagnosis: decreased ADL status, ROM, strength, and posture  Insurance/Certification information:  PT Insurance Information: Med mutual  Physician Information:  Referring Practitioner: Dr. Jazz Licona  Has the plan of care been signed (Y/N):        []  Yes  [x]  No     Date of Patient follow up with Physician:       Is this a Progress Report:     [x]  Yes  []  No        Progress report/ Recertification will be due (10 Rx or 30 days whichever is less): 8 Oct 2020      Visit # Insurance Allowable Auth Required   14 BMN []  Yes []  No        Functional Scale:   NDI   - 56% limitation   7/15 - 32% limitation   - 20% limitation  9/10 - 30% limitation           Latex Allergy:  [x]NO      []YES  Preferred Language for Healthcare:   [x]English       []other:      Pain level:  2 /10 9/16    SUBJECTIVE: Pt states she was doing some yard work yesterday and started to get pain. She states she was looking up and cutting tree branches down.  Her pain was increased to a 10.        OBJECTIVE: 9/10   Observation:    Test measurements:          AROM Left  Right   Comments   Cervical Flexion       50   Cervical Extension       20 + pain     Cervical Side bend 42 38 + pain        Cervical Rotation 46 + pain  64                      Special tests Normal Abnormal N/A Comments   Sharp-Brianne Spurling                Mobility Testing  Comments   Cervical Downglides  Normal   Cervical Flexion mobility     Thoracic PA mobility  Hypomobile    Rib mobility  Hypomobile            Red Flags           Positive    Positive    Headaches (worst patient has ever had) Pos Altered consciousness Neg   Dizziness Neg Radiating Pain Pos   Diplopia Neg Gait disturbances Neg   Dysphagia Neg Multi-segmental weakness Neg   Dysarthria Neg Tru/quad paresis Neg   Drop Attacks Neg Fracture Neg   Facial symptoms  Neg Numbness Neg   Nystagmus Neg Nausea  Neg      Joint mobility:               []?Normal                      [x]? Hypo              []?Hyper     Palpation:      Functional Mobility/Transfers: independent      Posture: guarded upper traps       RESTRICTIONS/PRECAUTIONS: none    Exercises/Interventions:       Therapeutic Ex (06574) Sets/sec Reps Notes/CUES   Upper trap stretch 30 sec 3 each side Added 6/11   Lev scap stretch      SCM stretch 30 sec 3  Added 6/17   Thoracic ext 10 sec 10 Added 6/11         Repeated cervical flexion Serratus punches 3 10 3#, ^7/22                     Manual Intervention (29860)      Cerv mobs/manip with suboccipital release 8'   Grade III-IV, 9/2   Thoracic mobs/manip 1'   Grade III-V, 9/2   CT manip Rib mobilizations 2'   Grade III-IV,9/2   IASTM            NMR re-education (25062)   CUES NEEDED         Cervical isometrics (FLex, ext, SB)       Shoulder shrug Resisted scapular retraction 3 10 Blue TB, Added 7/8   Resisted shoulder ext  3 10 Blue TB, Added 7/22   Shoulder ER 3 10 Blue  TB, ^ 8/18; form corrected   Lat pulldowns  3 10 Blue TB, Added 9/2         Therapeutic Activity (79258)      Wall posture      Standing scaption  1#; in mirror for visual cue to avoid any shoulder hiking.     Chin tuck 10 sec 10 Added 6/11   Scapular retraction 10 sec 10 Added 6/11         Prone T 3 10 1#, ^7/29         Prone chest lift 3 10 Added 7/29       Therapeutic Exercise and NMR EXR  [x] (75677) Provided verbal/tactile cueing for activities related to strengthening, flexibility, endurance, ROM  for improvements in cervical, postural, scapular, scapulothoracic and UE control with self care, reaching, carrying, lifting, house/yardwork, driving/computer work. [x] (20773) Provided verbal/tactile cueing for activities related to improving balance, coordination, kinesthetic sense, posture, motor skill, proprioception  to assist with cervical, scapular, scapulothoracic and UE control with self care, reaching, carrying, lifting, house/yardwork, driving/computer work. Therapeutic Activities:    [] (66447 or 71621) Provided verbal/tactile cueing for activities related to improving balance, coordination, kinesthetic sense, posture, motor skill, proprioception and motor activation to allow for proper function of cervical, scapular, scapulothoracic and UE control with self care, carrying, lifting, driving/computer work. Home Exercise Program:    [x] (37424) Reviewed/Progressed HEP activities related to strengthening, flexibility, endurance, ROM of cervical, scapular, scapulothoracic and UE control with self care, reaching, carrying, lifting, house/yardwork, driving/computer work  [] (83703) Reviewed/Progressed HEP activities related to improving balance, coordination, kinesthetic sense, posture, motor skill, proprioception of cervical, scapular, scapulothoracic and UE control with self care, reaching, carrying, lifting, house/yardwork, driving/computer work      Manual Treatments:  PROM / STM / Oscillations-Mobs:  G-I, II, III, IV (PA's, Inf., Post.)  [] (90547) Provided manual therapy to mobilize soft tissue/joints of cervical/CT, scapular GHJ and UE for the purpose of decreasing headache, modulating pain, promoting relaxation,  increasing ROM, reducing/eliminating soft tissue swelling/inflammation/restriction, improving soft tissue extensibility and allowing for proper ROM for normal function with self care, reaching, carrying, lifting, house/yardwork, driving/computer work    I      Modalities: CP -15' 9/16   [] GAME READY (VASO)- for significant edema, swelling, pain control.      Charges:  Timed Code Treatment Minutes: 48'    Total Treatment Minutes: 80'       [] EVAL (LOW) 63261 (typically 20 minutes face-to-face)  [] EVAL (MOD) 86374 (typically 30 minutes face-to-face)  [] EVAL (HIGH) 71189 (typically 45 minutes face-to-face)  [] RE-EVAL     [x] IT(48337) x 2  [] IONTO  [] NMR (66701)  [] VASO  [x] Manual (28333) x 1     [] Other:  [x] TA x 1     [] Mech Traction (89272)  [] ES(attended) (66311)      [] ES (un) (54347):        ASSESSMENT:      GOALS:9/10  Patient stated goal: pain relief and gain strength    [x] Progressing: [] Met: [] Not Met: [] Adjusted    Therapist goals for Patient:   Short Term Goals: To be achieved in: 2 weeks  1. Independent in HEP and progression per patient tolerance, in order to prevent re-injury. [] Progressing: [x] Met: [] Not Met: [] Adjusted   2. Patient will have a decrease in pain to facilitate improvement in movement, function, and ADLs as indicated by Functional Deficits. [] Progressing: [x] Met: [] Not Met: [] Adjusted    Long Term Goals: To be achieved in: 4-6 weeks  1. Disability index score of 28% or less for the NDI to assist with reaching prior level of function. [x] Progressing: [] Met: [] Not Met: [] Adjusted  2. Patient will demonstrate increased cervical flexion and ext  AROM to greater than or equal to 45 deg,  to allow for proper joint functioning as indicated by patients Functional Deficits. [] Progressing: [x] Met - partially: [] Not Met: [] Adjusted  3. Patient will return to ADLs and work without increased symptoms or restriction. [x] Progressing: [] Met: [] Not Met: [] Adjusted  4. Patient will report returning to independent workouts pain free. [] Progressing: [] Met: [x] Not Met: [] Adjusted     Overall Progression Towards Functional goals/ Treatment Progress Update:  [x] Patient is progressing as expected towards functional goals listed. [] Progression is slowed due to complexities/Impairments listed.   [] Progression has been slowed due to co-morbidities. [] Plan just implemented, too soon to assess goals progression <30days   [] Goals require adjustment due to lack of progress  [] Patient is not progressing as expected and requires additional follow up with physician  [] Other    Prognosis for POC: [x] Good [] Fair  [] Poor      Patient requires continued skilled intervention: [x] Yes  [] No    Treatment/Activity Tolerance:  [x] Patient tolerated treatment well [] Patient limited by fatique  [] Patient limited by pain  [x] Patient limited by other medical complications  [] Other:  Pt rox session well. She rox strength training today without any pain. She did require VCs for proper from with resisted ER, but was able to maintain. PT noted hypomobility with rib mobilizations today, which pt rox well today. 9/16    Patient education: Patient education on PT and plan of care including diagnosis, prognosis, treatment goals and options. Patient agrees with discussed POC and treatment and is aware of rehab process. 6/11    PLAN: 1x/ week for 4 weeks (9/10 - 10/8)   [x] Continue per plan of care [] Alter current plan (see comments above)  [] Plan of care initiated [] Hold pending MD visit [] Discharge    Electronically signed by: Denisse Andrews, PT, DPT       Note: If patient does not return for scheduled/ recommended follow up visits, this note will serve as a discharge from care along with most recent update on progress.

## 2020-09-16 NOTE — PROGRESS NOTES
Follow up: [address]  1960  [id number]         Chief Complaint   Patient presents with    Neck Pain     9/9: CMBB L C2, TON, C3, C4, C5          HISTORY OF PRESENT ILLNESS:  Ms. Mag Cannon is a 64 y.o. female returns for a follow up visit for multiple medical problems. Her current presenting problems are   1. Cervical spondylosis without myelopathy    2. DDD (degenerative disc disease), cervical    3. Foraminal stenosis of cervical region    . As per information/history obtained from the PADT(patient assessment and documentation tool) - She complains of pain in the neck with radiation to the head and left ear She rates the pain 3/10 and describes it as dull, aching. Pain is made worse by: looking down, looking up, prolonged driving. She denies side effects from the current pain regimen. Patient reports that since the last follow up visit the physical functioning is unchanged, family/social relationships are unchanged, mood is unchanged and sleep patterns are unchanged, and that the overall functioning is unchanged. Patient denies neurological bowel or bladder. Patient returns today after having undergone a cervical medial branch branch block # 2 to left C2, TON, C3, C4, C5, C6 on 9/9/2020. She reports her symptoms were improved by 80% following this entity. She notes that her left sided head pain was significantly decreased for 5 hours following the procedure. The patient states the pain did return the night of the procedure to preprocedure levels. She continues to report left-sided neck pain radiating to the left side of the head. She does feel that her neck continues to feel weak and fatigued. Patient continues to attend physical therapy as well an extension of visits for the next 4 weeks. Alleviating factors include ice, lying, down, Tylenol and physical therapy exercises. She denies any new injuries or triggers at this time.      Associated signs and symptoms:   Neurogenic bowel or bladder symptoms:  no   Perceived weakness:  no   Difficulty walking:  no            Past medical, surgical, social and family history reviewed with the patient. No pertinent relevant history.    Past Medical History:   Past Medical History:   Diagnosis Date    Abnormal mammogram 6/8/2011    saw Dr. Neville wood or associate 6/2011 Ok to return for regular mammogram's     Anxiety 6/8/2011    Hyperlipidemia     Kidney stone     Medical history reviewed with no changes     Pyelonephritis 7/28/2011    Recurrent Change to augmentin and levaquin and ck blood cx's 7/27/2011 On daily macrobid per Dr. Tano Blanco disorder, acute 3/23/2013    Due to 's infidelity and leaving her 3/2013 To see counselor- increase celexa   still living w the family as of 6/2013       Past Surgical History:     Past Surgical History:   Procedure Laterality Date    CARPAL TUNNEL RELEASE  12/29/11    Left    CARPAL TUNNEL RELEASE Right 12-    CYSTOSCOPY      with stent placement-x3    PAIN MANAGEMENT PROCEDURE Left 8/26/2020    LEFT C2, THIRD OCCIPITAL NERVE, C3, C4, C5 MEDIAL BRANCH BLOCK WITH FLUOROSCOPY (75378, 25677)  #1 performed by Andrae Beard MD at [address] Left 9/9/2020    LEFT C2,TON,C3,C4 C5 MEDIAL BRANCH BLOCKS WITH FLUOROSCOPY performed by Andrae Beard MD at [address]      L maxillary gland removal     Current Medications:     Current Outpatient Medications:     montelukast (SINGULAIR) 10 MG tablet, TAKE 1 TABLET BY MOUTH ONE TIME A DAY, Disp: 90 tablet, Rfl: 3    loratadine (CLARITIN) 10 MG tablet, Take 10 mg by mouth daily, Disp: , Rfl:     divalproex (DEPAKOTE ER) 250 MG extended release tablet, Take 250 mg by mouth daily, Disp: , Rfl:     Vitamin D, Ergocalciferol, 50 MCG (2000 UT) CAPS, Take 4,000 Units by mouth, Disp: , Rfl:     meloxicam (MOBIC) 15 MG tablet, TAKE 1 TABLET BY MOUTH ONE TIME A DAY, Disp: 30 tablet, Rfl: 1    fluticasone (FLONASE) 50 MCG/ACT nasal spray, USE 1 SPRAY IN EACH NOSTRIL DAILY, Disp: 16 g, Rfl: 3    rizatriptan (MAXALT-MLT) 10 MG disintegrating tablet, TALE 1 TABLET BY MOUTH AS NEEDED FOR MIGRAINES MAY REPEAT 2 HOURS LATER IF NEEDED, Disp: 27 tablet, Rfl: 3    butalbital-acetaminophen-caffeine (FIORICET, ESGIC) -40 MG per tablet, TAKE ONE TABLET BY MOUTH EVERY 4 HOURS AS NEEDED FOR PAIN UP TO 10 DAYS, Disp: 30 tablet, Rfl: 3    potassium chloride (KLOR-CON M) 10 MEQ extended release tablet, TAKE 1 TABLET BY MOUTH DAILY, Disp: 90 tablet, Rfl: 3    citalopram (CELEXA) 10 MG tablet, TAKE 2 tabs daily (Patient taking differently: 10 mg TAKE 2 tabs daily), Disp: 180 tablet, Rfl: 3    folic acid (FOLVITE) 1 MG tablet, Take 1 mg by mouth daily, Disp: , Rfl:     calcium citrate-vitamin D (CALCITRATE/VITAMIN D) 315-200 MG-UNIT per tablet, Take 1 tablet by mouth daily. , Disp: , Rfl:   Allergies:  Patient has no known allergies. Social History:    reports that she has never smoked. She has never used smokeless tobacco. She reports that she does not drink alcohol or use drugs. Family History:   Family History   Problem Relation Age of Onset    Diabetes Mother     Kidney Cancer Mother         RIGHT    Cancer Maternal Grandmother     Diabetes Maternal Grandmother     Cancer Maternal Grandfather     Diabetes Maternal Grandfather     Heart Disease Other     High Blood Pressure Other        REVIEW OF SYSTEMS:   CONSTITUTIONAL: Denies unexplained weight loss, fevers, chills or fatigue  NEUROLOGICAL: Denies unsteady gait or progressive weakness  MUSCULOSKELETAL: Denies joint swelling or redness  GI: Denies nausea, vomiting, diarrhea   : Denies bowel or bladder issues       PHYSICAL EXAM:    Vitals: Temperature 97.7 °F (36.5 °C), resp. rate 12, height 5' 4.02\" (1.626 m), weight 130 lb 1.1 oz (59 kg), last menstrual period 10/07/2012.     GENERAL EXAM:  · General Apparence: Patient is adequately left upper extremity does not show any tenderness, deformity or injury. Range of motion is unremarkable and pain-free. There is no gross instability. There are no rashes, ulcerations or lesions. Strength and tone are normal. No atrophy or abnormal movements are noted. · RIGHT LOWER EXTREMITY: Inspection/examination of the right lower extremity does not show any tenderness, deformity or injury. Range of motion is normal and pain-free. There is no gross instability. There are no rashes, ulcerations or lesions. Strength and tone are normal. No atrophy or abnormal movements are noted. · LEFT LOWER EXTREMITY:  Inspection/examination of the left lower extremity does not show any tenderness, deformity or injury. Range of motion is normal and pain-free. There is no gross instability. There are no rashes, ulcerations or lesions. Strength and tone are normal. No atrophy or abnormal movements are noted. Diagnostic Testing:    MR cervical spine shows 6/11/20:  Impression    1. Mild bilateral neural foraminal narrowing at C5-C6 secondary to a disc    bulge and uncovertebral overgrowth. 2. Mild-to-moderate left neural foraminal narrowing at C4-C5 secondary to a    disc bulge and uncovertebral overgrowth. 3. Focal 2 mm central disc protrusion C3-C4 without significant spinal canal    stenosis or neural foraminal narrowing evident. Results for orders placed or performed in visit on 09/03/20   COVID-19   Result Value Ref Range    SARS-CoV-2, WAYNE NOT DETECTED NOT DETECTED     Impression:       1. Cervical spondylosis without myelopathy    2. DDD (degenerative disc disease), cervical    3. Foraminal stenosis of cervical region        Plan:  Clinical Course: shows no change    I discussed the diagnosis and the treatment options with Angeli Harrison today.      In Summary:  The various treatment options were outlined and discussed with Angeli Harrison including:  Conservative care options: physical therapy, ice, medications, bracing, and activity modification. The indications for therapeutic injections. The indications for additional imaging/laboratory studies. The indications for (possible future) interventions. After considering the various options discussed, Tres Mcbride elected to pursue a course of treatment that includes the followin. Medications:  Continue anti-inflammatories with appropriate GI Precautions including to stop if develop dark tarry stools or GI upset and to take with food. 2. PT:  Hold physical therapy during the week before radiofrequency ablation. May continue after procedure is complete. 3. Further studies: Patient has had two negative COVID-19 PCR test results      4. Interventional:  We have discussed options such as radiofrequency ablation after undergoing 2 successful cervical medial branch blocks. Risks include but limited to bleeding, infection, neuritis, increased pain, lack of pain relief, motor nerve injury. This does not include all possible risks. The patient verbalized understanding would like to proceed. Side effects and benefits were also discussed. As such, I have confirmed the patient has met the general requirements including failure of conservative management including prescription strength analgesics, adjunctive medications were utilized , therapeutic exercise program, and no underlying addiction or behavioral disorders were identified to the ability of the provider. Symptoms are impacting their ADLs or iADLs such as walking and transferring and significant pain was noted in the HPI. Imaging studies as noted in the diagnostic imaging section of the consultation were reviewed and correlated with clinical findings. Fluoroscopy is utilized for interventional procedures.     For first RFN  Dual diagnostic MBB injections produced 80% or greater pain relief of the primary index pain and the onset and minimum duration of relief consistent with the

## 2020-09-17 ENCOUNTER — TELEPHONE (OUTPATIENT)
Dept: INTERNAL MEDICINE CLINIC | Age: 60
End: 2020-09-17

## 2020-09-17 DIAGNOSIS — E78.5 HYPERLIPIDEMIA, UNSPECIFIED HYPERLIPIDEMIA TYPE: ICD-10-CM

## 2020-09-17 DIAGNOSIS — R53.83 OTHER FATIGUE: ICD-10-CM

## 2020-09-17 DIAGNOSIS — F41.9 ANXIETY: ICD-10-CM

## 2020-09-17 DIAGNOSIS — N20.0 KIDNEY STONES: ICD-10-CM

## 2020-09-17 DIAGNOSIS — F32.A DEPRESSION, UNSPECIFIED DEPRESSION TYPE: ICD-10-CM

## 2020-09-17 LAB
A/G RATIO: 2 (ref 1.1–2.2)
ALBUMIN SERPL-MCNC: 4.1 G/DL (ref 3.4–5)
ALP BLD-CCNC: 65 U/L (ref 40–129)
ALT SERPL-CCNC: 18 U/L (ref 10–40)
ANION GAP SERPL CALCULATED.3IONS-SCNC: 9 MMOL/L (ref 3–16)
AST SERPL-CCNC: 18 U/L (ref 15–37)
BASOPHILS ABSOLUTE: 0 K/UL (ref 0–0.2)
BASOPHILS RELATIVE PERCENT: 1.1 %
BILIRUB SERPL-MCNC: 0.4 MG/DL (ref 0–1)
BILIRUBIN DIRECT: <0.2 MG/DL (ref 0–0.3)
BILIRUBIN, INDIRECT: ABNORMAL MG/DL (ref 0–1)
BUN BLDV-MCNC: 20 MG/DL (ref 7–20)
CALCIUM SERPL-MCNC: 8.9 MG/DL (ref 8.3–10.6)
CHLORIDE BLD-SCNC: 105 MMOL/L (ref 99–110)
CHOLESTEROL, TOTAL: 239 MG/DL (ref 0–199)
CO2: 30 MMOL/L (ref 21–32)
CREAT SERPL-MCNC: 0.6 MG/DL (ref 0.6–1.1)
EOSINOPHILS ABSOLUTE: 0.1 K/UL (ref 0–0.6)
EOSINOPHILS RELATIVE PERCENT: 3.7 %
GFR AFRICAN AMERICAN: >60
GFR NON-AFRICAN AMERICAN: >60
GLOBULIN: 2.1 G/DL
GLUCOSE BLD-MCNC: 97 MG/DL (ref 70–99)
HCT VFR BLD CALC: 43 % (ref 36–48)
HDLC SERPL-MCNC: 55 MG/DL (ref 40–60)
HEMOGLOBIN: 14.2 G/DL (ref 12–16)
LDL CHOLESTEROL CALCULATED: 157 MG/DL
LYMPHOCYTES ABSOLUTE: 1.5 K/UL (ref 1–5.1)
LYMPHOCYTES RELATIVE PERCENT: 38.3 %
MCH RBC QN AUTO: 29.6 PG (ref 26–34)
MCHC RBC AUTO-ENTMCNC: 33 G/DL (ref 31–36)
MCV RBC AUTO: 89.6 FL (ref 80–100)
MONOCYTES ABSOLUTE: 0.5 K/UL (ref 0–1.3)
MONOCYTES RELATIVE PERCENT: 11.4 %
NEUTROPHILS ABSOLUTE: 1.8 K/UL (ref 1.7–7.7)
NEUTROPHILS RELATIVE PERCENT: 45.5 %
PDW BLD-RTO: 13.3 % (ref 12.4–15.4)
PLATELET # BLD: 171 K/UL (ref 135–450)
PMV BLD AUTO: 7.9 FL (ref 5–10.5)
POTASSIUM SERPL-SCNC: 3.9 MMOL/L (ref 3.5–5.1)
RBC # BLD: 4.8 M/UL (ref 4–5.2)
SODIUM BLD-SCNC: 144 MMOL/L (ref 136–145)
TOTAL PROTEIN: 6.2 G/DL (ref 6.4–8.2)
TRIGL SERPL-MCNC: 134 MG/DL (ref 0–150)
TSH SERPL DL<=0.05 MIU/L-ACNC: 1.77 UIU/ML (ref 0.27–4.2)
VLDLC SERPL CALC-MCNC: 27 MG/DL
WBC # BLD: 4 K/UL (ref 4–11)

## 2020-09-17 NOTE — TELEPHONE ENCOUNTER
Patient called to request a Depakote blood level test be ordered. Please have faxed to [phone].

## 2020-09-23 ENCOUNTER — OFFICE VISIT (OUTPATIENT)
Dept: INTERNAL MEDICINE CLINIC | Age: 60
End: 2020-09-23
Payer: COMMERCIAL

## 2020-09-23 ENCOUNTER — HOSPITAL ENCOUNTER (OUTPATIENT)
Dept: PHYSICAL THERAPY | Age: 60
Setting detail: THERAPIES SERIES
Discharge: HOME | End: 2020-09-23
Payer: COMMERCIAL

## 2020-09-23 VITALS
OXYGEN SATURATION: 98 % | WEIGHT: 135.2 LBS | TEMPERATURE: 97.7 F | HEIGHT: 64 IN | BODY MASS INDEX: 23.08 KG/M2 | DIASTOLIC BLOOD PRESSURE: 82 MMHG | HEART RATE: 70 BPM | SYSTOLIC BLOOD PRESSURE: 138 MMHG

## 2020-09-23 PROCEDURE — 97140 MANUAL THERAPY 1/> REGIONS: CPT | Performed by: PHYSICAL THERAPIST

## 2020-09-23 PROCEDURE — 97530 THERAPEUTIC ACTIVITIES: CPT | Performed by: PHYSICAL THERAPIST

## 2020-09-23 PROCEDURE — 97110 THERAPEUTIC EXERCISES: CPT | Performed by: PHYSICAL THERAPIST

## 2020-09-23 PROCEDURE — 99396 PREV VISIT EST AGE 40-64: CPT | Performed by: INTERNAL MEDICINE

## 2020-09-23 RX ORDER — CYCLOSPORINE 0.5 MG/ML
EMULSION OPHTHALMIC
COMMUNITY

## 2020-09-23 ASSESSMENT — ENCOUNTER SYMPTOMS
WHEEZING: 0
COLOR CHANGE: 0
BACK PAIN: 0
CHEST TIGHTNESS: 0
ABDOMINAL PAIN: 0

## 2020-09-23 NOTE — PROGRESS NOTES
Pulse: 70   Temp: 97.7 °F (36.5 °C)   SpO2: 98%   Weight: 135 lb 3.2 oz (61.3 kg)   Height: 5' 4\" (1.626 m)     Body mass index is 23.21 kg/m².      Wt Readings from Last 3 Encounters:   09/23/20 135 lb 3.2 oz (61.3 kg)   09/16/20 130 lb 1.1 oz (59 kg)   09/09/20 130 lb (59 kg)     BP Readings from Last 3 Encounters:   09/23/20 138/82   09/09/20 (!) 141/88   08/26/20 (!) 145/83         Immunization History   Administered Date(s) Administered    Influenza 10/29/2015    Influenza Vaccine, unspecified formulation 10/01/2016    Influenza Virus Vaccine 10/03/2017, 10/12/2018, 10/07/2019    Tdap (Boostrix, Adacel) 08/06/2012    Tetanus 06/08/2000       Past Medical History:   Diagnosis Date    Abnormal mammogram 6/8/2011    saw Dr. Neville wood or associate 6/2011 Ok to return for regular mammogram's     Anxiety 6/8/2011    Hyperlipidemia     Kidney stone     Medical history reviewed with no changes     Pyelonephritis 7/28/2011    Recurrent Change to augmentin and levaquin and ck blood cx's 7/27/2011 On daily macrobid per Dr. Lige Aschoff disorder, acute 3/23/2013    Due to 's infidelity and leaving her 3/2013 To see counselor- increase celexa   still living w the family as of 6/2013      Past Surgical History:   Procedure Laterality Date    CARPAL TUNNEL RELEASE  12/29/11    Left    CARPAL TUNNEL RELEASE Right 12-    CYSTOSCOPY      with stent placement-x3    PAIN MANAGEMENT PROCEDURE Left 8/26/2020    LEFT C2, THIRD OCCIPITAL NERVE, C3, C4, C5 MEDIAL BRANCH BLOCK WITH FLUOROSCOPY (72975, 60461)  #1 performed by Imtiaz Llanes MD at [address] Left 9/9/2020    LEFT C2,TON,C3,C4 C5 MEDIAL BRANCH BLOCKS WITH FLUOROSCOPY performed by Imtiaz Llanes MD at [address]      L maxillary gland removal     Family History   Problem Relation Age of Onset    Diabetes Mother     Kidney Cancer Mother         RIGHT    Cancer Maternal Grandmother     Diabetes Maternal Grandmother     Cancer Maternal Grandfather     Diabetes Maternal Grandfather     Heart Disease Other     High Blood Pressure Other      Social History     Socioeconomic History    Marital status:      Spouse name: Not on file    Number of children: 11    Years of education: Not on file    Highest education level: Not on file   Occupational History    Occupation: [employer]     Employer: [employer]   Social Needs    Financial resource strain: Not on file    Food insecurity     Worry: Not on file     Inability: Not on file   [employer] needs     Medical: Not on file     Non-medical: Not on file   Tobacco Use    Smoking status: Never Smoker    Smokeless tobacco: Never Used   Substance and Sexual Activity    Alcohol use: No    Drug use: No    Sexual activity: Not on file   Lifestyle    Physical activity     Days per week: Not on file     Minutes per session: Not on file    Stress: Not on file   Relationships    Social connections     Talks on phone: Not on file     Gets together: Not on file     Attends [employer] service: Not on file     Active member of club or organization: Not on file     Attends meetings of clubs or organizations: Not on file     Relationship status: Not on file    Intimate partner violence     Fear of current or ex partner: Not on file     Emotionally abused: Not on file     Physically abused: Not on file     Forced sexual activity: Not on file   Other Topics Concern    Not on file   Social History Narrative    Not on file             Review of Systems   Constitutional: Positive for fatigue. Negative for activity change and appetite change. HENT: Negative for congestion. Eyes: Negative for visual disturbance. Respiratory: Negative for chest tightness and wheezing. Cardiovascular: Negative for chest pain and palpitations. Gastrointestinal: Negative for abdominal pain.    Musculoskeletal: Positive for myalgias, neck pain and neck stiffness. Negative for arthralgias and back pain. Skin: Negative for color change and rash. Neurological: Negative for weakness, light-headedness and headaches. Psychiatric/Behavioral: Positive for dysphoric mood. Objective:   Physical Exam  Constitutional:       Appearance: She is well-developed. HENT:      Head: Normocephalic and atraumatic. Eyes:      Conjunctiva/sclera: Conjunctivae normal.      Pupils: Pupils are equal, round, and reactive to light. Neck:      Musculoskeletal: Normal range of motion and neck supple. Cardiovascular:      Rate and Rhythm: Normal rate and regular rhythm. Heart sounds: Normal heart sounds. Pulmonary:      Effort: Pulmonary effort is normal. No respiratory distress. Breath sounds: Normal breath sounds. Abdominal:      General: There is no distension. Tenderness: There is no abdominal tenderness. Musculoskeletal:         General: Tenderness present. Comments: Point tenderness of cervical spine with para spinal spasm. Decreased rom and pain w rom noted. Lymphadenopathy:      Cervical: No cervical adenopathy. Skin:     General: Skin is warm and dry. Neurological:      Mental Status: She is alert and oriented to person, place, and time. Psychiatric:         Behavior: Behavior normal.           Assessment and Plan:      Other headache syndrome  Sl better w depakote     Neck pain  Getting injections     Kidney stones  Cont hydration     Depression  Still struggling some-consider more therapy? ? Hyperlipidemia  Cont to watch diet          Complete physical completed. Patient now up to date with all routine screening including Pap and colonoscopy if needed, yearly eye and dental exams,labs, dexa if indicated. Counseled re: nutrition/calciumand vit d supplementation,seat belt use, no cell phone use with driving.

## 2020-09-23 NOTE — FLOWSHEET NOTE
[facility], [address] [place], [address]  Phone: [phone]   Fax: [phone]      Physical Therapy Treatment Note/ Progress Report:       Date:  2020    Patient Name:  Sg Corral    :  1960  MRN: [id number]  Restrictions/Precautions:    Medical/Treatment Diagnosis Information:  Diagnosis: Neck pain - M54.2  Treatment Diagnosis: decreased ADL status, ROM, strength, and posture  Insurance/Certification information:  PT Insurance Information: Med mutual  Physician Information:  Referring Practitioner: Dr. Serenity Pizarro  Has the plan of care been signed (Y/N):        []  Yes  [x]  No     Date of Patient follow up with Physician:       Is this a Progress Report:     [x]  Yes  []  No        Progress report/ Recertification will be due (10 Rx or 30 days whichever is less): 8 Oct 2020      Visit # Insurance Allowable Auth Required   15 BMN []  Yes []  No        Functional Scale:   NDI   - 56% limitation   7/15 - 32% limitation   - 20% limitation  9/10 - 30% limitation           Latex Allergy:  [x]NO      []YES  Preferred Language for Healthcare:   [x]English       []other:      Pain level:  2 /10 9/23    SUBJECTIVE: Pt states she saw the MD last week after the PT appt and has an ablation scheduled for 10/7. She states she has had to wear the PAPR less at work, which has helped decrease pain. She states lifting boxes or looking up to do chores increase pain.        OBJECTIVE: 9/10   Observation:    Test measurements:          AROM Left  Right   Comments   Cervical Flexion       50   Cervical Extension       20 + pain     Cervical Side bend 42 38 + pain        Cervical Rotation 46 + pain  64                      Special tests Normal Abnormal N/A Comments   Sharp-Brianne Spurling                Mobility Testing  Comments   Cervical Downglides  Normal   Cervical Flexion mobility     Thoracic PA mobility  Hypomobile Rib mobility  Hypomobile            Red Flags           Positive    Positive    Headaches (worst patient has ever had) Pos Altered consciousness Neg   Dizziness Neg Radiating Pain Pos   Diplopia Neg Gait disturbances Neg   Dysphagia Neg Multi-segmental weakness Neg   Dysarthria Neg Tru/quad paresis Neg   Drop Attacks Neg Fracture Neg   Facial symptoms  Neg Numbness Neg   Nystagmus Neg Nausea  Neg      Joint mobility:               []?Normal                      [x]? Hypo              []?Hyper     Palpation:      Functional Mobility/Transfers: independent      Posture: guarded upper traps       RESTRICTIONS/PRECAUTIONS: none    Exercises/Interventions:       Therapeutic Ex (37180) Sets/sec Reps Notes/CUES   Bike 5'  Added 9/23   Upper trap stretch 30 sec 3 each side Added 6/11   Lev scap stretch      SCM stretch 30 sec 3  Added 6/17   Thoracic ext 10 sec 10 Added 6/11         Repeated cervical flexion Serratus punches 3 10 3#, ^7/22                     Manual Intervention (93326)      Cerv mobs/manip with suboccipital release 8'   Grade III-IV, 9/2   Thoracic mobs/manip 1'   Grade III-V, 9/2   CT manip Rib mobilizations 2'   Grade III-IV,9/2   IASTM            NMR re-education (70479)   CUES NEEDED         Cervical isometrics (FLex, ext, SB)       Shoulder shrug Resisted scapular retraction 3 10 Blue TB, Added 7/8   Resisted shoulder ext  3 10 Blue TB, Added 7/22   Shoulder ER 3 10 Blue  TB, ^ 8/18; form corrected   Lat pulldowns  3 10 Blue TB, Added 9/2         Therapeutic Activity (69767)      Wall posture      Standing scaption  3 10  1#   Chin tuck 10 sec 10 Added 6/11   Scapular retraction 10 sec 10 Added 6/11         Prone T 3 10 1#, ^7/29         Prone chest lift 3 10 Added 7/29       Therapeutic Exercise and NMR EXR  [x] (16346) Provided verbal/tactile cueing for activities related to strengthening, flexibility, endurance, ROM  for improvements in cervical, postural, scapular, scapulothoracic and UE control Charges:  Timed Code Treatment Minutes: 48'   Total Treatment Minutes: 80'       [] EVAL (LOW) 23296 (typically 20 minutes face-to-face)  [] EVAL (MOD) 01843 (typically 30 minutes face-to-face)  [] EVAL (HIGH) 48156 (typically 45 minutes face-to-face)  [] RE-EVAL     [x] UL(43789) x 2  [] IONTO  [] NMR (81176)  [] VASO  [x] Manual (80289) x 1     [] Other:  [x] TA x 1     [] Mech Traction (81968)  [] ES(attended) (87617)      [] ES (un) (78552):        ASSESSMENT:      GOALS:9/10  Patient stated goal: pain relief and gain strength    [x] Progressing: [] Met: [] Not Met: [] Adjusted    Therapist goals for Patient:   Short Term Goals: To be achieved in: 2 weeks  1. Independent in HEP and progression per patient tolerance, in order to prevent re-injury. [] Progressing: [x] Met: [] Not Met: [] Adjusted   2. Patient will have a decrease in pain to facilitate improvement in movement, function, and ADLs as indicated by Functional Deficits. [] Progressing: [x] Met: [] Not Met: [] Adjusted    Long Term Goals: To be achieved in: 4-6 weeks  1. Disability index score of 28% or less for the NDI to assist with reaching prior level of function. [x] Progressing: [] Met: [] Not Met: [] Adjusted  2. Patient will demonstrate increased cervical flexion and ext  AROM to greater than or equal to 45 deg,  to allow for proper joint functioning as indicated by patients Functional Deficits. [] Progressing: [x] Met - partially: [] Not Met: [] Adjusted  3. Patient will return to ADLs and work without increased symptoms or restriction. [x] Progressing: [] Met: [] Not Met: [] Adjusted  4. Patient will report returning to independent workouts pain free. [] Progressing: [] Met: [x] Not Met: [] Adjusted     Overall Progression Towards Functional goals/ Treatment Progress Update:  [x] Patient is progressing as expected towards functional goals listed. [] Progression is slowed due to complexities/Impairments listed.   [] Progression has been slowed due to co-morbidities. [] Plan just implemented, too soon to assess goals progression <30days   [] Goals require adjustment due to lack of progress  [] Patient is not progressing as expected and requires additional follow up with physician  [] Other    Prognosis for POC: [x] Good [] Fair  [] Poor      Patient requires continued skilled intervention: [x] Yes  [] No    Treatment/Activity Tolerance:  [x] Patient tolerated treatment well [] Patient limited by fatique  [] Patient limited by pain  [x] Patient limited by other medical complications  [] Other:  Pt rox rox session well. She demonstrates good form with exercises today. PT observed hypomobility with rib mobility today. 9/23    Patient education: Patient education on PT and plan of care including diagnosis, prognosis, treatment goals and options. Patient agrees with discussed POC and treatment and is aware of rehab process. 6/11    PLAN: 1x/ week for 4 weeks (9/10 - 10/8)   [x] Continue per plan of care [] Alter current plan (see comments above)  [] Plan of care initiated [] Hold pending MD visit [] Discharge    Electronically signed by: Jac Hsu, PT, DPT       Note: If patient does not return for scheduled/ recommended follow up visits, this note will serve as a discharge from care along with most recent update on progress.

## 2020-09-28 ENCOUNTER — TELEPHONE (OUTPATIENT)
Dept: ORTHOPEDIC SURGERY | Age: 60
End: 2020-09-28

## 2020-09-30 ENCOUNTER — HOSPITAL ENCOUNTER (OUTPATIENT)
Dept: PHYSICAL THERAPY | Age: 60
Setting detail: THERAPIES SERIES
Discharge: HOME | End: 2020-09-30
Payer: COMMERCIAL

## 2020-09-30 PROCEDURE — 97110 THERAPEUTIC EXERCISES: CPT | Performed by: PHYSICAL THERAPIST

## 2020-09-30 PROCEDURE — 97140 MANUAL THERAPY 1/> REGIONS: CPT | Performed by: PHYSICAL THERAPIST

## 2020-09-30 PROCEDURE — 97530 THERAPEUTIC ACTIVITIES: CPT | Performed by: PHYSICAL THERAPIST

## 2020-09-30 NOTE — FLOWSHEET NOTE
[address] [place] [address] [facility], [address] [place], [address]  Phone: [phone]   Fax: [phone]      Physical Therapy Treatment Note/ Progress Report:       Date:  2020    Patient Name:  Bhavin Peacock    :  1960  MRN: [id number]  Restrictions/Precautions:    Medical/Treatment Diagnosis Information:  Diagnosis: Neck pain - M54.2  Treatment Diagnosis: decreased ADL status, ROM, strength, and posture  Insurance/Certification information:  PT Insurance Information: Med mutual  Physician Information:  Referring Practitioner: Dr. Katrina Vargas  Has the plan of care been signed (Y/N):        []  Yes  [x]  No     Date of Patient follow up with Physician:       Is this a Progress Report:     [x]  Yes  []  No        Progress report/ Recertification will be due (10 Rx or 30 days whichever is less): 8 Oct 2020      Visit # Insurance Allowable Auth Required   16 BMN []  Yes []  No        Functional Scale:   NDI   - 56% limitation   7/15 - 32% limitation   - 20% limitation  9/10 - 30% limitation           Latex Allergy:  [x]NO      []YES  Preferred Language for Healthcare:   [x]English       []other:      Pain level: 1 /10 9/30    SUBJECTIVE: Pt states work has been difficult due to having to be back in the [employer] more. She states she feels herself tense up as soon as she puts it on. She states a little pain today. She saw her neurologist last week, who is going to wait to see how the ablation helps.         OBJECTIVE: 9/10   Observation:    Test measurements:          AROM Left  Right   Comments   Cervical Flexion       50   Cervical Extension       20 + pain     Cervical Side bend 42 38 + pain        Cervical Rotation 46 + pain  64                      Special tests Normal Abnormal N/A Comments   Sharp-Brianne Spurling                Mobility Testing  Comments   Cervical Downglides  Normal   Cervical Flexion mobility     Thoracic PA mobility Hypomobile    Rib mobility  Hypomobile            Red Flags           Positive    Positive    Headaches (worst patient has ever had) Pos Altered consciousness Neg   Dizziness Neg Radiating Pain Pos   Diplopia Neg Gait disturbances Neg   Dysphagia Neg Multi-segmental weakness Neg   Dysarthria Neg Tru/quad paresis Neg   Drop Attacks Neg Fracture Neg   Facial symptoms  Neg Numbness Neg   Nystagmus Neg Nausea  Neg      Joint mobility:               []?Normal                      [x]? Hypo              []?Hyper     Palpation:      Functional Mobility/Transfers: independent      Posture: guarded upper traps       RESTRICTIONS/PRECAUTIONS: none    Exercises/Interventions:       Therapeutic Ex (80812) Sets/sec Reps Notes/CUES   Bike 5'  Added 9/23   Upper trap stretch 30 sec 3 each side Added 6/11   Lev scap stretch      SCM stretch 30 sec 3  Added 6/17   Thoracic ext 10 sec 10 Added 6/11         Repeated cervical flexion Serratus punches 3 10 3#, ^7/22                     Manual Intervention (80322)      Cerv mobs/manip with suboccipital release 8'   Grade III-IV, 9/30   Thoracic mobs/manip 2'   Grade III-V,  9/30   CT manip Rib mobilizations 2'   Grade III-IV, 9/30   IASTM            NMR re-education (77895)   CUES NEEDED         Cervical isometrics (FLex, ext, SB)       Shoulder shrug Resisted scapular retraction 3 10 Blue TB, Added 7/8   Resisted shoulder ext  3 10 Blue TB, Added 7/22   Shoulder ER 3 10 Blue  TB, ^ 8/18; form corrected   Lat pulldowns  3 10 Blue TB, Added 9/2         Therapeutic Activity (85412)      Wall posture      Standing scaption  3 10  1#   Chin tuck 10 sec 10 Added 6/11   Scapular retraction 10 sec 10 Added 6/11         Prone T 3 10 1#, ^7/29         Prone chest lift 3 10 Added 7/29       Therapeutic Exercise and NMR EXR  [x] (37538) Provided verbal/tactile cueing for activities related to strengthening, flexibility, endurance, ROM  for improvements in cervical, postural, scapular, scapulothoracic and UE control with self care, reaching, carrying, lifting, house/yardwork, driving/computer work. [x] (16804) Provided verbal/tactile cueing for activities related to improving balance, coordination, kinesthetic sense, posture, motor skill, proprioception  to assist with cervical, scapular, scapulothoracic and UE control with self care, reaching, carrying, lifting, house/yardwork, driving/computer work. Therapeutic Activities:    [] (35343 or 46372) Provided verbal/tactile cueing for activities related to improving balance, coordination, kinesthetic sense, posture, motor skill, proprioception and motor activation to allow for proper function of cervical, scapular, scapulothoracic and UE control with self care, carrying, lifting, driving/computer work.      Home Exercise Program:    [x] (35375) Reviewed/Progressed HEP activities related to strengthening, flexibility, endurance, ROM of cervical, scapular, scapulothoracic and UE control with self care, reaching, carrying, lifting, house/yardwork, driving/computer work  [] (71913) Reviewed/Progressed HEP activities related to improving balance, coordination, kinesthetic sense, posture, motor skill, proprioception of cervical, scapular, scapulothoracic and UE control with self care, reaching, carrying, lifting, house/yardwork, driving/computer work      Manual Treatments:  PROM / STM / Oscillations-Mobs:  G-I, II, III, IV (PA's, Inf., Post.)  [] (42440) Provided manual therapy to mobilize soft tissue/joints of cervical/CT, scapular GHJ and UE for the purpose of decreasing headache, modulating pain, promoting relaxation,  increasing ROM, reducing/eliminating soft tissue swelling/inflammation/restriction, improving soft tissue extensibility and allowing for proper ROM for normal function with self care, reaching, carrying, lifting, house/yardwork, driving/computer work    I      Modalities: CP -15' 9/30   [] GAME READY (VASO)- for significant edema, swelling, pain control. Charges:  Timed Code Treatment Minutes: 54'   Total Treatment Minutes: 80'       [] EVAL (LOW) [phone] (typically 20 minutes face-to-face)  [] EVAL (MOD) 10594 (typically 30 minutes face-to-face)  [] EVAL (HIGH) 51528 (typically 45 minutes face-to-face)  [] RE-EVAL     [x] XO(42699) x 2  [] IONTO  [] NMR (28936)  [] VASO  [x] Manual (97451) x 1     [] Other:  [x] TA x 1     [] Mech Traction (04631)  [] ES(attended) (79057)      [] ES (un) (49981):        ASSESSMENT:      GOALS:9/10  Patient stated goal: pain relief and gain strength    [x] Progressing: [] Met: [] Not Met: [] Adjusted    Therapist goals for Patient:   Short Term Goals: To be achieved in: 2 weeks  1. Independent in HEP and progression per patient tolerance, in order to prevent re-injury. [] Progressing: [x] Met: [] Not Met: [] Adjusted   2. Patient will have a decrease in pain to facilitate improvement in movement, function, and ADLs as indicated by Functional Deficits. [] Progressing: [x] Met: [] Not Met: [] Adjusted    Long Term Goals: To be achieved in: 4-6 weeks  1. Disability index score of 28% or less for the NDI to assist with reaching prior level of function. [x] Progressing: [] Met: [] Not Met: [] Adjusted  2. Patient will demonstrate increased cervical flexion and ext  AROM to greater than or equal to 45 deg,  to allow for proper joint functioning as indicated by patients Functional Deficits. [] Progressing: [x] Met - partially: [] Not Met: [] Adjusted  3. Patient will return to ADLs and work without increased symptoms or restriction. [x] Progressing: [] Met: [] Not Met: [] Adjusted  4. Patient will report returning to independent workouts pain free. [] Progressing: [] Met: [x] Not Met: [] Adjusted     Overall Progression Towards Functional goals/ Treatment Progress Update:  [x] Patient is progressing as expected towards functional goals listed.     [] Progression is slowed due to complexities/Impairments listed. [] Progression has been slowed due to co-morbidities. [] Plan just implemented, too soon to assess goals progression <30days   [] Goals require adjustment due to lack of progress  [] Patient is not progressing as expected and requires additional follow up with physician  [] Other    Prognosis for POC: [x] Good [] Fair  [] Poor      Patient requires continued skilled intervention: [x] Yes  [] No    Treatment/Activity Tolerance:  [x] Patient tolerated treatment well [] Patient limited by fatique  [] Patient limited by pain  [x] Patient limited by other medical complications  [] Other:  Pt rox rox session well. PT noted hypomobility with rib and thoracic spine mobility today. She continues to complete exercises well. Pt will be seen next week after ablation. 9/30    Patient education: Patient education on PT and plan of care including diagnosis, prognosis, treatment goals and options. Patient agrees with discussed POC and treatment and is aware of rehab process. 6/11    PLAN: 1x/ week for 4 weeks (9/10 - 10/8)   [x] Continue per plan of care [] Alter current plan (see comments above)  [] Plan of care initiated [] Hold pending MD visit [] Discharge    Electronically signed by: Emili Queen, PT, DPT       Note: If patient does not return for scheduled/ recommended follow up visits, this note will serve as a discharge from care along with most recent update on progress.

## 2020-10-01 NOTE — PROGRESS NOTES
PATIENT REACHED   YES____NO__X__    PREOP INSTUCTIONS LEFT ON  [facility]____[phone]___________      DATE__10/7/2020_______ TIME__1000_______ARRIVAL__0900______PLACE__[facility]__________  NOTHING TO EAT OR DRINK  AFTER MIDNIGHT THE EVENING PRIOR OR AS INSTRUCTED BY YOUR DR.  Stella Comer NEED A RESPONSIBLE ADULT AGE 18 OR OLDER TO DRIVE YOU HOME  PLEASE BRING INSURANCE CARD. PICTURE ID AND COMPLETE LIST OF MEDS  WEAR LOOSE COMFORTABLE CLOTHING  FOLLOW ANY INSTRUCTIONS YOUR DRS OFFICE HAS GIVEN YOU,INCLUDING WHAT MEDICATIONS TO TAKE THE AM OF PROCEDURE AND WHEN AND IF YOU NEED TO STOP ANY BLOOD THINNERS. IF YOU HAVE QUESTIONS REGARDING THIS CALL THE OFFICE  THE GOAL BLOOD SUGAR THE AM OF PROCEDURE  OR LESS ABOVE THAT THE PROCEDURE MAY BE CANCELLED  ANY QUESTIONS CALL YOUR DOCTOR. ALSO,PLEASE READ THE INSTRUCTION PACKET FROM YOUR DR IF YOU RECEIVED ONE. SPINE INTERVENTION NUMBER [phone]  There is a one visitor policy at [facility] for all surgeries and endoscopies. Whether the visitor can stay or will be asked to wait in the car will depend on the current policy and if social distancing can be maintained. The policy is subject to change at any time. Please make sure the visitor has a cell phone that is on,charged and able to accept calls, as this may be the way that the staff communicates with them. Pain management is NO VISITOR policyThe patients ride is expected to remain in the car with a cell phone for communication. If the ride is leaving the hospital grounds please make sure they are back in time for pickup. Have the patient inform the staff on arrival what their rides plans are while the patient is in the facility. At the MAIN there is one visitor allowed. Please note that the visitor policy is subject to change.

## 2020-10-07 ENCOUNTER — APPOINTMENT (OUTPATIENT)
Dept: GENERAL RADIOLOGY | Age: 60
End: 2020-10-07
Attending: PHYSICAL MEDICINE & REHABILITATION
Payer: COMMERCIAL

## 2020-10-07 ENCOUNTER — HOSPITAL ENCOUNTER (OUTPATIENT)
Age: 60
Setting detail: OUTPATIENT SURGERY
Discharge: HOME | End: 2020-10-07
Attending: PHYSICAL MEDICINE & REHABILITATION | Admitting: PHYSICAL MEDICINE & REHABILITATION
Payer: COMMERCIAL

## 2020-10-07 VITALS
HEIGHT: 62 IN | TEMPERATURE: 98 F | DIASTOLIC BLOOD PRESSURE: 89 MMHG | HEART RATE: 66 BPM | OXYGEN SATURATION: 99 % | RESPIRATION RATE: 16 BRPM | BODY MASS INDEX: 24.11 KG/M2 | SYSTOLIC BLOOD PRESSURE: 140 MMHG | WEIGHT: 131 LBS

## 2020-10-07 PROCEDURE — 3610000059 HC PAIN LEVEL 5 ADDL 15 MIN (NON-OR): Performed by: PHYSICAL MEDICINE & REHABILITATION

## 2020-10-07 PROCEDURE — 2500000003 HC RX 250 WO HCPCS: Performed by: PHYSICAL MEDICINE & REHABILITATION

## 2020-10-07 PROCEDURE — 99152 MOD SED SAME PHYS/QHP 5/>YRS: CPT | Performed by: PHYSICAL MEDICINE & REHABILITATION

## 2020-10-07 PROCEDURE — 2709999900 HC NON-CHARGEABLE SUPPLY: Performed by: PHYSICAL MEDICINE & REHABILITATION

## 2020-10-07 PROCEDURE — 3610000058 HC PAIN LEVEL 5 BASE (NON-OR): Performed by: PHYSICAL MEDICINE & REHABILITATION

## 2020-10-07 PROCEDURE — 6360000002 HC RX W HCPCS: Performed by: PHYSICAL MEDICINE & REHABILITATION

## 2020-10-07 PROCEDURE — 3209999900 FLUORO FOR SURGICAL PROCEDURES

## 2020-10-07 RX ORDER — LIDOCAINE HYDROCHLORIDE 10 MG/ML
INJECTION, SOLUTION EPIDURAL; INFILTRATION; INTRACAUDAL; PERINEURAL
Status: COMPLETED | OUTPATIENT
Start: 2020-10-07 | End: 2020-10-07

## 2020-10-07 RX ORDER — DEXAMETHASONE SODIUM PHOSPHATE 10 MG/ML
INJECTION, SOLUTION INTRAMUSCULAR; INTRAVENOUS
Status: COMPLETED | OUTPATIENT
Start: 2020-10-07 | End: 2020-10-07

## 2020-10-07 RX ORDER — LIDOCAINE HYDROCHLORIDE 20 MG/ML
INJECTION, SOLUTION EPIDURAL; INFILTRATION; INTRACAUDAL; PERINEURAL
Status: COMPLETED | OUTPATIENT
Start: 2020-10-07 | End: 2020-10-07

## 2020-10-07 RX ORDER — FENTANYL CITRATE 50 UG/ML
INJECTION, SOLUTION INTRAMUSCULAR; INTRAVENOUS
Status: COMPLETED | OUTPATIENT
Start: 2020-10-07 | End: 2020-10-07

## 2020-10-07 RX ORDER — MIDAZOLAM HYDROCHLORIDE 1 MG/ML
INJECTION INTRAMUSCULAR; INTRAVENOUS
Status: COMPLETED | OUTPATIENT
Start: 2020-10-07 | End: 2020-10-07

## 2020-10-07 RX ORDER — BUPIVACAINE HYDROCHLORIDE 5 MG/ML
INJECTION, SOLUTION EPIDURAL; INTRACAUDAL
Status: COMPLETED | OUTPATIENT
Start: 2020-10-07 | End: 2020-10-07

## 2020-10-07 ASSESSMENT — PAIN SCALES - GENERAL
PAINLEVEL_OUTOF10: 0
PAINLEVEL_OUTOF10: 0

## 2020-10-07 ASSESSMENT — PAIN - FUNCTIONAL ASSESSMENT
PAIN_FUNCTIONAL_ASSESSMENT: PREVENTS OR INTERFERES SOME ACTIVE ACTIVITIES AND ADLS
PAIN_FUNCTIONAL_ASSESSMENT: 0-10

## 2020-10-07 NOTE — OP NOTE
Patient:  Macie Do  YOB: 1960  Medical Record #:  [id number]   Place:  [address] [place] [address]  Date:  10/7/2020   Physician:  Carri Monsivais MD, CESIA    Procedure:  Cervical Medial Branch Radiofrequency ablation- Left C2, Third Occipital Nerve, C3, C4 and C5    Pre-Procedure Diagnosis: Cervical spondylosis without radiculopathy    Post-Procedure Diagnosis: Same    Sedation: Local with 1% Lidocaine 3 ml and 1 mg of IV Versed and 75 mcg of IV Fentanyl    EBL: None    Complications: None    Procedure Summary:    The patient was seen in the office for complaints of left sided neck pain. Review of the imaging and physical exam of the patient confirmed the pre-procedure diagnosis. After a thorough discussion of risks, benefits and alternatives informed consent was obtained. The patient was brought to the procedure suite and placed in the prone position. The skin overlying the cervical spine was prepped and draped in the usual sterile fashion. Using fluoroscopic guidance, the left C2, Third Occipital Nerve,  C3, C4, C5 levels were identified. Through anesthetized skin a 20 gauge  mm with a 10 mm active tip needle was advanced to the waist of the articular pillar at the C2. This was repeated for the C3, C4 and C5 medial branches. Motor testing was undertaken without any paresthesia into the right arm. 0.5 ml of 2% Lidocaine was instilled. Ablation took place at 80 C for 90 seconds. A mixture of 10 mg of dexamethasone mixed with 0.25% Marcaine was instilled in 1/5th aliquots. The needles were removed and a band-aid applied. The patient was transferred to the post-operative area in stable condition.

## 2020-10-07 NOTE — PROGRESS NOTES
Teaching / education initiated regarding perioperative experience, expectations, and pain management during stay. Patient verbalized understanding.  Vanessa Carrizales RN

## 2020-10-07 NOTE — H&P
HISTORY AND PHYSICAL/PRE-SEDATION ASSESSMENT    Patient:  Fatmata Ford   :  1960  Medical Record No.:  [id number]   Date:  10/7/2020  Physician:  Mraiia Cohen M.D. Facility: [address] [place] [address]    HISTORY OF PRESENT ILLNESS:                 The patient is a 64 y.o. female whom presents with left sided neck pain. Review of the imaging and physical exam of the patient confirmed the pre-procedure diagnosis. After a thorough discussion of risks, benefits and alternatives informed consent was obtained. Past Medical History:   Past Medical History:   Diagnosis Date    Abnormal mammogram 2011    saw Dr. Neville wood or associate 2011 Ok to return for regular mammogram's     Anxiety 2011    Hyperlipidemia     Kidney stone     Medical history reviewed with no changes     Pyelonephritis 2011    Recurrent Change to augmentin and levaquin and ck blood cx's 2011 On daily macrobid per Dr. Corry Caraballo disorder, acute 3/23/2013    Due to 's infidelity and leaving her 3/2013 To see counselor- increase celexa   still living w the family as of 2013       Past Surgical History:     Past Surgical History:   Procedure Laterality Date    CARPAL TUNNEL RELEASE  11    Left    CARPAL TUNNEL RELEASE Right 2014    CYSTOSCOPY      with stent placement-x3    PAIN MANAGEMENT PROCEDURE Left 2020    LEFT C2, THIRD OCCIPITAL NERVE, C3, C4, C5 MEDIAL BRANCH BLOCK WITH FLUOROSCOPY (03872, 18554)  #1 performed by Mariia Cohen MD at [address] Left 2020    LEFT C2,TON,C3,C4 C5 MEDIAL BRANCH BLOCKS WITH FLUOROSCOPY performed by Mariia Cohen MD at [address] maxillary gland removal     Current Medications:   Prior to Admission medications    Medication Sig Start Date End Date Taking?  Authorizing Provider   cycloSPORINE (RESTASIS) 0.05 % ophthalmic emulsion Restasis 0.05 % eye drops in a dropperette    Historical Provider, MD   montelukast (SINGULAIR) 10 MG tablet TAKE 1 TABLET BY MOUTH ONE TIME A DAY 9/11/20   Cassandra Gale MD   loratadine (CLARITIN) 10 MG tablet Take 10 mg by mouth daily    Historical Provider, MD   divalproex (DEPAKOTE ER) 250 MG extended release tablet Take 250 mg by mouth daily    Historical Provider, MD   Vitamin D, Ergocalciferol, 50 MCG (2000 UT) CAPS Take 4,000 Units by mouth    Historical Provider, MD   meloxicam (MOBIC) 15 MG tablet TAKE 1 TABLET BY MOUTH ONE TIME A DAY 5/29/20   Jonna Hitchcock MD   fluticasone (FLONASE) 50 MCG/ACT nasal spray USE 1 SPRAY IN EACH NOSTRIL DAILY 4/24/20   Cassandra Gale MD   rizatriptan (MAXALT-MLT) 10 MG disintegrating tablet TALE 1 TABLET BY MOUTH AS NEEDED FOR MIGRAINES MAY REPEAT 2 HOURS LATER IF NEEDED 3/5/20   Cassandra Gale MD   butalbital-acetaminophen-caffeine (FIORICET, ESGIC) -40 MG per tablet TAKE ONE TABLET BY MOUTH EVERY 4 HOURS AS NEEDED FOR PAIN UP TO 10 DAYS 1/16/20   Cassandra Gale MD   potassium chloride (KLOR-CON M) 10 MEQ extended release tablet TAKE 1 TABLET BY MOUTH DAILY 9/18/19   Cassandra Gale MD   citalopram (CELEXA) 10 MG tablet TAKE 2 tabs daily  Patient taking differently: 10 mg TAKE 2 tabs daily 9/18/19   Cassandra Gale MD   folic acid (FOLVITE) 1 MG tablet Take 1 mg by mouth daily    Historical Provider, MD   calcium citrate-vitamin D (CALCITRATE/VITAMIN D) 315-200 MG-UNIT per tablet Take 1 tablet by mouth daily. Historical Provider, MD     Allergies:  Patient has no known allergies. Social History:    reports that she has never smoked. She has never used smokeless tobacco. She reports that she does not drink alcohol or use drugs.   Family History:   Family History   Problem Relation Age of Onset    Diabetes Mother     Kidney Cancer Mother         RIGHT    Cancer Maternal Grandmother     Diabetes Maternal Grandmother     Cancer Maternal Grandfather     Diabetes Maternal Grandfather  Heart Disease Other     High Blood Pressure Other        Vitals: Last menstrual period 10/07/2012. PHYSICAL EXAM:including affected areas  HENT: Airway patent and reviewed  Cardiovascular: Normal rate, regular rhythm, normal heart sounds. Pulmonary/Chest: No wheezes. No rhonchi. No rales. Abdominal: Soft. Bowel sounds are normal. No distension. Extremities: Moves all extremities equally  Cervical and Lumbar Spine: Painful range of motion, no midline tenderness       Diagnosis:Cervical spondylosis without radiculopathy  M47.812 M50.30  M48.02    Plan: Proceed with planned procedure      ASA CLASS:         []   I. Normal, healthy adult           [x]   II.  Mild systemic disease            []   III. Severe systemic disease      Mallampati: Mallampati Class II - (soft palate, fauces & uvula are visible)      Sedation plan:   [x]  Local              []  Minimal                  []  General anesthesia    Patient's condition acceptable for planned procedure/sedation. Post Procedure Plan   Return to same level of care   ______________________     The patient was counseled at length about the risks of maria eugenia Covid-19 in the matheus-operative and post-operative states including the recovery window of their procedure. The patient was made aware that maria eugenia Covid-19 after a surgical procedure may worsen their prognosis for recovering from the virus and lend to a higher morbidity and or mortality risk. The patient was given the options of postponing their procedure. All of the risks, benefits, and alternatives were discussed. The patient does wish to proceed with the procedure. The risks and benefits as well as alternatives to the procedure have been discussed with the patient and or family. The patient and or next of kin understands and agrees to proceed.     Lo Munson M.D.

## 2020-10-08 ENCOUNTER — HOSPITAL ENCOUNTER (OUTPATIENT)
Dept: PHYSICAL THERAPY | Age: 60
Setting detail: THERAPIES SERIES
Discharge: HOME | End: 2020-10-08
Payer: COMMERCIAL

## 2020-10-08 PROCEDURE — 97530 THERAPEUTIC ACTIVITIES: CPT | Performed by: PHYSICAL THERAPIST

## 2020-10-08 PROCEDURE — 97110 THERAPEUTIC EXERCISES: CPT | Performed by: PHYSICAL THERAPIST

## 2020-10-08 NOTE — FLOWSHEET NOTE
[facility], [address] [place], [address]  Phone: [phone]   Fax: [phone]      Physical Therapy Treatment Note/ Progress Report:       Date:  10/8/2020    Patient Name:  Pavithra Brantley    :  1960  MRN: [id number]  Restrictions/Precautions:    Medical/Treatment Diagnosis Information:  Diagnosis: Neck pain - M54.2  Treatment Diagnosis: decreased ADL status, ROM, strength, and posture  Insurance/Certification information:  PT Insurance Information: Med mutual  Physician Information:  Referring Practitioner: Dr. Lana Ellsworth  Has the plan of care been signed (Y/N):        []  Yes  [x]  No     Date of Patient follow up with Physician:       Is this a Progress Report:     [x]  Yes  []  No        Progress report/ Recertification will be due (10 Rx or 30 days whichever is less): 8 Oct 2020      Visit # Insurance Allowable Auth Required   17 BMN []  Yes []  No        Functional Scale:   NDI   - 56% limitation   7/15 - 32% limitation   - 20% limitation  9/10 - 30% limitation           Latex Allergy:  [x]NO      []YES  Preferred Language for Healthcare:   [x]English       []other:      Pain level: 4 /10 10/8    SUBJECTIVE: Pt states she had her ablation yesterday. She states she is really sore today. She states driving is difficult. She states immediately after the ablation, she felt great until the afternoon.      10/8    OBJECTIVE: 9/10   Observation:    Test measurements:          AROM Left  Right   Comments   Cervical Flexion       50   Cervical Extension       20 + pain     Cervical Side bend 42 38 + pain        Cervical Rotation 46 + pain  64                      Special tests Normal Abnormal N/A Comments   Sharp-Brianne Spurling                Mobility Testing  Comments   Cervical Downglides  Normal   Cervical Flexion mobility     Thoracic PA mobility  Hypomobile    Rib mobility  Hypomobile            Red Flags         Positive    Positive    Headaches (worst patient has ever had) Pos Altered consciousness Neg   Dizziness Neg Radiating Pain Pos   Diplopia Neg Gait disturbances Neg   Dysphagia Neg Multi-segmental weakness Neg   Dysarthria Neg Tru/quad paresis Neg   Drop Attacks Neg Fracture Neg   Facial symptoms  Neg Numbness Neg   Nystagmus Neg Nausea  Neg      Joint mobility:               []?Normal                      [x]? Hypo              []?Hyper     Palpation:      Functional Mobility/Transfers: independent      Posture: guarded upper traps       RESTRICTIONS/PRECAUTIONS: none    Exercises/Interventions:       Therapeutic Ex (10926) Sets/sec Reps Notes/CUES   Bike Upper trap stretch 30 sec 3 each side Added 6/11   Lev scap stretch      SCM stretch 30 sec 3  Added 6/17   Thoracic ext 10 sec 10 Added 6/11   Cervical rotation ROM - pain free ROM 3 10 Added 10/8         Repeated cervical flexion Serratus punches                   Manual Intervention (71870)      Cerv mobs/manip with suboccipital release  Grade III-IV, 9/30   Thoracic mobs/manip  Grade III-V,  9/30   CT manip Rib mobilizations  Grade III-IV, 9/30   IASTM            NMR re-education (15657)   CUES NEEDED         Cervical isometrics (FLex, ext, SB)       Shoulder shrug Resisted scapular retraction 3 10 Blue TB, Added 7/8   Resisted shoulder ext  3 10 Blue TB, Added 7/22   Shoulder ER 3 10 Blue  TB, ^ 8/18; form corrected   Lat pulldowns  3 10 Blue TB, Added 9/2         Therapeutic Activity (71682)      Wall posture      Standing scaption  Chin tuck Scapular retraction  Prone T  Prone chest lift     Therapeutic Exercise and NMR EXR  [x] (71376) Provided verbal/tactile cueing for activities related to strengthening, flexibility, endurance, ROM  for improvements in cervical, postural, scapular, scapulothoracic and UE control with self care, reaching, carrying, lifting, house/yardwork, driving/computer work.     [x] (71370) Provided verbal/tactile cueing for activities related to improving balance, coordination, kinesthetic sense, posture, motor skill, proprioception  to assist with cervical, scapular, scapulothoracic and UE control with self care, reaching, carrying, lifting, house/yardwork, driving/computer work. Therapeutic Activities:    [] (88524 or 47233) Provided verbal/tactile cueing for activities related to improving balance, coordination, kinesthetic sense, posture, motor skill, proprioception and motor activation to allow for proper function of cervical, scapular, scapulothoracic and UE control with self care, carrying, lifting, driving/computer work. Home Exercise Program:    [x] (02180) Reviewed/Progressed HEP activities related to strengthening, flexibility, endurance, ROM of cervical, scapular, scapulothoracic and UE control with self care, reaching, carrying, lifting, house/yardwork, driving/computer work  [] (03534) Reviewed/Progressed HEP activities related to improving balance, coordination, kinesthetic sense, posture, motor skill, proprioception of cervical, scapular, scapulothoracic and UE control with self care, reaching, carrying, lifting, house/yardwork, driving/computer work      Manual Treatments:  PROM / STM / Oscillations-Mobs:  G-I, II, III, IV (PA's, Inf., Post.)  [] (51289) Provided manual therapy to mobilize soft tissue/joints of cervical/CT, scapular GHJ and UE for the purpose of decreasing headache, modulating pain, promoting relaxation,  increasing ROM, reducing/eliminating soft tissue swelling/inflammation/restriction, improving soft tissue extensibility and allowing for proper ROM for normal function with self care, reaching, carrying, lifting, house/yardwork, driving/computer work    I      Modalities: CP -15' 10/8   [] GAME READY (VASO)- for significant edema, swelling, pain control.      Charges:  Timed Code Treatment Minutes: 40'    Total Treatment Minutes: 61'       [] EVAL (LOW) 455 [id number] (typically 20 minutes require adjustment due to lack of progress  [] Patient is not progressing as expected and requires additional follow up with physician  [] Other    Prognosis for POC: [x] Good [] Fair  [] Poor      Patient requires continued skilled intervention: [x] Yes  [] No    Treatment/Activity Tolerance:  [x] Patient tolerated treatment well [] Patient limited by fatique  [] Patient limited by pain  [x] Patient limited by other medical complications  [] Other:  Pt rox rox session well. Pt rox cervical ROM, although very sore from procedure yesterday. 10/8    Patient education: Patient education on PT and plan of care including diagnosis, prognosis, treatment goals and options. Patient agrees with discussed POC and treatment and is aware of rehab process. 6/11    PLAN: 1x/ week for 4 weeks (9/10 - 10/8)   [x] Continue per plan of care [] Alter current plan (see comments above)  [] Plan of care initiated [] Hold pending MD visit [] Discharge    Electronically signed by: Karina Calix, PT, DPT       Note: If patient does not return for scheduled/ recommended follow up visits, this note will serve as a discharge from care along with most recent update on progress.

## 2020-10-14 ENCOUNTER — HOSPITAL ENCOUNTER (OUTPATIENT)
Dept: PHYSICAL THERAPY | Age: 60
Setting detail: THERAPIES SERIES
Discharge: HOME | End: 2020-10-14
Payer: COMMERCIAL

## 2020-10-14 PROCEDURE — 97112 NEUROMUSCULAR REEDUCATION: CPT | Performed by: PHYSICAL THERAPIST

## 2020-10-14 PROCEDURE — 97110 THERAPEUTIC EXERCISES: CPT | Performed by: PHYSICAL THERAPIST

## 2020-10-14 PROCEDURE — 97140 MANUAL THERAPY 1/> REGIONS: CPT | Performed by: PHYSICAL THERAPIST

## 2020-10-14 NOTE — FLOWSHEET NOTE
[address] [place] [address] [facility], [address] [facility], [address]  Phone: [phone]   Fax: [phone]      Physical Therapy Treatment Note/ Progress Report:       Date:  10/14/2020    Patient Name:  Jami Sever    :  1960  MRN: [id number]  Restrictions/Precautions:    Medical/Treatment Diagnosis Information:  Diagnosis: Neck pain - M54.2  Treatment Diagnosis: decreased ADL status, ROM, strength, and posture  Insurance/Certification information:  PT Insurance Information: Med mutual  Physician Information:  Referring Practitioner: Dr. Naomi Simmons  Has the plan of care been signed (Y/N):        []  Yes  [x]  No     Date of Patient follow up with Physician:       Is this a Progress Report:     []  Yes  [x]  No        Progress report/ Recertification will be due (10 Rx or 30 days whichever is less): 8 Oct 2020      Visit # Insurance Allowable Auth Required   18 BMN []  Yes []  No        Functional Scale:   NDI   - 56% limitation   7/15 - 32% limitation   - 20% limitation  9/10 - 30% limitation           Latex Allergy:  [x]NO      []YES  Preferred Language for Healthcare:   [x]English       []other:      Pain level: 2 /10 10/14    SUBJECTIVE: Pt states she is feeling better this morning compared to last night and over the weekend. Her pain was a 4/10 over the weekend. She stated she was using ice while at work and tried heat last night before bed.      10/14    OBJECTIVE: 9/10   Observation:    Test measurements:          AROM Left  Right   Comments   Cervical Flexion       50   Cervical Extension       20 + pain     Cervical Side bend 42 38 + pain        Cervical Rotation 46 + pain  64                      Special tests Normal Abnormal N/A Comments   Sharp-Brianne Spurling                Mobility Testing  Comments   Cervical Downglides  Normal   Cervical Flexion mobility     Thoracic PA mobility  Hypomobile    Rib mobility  Hypomobile          Red Flags           Positive    Positive    Headaches (worst patient has ever had) Pos Altered consciousness Neg   Dizziness Neg Radiating Pain Pos   Diplopia Neg Gait disturbances Neg   Dysphagia Neg Multi-segmental weakness Neg   Dysarthria Neg Tru/quad paresis Neg   Drop Attacks Neg Fracture Neg   Facial symptoms  Neg Numbness Neg   Nystagmus Neg Nausea  Neg      Joint mobility:               []?Normal                      [x]? Hypo              []?Hyper     Palpation:      Functional Mobility/Transfers: independent      Posture: guarded upper traps       RESTRICTIONS/PRECAUTIONS: none    Exercises/Interventions:       Therapeutic Ex (41073) Sets/sec Reps Notes/CUES   Bike Upper trap stretch 30 sec 3 each side Added 6/11   Lev scap stretch      SCM stretch 30 sec 3  Added 6/17   Thoracic ext 10 sec 10 Added 6/11   Cervical rotation ROM - pain free ROM 3 10 Added 10/8         Repeated cervical flexion Serratus punches                   Manual Intervention (53767)      Cerv mobs/manip with suboccipital release 8'  Grade III-IV, 9/30   Thoracic mobs/manip  Grade III-V,  9/30   CT manip Rib mobilizations 2'  Grade III-IV, 9/30   IASTM            NMR re-education (44985)   CUES NEEDED         Cervical isometrics (FLex, ext, SB)       Shoulder shrug Resisted scapular retraction 3 10 Blue TB, Added 7/8   Resisted shoulder ext  3 10 Blue TB, Added 7/22   Shoulder ER 3 10 Blue  TB, ^ 8/18; form corrected   Lat pulldowns  3 10 Blue TB, Added 9/2         Therapeutic Activity (08207)      Wall posture      Standing scaption  Chin tuck 10 sec 10 Added 6/11   Scapular retraction 10 sec 10 Added 6/11    Prone T 3 10  Modified 10/14    Prone chest lift 3 10 Added 7/29       Therapeutic Exercise and NMR EXR  [x] (27956) Provided verbal/tactile cueing for activities related to strengthening, flexibility, endurance, ROM  for improvements in cervical, postural, scapular, scapulothoracic and UE control with self care, reaching, carrying, lifting, house/yardwork, driving/computer work. [x] (86380) Provided verbal/tactile cueing for activities related to improving balance, coordination, kinesthetic sense, posture, motor skill, proprioception  to assist with cervical, scapular, scapulothoracic and UE control with self care, reaching, carrying, lifting, house/yardwork, driving/computer work. Therapeutic Activities:    [] (87196 or 51817) Provided verbal/tactile cueing for activities related to improving balance, coordination, kinesthetic sense, posture, motor skill, proprioception and motor activation to allow for proper function of cervical, scapular, scapulothoracic and UE control with self care, carrying, lifting, driving/computer work. Home Exercise Program:    [x] (84461) Reviewed/Progressed HEP activities related to strengthening, flexibility, endurance, ROM of cervical, scapular, scapulothoracic and UE control with self care, reaching, carrying, lifting, house/yardwork, driving/computer work  [] (99600) Reviewed/Progressed HEP activities related to improving balance, coordination, kinesthetic sense, posture, motor skill, proprioception of cervical, scapular, scapulothoracic and UE control with self care, reaching, carrying, lifting, house/yardwork, driving/computer work      Manual Treatments:  PROM / STM / Oscillations-Mobs:  G-I, II, III, IV (PA's, Inf., Post.)  [x] (05516) Provided manual therapy to mobilize soft tissue/joints of cervical/CT, scapular GHJ and UE for the purpose of decreasing headache, modulating pain, promoting relaxation,  increasing ROM, reducing/eliminating soft tissue swelling/inflammation/restriction, improving soft tissue extensibility and allowing for proper ROM for normal function with self care, reaching, carrying, lifting, house/yardwork, driving/computer work    I      Modalities: CP -15' 10/14   [] GAME READY (VASO)- for significant edema, swelling, pain control.      Charges:  Timed Code Treatment Minutes: 48'   Total Treatment Minutes: 72'      [] EVAL (LOW) 55679 (typically 20 minutes face-to-face)  [] EVAL (MOD) 51010 (typically 30 minutes face-to-face)  [] EVAL (HIGH) 53595 (typically 45 minutes face-to-face)  [] RE-EVAL     [x] DV(34335) x 1  [] IONTO  [x] NMR (46615) x1  [] VASO  [x] Manual (92850) x 1     [] Other:  [] TA x      [] Mech Traction (88322)  [] ES(attended) (90653)      [] ES (un) (30098):        ASSESSMENT:      GOALS:9/10  Patient stated goal: pain relief and gain strength    [x] Progressing: [] Met: [] Not Met: [] Adjusted    Therapist goals for Patient:   Short Term Goals: To be achieved in: 2 weeks  1. Independent in HEP and progression per patient tolerance, in order to prevent re-injury. [] Progressing: [x] Met: [] Not Met: [] Adjusted   2. Patient will have a decrease in pain to facilitate improvement in movement, function, and ADLs as indicated by Functional Deficits. [] Progressing: [x] Met: [] Not Met: [] Adjusted    Long Term Goals: To be achieved in: 4-6 weeks  1. Disability index score of 28% or less for the NDI to assist with reaching prior level of function. [x] Progressing: [] Met: [] Not Met: [] Adjusted  2. Patient will demonstrate increased cervical flexion and ext  AROM to greater than or equal to 45 deg,  to allow for proper joint functioning as indicated by patients Functional Deficits. [] Progressing: [x] Met - partially: [] Not Met: [] Adjusted  3. Patient will return to ADLs and work without increased symptoms or restriction. [x] Progressing: [] Met: [] Not Met: [] Adjusted  4. Patient will report returning to independent workouts pain free. [] Progressing: [] Met: [x] Not Met: [] Adjusted     Overall Progression Towards Functional goals/ Treatment Progress Update:  [x] Patient is progressing as expected towards functional goals listed. [] Progression is slowed due to complexities/Impairments listed.   [] Progression has been slowed due to co-morbidities. [] Plan just implemented, too soon to assess goals progression <30days   [] Goals require adjustment due to lack of progress  [] Patient is not progressing as expected and requires additional follow up with physician  [] Other    Prognosis for POC: [x] Good [] Fair  [] Poor      Patient requires continued skilled intervention: [x] Yes  [] No    Treatment/Activity Tolerance:  [x] Patient tolerated treatment well [] Patient limited by fatique  [] Patient limited by pain  [] Patient limited by other medical complications  [] Other:  Pt tolerated session well. Pt able to complete stretching but limited motion and complaints of stiffness. Pt had increased pain after ER on the L side. PT observed hypomobility with left rib mobility today. 10/14    Patient education: Patient education on PT and plan of care including diagnosis, prognosis, treatment goals and options. Patient agrees with discussed POC and treatment and is aware of rehab process. 6/11    PLAN: 1x/ week for 4 weeks (9/10 - 10/8)   [x] Continue per plan of care [] Alter current plan (see comments above)  [] Plan of care initiated [] Hold pending MD visit [] Discharge    Electronically signed by: Ricki Moss, student physical therapist  Therapist was present, directed the patient's care, made skilled judgement, and was responsible for assessment and treatment of the patient. Audrey Means, PT, DPT        Note: If patient does not return for scheduled/ recommended follow up visits, this note will serve as a discharge from care along with most recent update on progress.

## 2020-10-21 ENCOUNTER — HOSPITAL ENCOUNTER (OUTPATIENT)
Dept: PHYSICAL THERAPY | Age: 60
Setting detail: THERAPIES SERIES
Discharge: HOME | End: 2020-10-21
Payer: COMMERCIAL

## 2020-10-21 PROCEDURE — 97110 THERAPEUTIC EXERCISES: CPT | Performed by: PHYSICAL THERAPIST

## 2020-10-21 PROCEDURE — 97140 MANUAL THERAPY 1/> REGIONS: CPT | Performed by: PHYSICAL THERAPIST

## 2020-10-21 PROCEDURE — 97530 THERAPEUTIC ACTIVITIES: CPT | Performed by: PHYSICAL THERAPIST

## 2020-10-21 NOTE — FLOWSHEET NOTE
Hypomobile    Rib mobility  Hypomobile            Red Flags           Positive    Positive    Headaches (worst patient has ever had) Pos Altered consciousness Neg   Dizziness Neg Radiating Pain Pos   Diplopia Neg Gait disturbances Neg   Dysphagia Neg Multi-segmental weakness Neg   Dysarthria Neg Tru/quad paresis Neg   Drop Attacks Neg Fracture Neg   Facial symptoms  Neg Numbness Neg   Nystagmus Neg Nausea  Neg      Joint mobility:               []?Normal                      [x]? Hypo              []?Hyper     Palpation:      Functional Mobility/Transfers: independent      Posture: guarded upper traps       RESTRICTIONS/PRECAUTIONS: none    Exercises/Interventions:       Therapeutic Ex (81391) Sets/sec Reps Notes/CUES   Bike 5'  Added 9/23   Upper trap stretch 30 sec 3 each side Added 6/11   Lev scap stretch      SCM stretch 30 sec 3  Added 6/17   Thoracic ext 10 sec 10 Added 6/11   Cervical rotation ROM - pain free ROM 3 10 Added 10/8         Repeated cervical flexion Serratus punches 3 10 3#, ^7/22         Resisted scapular retraction 3 10 Blue TB, Added 7/8   Resisted shoulder ext  3 10 Blue TB, Added 7/22   Shoulder ER 3 10 Blue  TB, ^ 8/18               Manual Intervention (24400)      Cerv mobs/manip with suboccipital release 8'  Grade III-IV, 9/30   Thoracic mobs/manip  Grade III-V,  9/30   CT manip Rib mobilizations 2'  Grade III-IV, 9/30   IASTM            NMR re-education (04077)   CUES NEEDED         Cervical isometrics (FLex, ext, SB)       Shoulder shrug  3 10     3 10     3 10    Lat pulldowns  3 10 Blue TB, Added 9/2         Therapeutic Activity (04402)      Wall posture 10 sec 10 Added 10/21   Standing scaption  3 10  2#, ^10/21   Chin tuck 10 sec 10 Added 6/11   Scapular retraction 10 sec 10 Added 6/11    Prone T 3 10 2#, ^10/21    Prone chest lift 3 10 Added 7/29       Therapeutic Exercise and NMR EXR  [x] (09555) Provided verbal/tactile cueing for activities related to strengthening, flexibility, endurance, ROM  for improvements in cervical, postural, scapular, scapulothoracic and UE control with self care, reaching, carrying, lifting, house/yardwork, driving/computer work. [x] (02832) Provided verbal/tactile cueing for activities related to improving balance, coordination, kinesthetic sense, posture, motor skill, proprioception  to assist with cervical, scapular, scapulothoracic and UE control with self care, reaching, carrying, lifting, house/yardwork, driving/computer work. Therapeutic Activities:    [] (10768 or 20257) Provided verbal/tactile cueing for activities related to improving balance, coordination, kinesthetic sense, posture, motor skill, proprioception and motor activation to allow for proper function of cervical, scapular, scapulothoracic and UE control with self care, carrying, lifting, driving/computer work.      Home Exercise Program:    [x] (15357) Reviewed/Progressed HEP activities related to strengthening, flexibility, endurance, ROM of cervical, scapular, scapulothoracic and UE control with self care, reaching, carrying, lifting, house/yardwork, driving/computer work  [] (81644) Reviewed/Progressed HEP activities related to improving balance, coordination, kinesthetic sense, posture, motor skill, proprioception of cervical, scapular, scapulothoracic and UE control with self care, reaching, carrying, lifting, house/yardwork, driving/computer work      Manual Treatments:  PROM / STM / Oscillations-Mobs:  G-I, II, III, IV (PA's, Inf., Post.)  [x] (73496) Provided manual therapy to mobilize soft tissue/joints of cervical/CT, scapular GHJ and UE for the purpose of decreasing headache, modulating pain, promoting relaxation,  increasing ROM, reducing/eliminating soft tissue swelling/inflammation/restriction, improving soft tissue extensibility and allowing for proper ROM for normal function with self care, reaching, carrying, lifting, house/yardwork, driving/computer work    I      Modalities: CP -15' 10/21   [] GAME READY (VASO)- for significant edema, swelling, pain control. Charges:  Timed Code Treatment Minutes: 54'    Total Treatment Minutes: 68'       [] EVAL (LOW) 77751 (typically 20 minutes face-to-face)  [] EVAL (MOD) 97664 (typically 30 minutes face-to-face)  [] EVAL (HIGH) 76131 (typically 45 minutes face-to-face)  [] RE-EVAL     [x] SY(96634) x 2  [] IONTO  [] NMR (78443) x  [] VASO  [x] Manual (61306) x 1     [] Other:  [x] TA x  1    [] Mech Traction (68904)  [] ES(attended) (82402)      [] ES (un) (63312):        ASSESSMENT:      GOALS:9/10  Patient stated goal: pain relief and gain strength    [x] Progressing: [] Met: [] Not Met: [] Adjusted    Therapist goals for Patient:   Short Term Goals: To be achieved in: 2 weeks  1. Independent in HEP and progression per patient tolerance, in order to prevent re-injury. [] Progressing: [x] Met: [] Not Met: [] Adjusted   2. Patient will have a decrease in pain to facilitate improvement in movement, function, and ADLs as indicated by Functional Deficits. [] Progressing: [x] Met: [] Not Met: [] Adjusted    Long Term Goals: To be achieved in: 4-6 weeks  1. Disability index score of 28% or less for the NDI to assist with reaching prior level of function. [x] Progressing: [] Met: [] Not Met: [] Adjusted  2. Patient will demonstrate increased cervical flexion and ext  AROM to greater than or equal to 45 deg,  to allow for proper joint functioning as indicated by patients Functional Deficits. [] Progressing: [x] Met - partially: [] Not Met: [] Adjusted  3. Patient will return to ADLs and work without increased symptoms or restriction. [x] Progressing: [] Met: [] Not Met: [] Adjusted  4. Patient will report returning to independent workouts pain free.    [] Progressing: [] Met: [x] Not Met: [] Adjusted     Overall Progression Towards Functional goals/ Treatment Progress Update:  [x] Patient is progressing as expected towards functional goals listed. [] Progression is slowed due to complexities/Impairments listed. [] Progression has been slowed due to co-morbidities. [] Plan just implemented, too soon to assess goals progression <30days   [] Goals require adjustment due to lack of progress  [] Patient is not progressing as expected and requires additional follow up with physician  [] Other    Prognosis for POC: [x] Good [] Fair  [] Poor      Patient requires continued skilled intervention: [x] Yes  [] No    Treatment/Activity Tolerance:  [x] Patient tolerated treatment well [] Patient limited by fatique  [] Patient limited by pain  [] Patient limited by other medical complications  [] Other:  Pt tolerated session well. She demonstrated increased strength, noted by increased resistance. She rox the addition of wall posture, maintaining proper posture throughout the exercise. Pt demonstrates improved symptoms this week, so will continue to progress postural and strength training as patient rox. 10/21    Patient education: Patient education on PT and plan of care including diagnosis, prognosis, treatment goals and options. Patient agrees with discussed POC and treatment and is aware of rehab process. 6/11    PLAN: 1x/ week for 4 weeks (9/10 - 10/8)   [x] Continue per plan of care [] Alter current plan (see comments above)  [] Plan of care initiated [] Hold pending MD visit [] Discharge    Electronically signed by: Hanane Wood, student physical therapist  Therapist was present, directed the patient's care, made skilled judgement, and was responsible for assessment and treatment of the patient. Mauricio Morin, PT, DPT        Note: If patient does not return for scheduled/ recommended follow up visits, this note will serve as a discharge from care along with most recent update on progress.

## 2020-10-28 ENCOUNTER — HOSPITAL ENCOUNTER (OUTPATIENT)
Dept: PHYSICAL THERAPY | Age: 60
Setting detail: THERAPIES SERIES
Discharge: HOME | End: 2020-10-28
Payer: COMMERCIAL

## 2020-10-28 PROCEDURE — 97110 THERAPEUTIC EXERCISES: CPT | Performed by: PHYSICAL THERAPIST

## 2020-10-28 PROCEDURE — 97530 THERAPEUTIC ACTIVITIES: CPT | Performed by: PHYSICAL THERAPIST

## 2020-10-28 NOTE — FLOWSHEET NOTE
[facility], [address] [place], [address]  Phone: [phone]   Fax: [phone]      Physical Therapy Treatment Note/ Progress Report:       Date:  10/28/2020    Patient Name:  Autumn Wallace    :  1960  MRN: [id number]  Restrictions/Precautions:    Medical/Treatment Diagnosis Information:  Diagnosis: Neck pain - M54.2  Treatment Diagnosis: decreased ADL status, ROM, strength, and posture  Insurance/Certification information:  PT Insurance Information: Med mutual  Physician Information:  Referring Practitioner: Dr. Jaswinder Dickerson  Has the plan of care been signed (Y/N):        []  Yes  [x]  No     Date of Patient follow up with Physician:       Is this a Progress Report:     []  Yes  [x]  No        Progress report/ Recertification will be due (10 Rx or 30 days whichever is less): 8 Oct 2020      Visit # Insurance Allowable Auth Required   20 BMN []  Yes []  No        Functional Scale:   NDI   - 56% limitation   7/15 - 32% limitation   - 20% limitation  9/10 - 30% limitation           Latex Allergy:  [x]NO      []YES  Preferred Language for Healthcare:   [x]English       []other:      Pain level:  0-1/10 10/28    SUBJECTIVE: Pt states she is feeling pretty good today. She didn't have pain over the weekend. Pt reports that after piliates on Wednesday she had pain that evening and into the next morning. Pt states she has pain with downward dog during piliates.      10/28    OBJECTIVE: 9/10   Observation:    Test measurements:          AROM Left  Right   Comments   Cervical Flexion       50   Cervical Extension       20 + pain     Cervical Side bend 42 38 + pain        Cervical Rotation 46 + pain  64                      Special tests Normal Abnormal N/A Comments   Sharp-Brianne Spurling                Mobility Testing  Comments   Cervical Downglides  Normal   Cervical Flexion mobility     Thoracic PA mobility  Hypomobile Rib mobility  Hypomobile            Red Flags           Positive    Positive    Headaches (worst patient has ever had) Pos Altered consciousness Neg   Dizziness Neg Radiating Pain Pos   Diplopia Neg Gait disturbances Neg   Dysphagia Neg Multi-segmental weakness Neg   Dysarthria Neg Tru/quad paresis Neg   Drop Attacks Neg Fracture Neg   Facial symptoms  Neg Numbness Neg   Nystagmus Neg Nausea  Neg      Joint mobility:               []?Normal                      [x]? Hypo              []?Hyper     Palpation:      Functional Mobility/Transfers: independent      Posture: guarded upper traps       RESTRICTIONS/PRECAUTIONS: none    Exercises/Interventions:       Therapeutic Ex (78899) Sets/sec Reps Notes/CUES   Bike 5'  Added 9/23   Upper trap stretch 30 sec 3 each side Added 6/11   Lev scap stretch      SCM stretch 30 sec 3  Added 6/17   Thoracic ext 10 sec 10 Added 6/11   Cervical rotation ROM - pain free ROM 3 10 Added 10/8         Repeated cervical flexion Serratus punches 3 10 3#, ^7/22         Resisted scapular retraction 3 10 Black TB,  ^10/28   Resisted shoulder ext  3 10 Black TB, ^10/28   Shoulder ER 3 10 Blue  TB, ^ 8/18   Shoulder IR 3 10 Blue TB,         Shoulder shrug 3  1# ^10/28   Lat pulldowns  3  Black TB, ^10/28         Manual Intervention (56180)      Cerv mobs/manip with suboccipital release   Grade III-IV, 9/30   Thoracic mobs/manip  Grade III-V,  9/30   CT manip Rib mobilizations  Grade III-IV, 9/30   IASTM            NMR re-education (55891)   CUES NEEDED         Cervical isometrics (FLex, ext, SB)       plank 30 sec 3 Added 10/28                     Therapeutic Activity (74572)      Wall posture 10 sec 10 Added 10/21   Standing scaption  3 10  2#, ^10/21   Chin tuck Scapular retraction  Prone T 3 10 2#, ^10/21    Prone chest lift 3 10 1# ^10/28         Downdog 1 min 1 With and without kicks, Added 10/28                   Therapeutic Exercise and NMR EXR  [x] (55212) Provided verbal/tactile cueing for activities related to strengthening, flexibility, endurance, ROM  for improvements in cervical, postural, scapular, scapulothoracic and UE control with self care, reaching, carrying, lifting, house/yardwork, driving/computer work. [x] (53930) Provided verbal/tactile cueing for activities related to improving balance, coordination, kinesthetic sense, posture, motor skill, proprioception  to assist with cervical, scapular, scapulothoracic and UE control with self care, reaching, carrying, lifting, house/yardwork, driving/computer work. Therapeutic Activities:    [] (97988 or 99301) Provided verbal/tactile cueing for activities related to improving balance, coordination, kinesthetic sense, posture, motor skill, proprioception and motor activation to allow for proper function of cervical, scapular, scapulothoracic and UE control with self care, carrying, lifting, driving/computer work.      Home Exercise Program:    [x] (93371) Reviewed/Progressed HEP activities related to strengthening, flexibility, endurance, ROM of cervical, scapular, scapulothoracic and UE control with self care, reaching, carrying, lifting, house/yardwork, driving/computer work  [] (54839) Reviewed/Progressed HEP activities related to improving balance, coordination, kinesthetic sense, posture, motor skill, proprioception of cervical, scapular, scapulothoracic and UE control with self care, reaching, carrying, lifting, house/yardwork, driving/computer work      Manual Treatments:  PROM / STM / Oscillations-Mobs:  G-I, II, III, IV (PA's, Inf., Post.)  [x] (72324) Provided manual therapy to mobilize soft tissue/joints of cervical/CT, scapular GHJ and UE for the purpose of decreasing headache, modulating pain, promoting relaxation,  increasing ROM, reducing/eliminating soft tissue swelling/inflammation/restriction, improving soft tissue extensibility and allowing for proper ROM for normal function with self care, reaching, carrying, lifting, house/yardwork, driving/computer work    I      Modalities: CP -15' 10/28   [] GAME READY (VASO)- for significant edema, swelling, pain control. Charges:  Timed Code Treatment Minutes: 62'   Total Treatment Minutes: 68'      [] EVAL (LOW) 61281 (typically 20 minutes face-to-face)  [] EVAL (MOD) 34410 (typically 30 minutes face-to-face)  [] EVAL (HIGH) 63366 (typically 45 minutes face-to-face)  [] RE-EVAL     [x] XF(68882) x 2  [] IONTO  [] NMR (52008) x  [] VASO  [] Manual (38410) x      [] Other:  [x] TA x 2    [] Mech Traction (30009)  [] ES(attended) (12458)      [] ES (un) (43948):        ASSESSMENT:      GOALS:9/10  Patient stated goal: pain relief and gain strength    [x] Progressing: [] Met: [] Not Met: [] Adjusted    Therapist goals for Patient:   Short Term Goals: To be achieved in: 2 weeks  1. Independent in HEP and progression per patient tolerance, in order to prevent re-injury. [] Progressing: [x] Met: [] Not Met: [] Adjusted   2. Patient will have a decrease in pain to facilitate improvement in movement, function, and ADLs as indicated by Functional Deficits. [] Progressing: [x] Met: [] Not Met: [] Adjusted    Long Term Goals: To be achieved in: 4-6 weeks  1. Disability index score of 28% or less for the NDI to assist with reaching prior level of function. [x] Progressing: [] Met: [] Not Met: [] Adjusted  2. Patient will demonstrate increased cervical flexion and ext  AROM to greater than or equal to 45 deg,  to allow for proper joint functioning as indicated by patients Functional Deficits. [] Progressing: [x] Met - partially: [] Not Met: [] Adjusted  3. Patient will return to ADLs and work without increased symptoms or restriction. [x] Progressing: [] Met: [] Not Met: [] Adjusted  4. Patient will report returning to independent workouts pain free.    [] Progressing: [] Met: [x] Not Met: [] Adjusted     Overall Progression Towards Functional goals/ Treatment Progress Update:  [x] Patient is progressing as expected towards functional goals listed. [] Progression is slowed due to complexities/Impairments listed. [] Progression has been slowed due to co-morbidities. [] Plan just implemented, too soon to assess goals progression <30days   [] Goals require adjustment due to lack of progress  [] Patient is not progressing as expected and requires additional follow up with physician  [] Other    Prognosis for POC: [x] Good [] Fair  [] Poor      Patient requires continued skilled intervention: [x] Yes  [] No    Treatment/Activity Tolerance:  [x] Patient tolerated treatment well [] Patient limited by fatique  [] Patient limited by pain  [] Patient limited by other medical complications  [] Other:  Pt tolerated session well. Pt weaned from manual treatment and added more strengthening exercises. Pt reported fatigue on last set with shoulder ext after increase in resistance. PT observed proper form with band exercises with added resistance. Pt reported tightness after standing scaption. PT observed downward dog, as pt lifts leg and stays in downward dog, pt will move into cervical ext. Pt educated on remaining in neutral and keeping her head relaxed while holding downward dog. Verbal cues for scapular position during planks. 10/28    Patient education: Patient education on PT and plan of care including diagnosis, prognosis, treatment goals and options. Patient agrees with discussed POC and treatment and is aware of rehab process. 6/11    Next Session: progress note     PLAN: 1x/ week for 4 weeks (9/10 - 10/8)   [x] Continue per plan of care [] Alter current plan (see comments above)  [] Plan of care initiated [] Hold pending MD visit [] Discharge    Electronically signed by: Nicole Nagel, student physical therapist  Therapist was present, directed the patient's care, made skilled judgement, and was responsible for assessment and treatment of the patient.       Javi Nettles, PT, DPT        Note: If patient does not return for scheduled/ recommended follow up visits, this note will serve as a discharge from care along with most recent update on progress.

## 2020-11-04 ENCOUNTER — OFFICE VISIT (OUTPATIENT)
Dept: ORTHOPEDIC SURGERY | Age: 60
End: 2020-11-04
Payer: COMMERCIAL

## 2020-11-04 ENCOUNTER — HOSPITAL ENCOUNTER (OUTPATIENT)
Dept: PHYSICAL THERAPY | Age: 60
Setting detail: THERAPIES SERIES
Discharge: HOME | End: 2020-11-04
Payer: COMMERCIAL

## 2020-11-04 VITALS — HEIGHT: 62 IN | RESPIRATION RATE: 12 BRPM | WEIGHT: 131 LBS | TEMPERATURE: 97.7 F | BODY MASS INDEX: 24.11 KG/M2

## 2020-11-04 PROCEDURE — 97110 THERAPEUTIC EXERCISES: CPT | Performed by: PHYSICAL THERAPIST

## 2020-11-04 PROCEDURE — 97140 MANUAL THERAPY 1/> REGIONS: CPT | Performed by: PHYSICAL THERAPIST

## 2020-11-04 PROCEDURE — 99213 OFFICE O/P EST LOW 20 MIN: CPT | Performed by: STUDENT IN AN ORGANIZED HEALTH CARE EDUCATION/TRAINING PROGRAM

## 2020-11-04 PROCEDURE — 97530 THERAPEUTIC ACTIVITIES: CPT | Performed by: PHYSICAL THERAPIST

## 2020-11-04 NOTE — PROGRESS NOTES
Follow up: [address]  1960  [id number]      CHIEF COMPLAINT:    Chief Complaint   Patient presents with    Neck Pain     F/u RFA Left C2, TON, C3, C4, C5 10/7         HISTORY OF PRESENT ILLNESS:  Ms. Juan Boyd is a 64 y.o. female returns for a follow up visit for multiple medical problems. Her current presenting problems are   1. Cervical spondylosis without myelopathy    2. DDD (degenerative disc disease), cervical    3. Foraminal stenosis of cervical region    . As per information/history obtained from the PADT(patient assessment and documentation tool) - She complains of pain in the neck with radiation to the Sternocleidomastoid left She rates the pain 2/10 and describes it as aching. Pain is made worse by: movement, lifting, driving. She denies side effects from the current pain regimen. Patient reports that since the last follow up visit the physical functioning is better, family/social relationships are unchanged, mood is better and sleep patterns are better, and that the overall functioning is better. Patient denies neurological bowel or bladder. The patient presents for follow up of ongoing neck pain. The patient recently underwent left C2, TON, C3, C4, C5 radiofrequency ablation. She reports 90% relief of her pain following this procedure. The patient states she wakes up with less pain and is able to sleep better. 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 She describes pain near the ear where the SCM muscle inserts as well. Alleviating measures include physical therapy, manual massage, stretching, Tylenol and anti inflammatory medication. The patient describes laying in bed with her head supported by a pillow alleviates her symptoms. She states overall she feels improvement in her neck pain however she is still working on [employer] over the hump\" with continued muscular pain.  She would like to continue TIME A DAY, Disp: 90 tablet, Rfl: 3    loratadine (CLARITIN) 10 MG tablet, Take 10 mg by mouth daily, Disp: , Rfl:     divalproex (DEPAKOTE ER) 250 MG extended release tablet, Take 250 mg by mouth daily, Disp: , Rfl:     Vitamin D, Ergocalciferol, 50 MCG (2000 UT) CAPS, Take 4,000 Units by mouth, Disp: , Rfl:     fluticasone (FLONASE) 50 MCG/ACT nasal spray, USE 1 SPRAY IN EACH NOSTRIL DAILY, Disp: 16 g, Rfl: 3    rizatriptan (MAXALT-MLT) 10 MG disintegrating tablet, TALE 1 TABLET BY MOUTH AS NEEDED FOR MIGRAINES MAY REPEAT 2 HOURS LATER IF NEEDED, Disp: 27 tablet, Rfl: 3    butalbital-acetaminophen-caffeine (FIORICET, ESGIC) -40 MG per tablet, TAKE ONE TABLET BY MOUTH EVERY 4 HOURS AS NEEDED FOR PAIN UP TO 10 DAYS, Disp: 30 tablet, Rfl: 3    potassium chloride (KLOR-CON M) 10 MEQ extended release tablet, TAKE 1 TABLET BY MOUTH DAILY, Disp: 90 tablet, Rfl: 3    citalopram (CELEXA) 10 MG tablet, TAKE 2 tabs daily (Patient taking differently: 10 mg TAKE 2 tabs daily), Disp: 180 tablet, Rfl: 3    folic acid (FOLVITE) 1 MG tablet, Take 1 mg by mouth daily, Disp: , Rfl:     calcium citrate-vitamin D (CALCITRATE/VITAMIN D) 315-200 MG-UNIT per tablet, Take 1 tablet by mouth daily. , Disp: , Rfl:   Allergies:  Patient has no known allergies. Social History:    reports that she has never smoked. She has never used smokeless tobacco. She reports that she does not drink alcohol or use drugs.   Family History:   Family History   Problem Relation Age of Onset    Diabetes Mother     Kidney Cancer Mother         RIGHT    Cancer Maternal Grandmother     Diabetes Maternal Grandmother     Cancer Maternal Grandfather     Diabetes Maternal Grandfather     Heart Disease Other     High Blood Pressure Other        REVIEW OF SYSTEMS:   CONSTITUTIONAL: Denies unexplained weight loss, fevers, chills or fatigue  NEUROLOGICAL: Denies unsteady gait or progressive weakness  MUSCULOSKELETAL: Denies joint swelling or redness  GI: Denies nausea, vomiting, diarrhea   : Denies bowel or bladder issues       PHYSICAL EXAM:    Vitals: Temperature 97.7 °F (36.5 °C), resp. rate 12, height 5' 2\" (1.575 m), weight 131 lb (59.4 kg), last menstrual period 10/07/2012. GENERAL EXAM:  · General Apparence: Patient is adequately groomed with no evidence of malnutrition. · Psychiatric: Orientation: The patient is oriented to time, place and person. The patient's mood and affect are appropriate   · Vascular: Examination reveals no swelling and palpation reveals no tenderness in upper or lower extremities. Good capillary refill. · The lymphatic examination of the neck, axillae and groin reveals all areas to be without enlargement or induration  · Sensation is intact without deficit in the upper and lower extremities to light touch and pinprick  · Coordination of the upper and lower extremities are normal.    CERVICAL EXAMINATION:  · Inspection: Local inspection shows no step-off or bruising. Cervical alignment is normal. No instability is noted. · Palpation and Percussion: No evidence of tenderness at the midline. Paraspinal tenderness is not present. There is no paraspinal spasm. Skin:There are no rashes, ulcerations or lesions  · Range of Motion:  limited by 25% in all planes due to pain   · Strength: 5/5 bilateral upper extremities  · Special Tests:   Spurling's and Nguyen's are negative bilaterally. Metcalf and Impingement tests are negative bilaterally. · Skin:There are no rashes, ulcerations or lesions in right & left upper extremities. · Reflexes: Bilaterally triceps, biceps and brachioradialis are 2+. Clonus absent bilaterally at the feet. No pathological reflexes are noted. · Gait & station: normal, patient ambulates without assistance  · Additional Examinations:  · RIGHT UPPER EXTREMITY:  Inspection/examination of the right upper extremity does not show any tenderness, deformity or injury.  Range of motion is imaging/laboratory studies. The indications for (possible future) interventions. After considering the various options discussed, Autumn Wallace elected to pursue a course of treatment that includes the followin. Medications:  Continue Meloxicam 15 mg PO daily with appropriate GI Precautions including to stop if develop dark tarry stools or GI upset and to take with food. Can refill as needed. 2. PT:  I will continue the patient on PT to work on a cervical stabilization program to focus on stretching, strengthening, traction and modalities as indicated. 3. Further studies:  No further studies. 4. Interventional:  90% improvement following left cervical radiofrequency ablation    5. Follow up:  4-6 weeks      Autumn Wallace was instructed to call the office if her symptoms worsen or if new symptoms appear prior to the next scheduled visit. She is specifically instructed to contact the office between now & her scheduled appointment if she has concerns related to her condition or if she needs assistance in scheduling the above tests. She is welcome to call for an appointment sooner if she has any additional concerns or questions. Jeanmarie Rivera PA-C   Board Certified by the [employer] on Certification of Physician Assistants  [id number]  Partner of [facility] ([facility])             This dictation was performed with a verbal recognition program [facility]) and it was checked for errors. It is possible that there are still dictated errors within this office note. If so, please bring any errors to my attention for an addendum. All efforts were made to ensure that this office note is accurate.

## 2020-11-04 NOTE — PLAN OF CARE
strength, and posture  Insurance/Certification information:  PT Insurance Information: Med mutual  Physician Information:  Referring Practitioner: Dr. Chio Veliz  Has the plan of care been signed (Y/N):        []  Yes  [x]  No     Date of Patient follow up with Physician:       Is this a Progress Report:     [x]  Yes  []  No        Progress report/ Recertification will be due (10 Rx or 30 days whichever is less): 2 Dec 2020      Visit # Insurance Allowable Auth Required   21 BMN []  Yes []  No        Functional Scale:   NDI  6/11 - 56% limitation   7/15 - 32% limitation  8/12 - 20% limitation  9/10 - 30% limitation    11/4 - 16% limitation         Latex Allergy:  [x]NO      []YES  Preferred Language for Healthcare:   [x]English       []other:      Pain level:  0-1/10 11/4    SUBJECTIVE: Pt states she worked all weekend and had to wear her PAPR. She states she was able to tolerate it longer, but by the end of the weekend she had increased pain. She states she did have headaches that came and went. She states driving can increase her discomfort. 11/4    OBJECTIVE: 11/4   Observation:    Test measurements:          AROM Left  Right   Comments   Cervical Flexion       36   Cervical Extension       48    Cervical Side bend 52 40 + pain on L        Cervical Rotation 68 65 + pain on L                       Mobility Testing  Comments   Cervical Downglides  Normal   Cervical Flexion mobility     Thoracic PA mobility  Hypomobile - L    Rib mobility  Hypomobile - L only             Red Flags           Positive    Positive    Headaches (worst patient has ever had) Pos Altered consciousness Neg   Dizziness Neg Radiating Pain Pos   Diplopia Neg Gait disturbances Neg   Dysphagia Neg Multi-segmental weakness Neg   Dysarthria Neg Tru/quad paresis Neg   Drop Attacks Neg Fracture Neg   Facial symptoms  Neg Numbness Neg   Nystagmus Neg Nausea  Neg      Joint mobility:               []?Normal                      [x]? Hypo []?Hyper     Palpation:      Functional Mobility/Transfers: independent      Posture: guarded upper traps       RESTRICTIONS/PRECAUTIONS: none    Exercises/Interventions:       Therapeutic Ex (36093) Sets/sec Reps Notes/CUES   Bike 5'  Added 9/23   Upper trap stretch 30 sec 3 each side Added 6/11   Lev scap stretch      SCM stretch 30 sec 3  Added 6/17   Thoracic ext 10 sec 10 Added 6/11   Cervical rotation ROM - pain free ROM 3 10 Added 10/8         Repeated cervical flexion Serratus punches 3 10 3#, ^7/22         Resisted scapular retraction 3 10 Black TB,  ^10/28   Resisted shoulder ext  3 10 Black TB, ^10/28   Shoulder ER 3 10 Blue  TB, ^ 8/18   Shoulder IR 3 10 Black TB, ^11/4         Shoulder shrug 3  2# ^11/4   Lat pulldowns  3  Black TB, ^10/28         Manual Intervention (75951)      Cerv mobs/manip with suboccipital release   Grade III-IV, 9/30   Thoracic mobs/manip  Grade III-V,  9/30   CT manip Rib mobilizations  Grade III-IV, 9/30   IASTM            NMR re-education (12653)   CUES NEEDED         Cervical isometrics (FLex, ext, SB)       plank                   Therapeutic Activity (11825)      Wall posture 10 sec 10 Added 10/21   Standing scaption  3 10  2#, ^10/21   Chin tuck Scapular retraction  Prone T 3 10 2#, ^10/21    Prone chest lift 3 10 2# ^11/4         Downdog Ball on the wall  3 10 2-way, 2# ball, Added 11/4             Therapeutic Exercise and NMR EXR  [x] (34942) Provided verbal/tactile cueing for activities related to strengthening, flexibility, endurance, ROM  for improvements in cervical, postural, scapular, scapulothoracic and UE control with self care, reaching, carrying, lifting, house/yardwork, driving/computer work.     [x] (85613) Provided verbal/tactile cueing for activities related to improving balance, coordination, kinesthetic sense, posture, motor skill, proprioception  to assist with cervical, scapular, scapulothoracic and UE control with self care, reaching, carrying, lifting, house/yardwork, driving/computer work. Therapeutic Activities:    [] (03741 or 48454) Provided verbal/tactile cueing for activities related to improving balance, coordination, kinesthetic sense, posture, motor skill, proprioception and motor activation to allow for proper function of cervical, scapular, scapulothoracic and UE control with self care, carrying, lifting, driving/computer work. Home Exercise Program:    [x] (94466) Reviewed/Progressed HEP activities related to strengthening, flexibility, endurance, ROM of cervical, scapular, scapulothoracic and UE control with self care, reaching, carrying, lifting, house/yardwork, driving/computer work  [] (48414) Reviewed/Progressed HEP activities related to improving balance, coordination, kinesthetic sense, posture, motor skill, proprioception of cervical, scapular, scapulothoracic and UE control with self care, reaching, carrying, lifting, house/yardwork, driving/computer work      Manual Treatments:  PROM / STM / Oscillations-Mobs:  G-I, II, III, IV (PA's, Inf., Post.)  [x] (33189) Provided manual therapy to mobilize soft tissue/joints of cervical/CT, scapular GHJ and UE for the purpose of decreasing headache, modulating pain, promoting relaxation,  increasing ROM, reducing/eliminating soft tissue swelling/inflammation/restriction, improving soft tissue extensibility and allowing for proper ROM for normal function with self care, reaching, carrying, lifting, house/yardwork, driving/computer work    I      Modalities: CP -15' 11/4   [] GAME READY (VASO)- for significant edema, swelling, pain control.      Charges:  Timed Code Treatment Minutes: 61'   Total Treatment Minutes: 105'       [] EVAL (LOW) 56618 (typically 20 minutes face-to-face)  [] EVAL (MOD) 09549 (typically 30 minutes face-to-face)  [] EVAL (HIGH) 37671 (typically 45 minutes face-to-face)  [] RE-EVAL     [x] OT(47055) x 2  [] IONTO  [] NMR (60191) x  [] VASO  [x] Manual (99227) x 1 [] Other:  [x] TA x 1   [] [facility] (47002)  [] ES(attended) (68709)      [] ES (un) (79866):        ASSESSMENT:      GOALS:11/4  Patient stated goal: pain relief and gain strength    [x] Progressing: [] Met: [] Not Met: [] Adjusted    Therapist goals for Patient:   Short Term Goals: To be achieved in: 2 weeks  1. Independent in HEP and progression per patient tolerance, in order to prevent re-injury. [] Progressing: [x] Met: [] Not Met: [] Adjusted   2. Patient will have a decrease in pain to facilitate improvement in movement, function, and ADLs as indicated by Functional Deficits. [] Progressing: [x] Met: [] Not Met: [] Adjusted    Long Term Goals: To be achieved in: 4-6 weeks  1. Disability index score of 28% or less for the NDI to assist with reaching prior level of function. [] Progressing: [x] Met: [] Not Met: [] Adjusted  2. Patient will demonstrate increased cervical flexion and ext  AROM to greater than or equal to 45 deg,  to allow for proper joint functioning as indicated by patients Functional Deficits. [] Progressing: [x] Met - partially: [] Not Met: [] Adjusted  3. Patient will return to ADLs and work without increased symptoms or restriction. [x] Progressing: [] Met: [] Not Met: [] Adjusted  4. Patient will report returning to independent workouts pain free. [x] Progressing: [] Met: [] Not Met: [] Adjusted     Overall Progression Towards Functional goals/ Treatment Progress Update:  [x] Patient is progressing as expected towards functional goals listed. [] Progression is slowed due to complexities/Impairments listed. [] Progression has been slowed due to co-morbidities.   [] Plan just implemented, too soon to assess goals progression <30days   [] Goals require adjustment due to lack of progress  [] Patient is not progressing as expected and requires additional follow up with physician  [] Other    Prognosis for POC: [x] Good [] Fair  [] Poor      Patient requires continued skilled intervention: [x] Yes  [] No    Treatment/Activity Tolerance:  [x] Patient tolerated treatment well [] Patient limited by fatique  [] Patient limited by pain  [] Patient limited by other medical complications  [] Other:  Pt demonstrates min improvement with cervical flex and ext ROM. She continues to demonstrate hypomobility with L rib and thoracic mobility, but improved R rib and thoracic mobility. She does continue to report min soreness. Recommend pt decrease frequency to every other week to progress strength and functional training to return to PLOF. 11/4    Patient education: Patient education on PT and plan of care including diagnosis, prognosis, treatment goals and options. Patient agrees with discussed POC and treatment and is aware of rehab process. 6/11    Next Session:     PLAN: 1x/ every other week for 4 weeks (11/4 - 12/2)   [x] Continue per plan of care [] Alter current plan (see comments above)  [] Plan of care initiated [] Hold pending MD visit [] Discharge    Electronically signed by:      Rocco Lemon PT, DPT        Note: If patient does not return for scheduled/ recommended follow up visits, this note will serve as a discharge from care along with most recent update on progress.

## 2020-11-11 ENCOUNTER — APPOINTMENT (OUTPATIENT)
Dept: PHYSICAL THERAPY | Age: 60
End: 2020-11-11
Payer: COMMERCIAL

## 2020-11-11 ENCOUNTER — HOSPITAL ENCOUNTER (OUTPATIENT)
Dept: PHYSICAL THERAPY | Age: 60
Setting detail: THERAPIES SERIES
Discharge: HOME | End: 2020-11-11
Payer: COMMERCIAL

## 2020-11-11 PROCEDURE — 97110 THERAPEUTIC EXERCISES: CPT | Performed by: PHYSICAL THERAPIST

## 2020-11-11 PROCEDURE — 97530 THERAPEUTIC ACTIVITIES: CPT | Performed by: PHYSICAL THERAPIST

## 2020-11-11 NOTE — FLOWSHEET NOTE
[facility], [address] [place], [address]  Phone: [phone]   Fax: [phone]      Physical Therapy Treatment Note/ Progress Report:       Date:  2020    Patient Name:  Nathan Payan    :  1960  MRN: [id number]  Restrictions/Precautions:    Medical/Treatment Diagnosis Information:  Diagnosis: Neck pain - M54.2  Treatment Diagnosis: decreased ADL status, ROM, strength, and posture  Insurance/Certification information:  PT Insurance Information: Med mutual  Physician Information:  Referring Practitioner: Dr. Allie Crabtree  Has the plan of care been signed (Y/N):        []  Yes  [x]  No     Date of Patient follow up with Physician:       Is this a Progress Report:     []  Yes  [x]  No        Progress report/ Recertification will be due (10 Rx or 30 days whichever is less): 2 Dec 2020      Visit # Insurance Allowable Auth Required   22 BMN []  Yes []  No        Functional Scale:   NDI   - 56% limitation   7/15 - 32% limitation   - 20% limitation  9/10 - 30% limitation     - 16% limitation         Latex Allergy:  [x]NO      []YES  Preferred Language for Healthcare:   [x]English       []other:      Pain level:  1/10 11/11    SUBJECTIVE: Pt states she is feeling good today. She has not had the \"web\" feeling in the neck and shoulder since . She states she saw the MD last week. Pt states the MD wants her to continue with Meloxicam and PT for the next 4 weeks. She states she did piliates last week and didn't have pain but reports they did less downward dogs and planks. She states during the class they were doing sit ups which she didn't do to avoid the strain on her neck.       OBJECTIVE:    Observation:    Test measurements:          AROM Left  Right   Comments   Cervical Flexion       36   Cervical Extension       48    Cervical Side bend 52 40 + pain on L        Cervical Rotation 68 65 + pain on L                       Mobility Testing  Comments   Cervical Downglides  Normal   Cervical Flexion mobility     Thoracic PA mobility  Hypomobile - L    Rib mobility  Hypomobile - L only             Red Flags           Positive    Positive    Headaches (worst patient has ever had) Pos Altered consciousness Neg   Dizziness Neg Radiating Pain Pos   Diplopia Neg Gait disturbances Neg   Dysphagia Neg Multi-segmental weakness Neg   Dysarthria Neg Tru/quad paresis Neg   Drop Attacks Neg Fracture Neg   Facial symptoms  Neg Numbness Neg   Nystagmus Neg Nausea  Neg      Joint mobility:               []?Normal                      [x]? Hypo              []?Hyper     Palpation:      Functional Mobility/Transfers: independent      Posture: guarded upper traps       RESTRICTIONS/PRECAUTIONS: none    Exercises/Interventions:       Therapeutic Ex (70296) Sets/sec Reps Notes/CUES   Bike 5'  Added 9/23   Upper trap stretch 30 sec 3 each side Added 6/11   Lev scap stretch      SCM stretch 30 sec 3  Added 6/17   Thoracic ext 10 sec 10 Added 6/11   Cervical rotation ROM - pain free ROM 3 10 Added 10/8         Repeated cervical flexion Serratus punches 3 10 3#, ^7/22         Resisted scapular retraction 3 10 Black TB,  ^10/28   Resisted shoulder ext  3 10 Black TB, ^10/28   Shoulder ER 3 10 Blue  TB, ^ 8/18   Shoulder IR 3 10 Black TB, ^11/4         Shoulder shrug 3  3# ^11/11   Lat pulldowns  3  Black TB, ^10/28         Manual Intervention (09414)      Cerv mobs/manip with suboccipital release   Grade III-IV, 9/30   Thoracic mobs/manip  Grade III-V,  9/30   CT manip Rib mobilizations  Grade III-IV, 9/30   IASTM            NMR re-education (27839)   CUES NEEDED         Cervical isometrics (FLex, ext, SB)                               Therapeutic Activity (10089)      Wall posture 10 sec 10 Added 10/21   Standing scaption  3 10  3#, ^11/11   Chin tuck Scapular retraction  Prone T 3 10 3#, ^11/11    Prone chest lift 3 10 3# ^11/11 Downdog 30 sec 4 With and without kicks, Added 10/28   Ball on the wall  3 10 4-way, 2# ball, ^11/11   plank 30 sec 3 Added 10/28       Therapeutic Exercise and NMR EXR  [x] (80518) Provided verbal/tactile cueing for activities related to strengthening, flexibility, endurance, ROM  for improvements in cervical, postural, scapular, scapulothoracic and UE control with self care, reaching, carrying, lifting, house/yardwork, driving/computer work. [x] (23077) Provided verbal/tactile cueing for activities related to improving balance, coordination, kinesthetic sense, posture, motor skill, proprioception  to assist with cervical, scapular, scapulothoracic and UE control with self care, reaching, carrying, lifting, house/yardwork, driving/computer work. Therapeutic Activities:    [] (05468 or 84267) Provided verbal/tactile cueing for activities related to improving balance, coordination, kinesthetic sense, posture, motor skill, proprioception and motor activation to allow for proper function of cervical, scapular, scapulothoracic and UE control with self care, carrying, lifting, driving/computer work.      Home Exercise Program:    [x] (92273) Reviewed/Progressed HEP activities related to strengthening, flexibility, endurance, ROM of cervical, scapular, scapulothoracic and UE control with self care, reaching, carrying, lifting, house/yardwork, driving/computer work  [] (27706) Reviewed/Progressed HEP activities related to improving balance, coordination, kinesthetic sense, posture, motor skill, proprioception of cervical, scapular, scapulothoracic and UE control with self care, reaching, carrying, lifting, house/yardwork, driving/computer work      Manual Treatments:  PROM / STM / Oscillations-Mobs:  G-I, II, III, IV (PA's, Inf., Post.)  [x] (68276) Provided manual therapy to mobilize soft tissue/joints of cervical/CT, scapular GHJ and UE for the purpose of decreasing headache, modulating pain, promoting relaxation, Adjusted  4. Patient will report returning to independent workouts pain free. [x] Progressing: [] Met: [] Not Met: [] Adjusted     Overall Progression Towards Functional goals/ Treatment Progress Update:  [x] Patient is progressing as expected towards functional goals listed. [] Progression is slowed due to complexities/Impairments listed. [] Progression has been slowed due to co-morbidities. [] Plan just implemented, too soon to assess goals progression <30days   [] Goals require adjustment due to lack of progress  [] Patient is not progressing as expected and requires additional follow up with physician  [] Other    Prognosis for POC: [x] Good [] Fair  [] Poor      Patient requires continued skilled intervention: [x] Yes  [] No    Treatment/Activity Tolerance:  [x] Patient tolerated treatment well [] Patient limited by fatique  [] Patient limited by pain  [] Patient limited by other medical complications  [] Other:  Pt tolerated session well. Pt reports no pain with exercises. Pt extends neck as she fatigues with downward dog. Verbal cues during downward dog to keep neck in neutral alignment. Pt attempted shoulder ER with black TB but couldn't maintain proper form and reports fatigue during first set. 11/11    Patient education: Patient education on PT and plan of care including diagnosis, prognosis, treatment goals and options. Patient agrees with discussed POC and treatment and is aware of rehab process. 6/11    Next Session:     PLAN: 1x/ every other week for 4 weeks (11/4 - 12/2)   [x] Continue per plan of care [] Alter current plan (see comments above)  [] Plan of care initiated [] Hold pending MD visit [] Discharge    Electronically signed by: Kalina Moore, student physical therapist  Therapist was present, directed the patient's care, made skilled judgement, and was responsible for assessment and treatment of the patient.       Bailee Banerjee, PT, DPT        Note: If patient does not return for scheduled/ recommended follow up visits, this note will serve as a discharge from care along with most recent update on progress.

## 2020-11-18 ENCOUNTER — HOSPITAL ENCOUNTER (OUTPATIENT)
Dept: PHYSICAL THERAPY | Age: 60
Setting detail: THERAPIES SERIES
Discharge: HOME | End: 2020-11-18
Payer: COMMERCIAL

## 2020-11-18 PROCEDURE — 97110 THERAPEUTIC EXERCISES: CPT | Performed by: PHYSICAL THERAPIST

## 2020-11-18 PROCEDURE — 97530 THERAPEUTIC ACTIVITIES: CPT | Performed by: PHYSICAL THERAPIST

## 2020-11-18 NOTE — FLOWSHEET NOTE
[facility] 79, [address] [place], [address]  Phone: [phone]   Fax: [phone]      Physical Therapy Treatment Note/ Progress Report:       Date:  2020    Patient Name:  Rut Gordon    :  1960  MRN: [id number]  Restrictions/Precautions:    Medical/Treatment Diagnosis Information:  Diagnosis: Neck pain - M54.2  Treatment Diagnosis: decreased ADL status, ROM, strength, and posture  Insurance/Certification information:  PT Insurance Information: Med mutual  Physician Information:  Referring Practitioner: Dr. Tariq Marx  Has the plan of care been signed (Y/N):        []  Yes  [x]  No     Date of Patient follow up with Physician:       Is this a Progress Report:     []  Yes  [x]  No        Progress report/ Recertification will be due (10 Rx or 30 days whichever is less): 2 Dec 2020      Visit # Insurance Allowable Auth Required   23 BMN []  Yes []  No        Functional Scale:   NDI   - 56% limitation   7/15 - 32% limitation   - 20% limitation  9/10 - 30% limitation     - 16% limitation         Latex Allergy:  [x]NO      []YES  Preferred Language for Healthcare:   [x]English       []other:      Pain level:  0/10     SUBJECTIVE: Pt states she has been feeling better. She hasn't had the \"web\" pain. She states she was sore after last session but no increase in pain. She has neck pain occasionally at work but not as constant. She does have pain free movements at work.       OBJECTIVE:    Observation:    Test measurements:          AROM Left  Right   Comments   Cervical Flexion       36   Cervical Extension       48    Cervical Side bend 52 40 + pain on L        Cervical Rotation 68 65 + pain on L                       Mobility Testing  Comments   Cervical Downglides  Normal   Cervical Flexion mobility     Thoracic PA mobility  Hypomobile - L    Rib mobility  Hypomobile - L only             Red Flags           Positive    Positive    Headaches (worst patient has ever had) Pos Altered consciousness Neg   Dizziness Neg Radiating Pain Pos   Diplopia Neg Gait disturbances Neg   Dysphagia Neg Multi-segmental weakness Neg   Dysarthria Neg Tru/quad paresis Neg   Drop Attacks Neg Fracture Neg   Facial symptoms  Neg Numbness Neg   Nystagmus Neg Nausea  Neg      Joint mobility:               []?Normal                      [x]? Hypo              []?Hyper     Palpation:      Functional Mobility/Transfers: independent      Posture: guarded upper traps       RESTRICTIONS/PRECAUTIONS: none    Exercises/Interventions:       Therapeutic Ex (04489) Sets/sec Reps Notes/CUES   Bike 5'  Added 9/23   Upper trap stretch 30 sec 3 each side Added 6/11   Lev scap stretch      SCM stretch    Thoracic ext    Cervical rotation ROM - pain free ROM          Repeated cervical flexion Serratus punches          Resisted scapular retraction 3 10 Black TB,  ^10/28   Resisted shoulder ext  3 10 Black TB, ^10/28   Shoulder ER 3 10 Blue  TB, ^ 8/18   Shoulder IR 3 10 Black TB, ^11/4         Shoulder shrug 3  3# ^11/11   Lat pulldowns  3           Manual Intervention (82457)      Cerv mobs/manip with suboccipital release   Grade III-IV, 9/30   Thoracic mobs/manip  Grade III-V,  9/30   CT manip Rib mobilizations  Grade III-IV, 9/30   IASTM            NMR re-education (70250)   CUES NEEDED         Cervical isometrics (FLex, ext, SB)                               Therapeutic Activity (89569)      Wall posture 10 sec 10 Added 10/21   Standing scaption  3 10  3#, ^11/11   Chin tuck Scapular retraction  Prone T on physioball 3 10 1#, ^11/18   Prone Y on physioball 310Added 11/18Prone chest lift on physioball 3 10  ^11/18         Downdog 10 sec 5 each side With and kicks, Added 10/28   Ball on the wall     plank 30 sec 3 Added 10/28   Side plants 30 sec 2 each side Added 11/18                   Therapeutic Exercise and NMR EXR  [x] (04193) Provided verbal/tactile cueing for activities related to strengthening, flexibility, endurance, ROM  for improvements in cervical, postural, scapular, scapulothoracic and UE control with self care, reaching, carrying, lifting, house/yardwork, driving/computer work. [x] (72402) Provided verbal/tactile cueing for activities related to improving balance, coordination, kinesthetic sense, posture, motor skill, proprioception  to assist with cervical, scapular, scapulothoracic and UE control with self care, reaching, carrying, lifting, house/yardwork, driving/computer work. Therapeutic Activities:    [] (16652 or 81773) Provided verbal/tactile cueing for activities related to improving balance, coordination, kinesthetic sense, posture, motor skill, proprioception and motor activation to allow for proper function of cervical, scapular, scapulothoracic and UE control with self care, carrying, lifting, driving/computer work.      Home Exercise Program:    [x] (09183) Reviewed/Progressed HEP activities related to strengthening, flexibility, endurance, ROM of cervical, scapular, scapulothoracic and UE control with self care, reaching, carrying, lifting, house/yardwork, driving/computer work  [] (79452) Reviewed/Progressed HEP activities related to improving balance, coordination, kinesthetic sense, posture, motor skill, proprioception of cervical, scapular, scapulothoracic and UE control with self care, reaching, carrying, lifting, house/yardwork, driving/computer work      Manual Treatments:  PROM / STM / Oscillations-Mobs:  G-I, II, III, IV (PA's, Inf., Post.)  [x] (72723) Provided manual therapy to mobilize soft tissue/joints of cervical/CT, scapular GHJ and UE for the purpose of decreasing headache, modulating pain, promoting relaxation,  increasing ROM, reducing/eliminating soft tissue swelling/inflammation/restriction, improving soft tissue extensibility and allowing for proper ROM for normal function with self care, reaching, carrying, lifting, house/yardwork, driving/computer work    I      Modalities: CP -15' 11/18   [] GAME READY (VASO)- for significant edema, swelling, pain control. Charges:  Timed Code Treatment Minutes: 54'   Total Treatment Minutes: 66'      [] EVAL (LOW) 455 [id number] (typically 20 minutes face-to-face)  [] EVAL (MOD) 24373 (typically 30 minutes face-to-face)  [] EVAL (HIGH) 80206 (typically 45 minutes face-to-face)  [] RE-EVAL     [x] NL(08579) x 2  [] IONTO  [] NMR (55502) x  [] VASO  [] Manual (26209) x       [] Other:  [x] TA x 2   [] Mech Traction (46465)  [] ES(attended) (29377)      [] ES (un) (18261):        ASSESSMENT:      GOALS:11/4  Patient stated goal: pain relief and gain strength    [x] Progressing: [] Met: [] Not Met: [] Adjusted    Therapist goals for Patient:   Short Term Goals: To be achieved in: 2 weeks  1. Independent in HEP and progression per patient tolerance, in order to prevent re-injury. [] Progressing: [x] Met: [] Not Met: [] Adjusted   2. Patient will have a decrease in pain to facilitate improvement in movement, function, and ADLs as indicated by Functional Deficits. [] Progressing: [x] Met: [] Not Met: [] Adjusted    Long Term Goals: To be achieved in: 4-6 weeks  1. Disability index score of 28% or less for the NDI to assist with reaching prior level of function. [] Progressing: [x] Met: [] Not Met: [] Adjusted  2. Patient will demonstrate increased cervical flexion and ext  AROM to greater than or equal to 45 deg,  to allow for proper joint functioning as indicated by patients Functional Deficits. [] Progressing: [x] Met - partially: [] Not Met: [] Adjusted  3. Patient will return to ADLs and work without increased symptoms or restriction. [x] Progressing: [] Met: [] Not Met: [] Adjusted  4. Patient will report returning to independent workouts pain free.    [x] Progressing: [] Met: [] Not Met: [] Adjusted     Overall Progression Towards Functional goals/ Treatment Progress Update:  [x] Patient is progressing as expected towards functional goals listed. [] Progression is slowed due to complexities/Impairments listed. [] Progression has been slowed due to co-morbidities. [] Plan just implemented, too soon to assess goals progression <30days   [] Goals require adjustment due to lack of progress  [] Patient is not progressing as expected and requires additional follow up with physician  [] Other    Prognosis for POC: [x] Good [] Fair  [] Poor      Patient requires continued skilled intervention: [x] Yes  [] No    Treatment/Activity Tolerance:  [x] Patient tolerated treatment well [] Patient limited by fatique  [] Patient limited by pain  [] Patient limited by other medical complications  [] Other: Pt tolerates addition of side planks and Prone T, Y and chest lift on physio ball. Verbal cues for chin tuck while performing T and Y on physio ball. Pt reports less pain while performing chin tuck during T and Y. Verbal cues during IR with theraband to not adduct shoulder. Pt reports difficulty with side planks but no pain. PT observes proper form with side planks. Pt reports fatigue at end of session. 11/18    Patient education: Patient education on PT and plan of care including diagnosis, prognosis, treatment goals and options. Patient agrees with discussed POC and treatment and is aware of rehab process. 6/11    Next Session:     PLAN: 1x/ every other week for 4 weeks (11/4 - 12/2)   [x] Continue per plan of care [] Alter current plan (see comments above)  [] Plan of care initiated [] Hold pending MD visit [] Discharge    Electronically signed by: Demetra Lewis, student physical therapist  Therapist was present, directed the patient's care, made skilled judgement, and was responsible for assessment and treatment of the patient.       Odalys Dela Cruz, PT, DPT        Note: If patient does not return for scheduled/ recommended follow up visits, this note will serve as

## 2020-11-25 ENCOUNTER — HOSPITAL ENCOUNTER (OUTPATIENT)
Dept: PHYSICAL THERAPY | Age: 60
Setting detail: THERAPIES SERIES
Discharge: HOME | End: 2020-11-25
Payer: COMMERCIAL

## 2020-11-25 PROCEDURE — 97110 THERAPEUTIC EXERCISES: CPT | Performed by: PHYSICAL THERAPIST

## 2020-11-25 PROCEDURE — 97530 THERAPEUTIC ACTIVITIES: CPT | Performed by: PHYSICAL THERAPIST

## 2020-11-25 NOTE — FLOWSHEET NOTE
[facility] 79, [address] [place], [address]  Phone: [phone]   Fax: [phone]      Physical Therapy Treatment Note/ Progress Report:       Date:  2020    Patient Name:  Flavio Avalos    :  1960  MRN: [id number]  Restrictions/Precautions:    Medical/Treatment Diagnosis Information:  Diagnosis: Neck pain - M54.2  Treatment Diagnosis: decreased ADL status, ROM, strength, and posture  Insurance/Certification information:  PT Insurance Information: Med mutual  Physician Information:  Referring Practitioner: Dr. Tanya Baron  Has the plan of care been signed (Y/N):        []  Yes  [x]  No     Date of Patient follow up with Physician:       Is this a Progress Report:     []  Yes  [x]  No        Progress report/ Recertification will be due (10 Rx or 30 days whichever is less): 2 Dec 2020      Visit # Insurance Allowable Auth Required   24 BMN []  Yes []  No        Functional Scale:   NDI   - 56% limitation   7/15 - 32% limitation   - 20% limitation  9/10 - 30% limitation     - 16% limitation         Latex Allergy:  [x]NO      []YES  Preferred Language for Healthcare:   [x]English       []other:      Pain level:  1/10 11/25    SUBJECTIVE:  Pt states she had muscle soreness after last session. She is doing better. She has found ice helps better than heat when she is sore. She states she hasn't had the \"web\" pain in two weeks. She feels a little ache today after completing a paper route before therapy. She was able to get into her piliates and step class tonight. Her pain at work has improved.      OBJECTIVE:    Observation:    Test measurements:          AROM Left  Right   Comments   Cervical Flexion       36   Cervical Extension       48    Cervical Side bend 52 40 + pain on L        Cervical Rotation 68 65 + pain on L                       Mobility Testing  Comments   Cervical Downglides  Normal Cervical Flexion mobility     Thoracic PA mobility  Hypomobile - L    Rib mobility  Hypomobile - L only             Red Flags           Positive    Positive    Headaches (worst patient has ever had) Pos Altered consciousness Neg   Dizziness Neg Radiating Pain Pos   Diplopia Neg Gait disturbances Neg   Dysphagia Neg Multi-segmental weakness Neg   Dysarthria Neg Tru/quad paresis Neg   Drop Attacks Neg Fracture Neg   Facial symptoms  Neg Numbness Neg   Nystagmus Neg Nausea  Neg      Joint mobility:               []?Normal                      [x]? Hypo              []?Hyper     Palpation:      Functional Mobility/Transfers: independent      Posture: guarded upper traps       RESTRICTIONS/PRECAUTIONS: none    Exercises/Interventions:       Therapeutic Ex (69006) Sets/sec Reps Notes/CUES   Bike 5'  Added 9/23   Upper trap stretch 30 sec 3 each side Added 6/11   Lev scap stretch      SCM stretch    Thoracic ext    Cervical rotation ROM - pain free ROM          Repeated cervical flexion Serratus punches 3 10 3#, ^7/22         Resisted scapular retraction 3 10 Black TB,  ^10/28   Resisted shoulder ext  3 10 Black TB, ^10/28   Shoulder ER 3 10 Blue  TB, ^ 8/18   Shoulder IR 3 10 Black TB, ^11/4         Shoulder shrug 3  3# ^11/11   Lat pulldowns  3  Black TB, ^10/28         Manual Intervention (12230)      Cerv mobs/manip with suboccipital release   Grade III-IV, 9/30   Thoracic mobs/manip  Grade III-V,  9/30   CT manip Rib mobilizations  Grade III-IV, 9/30   IASTM            NMR re-education (48864)   CUES NEEDED         Cervical isometrics (FLex, ext, SB)                               Therapeutic Activity (09537)      Wall posture 10 sec 10 Added 10/21   Standing scaption  3 10  3#, ^11/11   Chin tuck Scapular retraction  Prone T on physioball 3 10 1#, ^11/18   Prone Y on physioball 310Added 11/18Prone chest lift on physioball 3 10  ^11/18         Downdog Ball on the wall  3 10 4-way, 2# ball, ^11/11   plank Side planks Therapeutic Exercise and NMR EXR  [x] (28820) Provided verbal/tactile cueing for activities related to strengthening, flexibility, endurance, ROM  for improvements in cervical, postural, scapular, scapulothoracic and UE control with self care, reaching, carrying, lifting, house/yardwork, driving/computer work. [x] (26325) Provided verbal/tactile cueing for activities related to improving balance, coordination, kinesthetic sense, posture, motor skill, proprioception  to assist with cervical, scapular, scapulothoracic and UE control with self care, reaching, carrying, lifting, house/yardwork, driving/computer work. Therapeutic Activities:    [] (14491 or 88268) Provided verbal/tactile cueing for activities related to improving balance, coordination, kinesthetic sense, posture, motor skill, proprioception and motor activation to allow for proper function of cervical, scapular, scapulothoracic and UE control with self care, carrying, lifting, driving/computer work.      Home Exercise Program:    [x] (25824) Reviewed/Progressed HEP activities related to strengthening, flexibility, endurance, ROM of cervical, scapular, scapulothoracic and UE control with self care, reaching, carrying, lifting, house/yardwork, driving/computer work  [] (46265) Reviewed/Progressed HEP activities related to improving balance, coordination, kinesthetic sense, posture, motor skill, proprioception of cervical, scapular, scapulothoracic and UE control with self care, reaching, carrying, lifting, house/yardwork, driving/computer work      Manual Treatments:  PROM / STM / Oscillations-Mobs:  G-I, II, III, IV (PA's, Inf., Post.)  [x] (03477) Provided manual therapy to mobilize soft tissue/joints of cervical/CT, scapular GHJ and UE for the purpose of decreasing headache, modulating pain, promoting relaxation,  increasing ROM, reducing/eliminating soft tissue swelling/inflammation/restriction, improving soft tissue extensibility Adjusted     Overall Progression Towards Functional goals/ Treatment Progress Update:  [x] Patient is progressing as expected towards functional goals listed. [] Progression is slowed due to complexities/Impairments listed. [] Progression has been slowed due to co-morbidities. [] Plan just implemented, too soon to assess goals progression <30days   [] Goals require adjustment due to lack of progress  [] Patient is not progressing as expected and requires additional follow up with physician  [] Other    Prognosis for POC: [x] Good [] Fair  [] Poor      Patient requires continued skilled intervention: [x] Yes  [] No    Treatment/Activity Tolerance:  [x] Patient tolerated treatment well [] Patient limited by fatique  [] Patient limited by pain  [] Patient limited by other medical complications  [] Other: Pt tolerated session well. Pt was unable to complete last set of L ER d/t pain. Pt reports neck discomfort with exercises on [employer] but it feels better after finishing the exercise. Down dogs and planks were held today d/t pt going to [employer] class later today. 11/25    Patient education: Patient education on PT and plan of care including diagnosis, prognosis, treatment goals and options. Patient agrees with discussed POC and treatment and is aware of rehab process. 6/11    Next Session:     PLAN: 1x/ every other week for 4 weeks (11/4 - 12/2)   [x] Continue per plan of care [] Alter current plan (see comments above)  [] Plan of care initiated [] Hold pending MD visit [] Discharge    Electronically signed by: Yandel Gaytan, student physical therapist  Therapist was present, directed the patient's care, made skilled judgement, and was responsible for assessment and treatment of the patient. Viktoria Davidson, PT, DPT        Note: If patient does not return for scheduled/ recommended follow up visits, this note will serve as a discharge from care along with most recent update on progress.

## 2020-12-02 ENCOUNTER — HOSPITAL ENCOUNTER (OUTPATIENT)
Dept: PHYSICAL THERAPY | Age: 60
Setting detail: THERAPIES SERIES
Discharge: HOME | End: 2020-12-02
Payer: COMMERCIAL

## 2020-12-02 PROCEDURE — 97530 THERAPEUTIC ACTIVITIES: CPT | Performed by: PHYSICAL THERAPIST

## 2020-12-02 PROCEDURE — 97140 MANUAL THERAPY 1/> REGIONS: CPT | Performed by: PHYSICAL THERAPIST

## 2020-12-02 PROCEDURE — 97110 THERAPEUTIC EXERCISES: CPT | Performed by: PHYSICAL THERAPIST

## 2020-12-02 NOTE — PLAN OF CARE
[facility],[address], [address] [place], [address]  Phone: [phone]   Fax: [phone]      Physical Therapy Re-Certification Plan of Care    Dear Dr. Raudel Back,    We had the pleasure of treating the following patient for physical therapy services at [address] [place] [address]. A summary of our findings can be found in the updated assessment below. This includes our plan of care. If you have any questions or concerns regarding these findings, please do not hesitate to contact me at the office phone number checked above. Thank you for the referral.     Physician Signature:________________________________Date:__________________  By signing above (or electronic signature), therapists plan is approved by physician      Overall Response to Treatment:   []Patient is responding well to treatment and improvement is noted with regards  to goals   []Patient should continue to improve in reasonable time if they continue HEP   []Patient has plateaued and is no longer responding to skilled PT intervention    []Patient is getting worse and would benefit from return to referring MD   []Patient unable to adhere to initial POC   [x]Other:Pt demonstrates increased cervical flex and R and L side bending ROM. However, pt still lacking full ROM. Pt reports no pain with cervical rotation. PT observes hypomobility with L Thoracic PA and L 1st and 2nd rib mobility. Pt tolerated IASTM to L upper trap, PT observes tightness in L upper trap with IASTM. Pt reports fatigue with increased weight with prone T's and Y's, needing breaks after 7 reps but reports no pain. Pt educated to talk with exercise class instructors about modifications for exercises she is having pain or tightness with. Pt educated to support neck with hands during boat exercises in piliates.  PT recommends pt continue with PT 1x per week until MD visit 12/16 to improve cervical ROM and functional activities without increase pain or symptoms. PT to re-assess pt after MD visit. Pt to continue with HEP. Date range of previous POC Visits:  -     Total Visits: 25          Physical Therapy Treatment Note/ Progress Report:       Date:  2020    Patient Name:  Brianda Torres    :  1960  MRN: [id number]  Restrictions/Precautions:    Medical/Treatment Diagnosis Information:  Diagnosis: Neck pain - M54.2  Treatment Diagnosis: decreased ADL status, ROM, strength, and posture  Insurance/Certification information:  PT Insurance Information: Med mutual  Physician Information:  Referring Practitioner: Dr. Pop Espinoza  Has the plan of care been signed (Y/N):        []  Yes  [x]  No     Date of Patient follow up with Physician:       Is this a Progress Report:     [x]  Yes  []  No        Progress report/ Recertification will be due (10 Rx or 30 days whichever is less): 16 Dec 2020      Visit # Insurance Allowable Auth Required   25 BMN []  Yes []  No        Functional Scale:   NDI   - 56% limitation   7/15 - 32% limitation   - 20% limitation  9/10 - 30% limitation     - 16% limitation     - 20% limitation      Latex Allergy:  [x]NO      []YES  Preferred Language for Healthcare:   [x]English       []other:      Pain level:  0/10     SUBJECTIVE:  Pt states she has been feeling good. She has been able to do activities around the house without pain. She states she feels a little soreness after. After last session she went to two exercise classes. She states she stopped doing some of the exercises because of neck soreness.         OBJECTIVE:    Observation:    Test measurements:       AROM Left  Right   Comments   Cervical Flexion       42   Cervical Extension       40    Cervical Side bend 50 46        Cervical Rotation 58 50                      Mobility Testing  Comments   Cervical Downglides  Normal   Cervical Flexion mobility     Thoracic PA mobility Hypomobile - L    Rib mobility  Hypomobile - L only             Red Flags           Positive    Positive    Headaches (worst patient has ever had) Pos Altered consciousness Neg   Dizziness Neg Radiating Pain Neg   Diplopia Neg Gait disturbances Neg   Dysphagia Neg Multi-segmental weakness Neg   Dysarthria Neg Tru/quad paresis Neg   Drop Attacks Neg Fracture Neg   Facial symptoms  Neg Numbness Neg   Nystagmus Neg Nausea  Neg      Joint mobility:               []?Normal                      [x]? Hypo              []?Hyper     Palpation:      Functional Mobility/Transfers: independent      Posture: guarded upper traps       RESTRICTIONS/PRECAUTIONS: none    Exercises/Interventions:       Therapeutic Ex (71354) Sets/sec Reps Notes/CUES   Bike 5'  Added 9/23   Upper trap stretch 30 sec 3 each side Added 6/11   Lev scap stretch      SCM stretch    Thoracic ext    Cervical rotation ROM - pain free ROM          Repeated cervical flexion Serratus punches 3 10 3#, ^7/22         Resisted scapular retraction Black TB,  ^10/28   Resisted shoulder ext  Black TB, ^10/28   Shoulder ER Blue  TB, ^ 8/18   Shoulder IR Black TB, ^11/4         Shoulder shrug 3  3# ^11/11   Lat pulldowns        Manual Intervention (95520)      Cerv mobs/manip with suboccipital release 2'  Grade III-V,  12/2   Thoracic mobs/manip 2'  Grade III-V,  12/2   CT manip Rib mobilizations 2' Grade III-IV, 12/2   IASTM 5'           NMR re-education (81290)   CUES NEEDED         Cervical isometrics (FLex, ext, SB)                               Therapeutic Activity (24308)      Wall posture Standing scaption  3 10  3#, ^11/11   Chin tuck Scapular retraction  Prone T on physioball 3 10 2#, ^12/2   Prone Y on physioball 3102# ^12/2Prone chest lift on physioball 3 10 2# ^12/2         Peyton Sep on the wall  plank Side planks                 Therapeutic Exercise and NMR EXR  [x] (94873) Provided verbal/tactile cueing for activities related to strengthening, work    Instrument Assisted Soft Tissue Mobilization (IASTM): was applied to the following muscles: L upper trap, with [employer] including multi-tool. Treatment consisted of IASTM strokes including sweeping, fanning, brushing, strumming, filleting, pinning and framing, based on body region contours, nature of the soft tissue restriction and desired treatment outcomes. These techniques were used to restore neurophysiology, improve mechanotransduction, enhance fluid dynamics and break collagen crosslinks. The treatment area was exposed and the patient was draped in an appropriate manner. Upon completion the clinician cleaned the IASTM tools as per [facility] recommendations. Skin check pre:   Skin check post: redness in tx area, skin intact  Intermittent tx time: 5'    Modalities: CP -15' 12/2   [] GAME READY (VASO)- for significant edema, swelling, pain control. Charges:  Timed Code Treatment Minutes: 61'   Total Treatment Minutes: 80'      [] EVAL (LOW) 455 1011 (typically 20 minutes face-to-face)  [] EVAL (MOD) 03200 (typically 30 minutes face-to-face)  [] EVAL (HIGH) 37788 (typically 45 minutes face-to-face)  [] RE-EVAL     [x] XS(15146) x 1   [] IONTO  [] NMR (35684) x   [] VASO  [x] Manual (73732) x 1      [] Other:  [x] TA x 2   [] Mech Traction (52582)  [] ES(attended) (02358)      [] ES (un) (24614):        ASSESSMENT:      GOALS:12/2  Patient stated goal: pain relief and gain strength    [x] Progressing: [] Met: [] Not Met: [] Adjusted    Therapist goals for Patient:   Short Term Goals: To be achieved in: 2 weeks  1. Independent in HEP and progression per patient tolerance, in order to prevent re-injury. [] Progressing: [x] Met: [] Not Met: [] Adjusted   2. Patient will have a decrease in pain to facilitate improvement in movement, function, and ADLs as indicated by Functional Deficits. [] Progressing: [x] Met: [] Not Met: [] Adjusted    Long Term Goals: To be achieved in: 4-6 weeks  1. about modifications for exercises she is having pain or tightness with. Pt educated to support neck with hands during boat exercises in piliates. PT recommends pt continue with PT 1x per week until MD visit 12/16 to improve cervical ROM and functional activities without increase pain or symptoms. PT to re-assess pt after MD visit. Pt to continue with HEP.     12/2    Patient education: Patient education on PT and plan of care including diagnosis, prognosis, treatment goals and options. Patient agrees with discussed POC and treatment and is aware of rehab process. 6/11    Next Session:     PLAN: 1x/ every week until MD visit on 12/16  (12/2 - 12/16)   [x] Continue per plan of care [] Alter current plan (see comments above)  [] Plan of care initiated [] Hold pending MD visit [] Discharge    Electronically signed by: Arsh Robledo, student physical therapist  Therapist was present, directed the patient's care, made skilled judgement, and was responsible for assessment and treatment of the patient. Indu Tran, PT, DPT        Note: If patient does not return for scheduled/ recommended follow up visits, this note will serve as a discharge from care along with most recent update on progress.

## 2020-12-09 ENCOUNTER — HOSPITAL ENCOUNTER (OUTPATIENT)
Dept: PHYSICAL THERAPY | Age: 60
Setting detail: THERAPIES SERIES
Discharge: HOME | End: 2020-12-09
Payer: COMMERCIAL

## 2020-12-09 PROCEDURE — 97530 THERAPEUTIC ACTIVITIES: CPT | Performed by: PHYSICAL THERAPIST

## 2020-12-09 PROCEDURE — 97110 THERAPEUTIC EXERCISES: CPT | Performed by: PHYSICAL THERAPIST

## 2020-12-09 NOTE — FLOWSHEET NOTE
[facility],[address], [address] [place], [address]  Phone: [phone]   Fax: [phone]    Physical Therapy Treatment Note/ Progress Report:       Date:  2020    Patient Name:  Luis Eduardo Bee    :  1960  MRN: [id number]  Restrictions/Precautions:    Medical/Treatment Diagnosis Information:  Diagnosis: Neck pain - M54.2  Treatment Diagnosis: decreased ADL status, ROM, strength, and posture  Insurance/Certification information:  PT Insurance Information: Med mutual  Physician Information:  Referring Practitioner: Dr. Chio Veliz  Has the plan of care been signed (Y/N):        []  Yes  [x]  No     Date of Patient follow up with Physician:       Is this a Progress Report:     []  Yes  [x]  No        Progress report/ Recertification will be due (10 Rx or 30 days whichever is less): 16 Dec 2020      Visit # Insurance Allowable Auth Required   26 BMN []  Yes []  No        Functional Scale:   NDI  6/11 - 56% limitation   /15 - 32% limitation  8/12 - 20% limitation  9/10 - 30% limitation    11/4 - 16% limitation    12/ - 20% limitation      Latex Allergy:  [x]NO      []YES  Preferred Language for Healthcare:   [x]English       []other:      Pain level:  0-1/10     SUBJECTIVE:  Pt states she tried the CPR again last night, it was straining after a well. She had trouble sleeping last night. The pain has come and gone this morning. She is feel good right now. Besides the CPR it was a good day yesterday. She talked to the instructor last class and had a few modifications.        OBJECTIVE:    Observation:    Test measurements:       AROM Left  Right   Comments   Cervical Flexion       42   Cervical Extension       40    Cervical Side bend 50 46        Cervical Rotation 58 50                      Mobility Testing  Comments   Cervical Downglides  Normal   Cervical Flexion mobility     Thoracic PA mobility  Hypomobile - L Rib mobility  Hypomobile - L only             Red Flags           Positive    Positive    Headaches (worst patient has ever had) Pos Altered consciousness Neg   Dizziness Neg Radiating Pain Neg   Diplopia Neg Gait disturbances Neg   Dysphagia Neg Multi-segmental weakness Neg   Dysarthria Neg Tru/quad paresis Neg   Drop Attacks Neg Fracture Neg   Facial symptoms  Neg Numbness Neg   Nystagmus Neg Nausea  Neg      Joint mobility:               []?Normal                      [x]? Hypo              []?Hyper     Palpation:      Functional Mobility/Transfers: independent      Posture: guarded upper traps       RESTRICTIONS/PRECAUTIONS: none    Exercises/Interventions:       Therapeutic Ex (14179) Sets/sec Reps Notes/CUES   Bike 5'  Added 9/23   Upper trap stretch 30 sec 3 each side Added 6/11   Lev scap stretch      SCM stretch 30 sec 3  Added 6/17   Thoracic ext    Cervical rotation ROM - pain free ROM          Repeated cervical flexion Serratus punches 3 10 3#, ^7/22         Resisted scapular retraction 3 10 Black TB,  ^10/28   Resisted shoulder ext  3 10 Black TB, ^10/28   Shoulder ER 3 10 Blue  TB, ^ 8/18   Shoulder IR 3 10 Black TB, ^11/4         Shoulder shrug 3  3# ^11/11   Lat pulldowns  3  Black TB, ^10/28         Manual Intervention (56455)      Cerv mobs/manip with suboccipital release  Grade III-V,  12/2   Thoracic mobs/manip  Grade III-V,  12/2   CT manip Rib mobilizations  Grade III-IV, 12/2   IASTM 5'           NMR re-education (33565)   CUES NEEDED         Cervical isometrics (FLex, ext, SB)                               Therapeutic Activity (68238)      Wall posture 10 sec 10 Added 10/21   Standing scaption  3 10  3#, ^11/11   Chin tuck 10 sec 10 Added 6/11   Scapular retraction  Prone T on physioball 3 10 2#, ^12/2   Prone Y on physioball 3102# ^12/2Prone chest lift on physioball 3 10 2# ^12/2         Ulisses Callas on the wall  plank Side planks                 Therapeutic Exercise and NMR EXR  [x] (16808) Provided verbal/tactile cueing for activities related to strengthening, flexibility, endurance, ROM  for improvements in cervical, postural, scapular, scapulothoracic and UE control with self care, reaching, carrying, lifting, house/yardwork, driving/computer work. [x] (88971) Provided verbal/tactile cueing for activities related to improving balance, coordination, kinesthetic sense, posture, motor skill, proprioception  to assist with cervical, scapular, scapulothoracic and UE control with self care, reaching, carrying, lifting, house/yardwork, driving/computer work. Therapeutic Activities:    [] (57325 or 06057) Provided verbal/tactile cueing for activities related to improving balance, coordination, kinesthetic sense, posture, motor skill, proprioception and motor activation to allow for proper function of cervical, scapular, scapulothoracic and UE control with self care, carrying, lifting, driving/computer work.      Home Exercise Program:    [x] (79636) Reviewed/Progressed HEP activities related to strengthening, flexibility, endurance, ROM of cervical, scapular, scapulothoracic and UE control with self care, reaching, carrying, lifting, house/yardwork, driving/computer work  [] (29251) Reviewed/Progressed HEP activities related to improving balance, coordination, kinesthetic sense, posture, motor skill, proprioception of cervical, scapular, scapulothoracic and UE control with self care, reaching, carrying, lifting, house/yardwork, driving/computer work      Manual Treatments:  PROM / STM / Oscillations-Mobs:  G-I, II, III, IV (PA's, Inf., Post.)  [x] (00759) Provided manual therapy to mobilize soft tissue/joints of cervical/CT, scapular GHJ and UE for the purpose of decreasing headache, modulating pain, promoting relaxation,  increasing ROM, reducing/eliminating soft tissue swelling/inflammation/restriction, improving soft tissue extensibility and allowing for proper ROM for normal function with self care, reaching, carrying, lifting, house/yardwork, driving/computer work    Instrument Assisted Soft Tissue Mobilization (IASTM): was applied to the following muscles: L upper trap, with [employer] including multi-tool. Treatment consisted of IASTM strokes including sweeping, fanning, brushing, strumming, filleting, pinning and framing, based on body region contours, nature of the soft tissue restriction and desired treatment outcomes. These techniques were used to restore neurophysiology, improve mechanotransduction, enhance fluid dynamics and break collagen crosslinks. The treatment area was exposed and the patient was draped in an appropriate manner. Upon completion the clinician cleaned the IASTM tools as per [facility] recommendations. Skin check pre: normal   Skin check post: redness in tx area, skin intact  Intermittent tx time: 5'    Modalities: CP -15' 12/9   [] GAME READY (VASO)- for significant edema, swelling, pain control. Charges:  Timed Code Treatment Minutes: 48'   Total Treatment Minutes: 76'      [] EVAL (LOW) 455 1011 (typically 20 minutes face-to-face)  [] EVAL (MOD) 84820 (typically 30 minutes face-to-face)  [] EVAL (HIGH) 11982 (typically 45 minutes face-to-face)  [] RE-EVAL     [x] LF(77822) x  1  [] IONTO  [] NMR (92672) x   [] VASO  [] Manual (37104) x       [] Other:  [x] TA x 2   [] Mech Traction (95241)  [] ES(attended) (54308)      [] ES (un) (97638):        ASSESSMENT:      GOALS:12/2  Patient stated goal: pain relief and gain strength    [x] Progressing: [] Met: [] Not Met: [] Adjusted    Therapist goals for Patient:   Short Term Goals: To be achieved in: 2 weeks  1. Independent in HEP and progression per patient tolerance, in order to prevent re-injury. [] Progressing: [x] Met: [] Not Met: [] Adjusted   2. Patient will have a decrease in pain to facilitate improvement in movement, function, and ADLs as indicated by Functional Deficits.     [] Progressing: [x] Met: [] Not Met: [] Adjusted    Long Term Goals: To be achieved in: 4-6 weeks  1. Disability index score of 28% or less for the NDI to assist with reaching prior level of function. [] Progressing: [x] Met: [] Not Met: [] Adjusted  2. Patient will demonstrate increased cervical flexion and ext  AROM to greater than or equal to 45 deg,  to allow for proper joint functioning as indicated by patients Functional Deficits. [] Progressing: [x] Met - partially: [] Not Met: [] Adjusted  3. Patient will return to ADLs and work without increased symptoms or restriction. [x] Progressing: [] Met: [] Not Met: [] Adjusted  4. Patient will report returning to independent workouts pain free. [x] Progressing: [] Met: [] Not Met: [] Adjusted     Overall Progression Towards Functional goals/ Treatment Progress Update:  [x] Patient is progressing as expected towards functional goals listed. [] Progression is slowed due to complexities/Impairments listed. [] Progression has been slowed due to co-morbidities. [] Plan just implemented, too soon to assess goals progression <30days   [] Goals require adjustment due to lack of progress  [] Patient is not progressing as expected and requires additional follow up with physician  [] Other    Prognosis for POC: [x] Good [] Fair  [] Poor      Patient requires continued skilled intervention: [x] Yes  [] No    Treatment/Activity Tolerance:  [x] Patient tolerated treatment well [] Patient limited by fatique  [] Patient limited by pain  [] Patient limited by other medical complications  [] Other: Pt tolerated session well. PT observes tightness in L upper trap with IASTM. Pt reports fatigue with prone T's but no pain. Pt reports difficulty with prone Y's and chest lift d/t fatigue. 12/9    Patient education: Patient education on PT and plan of care including diagnosis, prognosis, treatment goals and options. Patient agrees with discussed POC and treatment and is aware of rehab process. 6/11    Next Session:     PLAN: 1x/ every week until MD visit on 12/16  (12/2 - 12/16)   [x] Continue per plan of care [] Alter current plan (see comments above)  [] Plan of care initiated [] Hold pending MD visit [] Discharge    Electronically signed by: Gomez Mirza, student physical therapist  Therapist was present, directed the patient's care, made skilled judgement, and was responsible for assessment and treatment of the patient. Karina Calix, PT, DPT        Note: If patient does not return for scheduled/ recommended follow up visits, this note will serve as a discharge from care along with most recent update on progress.

## 2020-12-16 ENCOUNTER — HOSPITAL ENCOUNTER (OUTPATIENT)
Dept: PHYSICAL THERAPY | Age: 60
Setting detail: THERAPIES SERIES
Discharge: HOME | End: 2020-12-16
Payer: COMMERCIAL

## 2020-12-16 ENCOUNTER — OFFICE VISIT (OUTPATIENT)
Dept: ORTHOPEDIC SURGERY | Age: 60
End: 2020-12-16
Payer: COMMERCIAL

## 2020-12-16 VITALS — RESPIRATION RATE: 12 BRPM | HEIGHT: 62 IN | BODY MASS INDEX: 24.11 KG/M2 | WEIGHT: 131 LBS | TEMPERATURE: 95.5 F

## 2020-12-16 PROCEDURE — 97110 THERAPEUTIC EXERCISES: CPT | Performed by: PHYSICAL THERAPIST

## 2020-12-16 PROCEDURE — 99214 OFFICE O/P EST MOD 30 MIN: CPT | Performed by: STUDENT IN AN ORGANIZED HEALTH CARE EDUCATION/TRAINING PROGRAM

## 2020-12-16 PROCEDURE — 97140 MANUAL THERAPY 1/> REGIONS: CPT | Performed by: PHYSICAL THERAPIST

## 2020-12-16 RX ORDER — GABAPENTIN 100 MG/1
100 CAPSULE ORAL NIGHTLY
Qty: 30 CAPSULE | Refills: 0 | Status: SHIPPED | OUTPATIENT
Start: 2020-12-16 | End: 2021-01-11 | Stop reason: SDUPTHER

## 2020-12-16 RX ORDER — TIZANIDINE HYDROCHLORIDE 2 MG/1
2 CAPSULE, GELATIN COATED ORAL EVERY EVENING
Qty: 30 CAPSULE | Refills: 0 | Status: SHIPPED | OUTPATIENT
Start: 2020-12-16 | End: 2021-01-11 | Stop reason: SDUPTHER

## 2020-12-16 NOTE — PROGRESS NOTES
Follow up: [place] Number  1960  [id number]      CHIEF COMPLAINT:    Chief Complaint   Patient presents with    Neck Pain     F/u CSP         HISTORY OF PRESENT ILLNESS:  Ms. Tye Ordoñez is a 64 y.o. female returns for a follow up visit for multiple medical problems. Her current presenting problems are   1. Cervical spondylosis without myelopathy    2. DDD (degenerative disc disease), cervical    3. Foraminal stenosis of cervical region    4. Myofascial muscle pain    . As per information/history obtained from the PADT(patient assessment and documentation tool) - She complains of pain in the neck with radiation to the head She rates the pain 2/10 and describes it as aching. Pain is made worse by: movement, PPE at work. She denies side effects from the current pain regimen. Patient reports that since the last follow up visit the physical functioning is unchanged, family/social relationships are unchanged, mood is unchanged and sleep patterns are worse, and that the overall functioning is worse. Patient denies neurological bowel or bladder. Ms. Tye Ordoñez presents in follow-up of ongoing left-sided neck pain. 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 She states recently work has been more stressful and the [address] protection that she wears on her head has been causing her neck pain to worsen. She states over the last few weeks she has had returning headaches and nausea. She is seeing her neurologist for the headaches and they have increased her dose of Depakote. The patient states lying down, physical therapy and stretching does help with her symptoms. She has continued to attend physical therapy where they have began using the [place]'s technique. She states this is helping however she feels her pain is returning to preprocedure levels.   She continues to take the meloxicam and Tylenol which does help with her pain. Associated signs and symptoms:   Neurogenic bowel or bladder symptoms:  no   Perceived weakness:  no   Difficulty walking:  no              Past Medical History:   Past Medical History:   Diagnosis Date    Abnormal mammogram 6/8/2011    saw Dr. Neville wood or associate 6/2011 Ok to return for regular mammogram's     Anxiety 6/8/2011    Hyperlipidemia     Kidney stone     Medical history reviewed with no changes     Pyelonephritis 7/28/2011    Recurrent Change to augmentin and levaquin and ck blood cx's 7/27/2011 On daily macrobid per Dr. Milner Bach disorder, acute 3/23/2013    Due to 's infidelity and leaving her 3/2013 To see counselor- increase celexa   still living w the family as of 6/2013       Past Surgical History:     Past Surgical History:   Procedure Laterality Date    CARPAL TUNNEL RELEASE  12/29/11    Left    CARPAL TUNNEL RELEASE Right 12-    CYSTOSCOPY      with stent placement-x3    PAIN MANAGEMENT PROCEDURE Left 8/26/2020    LEFT C2, THIRD OCCIPITAL NERVE, C3, C4, C5 MEDIAL BRANCH BLOCK WITH FLUOROSCOPY (68348, 14835)  #1 performed by Afia Vizcarra MD at [address] [place] [address] Left 9/9/2020    LEFT C2,TON,C3,C4 C5 MEDIAL BRANCH BLOCKS WITH FLUOROSCOPY performed by Afia Vizcarra MD at [address] [place] [address]  10/07/2020    PAIN MANAGEMENT PROCEDURE Left 10/7/2020    LEFT C2,TON,C3,C4,C5 RADIOFREQUENCY ABLATION WITH FLUOROSCOPY performed by Afia Vizcarra MD at [address]      L maxillary gland removal     Current Medications:     Current Outpatient Medications:     tiZANidine (ZANAFLEX) 2 MG capsule, Take 1 capsule by mouth every evening, Disp: 30 capsule, Rfl: 0    gabapentin (NEURONTIN) 100 MG capsule, Take 1 capsule by mouth nightly for 30 days.  Intended supply: 30 days, Disp: 30 capsule, Rfl: 0    potassium chloride  Heart Disease Other     High Blood Pressure Other        REVIEW OF SYSTEMS:   CONSTITUTIONAL: Denies unexplained weight loss, fevers, chills or fatigue  NEUROLOGICAL: Denies unsteady gait or progressive weakness  MUSCULOSKELETAL: Denies joint swelling or redness  GI: Denies nausea, vomiting, diarrhea   : Denies bowel or bladder issues       PHYSICAL EXAM:    Vitals: Temperature 95.5 °F (35.3 °C), resp. rate 12, height 5' 2\" (1.575 m), weight 131 lb (59.4 kg), last menstrual period 10/07/2012. GENERAL EXAM:  · General Apparence: Patient is adequately groomed with no evidence of malnutrition. · Psychiatric: Orientation: The patient is oriented to time, place and person. The patient's mood and affect are appropriate   · Vascular: Examination reveals no swelling and palpation reveals no tenderness in upper or lower extremities. Good capillary refill. · The lymphatic examination of the neck, axillae and groin reveals all areas to be without enlargement or induration  · Sensation is intact without deficit in the upper and lower extremities to light touch and pinprick  · Coordination of the upper and lower extremities are normal.    CERVICAL EXAMINATION:  · Inspection: Local inspection shows no step-off or bruising. Cervical alignment is normal. No instability is noted. · Palpation and Percussion: No evidence of tenderness at the midline. Paraspinal tenderness is not present. There is no paraspinal spasm. Skin:There are no rashes, ulcerations or lesions  · Range of Motion:  limited by 25% in all planes due to pain especially with cervical rotation to the left  · Strength: 5/5 bilateral upper extremities  · Special Tests:   Spurling's and Nguyen's are negative bilaterally. Metcalf and Impingement tests are negative bilaterally. · Skin:There are no rashes, ulcerations or lesions in right & left upper extremities. · Reflexes: Bilaterally triceps, biceps and brachioradialis are 1+.   Clonus absent bilaterally at the feet. No pathological reflexes are noted. · Gait & station: normal, patient ambulates without assistance  · Additional Examinations:  · RIGHT UPPER EXTREMITY:  Inspection/examination of the right upper extremity does not show any tenderness, deformity or injury. Range of motion is unremarkable and pain-free. There is no gross instability. There are no rashes, ulcerations or lesions. Strength and tone are normal. No atrophy or abnormal movements are noted. · LEFT UPPER EXTREMITY: Inspection/examination of the left upper extremity does not show any tenderness, deformity or injury. Range of motion is unremarkable and pain-free. There is no gross instability. There are no rashes, ulcerations or lesions. Strength and tone are normal. No atrophy or abnormal movements are noted. · RIGHT LOWER EXTREMITY: Inspection/examination of the right lower extremity does not show any tenderness, deformity or injury. Range of motion is normal and pain-free. There is no gross instability. There are no rashes, ulcerations or lesions. Strength and tone are normal. No atrophy or abnormal movements are noted. · LEFT LOWER EXTREMITY:  Inspection/examination of the left lower extremity does not show any tenderness, deformity or injury. Range of motion is normal and pain-free. There is no gross instability. There are no rashes, ulcerations or lesions. Strength and tone are normal. No atrophy or abnormal movements are noted. Diagnostic Testing:    No new diagnostics  Results for orders placed or performed in visit on 09/17/20   Valproic acid level, total   Result Value Ref Range    Valproic Acid Lvl 28.4 (L) 50.0 - 100.0 ug/mL     Impression:       1. Cervical spondylosis without myelopathy    2. DDD (degenerative disc disease), cervical    3. Foraminal stenosis of cervical region    4.  Myofascial muscle pain        Plan:  Clinical Course: Above diagnoses are worsening    I discussed the diagnosis and the was checked for errors. It is possible that there are still dictated errors within this office note. If so, please bring any errors to my attention for an addendum. All efforts were made to ensure that this office note is accurate.

## 2020-12-16 NOTE — PLAN OF CARE
Physical Therapy Treatment Note/ Progress Report:       Date:  2020    Patient Name:  Arley Johnson    :  1960  MRN: [id number]  Restrictions/Precautions:    Medical/Treatment Diagnosis Information:  Diagnosis: Neck pain - M54.2  Treatment Diagnosis: decreased ADL status, ROM, strength, and posture  Insurance/Certification information:  PT Insurance Information: Med mutual  Physician Information:  Referring Practitioner: Dr. Cassandra Gale  Has the plan of care been signed (Y/N):        []  Yes  [x]  No     Date of Patient follow up with Physician:       Is this a Progress Report:     [x]  Yes  []  No        Progress report/ Recertification will be due (10 Rx or 30 days whichever is less): 2021      Visit # Insurance Allowable Auth Required   27 BMN []  Yes []  No        Functional Scale:   NDI   - 56% limitation   15 - 32% limitation   - 20% limitation  9/10 - 30% limitation     - 16% limitation     - 20% limitation    - 24% limitation      Latex Allergy:  [x]NO      []YES  Preferred Language for Healthcare:   [x]English       []other:      Pain level:  1/10 12/16    SUBJECTIVE:  Pt states her pain over the weekend was on/off. She states headaches are back on/off. The headaches started a couple weeks ago but is noticing them more consistently now. She states the pain is where it started in her neck. Monday morning she woke up with a sharp pain. She states she has a follow up appointment today. She saw her neurologist last week, who increased her medication dose for one week. She states she wore the helmet all weekend. States still no \"webbing\" sensation. She states driving this morning she felt more tense in the shoulders.     OBJECTIVE:   ?  Observation:   ? Test measurements:       AROM Left  Right   Comments   Cervical Flexion       40   Cervical Extension       32    Cervical Side bend 50 32       Cervical Rotation 39 41                     Mobility Testing  Comments   Cervical Downglides  Hypomobile- L   Cervical Flexion mobility     Thoracic PA mobility  Hypomobile - L    Rib mobility  Hypomobile - L only             Red Flags           Positive    Positive    Headaches (worst patient has ever had) Pos Altered consciousness Neg   Dizziness Neg Radiating Pain Neg   Diplopia Neg Gait disturbances Neg   Dysphagia Neg Multi-segmental weakness Neg   Dysarthria Neg Tru/quad paresis Neg   Drop Attacks Neg Fracture Neg   Facial symptoms  Neg Numbness Neg   Nystagmus Neg Nausea  Pos      Joint mobility:               []?Normal                      [x]? Hypo              []?Hyper     Palpation: L upper trap     Functional Mobility/Transfers: independent      Posture: guarded upper traps       RESTRICTIONS/PRECAUTIONS: none    Exercises/Interventions:       Therapeutic Ex (79403) Sets/sec Reps Notes/CUES   Bike 5'  Added 9/23   Upper trap stretch 30 sec 3 each side Added 6/11   Lev scap stretch      SCM stretch 30 sec 3  Added 6/17   Thoracic ext    Cervical rotation ROM - pain free ROM          Repeated cervical flexion       Resisted scapular retraction 3 10 Black TB,  ^10/28   Resisted shoulder ext  3 10 Black TB, ^10/28   Shoulder ER 3 10 Blue  TB, ^ 8/18   Shoulder IR 3 10 Black TB, ^11/4         Shoulder shrug   3# ^11/11   Lat pulldowns       Manual Intervention (58700)      Cerv mobs/manip with suboccipital release 2'  Grade III-V,  12/2   Thoracic mobs/manip 4'  Grade III-V,  12/2   CT manip Rib mobilizations 2'  Grade III-IV, 12/2   IASTM 5'           NMR re-education (44879)   CUES NEEDED         Cervical isometrics (FLex, ext, SB)                               Therapeutic Activity (42380)      Wall posture 10 sec 10 Added 10/21   Standing scaption  3#, ^11/11   Chin tuck 10 sec 10 Added 6/11   Scapular retraction  Prone T on physioball Prone Y on physioball Prone chest lift on physioball       Downdog Ball on the wall  plank Side planks Therapeutic Exercise and NMR EXR  [x] (21033) Provided verbal/tactile cueing for activities related to strengthening, flexibility, endurance, ROM  for improvements in cervical, postural, scapular, scapulothoracic and UE control with self care, reaching, carrying, lifting, house/yardwork, driving/computer work. [x] (94340) Provided verbal/tactile cueing for activities related to improving balance, coordination, kinesthetic sense, posture, motor skill, proprioception  to assist with cervical, scapular, scapulothoracic and UE control with self care, reaching, carrying, lifting, house/yardwork, driving/computer work. Therapeutic Activities:    [] (29827 or 11643) Provided verbal/tactile cueing for activities related to improving balance, coordination, kinesthetic sense, posture, motor skill, proprioception and motor activation to allow for proper function of cervical, scapular, scapulothoracic and UE control with self care, carrying, lifting, driving/computer work.      Home Exercise Program:    [x] (06919) Reviewed/Progressed HEP activities related to strengthening, flexibility, endurance, ROM of cervical, scapular, scapulothoracic and UE control with self care, reaching, carrying, lifting, house/yardwork, driving/computer work  [] (18033) Reviewed/Progressed HEP activities related to improving balance, coordination, kinesthetic sense, posture, motor skill, proprioception of cervical, scapular, scapulothoracic and UE control with self care, reaching, carrying, lifting, house/yardwork, driving/computer work      Manual Treatments:  PROM / STM / Oscillations-Mobs:  G-I, II, III, IV (PA's, Inf., Post.) Therapist goals for Patient:   Short Term Goals: To be achieved in: 2 weeks  1. Independent in HEP and progression per patient tolerance, in order to prevent re-injury. [] Progressing: [x] Met: [] Not Met: [] Adjusted   2. Patient will have a decrease in pain to facilitate improvement in movement, function, and ADLs as indicated by Functional Deficits. [] Progressing: [x] Met: [] Not Met: [] Adjusted    Long Term Goals: To be achieved in: 4-6 weeks  1. Disability index score of 28% or less for the NDI to assist with reaching prior level of function. [] Progressing: [x] Met: [] Not Met: [] Adjusted  2. Patient will demonstrate increased cervical flexion and ext  AROM to greater than or equal to 45 deg,  to allow for proper joint functioning as indicated by patients Functional Deficits. [x] Progressing: [] Met: [] Not Met: [] Adjusted  3. Patient will return to ADLs and work without increased symptoms or restriction. [x] Progressing: [] Met: [] Not Met: [] Adjusted  4. Patient will report returning to independent workouts pain free. [x] Progressing: [] Met: [] Not Met: [] Adjusted     Overall Progression Towards Functional goals/ Treatment Progress Update:  [x] Patient is progressing as expected towards functional goals listed. [] Progression is slowed due to complexities/Impairments listed. [] Progression has been slowed due to co-morbidities.   [] Plan just implemented, too soon to assess goals progression <30days   [] Goals require adjustment due to lack of progress  [] Patient is not progressing as expected and requires additional follow up with physician  [] Other    Prognosis for POC: [x] Good [] Fair  [] Poor      Patient requires continued skilled intervention: [x] Yes  [] No    Treatment/Activity Tolerance:  [x] Patient tolerated treatment well [] Patient limited by fatique  [] Patient limited by pain  [] Patient limited by other medical complications [] Other: Pt demonstrates decreased cervical flexion, ext, R side bending, and bilat rotation ROM. Pt continues to report headaches but is now also experiencing nausea. Pt observes hypomobility with L cervical downglides, L rib mobility, and L thoracic PA glides. PT observes increase thoracic PA glide after thoracic mob/manip. Pt tolerated IASTM to L upper trap. Pt reports shoulder soreness with exercises today. Recommends pt continue PT 1x per week for 4 weeks to continue to improve cervical ROM and thoracic and cervical mobility d/t recent increase in symptoms reported by pt and decreased ROM and mobility. Pt to continue HEP.     12/16    Patient education: Patient education on PT and plan of care including diagnosis, prognosis, treatment goals and options. Patient agrees with discussed POC and treatment and is aware of rehab process. 6/11    Next Session:     PLAN: 1x/ per week for 4 weeks  (12/16/20 - 1/13/21)   [x] Continue per plan of care [] Alter current plan (see comments above)  [] Plan of care initiated [] Hold pending MD visit [] Discharge    Electronically signed by: Teresa Howard, student physical therapist  Therapist was present, directed the patient's care, made skilled judgement, and was responsible for assessment and treatment of the patient. Kuldip Frey, PT, DPT        Note: If patient does not return for scheduled/ recommended follow up visits, this note will serve as a discharge from care along with most recent update on progress.

## 2020-12-18 RX ORDER — CITALOPRAM 10 MG/1
TABLET ORAL
Qty: 180 TABLET | Refills: 3 | Status: SHIPPED | OUTPATIENT
Start: 2020-12-18 | End: 2022-02-03 | Stop reason: SDUPTHER

## 2020-12-23 ENCOUNTER — HOSPITAL ENCOUNTER (OUTPATIENT)
Dept: PHYSICAL THERAPY | Age: 60
Setting detail: THERAPIES SERIES
Discharge: HOME | End: 2020-12-23
Payer: COMMERCIAL

## 2020-12-23 PROCEDURE — 97140 MANUAL THERAPY 1/> REGIONS: CPT | Performed by: PHYSICAL THERAPIST

## 2020-12-23 PROCEDURE — 97530 THERAPEUTIC ACTIVITIES: CPT | Performed by: PHYSICAL THERAPIST

## 2020-12-23 PROCEDURE — 97110 THERAPEUTIC EXERCISES: CPT | Performed by: PHYSICAL THERAPIST

## 2020-12-23 NOTE — FLOWSHEET NOTE
[facility], [address] [place], [address]  Phone: [phone]   Fax: [phone]      Physical Therapy Treatment Note/ Progress Report:       Date:  2020    Patient Name:  Cristiana Ram    :  1960  MRN: [id number]  Restrictions/Precautions:    Medical/Treatment Diagnosis Information:  Diagnosis: Neck pain - M54.2  Treatment Diagnosis: decreased ADL status, ROM, strength, and posture  Insurance/Certification information:  PT Insurance Information: Med mutual  Physician Information:  Referring Practitioner: Dr. Cathy Villanueva  Has the plan of care been signed (Y/N):        []  Yes  [x]  No     Date of Patient follow up with Physician:       Is this a Progress Report:     [x]  Yes  []  No        Progress report/ Recertification will be due (10 Rx or 30 days whichever is less): 2021      Visit # Insurance Allowable Auth Required   28 BMN []  Yes []  No        Functional Scale:   NDI  /11 - 56% limitation   15 - 32% limitation   - 20% limitation  9/10 - 30% limitation    / - 16% limitation    / - 20% limitation   16 - 24% limitation      Latex Allergy:  [x]NO      []YES  Preferred Language for Healthcare:   [x]English       []other:      Pain level:  1 /10 12/23    SUBJECTIVE:  Pt states she saw the MD after last appt. She added some new medications, which she thinks are helping. She can't take the medication when he works. She states minimal neck pain today and a little shoulder pain today.     OBJECTIVE:   ?  Observation:   ? Test measurements:       AROM Left  Right   Comments   Cervical Flexion       40   Cervical Extension       32    Cervical Side bend 50 32       Cervical Rotation 39 41                      Mobility Testing  Comments   Cervical Downglides  Hypomobile- L   Cervical Flexion mobility     Thoracic PA mobility  Hypomobile - L    Rib mobility  Hypomobile - L only           Red Flags           Positive    Positive    Headaches (worst patient has ever had) Pos Altered consciousness Neg   Dizziness Neg Radiating Pain Neg   Diplopia Neg Gait disturbances Neg   Dysphagia Neg Multi-segmental weakness Neg   Dysarthria Neg Tru/quad paresis Neg   Drop Attacks Neg Fracture Neg   Facial symptoms  Neg Numbness Neg   Nystagmus Neg Nausea  Pos      Joint mobility:               []?Normal                      [x]? Hypo              []?Hyper     Palpation: L upper trap     Functional Mobility/Transfers: independent      Posture: guarded upper traps       RESTRICTIONS/PRECAUTIONS: none    Exercises/Interventions:       Therapeutic Ex (52491) Sets/sec Reps Notes/CUES   Bike 5'  Added 9/23   Upper trap stretch 30 sec 3 each side Added 6/11   Lev scap stretch      SCM stretch Added 6/17   Thoracic ext    Cervical rotation ROM - pain free ROM          Repeated cervical flexion       Resisted scapular retraction 3 10 Black TB,  ^10/28   Resisted shoulder ext  3 10 Black TB, ^10/28   Shoulder ER 3 10 Blue  TB, ^ 8/18   Shoulder IR 3 10 Black TB, ^11/4         Shoulder shrug   3# ^11/11   Lat pulldowns       Manual Intervention (26693)      Cerv mobs/manip with suboccipital release 5'  Grade III-V,  12/23   Thoracic mobs/manip 4'  Grade III-V,  12/23   CT manip Rib mobilizations 2'  Grade III-IV,12/23   IASTM     STM to upper trap 2'  12/23   NMR re-education (17451)   CUES NEEDED         Cervical isometrics (FLex, ext, SB)                               Therapeutic Activity (02343)      Wall posture 10 sec 10 Added 10/21   Standing scaption  3#, ^11/11   Chin tuck 10 sec 10 Added 6/11   Scapular retraction  Prone T on physioball 3 10 2#, ^12/2   Prone Y on physioball 3 10 2# ^12/2   Prone chest lift on physioball       Modified planks with rows  3 10 Added 12/23         Downdog Ball on the wall  3 10 4-way, 2# ball, ^11/11   plank Side planks                 Therapeutic Exercise and NMR EXR [x] (92546) Provided verbal/tactile cueing for activities related to strengthening, flexibility, endurance, ROM  for improvements in cervical, postural, scapular, scapulothoracic and UE control with self care, reaching, carrying, lifting, house/yardwork, driving/computer work. [x] (04648) Provided verbal/tactile cueing for activities related to improving balance, coordination, kinesthetic sense, posture, motor skill, proprioception  to assist with cervical, scapular, scapulothoracic and UE control with self care, reaching, carrying, lifting, house/yardwork, driving/computer work. Therapeutic Activities:    [] (73126 or 48947) Provided verbal/tactile cueing for activities related to improving balance, coordination, kinesthetic sense, posture, motor skill, proprioception and motor activation to allow for proper function of cervical, scapular, scapulothoracic and UE control with self care, carrying, lifting, driving/computer work.      Home Exercise Program:    [x] (13477) Reviewed/Progressed HEP activities related to strengthening, flexibility, endurance, ROM of cervical, scapular, scapulothoracic and UE control with self care, reaching, carrying, lifting, house/yardwork, driving/computer work  [] (96847) Reviewed/Progressed HEP activities related to improving balance, coordination, kinesthetic sense, posture, motor skill, proprioception of cervical, scapular, scapulothoracic and UE control with self care, reaching, carrying, lifting, house/yardwork, driving/computer work      Manual Treatments:  PROM / STM / Oscillations-Mobs:  G-I, II, III, IV (PA's, Inf., Post.) [x] Progressing: [] Met: [] Not Met: [] Adjusted  3. Patient will return to ADLs and work without increased symptoms or restriction. [x] Progressing: [] Met: [] Not Met: [] Adjusted  4. Patient will report returning to independent workouts pain free. [x] Progressing: [] Met: [] Not Met: [] Adjusted     Overall Progression Towards Functional goals/ Treatment Progress Update:  [x] Patient is progressing as expected towards functional goals listed. [] Progression is slowed due to complexities/Impairments listed. [] Progression has been slowed due to co-morbidities. [] Plan just implemented, too soon to assess goals progression <30days   [] Goals require adjustment due to lack of progress  [] Patient is not progressing as expected and requires additional follow up with physician  [] Other    Prognosis for POC: [x] Good [] Fair  [] Poor      Patient requires continued skilled intervention: [x] Yes  [] No    Treatment/Activity Tolerance:  [x] Patient tolerated treatment well [] Patient limited by fatique  [] Patient limited by pain  [] Patient limited by other medical complications  [] Other: Pt rox session well. PT observed tightness with STM in L upper trap, pt reporting soreness. She rox the addition of planks with rows pain free. She    12/23    Patient education: Patient education on PT and plan of care including diagnosis, prognosis, treatment goals and options. Patient agrees with discussed POC and treatment and is aware of rehab process. 6/11    Next Session:     PLAN: 1x/ per week for 4 weeks  (12/16/20 - 1/13/21)   [x] Continue per plan of care [] Alter current plan (see comments above)  [] Plan of care initiated [] Hold pending MD visit [] Discharge    Electronically signed by:      Jennifer Tang, PT, DPT        Note: If patient does not return for scheduled/ recommended follow up visits, this note will serve as a discharge from care along with most recent update on progress.

## 2020-12-30 ENCOUNTER — HOSPITAL ENCOUNTER (OUTPATIENT)
Dept: PHYSICAL THERAPY | Age: 60
Setting detail: THERAPIES SERIES
Discharge: HOME | End: 2020-12-30
Payer: COMMERCIAL

## 2020-12-30 PROCEDURE — 97530 THERAPEUTIC ACTIVITIES: CPT | Performed by: PHYSICAL THERAPIST

## 2020-12-30 PROCEDURE — 97140 MANUAL THERAPY 1/> REGIONS: CPT | Performed by: PHYSICAL THERAPIST

## 2020-12-30 PROCEDURE — 97110 THERAPEUTIC EXERCISES: CPT | Performed by: PHYSICAL THERAPIST

## 2020-12-30 NOTE — FLOWSHEET NOTE
[facility] 79, [address] [place], [address]  Phone: [phone]   Fax: [phone]      Physical Therapy Treatment Note/ Progress Report:       Date:  2020    Patient Name:  Lakshmi Richards    :  1960  MRN: [id number]  Restrictions/Precautions:    Medical/Treatment Diagnosis Information:  Diagnosis: Neck pain - M54.2  Treatment Diagnosis: decreased ADL status, ROM, strength, and posture  Insurance/Certification information:  PT Insurance Information: Med mutual  Physician Information:  Referring Practitioner: Dr. Kayla Zhou  Has the plan of care been signed (Y/N):        []  Yes  [x]  No     Date of Patient follow up with Physician:       Is this a Progress Report:     [x]  Yes  []  No        Progress report/ Recertification will be due (10 Rx or 30 days whichever is less): 2021      Visit # Insurance Allowable Auth Required   29 BMN []  Yes []  No        Functional Scale:   NDI  6/11 - 56% limitation   7/15 - 32% limitation  8/12 - 20% limitation  9/10 - 30% limitation    11/4 - 16% limitation    12/ - 20% limitation   16 - 24% limitation      Latex Allergy:  [x]NO      []YES  Preferred Language for Healthcare:   [x]English       []other:      Pain level:  0 /10     SUBJECTIVE:  Pt states no pain when arriving. 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 She states by the end of her shift, she had fatigue, but denies pain.     OBJECTIVE:   ?  Observation:   ? Test measurements:       AROM Left  Right   Comments   Cervical Flexion       40   Cervical Extension       32    Cervical Side bend 50 32       Cervical Rotation 39 41                      Mobility Testing  Comments   Cervical Downglides  Hypomobile- L   Cervical Flexion mobility     Thoracic PA mobility  Hypomobile - L    Rib mobility  Hypomobile - L only             Red Flags         Positive    Positive    Headaches (worst patient has ever had) Pos Altered consciousness Neg   Dizziness Neg Radiating Pain Neg   Diplopia Neg Gait disturbances Neg   Dysphagia Neg Multi-segmental weakness Neg   Dysarthria Neg Tru/quad paresis Neg   Drop Attacks Neg Fracture Neg   Facial symptoms  Neg Numbness Neg   Nystagmus Neg Nausea  Pos      Joint mobility:               []?Normal                      [x]? Hypo              []?Hyper     Palpation: L upper trap     Functional Mobility/Transfers: independent      Posture: guarded upper traps       RESTRICTIONS/PRECAUTIONS: none    Exercises/Interventions:       Therapeutic Ex (81374) Sets/sec Reps Notes/CUES   Bike 5'  Added 9/23   Upper trap stretch 30 sec 3 each side Added 6/11   Lev scap stretch      SCM stretch Added 6/17   Thoracic ext    Cervical rotation ROM - pain free ROM          Repeated cervical flexion       Resisted scapular retraction 3 10 Black TB,  ^10/28   Resisted shoulder ext  3 10 Black TB, ^10/28   Shoulder ER 3 10 Blue  TB, ^ 8/18   Shoulder IR 3 10 Black TB, ^11/4         Shoulder shrug   3# ^11/11   Lat pulldowns       Manual Intervention (72262)      Cerv mobs/manip with suboccipital release 5'  Grade III-V,  12/30   Thoracic mobs/manip CT manip Rib mobilizations 2'  Grade III-IV,12/30   IASTM     STM to upper trap 2'  12/30         NMR re-education (94888)   CUES NEEDED         Cervical isometrics (FLex, ext, SB)                               Therapeutic Activity (05757)      Wall posture 10 sec 10 Added 10/21   Standing scaption  3#, ^11/11   Chin tuck 10 sec 10 Added 6/11   Scapular retraction  Prone T on physioball 3 10 2#, ^12/2   Prone Y on physioball 3 10 2# ^12/2   Prone chest lift on physioball       Modified planks with rows  3 10 5#, Added 12/23         Downdog Ball on the wall  3 10 4-way, 2# ball, ^11/11   plank Side planks                 Therapeutic Exercise and NMR EXR [x] (20889) Provided verbal/tactile cueing for activities related to strengthening, flexibility, endurance, ROM  for improvements in cervical, postural, scapular, scapulothoracic and UE control with self care, reaching, carrying, lifting, house/yardwork, driving/computer work. [x] (22727) Provided verbal/tactile cueing for activities related to improving balance, coordination, kinesthetic sense, posture, motor skill, proprioception  to assist with cervical, scapular, scapulothoracic and UE control with self care, reaching, carrying, lifting, house/yardwork, driving/computer work. Therapeutic Activities:    [] (73162 or 99239) Provided verbal/tactile cueing for activities related to improving balance, coordination, kinesthetic sense, posture, motor skill, proprioception and motor activation to allow for proper function of cervical, scapular, scapulothoracic and UE control with self care, carrying, lifting, driving/computer work.      Home Exercise Program:    [x] (87477) Reviewed/Progressed HEP activities related to strengthening, flexibility, endurance, ROM of cervical, scapular, scapulothoracic and UE control with self care, reaching, carrying, lifting, house/yardwork, driving/computer work  [] (52516) Reviewed/Progressed HEP activities related to improving balance, coordination, kinesthetic sense, posture, motor skill, proprioception of cervical, scapular, scapulothoracic and UE control with self care, reaching, carrying, lifting, house/yardwork, driving/computer work      Manual Treatments:  PROM / STM / Oscillations-Mobs:  G-I, II, III, IV (PA's, Inf., Post.) [x] (14520) Provided manual therapy to mobilize soft tissue/joints of cervical/CT, scapular GHJ and UE for the purpose of decreasing headache, modulating pain, promoting relaxation,  increasing ROM, reducing/eliminating soft tissue swelling/inflammation/restriction, improving soft tissue extensibility and allowing for proper ROM for normal function with self care, reaching, carrying, lifting, house/yardwork, driving/computer work    I    Modalities: CP -15' 12/30   [] GAME READY (VASO)- for significant edema, swelling, pain control. Charges:  Timed Code Treatment Minutes: 48'    Total Treatment Minutes: 76'       [] EVAL (LOW) 455 [id number] (typically 20 minutes face-to-face)  [] EVAL (MOD) 01764 (typically 30 minutes face-to-face)  [] EVAL (HIGH) 85784 (typically 45 minutes face-to-face)  [] RE-EVAL     [x] WF(15364) x  1  [] IONTO  [] NMR (82490) x   [] VASO  [x] Manual (93060) x 1      [] Other:  [x] TA x 1   [] Mech Traction (51730)  [] ES(attended) (64022)      [] ES (un) (34871):        ASSESSMENT:      GOALS:12/16  Patient stated goal: pain relief and gain strength    [x] Progressing: [] Met: [] Not Met: [] Adjusted    Therapist goals for Patient:   Short Term Goals: To be achieved in: 2 weeks  1. Independent in HEP and progression per patient tolerance, in order to prevent re-injury. [] Progressing: [x] Met: [] Not Met: [] Adjusted   2. Patient will have a decrease in pain to facilitate improvement in movement, function, and ADLs as indicated by Functional Deficits. [] Progressing: [x] Met: [] Not Met: [] Adjusted    Long Term Goals: To be achieved in: 4-6 weeks  1. Disability index score of 28% or less for the NDI to assist with reaching prior level of function. [] Progressing: [x] Met: [] Not Met: [] Adjusted  2. Patient will demonstrate increased cervical flexion and ext  AROM to greater than or equal to 45 deg,  to allow for proper joint functioning as indicated by patients Functional Deficits. [x] Progressing: [] Met: [] Not Met: [] Adjusted  3. Patient will return to ADLs and work without increased symptoms or restriction. [x] Progressing: [] Met: [] Not Met: [] Adjusted  4. Patient will report returning to independent workouts pain free. [x] Progressing: [] Met: [] Not Met: [] Adjusted     Overall Progression Towards Functional goals/ Treatment Progress Update:  [x] Patient is progressing as expected towards functional goals listed. [] Progression is slowed due to complexities/Impairments listed. [] Progression has been slowed due to co-morbidities. [] Plan just implemented, too soon to assess goals progression <30days   [] Goals require adjustment due to lack of progress  [] Patient is not progressing as expected and requires additional follow up with physician  [] Other    Prognosis for POC: [x] Good [] Fair  [] Poor      Patient requires continued skilled intervention: [x] Yes  [] No    Treatment/Activity Tolerance:  [x] Patient tolerated treatment well [] Patient limited by fatique  [] Patient limited by pain  [] Patient limited by other medical complications  [] Other: Pt rox session well. She reported tenderness with STM to L upper trap today and reported relief of tightness in neck after completion of STM and mobilizations. 12/30    Patient education: Patient education on PT and plan of care including diagnosis, prognosis, treatment goals and options. Patient agrees with discussed POC and treatment and is aware of rehab process. 6/11    Next Session:     PLAN: 1x/ per week for 4 weeks  (12/16/20 - 1/13/21)   [x] Continue per plan of care [] Alter current plan (see comments above)  [] Plan of care initiated [] Hold pending MD visit [] Discharge    Electronically signed by:      Javi Nettles, PT, DPT        Note: If patient does not return for scheduled/ recommended follow up visits, this note will serve as a discharge from care along with most recent update on progress.

## 2021-01-06 ENCOUNTER — HOSPITAL ENCOUNTER (OUTPATIENT)
Dept: PHYSICAL THERAPY | Age: 61
Setting detail: THERAPIES SERIES
Discharge: HOME | End: 2021-01-06
Payer: COMMERCIAL

## 2021-01-06 PROCEDURE — 97140 MANUAL THERAPY 1/> REGIONS: CPT | Performed by: PHYSICAL THERAPIST

## 2021-01-06 PROCEDURE — 97110 THERAPEUTIC EXERCISES: CPT | Performed by: PHYSICAL THERAPIST

## 2021-01-06 PROCEDURE — 97530 THERAPEUTIC ACTIVITIES: CPT | Performed by: PHYSICAL THERAPIST

## 2021-01-06 NOTE — FLOWSHEET NOTE
[facility], [address] [place], [address]  Phone: [phone]   Fax: [phone]      Physical Therapy Treatment Note/ Progress Report:       Date:  2021    Patient Name:  Matt Sanchez    :  1960  MRN: [id number]  Restrictions/Precautions:    Medical/Treatment Diagnosis Information:  Diagnosis: Neck pain - M54.2  Treatment Diagnosis: decreased ADL status, ROM, strength, and posture  Insurance/Certification information:  PT Insurance Information: Med mutual  Physician Information:  Referring Practitioner: Dr. Adama Talley  Has the plan of care been signed (Y/N):        []  Yes  [x]  No     Date of Patient follow up with Physician:       Is this a Progress Report:     [x]  Yes  []  No        Progress report/ Recertification will be due (10 Rx or 30 days whichever is less): 2021      Visit # Insurance Allowable Auth Required   30 BMN []  Yes []  No        Functional Scale:   NDI   - 56% limitation   15 - 32% limitation  / - 20% limitation  /10 - 30% limitation    / - 16% limitation    / - 20% limitation    - 24% limitation      Latex Allergy:  [x]NO      []YES  Preferred Language for Healthcare:   [x]English       []other:      Pain level: 1 /10 1/6    SUBJECTIVE:  Pt states she is a little sore today. She states she went to her workouts last week and still has to modify some of the exercises.     OBJECTIVE:   ?  Observation:   ? Test measurements:       AROM Left  Right   Comments   Cervical Flexion       40   Cervical Extension       32    Cervical Side bend 50 32       Cervical Rotation 39 41                      Mobility Testing  Comments   Cervical Downglides  Hypomobile- L   Cervical Flexion mobility     Thoracic PA mobility  Hypomobile - L    Rib mobility  Hypomobile - L only             Red Flags           Positive    Positive Headaches (worst patient has ever had) Pos Altered consciousness Neg   Dizziness Neg Radiating Pain Neg   Diplopia Neg Gait disturbances Neg   Dysphagia Neg Multi-segmental weakness Neg   Dysarthria Neg Tru/quad paresis Neg   Drop Attacks Neg Fracture Neg   Facial symptoms  Neg Numbness Neg   Nystagmus Neg Nausea  Pos      Joint mobility:               []?Normal                      [x]? Hypo              []?Hyper     Palpation: L upper trap     Functional Mobility/Transfers: independent      Posture: guarded upper traps       RESTRICTIONS/PRECAUTIONS: none    Exercises/Interventions:       Therapeutic Ex (12389) Sets/sec Reps Notes/CUES   Bike 5'  Added 9/23   Upper trap stretch 30 sec 3 each side Added 6/11   Lev scap stretch      Cervical rotation ROM - pain free ROM                Resisted scapular retraction 3 10 Black TB,  ^10/28   Resisted shoulder ext  3 10 Black TB, ^10/28   Shoulder ER 3 10 Blue  TB, ^ 8/18   Shoulder IR 3 10 Black TB, ^11/4         Shoulder shrug   3# ^11/11   Lat pulldowns       Manual Intervention (12761)      Cerv mobs/manip with suboccipital release 5'  Grade III-V,  1/6   Thoracic mobs/manip CT manip Rib mobilizations 3'  Grade III-IV,1/6   IASTM     STM to upper trap       NMR re-education (73557)   CUES NEEDED         Cervical isometrics (FLex, ext, SB)             Lat pulldown 3 10 5 plates, Added 1/6               Therapeutic Activity (42288)      Wall posture 10 sec 10 Added 10/21   Standing scaption  3#, ^11/11   Chin tuck 10 sec 10 Added 6/11   Scapular retraction  Prone T on physioball 3 10 2#, ^12/2   Prone Y on physioball 3 10 2# ^12/2   Prone chest lift on physioball 3 10 2# ^12/2         Modified planks with rows  3 10 5#, Added 12/23   Wall push-ups 3 10 Added 1/6         Downdog Ball on the wall  3 10 4-way, 2# ball, ^11/11   plank Side planks                 Therapeutic Exercise and NMR EXR [x] (97591) Provided verbal/tactile cueing for activities related to strengthening, flexibility, endurance, ROM  for improvements in cervical, postural, scapular, scapulothoracic and UE control with self care, reaching, carrying, lifting, house/yardwork, driving/computer work. [x] (27528) Provided verbal/tactile cueing for activities related to improving balance, coordination, kinesthetic sense, posture, motor skill, proprioception  to assist with cervical, scapular, scapulothoracic and UE control with self care, reaching, carrying, lifting, house/yardwork, driving/computer work. Therapeutic Activities:    [] (46298 or 29644) Provided verbal/tactile cueing for activities related to improving balance, coordination, kinesthetic sense, posture, motor skill, proprioception and motor activation to allow for proper function of cervical, scapular, scapulothoracic and UE control with self care, carrying, lifting, driving/computer work.      Home Exercise Program:    [x] (25138) Reviewed/Progressed HEP activities related to strengthening, flexibility, endurance, ROM of cervical, scapular, scapulothoracic and UE control with self care, reaching, carrying, lifting, house/yardwork, driving/computer work  [] (75721) Reviewed/Progressed HEP activities related to improving balance, coordination, kinesthetic sense, posture, motor skill, proprioception of cervical, scapular, scapulothoracic and UE control with self care, reaching, carrying, lifting, house/yardwork, driving/computer work      Manual Treatments:  PROM / STM / Oscillations-Mobs:  G-I, II, III, IV (PA's, Inf., Post.) [x] (93182) Provided manual therapy to mobilize soft tissue/joints of cervical/CT, scapular GHJ and UE for the purpose of decreasing headache, modulating pain, promoting relaxation,  increasing ROM, reducing/eliminating soft tissue swelling/inflammation/restriction, improving soft tissue extensibility and allowing for proper ROM for normal function with self care, reaching, carrying, lifting, house/yardwork, driving/computer work    I    Modalities: CP -15' 1/6   [] GAME READY (VASO)- for significant edema, swelling, pain control. Charges:  Timed Code Treatment Minutes: 48'    Total Treatment Minutes: 76'       [] EVAL (LOW) 49214 (typically 20 minutes face-to-face)  [] EVAL (MOD) 54026 (typically 30 minutes face-to-face)  [] EVAL (HIGH) 88706 (typically 45 minutes face-to-face)  [] RE-EVAL     [x] UY(76985) x  1  [] IONTO  [] NMR (37441) x   [] VASO  [x] Manual (54051) x 1      [] Other:  [x] TA x 2   [] Mech Traction (48567)  [] ES(attended) (85482)      [] ES (un) (16700):        ASSESSMENT:      GOALS:12/16  Patient stated goal: pain relief and gain strength    [x] Progressing: [] Met: [] Not Met: [] Adjusted    Therapist goals for Patient:   Short Term Goals: To be achieved in: 2 weeks  1. Independent in HEP and progression per patient tolerance, in order to prevent re-injury. [] Progressing: [x] Met: [] Not Met: [] Adjusted   2. Patient will have a decrease in pain to facilitate improvement in movement, function, and ADLs as indicated by Functional Deficits. [] Progressing: [x] Met: [] Not Met: [] Adjusted    Long Term Goals: To be achieved in: 4-6 weeks  1. Disability index score of 28% or less for the NDI to assist with reaching prior level of function. [] Progressing: [x] Met: [] Not Met: [] Adjusted  2. Patient will demonstrate increased cervical flexion and ext  AROM to greater than or equal to 45 deg,  to allow for proper joint functioning as indicated by patients Functional Deficits. [x] Progressing: [] Met: [] Not Met: [] Adjusted  3. Patient will return to ADLs and work without increased symptoms or restriction. [x] Progressing: [] Met: [] Not Met: [] Adjusted  4. Patient will report returning to independent workouts pain free. [x] Progressing: [] Met: [] Not Met: [] Adjusted     Overall Progression Towards Functional goals/ Treatment Progress Update:  [x] Patient is progressing as expected towards functional goals listed. [] Progression is slowed due to complexities/Impairments listed. [] Progression has been slowed due to co-morbidities. [] Plan just implemented, too soon to assess goals progression <30days   [] Goals require adjustment due to lack of progress  [] Patient is not progressing as expected and requires additional follow up with physician  [] Other    Prognosis for POC: [x] Good [] Fair  [] Poor      Patient requires continued skilled intervention: [x] Yes  [] No    Treatment/Activity Tolerance:  [x] Patient tolerated treatment well [] Patient limited by fatique  [] Patient limited by pain  [] Patient limited by other medical complications  [] Other: Pt rox session well. She rox the addition of lat pulldowns and wall push-ups pain free. She required VCs to improve cervical posture during shoulder exercises. 1/6    Patient education: Patient education on PT and plan of care including diagnosis, prognosis, treatment goals and options. Patient agrees with discussed POC and treatment and is aware of rehab process. 6/11    Next Session: Progress Note     PLAN: 1x/ per week for 4 weeks  (12/16/20 - 1/13/21)   [x] Continue per plan of care [] Alter current plan (see comments above)  [] Plan of care initiated [] Hold pending MD visit [] Discharge    Electronically signed by:      Blake Cade, PT, DPT        Note: If patient does not return for scheduled/ recommended follow up visits, this note will serve as a discharge from care along with most recent update on progress.

## 2021-01-11 DIAGNOSIS — M47.812 CERVICAL SPONDYLOSIS WITHOUT MYELOPATHY: ICD-10-CM

## 2021-01-11 DIAGNOSIS — M79.18 MYOFASCIAL MUSCLE PAIN: ICD-10-CM

## 2021-01-11 DIAGNOSIS — M48.02 FORAMINAL STENOSIS OF CERVICAL REGION: ICD-10-CM

## 2021-01-11 DIAGNOSIS — M50.30 DDD (DEGENERATIVE DISC DISEASE), CERVICAL: ICD-10-CM

## 2021-01-11 RX ORDER — TIZANIDINE HYDROCHLORIDE 2 MG/1
2 CAPSULE, GELATIN COATED ORAL EVERY EVENING
Qty: 90 CAPSULE | Refills: 0 | Status: SHIPPED | OUTPATIENT
Start: 2021-01-11 | End: 2021-02-17 | Stop reason: SDUPTHER

## 2021-01-11 RX ORDER — GABAPENTIN 100 MG/1
100 CAPSULE ORAL NIGHTLY
Qty: 90 CAPSULE | Refills: 0 | Status: SHIPPED | OUTPATIENT
Start: 2021-01-15 | End: 2022-01-27

## 2021-01-11 NOTE — TELEPHONE ENCOUNTER
Patient last seen 2020 and medication last filled 2020:     *Patient requesting 90-day supply. *      Impression:         1. Cervical spondylosis without myelopathy    2. DDD (degenerative disc disease), cervical    3. Foraminal stenosis of cervical region    4. Myofascial muscle pain          Plan:  Clinical Course: Above diagnoses are worsening     I discussed the diagnosis and the treatment options with Gera Mirzas today.      In Summary:  The various treatment options were outlined and discussed with Gera Mirzas including:  Conservative care options: physical therapy, ice, medications, bracing, and activity modification. The indications for therapeutic injections. The indications for additional imaging/laboratory studies. The indications for (possible future) interventions.      After considering the various options discussed, Gera Mirzas elected to pursue a course of treatment that includes the followin. Medications:  I will add a Zanaflex 2 mg PO nightly and Gabapentin 100 mg PO nightly  to the current regimen. Counseled on risks, benefits and alternatives and recommended not to take the medicine and drive or operate heavy machinery.     2. PT:  We will continue with physical therapy with the addition of dry needling of the upper traps. The patient is experiencing continued stress from the [employer] protective equipment at work which I believe is contributing to the ongoing neck and myofascial pain.     3. Further studies:  No further studies.     4. Interventional:  We can repeat the cervical radiofrequency ablation in 2021.      5.  Follow up:  4-6 weeks

## 2021-01-13 ENCOUNTER — HOSPITAL ENCOUNTER (OUTPATIENT)
Dept: PHYSICAL THERAPY | Age: 61
Setting detail: THERAPIES SERIES
Discharge: HOME | End: 2021-01-13
Payer: COMMERCIAL

## 2021-01-13 ENCOUNTER — OFFICE VISIT (OUTPATIENT)
Dept: ORTHOPEDIC SURGERY | Age: 61
End: 2021-01-13
Payer: COMMERCIAL

## 2021-01-13 VITALS — TEMPERATURE: 97.5 F | BODY MASS INDEX: 24.1 KG/M2 | RESPIRATION RATE: 12 BRPM | HEIGHT: 62 IN | WEIGHT: 130.95 LBS

## 2021-01-13 DIAGNOSIS — M47.812 CERVICAL SPONDYLOSIS WITHOUT MYELOPATHY: Primary | ICD-10-CM

## 2021-01-13 DIAGNOSIS — M48.02 FORAMINAL STENOSIS OF CERVICAL REGION: ICD-10-CM

## 2021-01-13 DIAGNOSIS — M50.30 DDD (DEGENERATIVE DISC DISEASE), CERVICAL: ICD-10-CM

## 2021-01-13 PROCEDURE — 99213 OFFICE O/P EST LOW 20 MIN: CPT | Performed by: STUDENT IN AN ORGANIZED HEALTH CARE EDUCATION/TRAINING PROGRAM

## 2021-01-13 PROCEDURE — 97530 THERAPEUTIC ACTIVITIES: CPT | Performed by: PHYSICAL THERAPIST

## 2021-01-13 PROCEDURE — 97140 MANUAL THERAPY 1/> REGIONS: CPT | Performed by: PHYSICAL THERAPIST

## 2021-01-13 PROCEDURE — 97110 THERAPEUTIC EXERCISES: CPT | Performed by: PHYSICAL THERAPIST

## 2021-01-13 NOTE — FLOWSHEET NOTE
[facility] 79, [address] [place], [address]  Phone: [phone]   Fax: [phone]      Physical Therapy Treatment Note/ Progress Report:       Date:  2021    Patient Name:  Ngozi Nash    :  1960  MRN: [id number]  Restrictions/Precautions:    Medical/Treatment Diagnosis Information:  Diagnosis: Neck pain - M54.2  Treatment Diagnosis: decreased ADL status, ROM, strength, and posture  Insurance/Certification information:  PT Insurance Information: Med mutual  Physician Information:  Referring Practitioner: Dr. Jose Mcelroy  Has the plan of care been signed (Y/N):        []  Yes  [x]  No     Date of Patient follow up with Physician:       Is this a Progress Report:     [x]  Yes  []  No        Progress report/ Recertification will be due (10 Rx or 30 days whichever is less): 2021      Visit # Insurance Allowable Auth Required   31 BMN []  Yes []  No        Functional Scale:   NDI  6/11 - 56% limitation   7/15 - 32% limitation  8/12 - 20% limitation  9/10 - 30% limitation    11/4 - 16% limitation    12/2 - 20% limitation   12/16 - 24% limitation      Latex Allergy:  [x]NO      []YES  Preferred Language for Healthcare:   [x]English       []other:      Pain level: 1 to 2  /10     SUBJECTIVE: The patient noted that she is feeling ok. Was a bit sore after last session mostly in the lats from increases in weight. This morning she is a bit sore on the left side of her neck.      OBJECTIVE:   ? Observation:   ? Test measurements:       AROM Left  Right   Comments   Cervical Flexion       40   Cervical Extension       32    Cervical Side bend 50 32       Cervical Rotation 39 41                      Mobility Testing  Comments   Cervical Downglides  Hypomobile- L   Cervical Flexion mobility     Thoracic PA mobility  Hypomobile - L    Rib mobility  Hypomobile - L only             Red Flags           Positive    Positive [x] (02111) Provided manual therapy to mobilize soft tissue/joints of cervical/CT, scapular GHJ and UE for the purpose of decreasing headache, modulating pain, promoting relaxation,  increasing ROM, reducing/eliminating soft tissue swelling/inflammation/restriction, improving soft tissue extensibility and allowing for proper ROM for normal function with self care, reaching, carrying, lifting, house/yardwork, driving/computer work    I    Modalities: CP -15' 1/6   [] GAME READY (VASO)- for significant edema, swelling, pain control. Charges:  Timed Code Treatment Minutes: 48'    Total Treatment Minutes: 76'       [] EVAL (LOW) 22614 (typically 20 minutes face-to-face)  [] EVAL (MOD) 22682 (typically 30 minutes face-to-face)  [] EVAL (HIGH) 37436 (typically 45 minutes face-to-face)  [] RE-EVAL     [x] MJ(93236) x  1  [] IONTO  [] NMR (28252) x   [] VASO  [x] Manual (34151) x 1      [] Other:  [x] TA x 2   [] Mech Traction (57659)  [] ES(attended) (93376)      [] ES (un) (48520):        ASSESSMENT:      GOALS:12/16  Patient stated goal: pain relief and gain strength    [x] Progressing: [] Met: [] Not Met: [] Adjusted    Therapist goals for Patient:   Short Term Goals: To be achieved in: 2 weeks  1. Independent in HEP and progression per patient tolerance, in order to prevent re-injury. [] Progressing: [x] Met: [] Not Met: [] Adjusted   2. Patient will have a decrease in pain to facilitate improvement in movement, function, and ADLs as indicated by Functional Deficits. [] Progressing: [x] Met: [] Not Met: [] Adjusted    Long Term Goals: To be achieved in: 4-6 weeks  1. Disability index score of 28% or less for the NDI to assist with reaching prior level of function. [] Progressing: [x] Met: [] Not Met: [] Adjusted  2. Patient will demonstrate increased cervical flexion and ext  AROM to greater than or equal to 45 deg,  to allow for proper joint functioning as indicated by patients Functional Deficits. [x] Progressing: [] Met: [] Not Met: [] Adjusted  3. Patient will return to ADLs and work without increased symptoms or restriction. [x] Progressing: [] Met: [] Not Met: [] Adjusted  4. Patient will report returning to independent workouts pain free. [x] Progressing: [] Met: [] Not Met: [] Adjusted     Overall Progression Towards Functional goals/ Treatment Progress Update:  [x] Patient is progressing as expected towards functional goals listed. [] Progression is slowed due to complexities/Impairments listed. [] Progression has been slowed due to co-morbidities. [] Plan just implemented, too soon to assess goals progression <30days   [] Goals require adjustment due to lack of progress  [] Patient is not progressing as expected and requires additional follow up with physician  [] Other    Prognosis for POC: [x] Good [] Fair  [] Poor      Patient requires continued skilled intervention: [x] Yes  [] No    Treatment/Activity Tolerance:  [x] Patient tolerated treatment well [] Patient limited by fatique  [] Patient limited by pain  [] Patient limited by other medical complications  [] Other: Pt rox session well. Continued tightness and elevation of 1st and 2nd ribs due to mm tightness and guarding. Patient education: Patient education on PT and plan of care including diagnosis, prognosis, treatment goals and options. Patient agrees with discussed POC and treatment and is aware of rehab process. 6/11    Next Session: Progress Note     PLAN: 1x/ per week for 4 weeks  (12/16/20 - 1/13/21)   [x] Continue per plan of care [] Alter current plan (see comments above)  [] Plan of care initiated [] Hold pending MD visit [] Discharge    Electronically signed by:      Serge Veronica PT, MPT            Note: If patient does not return for scheduled/ recommended follow up visits, this note will serve as a discharge from care along with most recent update on progress.

## 2021-01-13 NOTE — PROGRESS NOTES
Follow up: Kathryn MOE [facility] Cash  1960  [id number]      CHIEF COMPLAINT:    Chief Complaint   Patient presents with    Neck Pain     CK CSP PT    Discuss Medications     requests refill of Tizanidine and Gabapentin - both sent to the pharmacy prior to patient's visit. HISTORY OF PRESENT ILLNESS:  Ms. Mt Stack is a 64 y.o. female returns for a follow up visit for multiple medical problems. Her current presenting problems are   1. Cervical spondylosis without myelopathy    2. DDD (degenerative disc disease), cervical    3. Foraminal stenosis of cervical region    . As per information/history obtained from the PADT(patient assessment and documentation tool) - She complains of pain in the neck with radiation to the shoulders Left She rates the pain 2/10 and describes it as aching. Pain is made worse by: wearing PPE at work. She denies side effects from the current pain regimen. Patient reports that since the last follow up visit the physical functioning is unchanged, family/social relationships are unchanged, mood is unchanged and sleep patterns are worse, and that the overall functioning is unchanged. Patient denies neurological bowel or bladder. The patient presents for follow up of ongoing neck pain. She has been continuing physical therapy status post a cervical radiofrequency ablation. They have added some new strength exercises which she states went well. The patient seems to be progressing well with physical therapy. Her physical therapist recommended cupping which the patient is interested in trying. She continues to have neck pain and soreness in the upper trapezius muscles and near the mastoid bilaterally. The patient is requesting Tizanidine and Gabapentin refills. Wearing the protective equipment at work and lifting continue to aggravate her symptoms. The patient is a ICU nurse.   The patient did have some questions about her plan of care which we discussed today in the office. Associated signs and symptoms:   Neurogenic bowel or bladder symptoms:  no   Perceived weakness:  no   Difficulty walking:  no              Past Medical History:   Past Medical History:   Diagnosis Date    Abnormal mammogram 6/8/2011    saw Dr. Christy Kenney or associate 6/2011 Ok to return for regular mammogram's     Anxiety 6/8/2011    Hyperlipidemia     Kidney stone     Medical history reviewed with no changes     Pyelonephritis 7/28/2011    Recurrent Change to augmentin and levaquin and ck blood cx's 7/27/2011 On daily macrobid per Dr. Sherrill Singh disorder, acute 3/23/2013    Due to 's infidelity and leaving her 3/2013 To see counselor- increase celexa   still living w the family as of 6/2013       Past Surgical History:     Past Surgical History:   Procedure Laterality Date    CARPAL TUNNEL RELEASE  12/29/11    Left    CARPAL TUNNEL RELEASE Right 12-    CYSTOSCOPY      with stent placement-x3    PAIN MANAGEMENT PROCEDURE Left 8/26/2020    LEFT C2, THIRD OCCIPITAL NERVE, C3, C4, C5 MEDIAL BRANCH BLOCK WITH FLUOROSCOPY (79819, 05303)  #1 performed by Xuan Aguirre MD at [address] Left 9/9/2020    LEFT C2,TON,C3,C4 C5 MEDIAL BRANCH BLOCKS WITH FLUOROSCOPY performed by Xuan Aguirre MD at [address]  10/07/2020    PAIN MANAGEMENT PROCEDURE Left 10/7/2020    LEFT C2,TON,C3,C4,C5 RADIOFREQUENCY ABLATION WITH FLUOROSCOPY performed by Xuan Aguirre MD at [address]      L maxillary gland removal     Current Medications:     Current Outpatient Medications:     [START ON 1/15/2021] gabapentin (NEURONTIN) 100 MG capsule, Take 1 capsule by mouth nightly for 90 days.  Intended supply: 90 days, Disp: 90 capsule, Rfl: 0    tiZANidine (ZANAFLEX) 2 MG capsule, Take 1 capsule by mouth every evening Intended Supply: 90 days, Disp: 90 capsule, Rfl: 0    citalopram (CELEXA) 10 MG tablet, TAKE TWO TABLETS BY MOUTH ONE TIME DAILY, Disp: 180 tablet, Rfl: 3    potassium chloride (KLOR-CON M) 10 MEQ extended release tablet, TAKE 1 TABLET BY MOUTH ONE TIME A DAY, Disp: 90 tablet, Rfl: 3    meloxicam (MOBIC) 15 MG tablet, TAKE 1 TABLET BY MOUTH ONE TIME A DAY, Disp: 90 tablet, Rfl: 0    cycloSPORINE (RESTASIS) 0.05 % ophthalmic emulsion, Restasis 0.05 % eye drops in a dropperette, Disp: , Rfl:     montelukast (SINGULAIR) 10 MG tablet, TAKE 1 TABLET BY MOUTH ONE TIME A DAY, Disp: 90 tablet, Rfl: 3    loratadine (CLARITIN) 10 MG tablet, Take 10 mg by mouth daily, Disp: , Rfl:     divalproex (DEPAKOTE ER) 250 MG extended release tablet, Take 500 mg by mouth daily , Disp: , Rfl:     Vitamin D, Ergocalciferol, 50 MCG (2000 UT) CAPS, Take 4,000 Units by mouth, Disp: , Rfl:     fluticasone (FLONASE) 50 MCG/ACT nasal spray, USE 1 SPRAY IN EACH NOSTRIL DAILY, Disp: 16 g, Rfl: 3    rizatriptan (MAXALT-MLT) 10 MG disintegrating tablet, TALE 1 TABLET BY MOUTH AS NEEDED FOR MIGRAINES MAY REPEAT 2 HOURS LATER IF NEEDED, Disp: 27 tablet, Rfl: 3    butalbital-acetaminophen-caffeine (FIORICET, ESGIC) -40 MG per tablet, TAKE ONE TABLET BY MOUTH EVERY 4 HOURS AS NEEDED FOR PAIN UP TO 10 DAYS, Disp: 30 tablet, Rfl: 3    folic acid (FOLVITE) 1 MG tablet, Take 1 mg by mouth daily, Disp: , Rfl:     calcium citrate-vitamin D (CALCITRATE/VITAMIN D) 315-200 MG-UNIT per tablet, Take 1 tablet by mouth daily. , Disp: , Rfl:   Allergies:  Patient has no known allergies. Social History:    reports that she has never smoked. She has never used smokeless tobacco. She reports that she does not drink alcohol or use drugs.   Family History:   Family History   Problem Relation Age of Onset    Diabetes Mother     Kidney Cancer Mother         RIGHT    Cancer Maternal Grandmother     Diabetes Maternal Grandmother     Cancer Maternal Grandfather     Diabetes Maternal Grandfather     Heart Disease Other     High Blood Pressure Other        REVIEW OF SYSTEMS:   CONSTITUTIONAL: Denies unexplained weight loss, fevers, chills or fatigue  NEUROLOGICAL: Denies unsteady gait or progressive weakness  MUSCULOSKELETAL: Denies joint swelling or redness  GI: Denies nausea, vomiting, diarrhea   : Denies bowel or bladder issues       PHYSICAL EXAM:    Vitals: Temperature 97.5 °F (36.4 °C), temperature source Infrared, resp. rate 12, height 5' 2.01\" (1.575 m), weight 130 lb 15.3 oz (59.4 kg), last menstrual period 10/07/2012. GENERAL EXAM:  · General Apparence: Patient is adequately groomed with no evidence of malnutrition. · Psychiatric: Orientation: The patient is oriented to time, place and person. The patient's mood and affect are appropriate   · Vascular: Examination reveals no swelling and palpation reveals no tenderness in upper or lower extremities. Good capillary refill. · The lymphatic examination of the neck, axillae and groin reveals all areas to be without enlargement or induration  · Sensation is intact without deficit in the upper and lower extremities to light touch and pinprick  · Coordination of the upper and lower extremities are normal.    CERVICAL EXAMINATION:  · Inspection: Local inspection shows no step-off or bruising. Cervical alignment is normal. No instability is noted. · Palpation and Percussion: No evidence of tenderness at the midline. Paraspinal tenderness is not present. There is no paraspinal spasm. Skin:There are no rashes, ulcerations or lesions  · Range of Motion:  limited by 25% in all planes due to pain   · Strength: 5/5 bilateral upper extremities  · Special Tests:   Spurling's and Nguyen's are negative bilaterally. Metcalf and Impingement tests are negative bilaterally. · Skin:There are no rashes, ulcerations or lesions in right & left upper extremities. · Reflexes: Bilaterally triceps, biceps and brachioradialis are 2+. Clonus absent bilaterally at the feet.  No pathological reflexes are noted.  · Gait & station: normal, patient ambulates without assistance  · Additional Examinations:  · RIGHT UPPER EXTREMITY:  Inspection/examination of the right upper extremity does not show any tenderness, deformity or injury. Range of motion is unremarkable and pain-free. There is no gross instability. There are no rashes, ulcerations or lesions. Strength and tone are normal. No atrophy or abnormal movements are noted. · LEFT UPPER EXTREMITY: Inspection/examination of the left upper extremity does not show any tenderness, deformity or injury. Range of motion is unremarkable and pain-free. There is no gross instability. There are no rashes, ulcerations or lesions. Strength and tone are normal. No atrophy or abnormal movements are noted. · RIGHT LOWER EXTREMITY: Inspection/examination of the right lower extremity does not show any tenderness, deformity or injury. Range of motion is normal and pain-free. There is no gross instability. There are no rashes, ulcerations or lesions. Strength and tone are normal. No atrophy or abnormal movements are noted. · LEFT LOWER EXTREMITY:  Inspection/examination of the left lower extremity does not show any tenderness, deformity or injury. Range of motion is normal and pain-free. There is no gross instability. There are no rashes, ulcerations or lesions. Strength and tone are normal. No atrophy or abnormal movements are noted. Diagnostic Testing:    No new diagnostics  Results for orders placed or performed in visit on 09/17/20   Valproic acid level, total   Result Value Ref Range    Valproic Acid Lvl 28.4 (L) 50.0 - 100.0 ug/mL     Impression:       1. Cervical spondylosis without myelopathy    2. DDD (degenerative disc disease), cervical    3. Foraminal stenosis of cervical region        Plan:  Clinical Course: Above diagnoses are improving     I discussed the diagnosis and the treatment options with Matt Sanchez today.      In Summary:  The various treatment options were outlined and discussed with Delfina Rogers including:  Conservative care options: physical therapy, ice, medications, bracing, and activity modification. The indications for therapeutic injections. The indications for additional imaging/laboratory studies. The indications for (possible future) interventions. After considering the various options discussed, Delfina Rogers elected to pursue a course of treatment that includes the followin. Medications:  I have refilled Gabapentin 300 mg PO nightly and Zanaflex 4 mg PO nightly to the current regimen. Counseled on risks, benefits and alternatives and recommended not to take the medicine and drive or operate heavy machinery. 2. PT:  Continue with physical therapy for the cervical spine. May include cupping or dry needling as appropriate. 3. Further studies:  No further studies. 4. Interventional:  Continued improvement with physical therapy. We can repeat the cervical radiofrequency ablation in April. 5. Follow up:  4-6 weeks      Delfina Rogers was instructed to call the office if her symptoms worsen or if new symptoms appear prior to the next scheduled visit. She is specifically instructed to contact the office between now & her scheduled appointment if she has concerns related to her condition or if she needs assistance in scheduling the above tests. She is welcome to call for an appointment sooner if she has any additional concerns or questions. Jai Triplett PA-C   Board Certified by the [employer] on Certification of Physician Assistants  [address] [facility]  Partner of [facility] ([facility])             This dictation was performed with a verbal recognition program [facility] ) and it was checked for errors. It is possible that there are still dictated errors within this office note. If so, please bring any errors to my attention for an addendum.  All efforts were made to ensure that this office note is accurate.

## 2021-01-20 ENCOUNTER — HOSPITAL ENCOUNTER (OUTPATIENT)
Dept: PHYSICAL THERAPY | Age: 61
Setting detail: THERAPIES SERIES
Discharge: HOME | End: 2021-01-20
Payer: COMMERCIAL

## 2021-01-20 PROCEDURE — 97140 MANUAL THERAPY 1/> REGIONS: CPT

## 2021-01-20 PROCEDURE — 97530 THERAPEUTIC ACTIVITIES: CPT

## 2021-01-20 PROCEDURE — 97110 THERAPEUTIC EXERCISES: CPT

## 2021-01-20 NOTE — FLOWSHEET NOTE
[address] [facility],[address], [address] [place], [address]  Phone: [phone]   Fax: [phone]      Physical Therapy Treatment Note/ Progress Report:       Date:  2021    Patient Name:  John Hernández    :  1960  MRN: [id number]  Restrictions/Precautions:    Medical/Treatment Diagnosis Information:  Diagnosis: Neck pain - M54.2  Treatment Diagnosis: decreased ADL status, ROM, strength, and posture  Insurance/Certification information:  PT Insurance Information: Med mutual  Physician Information:  Referring Practitioner: Dr. Carisa Grijalva  Has the plan of care been signed (Y/N):        []  Yes  [x]  No     Date of Patient follow up with Physician:       Is this a Progress Report:     [x]  Yes  []  No        Progress report/ Recertification will be due (10 Rx or 30 days whichever is less): 2021      Visit # Insurance Allowable Auth Required   3 ( 29 in )  BMN []  Yes []  No        Functional Scale:   NDI  6/11 - 56% limitation   7/15 - 32% limitation  8/12 - 20% limitation  9/10 - 30% limitation    11/4 - 16% limitation    12/2 - 20% limitation   12/16 - 24% limitation      Latex Allergy:  [x]NO      []YES  Preferred Language for Healthcare:   [x]English       []other:      Pain level: 1   /10     SUBJECTIVE:  Pt reports she is doing alright, she has some tightness behind the left ear. She notes that she felt like she had a \"bobble head\" when driving in this morning.      OBJECTIVE:   ? Observation:   ? Test measurements:       AROM Left  Right   Comments   Cervical Flexion       40   Cervical Extension       32    Cervical Side bend 50 32       Cervical Rotation 39 41                      Mobility Testing  Comments   Cervical Downglides  Hypomobile- L   Cervical Flexion mobility     Thoracic PA mobility  Hypomobile - L    Rib mobility  Hypomobile - L only             Red Flags           Positive    Positive Headaches (worst patient has ever had) Pos Altered consciousness Neg   Dizziness Neg Radiating Pain Neg   Diplopia Neg Gait disturbances Neg   Dysphagia Neg Multi-segmental weakness Neg   Dysarthria Neg Tru/quad paresis Neg   Drop Attacks Neg Fracture Neg   Facial symptoms  Neg Numbness Neg   Nystagmus Neg Nausea  Pos      Joint mobility:               []?Normal                      [x]? Hypo              []?Hyper     Palpation: L upper trap     Functional Mobility/Transfers: independent      Posture: guarded upper traps       RESTRICTIONS/PRECAUTIONS: none    Exercises/Interventions:       Therapeutic Ex (21644) Sets/sec Reps Notes/CUES   Bike 5'  Added 9/23   Upper trap stretch 30 sec 3 each side Added 6/11   Lev scap stretch 30 sec 3    Cervical rotation ROM - pain free ROM    SCM stretch 30 3           Resisted scapular retraction 3 10 Black TB,    Resisted shoulder ext  3 10 Black TB,   Shoulder ER 3 10 Black  TB,    Shoulder IR 3 10 Black TB,                Manual Intervention (47937)      Cerv mobs/manip with suboccipital release 5'  Grade III-V,  1/6   Thoracic mobs/manip CT manip Rib mobilizations 1 st and 2 nd 3'  Grade III-IV,1/6   IASTM     STM to upper trap       NMR re-education (50493)   CUES NEEDED         Cervical isometrics (FLex, ext, SB)             Lat pulldown 3 10 5 plates, Added 1/6               Therapeutic Activity (18346)      Wall posture 10 sec 10 Added 10/21   Standing scaption  3#, ^11/11   Chin tuck 10 sec 10 Added 6/11   Scapular retraction  Prone T on physioball 3 10 2#, ^12/2   Prone Y on physioball 3 10 2# ^12/2   Prone chest lift on physioball 3 10 2# ^12/2         Modified planks with rows  3 10 5#, Added 12/23   Wall push-ups 3 10 Added 1/6         Downdog Ball on the wall  3 10 4-way, 2# ball, ^11/11   plank Side planks                 Therapeutic Exercise and NMR EXR [x] (75497) Provided verbal/tactile cueing for activities related to strengthening, flexibility, endurance, ROM  for improvements in cervical, postural, scapular, scapulothoracic and UE control with self care, reaching, carrying, lifting, house/yardwork, driving/computer work. [x] (07148) Provided verbal/tactile cueing for activities related to improving balance, coordination, kinesthetic sense, posture, motor skill, proprioception  to assist with cervical, scapular, scapulothoracic and UE control with self care, reaching, carrying, lifting, house/yardwork, driving/computer work. Therapeutic Activities:    [] (76712 or 42984) Provided verbal/tactile cueing for activities related to improving balance, coordination, kinesthetic sense, posture, motor skill, proprioception and motor activation to allow for proper function of cervical, scapular, scapulothoracic and UE control with self care, carrying, lifting, driving/computer work.      Home Exercise Program:    [x] (51366) Reviewed/Progressed HEP activities related to strengthening, flexibility, endurance, ROM of cervical, scapular, scapulothoracic and UE control with self care, reaching, carrying, lifting, house/yardwork, driving/computer work  [] (09172) Reviewed/Progressed HEP activities related to improving balance, coordination, kinesthetic sense, posture, motor skill, proprioception of cervical, scapular, scapulothoracic and UE control with self care, reaching, carrying, lifting, house/yardwork, driving/computer work      Manual Treatments:  PROM / STM / Oscillations-Mobs:  G-I, II, III, IV (PA's, Inf., Post.) [x] (26097) Provided manual therapy to mobilize soft tissue/joints of cervical/CT, scapular GHJ and UE for the purpose of decreasing headache, modulating pain, promoting relaxation,  increasing ROM, reducing/eliminating soft tissue swelling/inflammation/restriction, improving soft tissue extensibility and allowing for proper ROM for normal function with self care, reaching, carrying, lifting, house/yardwork, driving/computer work    I    Modalities: CP -15' 1/6   [] GAME READY (VASO)- for significant edema, swelling, pain control. Charges:  Timed Code Treatment Minutes: 48'    Total Treatment Minutes: 76'       [] EVAL (LOW) 15673 (typically 20 minutes face-to-face)  [] EVAL (MOD) 83373 (typically 30 minutes face-to-face)  [] EVAL (HIGH) 88823 (typically 45 minutes face-to-face)  [] RE-EVAL     [x] RV(55743) x  1  [] IONTO  [] NMR (89796) x   [] VASO  [x] Manual (27964) x 1      [] Other:  [x] TA x 2   [] Mech Traction (30959)  [] ES(attended) (59292)      [] ES (un) (19868):        ASSESSMENT:      GOALS:12/16  Patient stated goal: pain relief and gain strength    [x] Progressing: [] Met: [] Not Met: [] Adjusted    Therapist goals for Patient:   Short Term Goals: To be achieved in: 2 weeks  1. Independent in HEP and progression per patient tolerance, in order to prevent re-injury. [] Progressing: [x] Met: [] Not Met: [] Adjusted   2. Patient will have a decrease in pain to facilitate improvement in movement, function, and ADLs as indicated by Functional Deficits. [] Progressing: [x] Met: [] Not Met: [] Adjusted    Long Term Goals: To be achieved in: 4-6 weeks  1. Disability index score of 28% or less for the NDI to assist with reaching prior level of function. [] Progressing: [x] Met: [] Not Met: [] Adjusted  2. Patient will demonstrate increased cervical flexion and ext  AROM to greater than or equal to 45 deg,  to allow for proper joint functioning as indicated by patients Functional Deficits. Note: If patient does not return for scheduled/ recommended follow up visits, this note will serve as a discharge from care along with most recent update on progress.

## 2021-01-27 ENCOUNTER — HOSPITAL ENCOUNTER (OUTPATIENT)
Dept: PHYSICAL THERAPY | Age: 61
Setting detail: THERAPIES SERIES
Discharge: HOME | End: 2021-01-27
Payer: COMMERCIAL

## 2021-01-27 PROCEDURE — 97110 THERAPEUTIC EXERCISES: CPT

## 2021-01-27 PROCEDURE — 97140 MANUAL THERAPY 1/> REGIONS: CPT

## 2021-01-27 PROCEDURE — 97012 MECHANICAL TRACTION THERAPY: CPT

## 2021-01-27 NOTE — PLAN OF CARE
118 [facility], [address] [place] [address], 122 [facility]  Phone: [phone]   Fax: [phone]  Physical Therapy Re-Certification Plan of Care/MD UPDATE      Dear Dr. Sheri Awad,     We had the pleasure of treating the following patient for physical therapy services at [address] [place] [address]. A summary of our findings can be found in the updated assessment below. This includes our plan of care. If you have any questions or concerns regarding these findings, please do not hesitate to contact me at the office phone number checked above. Thank you for the referral.     Physician Signature:________________________________Date:__________________  By signing above (or electronic signature), therapists plan is approved by physician    Date Range Of Visits: 20-21  Total Visits to Date: 35  Overall Response to Treatment:   []Patient is responding well to treatment and improvement is noted with regards  to goals   []Patient should continue to improve in reasonable time if they continue HEP   []Patient has plateaued and is no longer responding to skilled PT intervention    []Patient is getting worse and would benefit from return to referring MD   []Patient unable to adhere to initial POC   []Other: Pt notes she is 85-90% improved. She still has intermittent headaches and upper trap soreness related to work activities. She is independent with HEP so we will begin to focus sessions on manual treatments to try to improve remaining symptoms. PT 1-2x per week for 4-6 weeks.         Physical Therapy Treatment Note/ Progress Report:       Date:  2021    Patient Name:  More Davis    :  1960  MRN: [id number]  Restrictions/Precautions:    Medical/Treatment Diagnosis Information:  Diagnosis: Neck pain - M54.2  Treatment Diagnosis: decreased ADL status, ROM, strength, and posture Insurance/Certification information:  PT Insurance Information: Med mutual  Physician Information:  Referring Practitioner: Dr. Riddhi Alva  Has the plan of care been signed (Y/N):        []  Yes  [x]  No     Date of Patient follow up with Physician: 2/17/21      Is this a Progress Report:     [x]  Yes  []  No        Progress report/ Recertification will be due (10 Rx or 30 days whichever is less): 2/27/21      Visit # Insurance Allowable Auth Required   4 ( 29 in 2020)  BMN []  Yes []  No        Functional Scale:   NDI  6/11 - 56% limitation   7/15 - 32% limitation  8/12 - 20% limitation  9/10 - 30% limitation    11/4 - 16% limitation    12/2 - 20% limitation   12/16 - 24% limitation    1/27/21 - 20% limitation       Latex Allergy:  [x]NO      []YES  Preferred Language for Healthcare:   [x]English       []other:      Pain level: 1-2 /10     SUBJECTIVE: Pt reports that she is a little sore from being in the car almost an hour this morning from dropping her son off at school before she got here. Overall she feels 85-90% improvement. She did feel like during work Saturday she didn't feel like she had much pain but then Sunday at work she was in more pain but also had a headache after having visual issues Sunday morning. She is seeing neurologist again. OBJECTIVE: 1/27/21  ?  Observation:   ? Test measurements:       AROM Left  Right   Comments   Cervical Flexion       40   Cervical Extension       40   Cervical Side bend 50 45   55 and 45 after iastm 1/27/21   Cervical Rotation 60 60                      Mobility Testing  Comments   Cervical Downglides  Hypomobile- L   Cervical Flexion mobility     Thoracic PA mobility  Hypomobile - L    Rib mobility  Hypomobile - L only             Red Flags           Positive    Positive    Headaches (worst patient has ever had) Pos Altered consciousness Neg   Dizziness Neg Radiating Pain Neg   Diplopia Neg Gait disturbances Neg   Dysphagia Neg Multi-segmental weakness Neg Dysarthria Neg Tru/quad paresis Neg   Drop Attacks Neg Fracture Neg   Facial symptoms  Neg Numbness Neg   Nystagmus Neg Nausea  Pos      Joint mobility:               []?Normal                      [x]? Hypo              []?Hyper     Palpation: L upper trap     Functional Mobility/Transfers: independent      Posture: guarded upper traps       RESTRICTIONS/PRECAUTIONS: none    Exercises/Interventions:       Therapeutic Ex (33931) Sets/sec Reps Notes/CUES   Bike 5'  Added 9/23   Upper trap stretch 30 sec 3 each side Added 6/11   Lev scap stretch 30 sec 3    Cervical rotation ROM - pain free ROM    SCM stretch 30 3           Resisted scapular retraction Black TB,    Resisted shoulder ext  Black TB,   Shoulder ER Black  TB,    Shoulder IR Black TB,                Manual Intervention (98768)      Cerv mobs/manip with suboccipital release  Grade III-V,  1/6   Thoracic mobs/manip CT manip Rib mobilizations 1 st and 2 nd 3'  Grade III-IV,1/6   IASTM 10'    STM to upper trap       NMR re-education (91993)   CUES NEEDED         Cervical isometrics (FLex, ext, SB)             Lat pulldown  5 plates, Added 1/6               Therapeutic Activity (60203)      Wall posture 10 sec 10 Added 10/21   Standing scaption  3#, ^11/11   Chin tuck 10 sec 10 Added 6/11   Scapular retraction  Prone T on physioball 2#, ^12/2   Prone Y on physioball 2# ^12/2   Prone chest lift on physioball 2# ^12/2       Modified planks with rows  5#, Added 12/23   Wall push-ups Added 1/6         Downdog Ball on the wall  3 10 4-way, 2# ball, ^11/11   plank Side planks                 Therapeutic Exercise and NMR EXR  [x] (96728) Provided verbal/tactile cueing for activities related to strengthening, flexibility, endurance, ROM  for improvements in cervical, postural, scapular, scapulothoracic and UE control with self care, reaching, carrying, lifting, house/yardwork, driving/computer work. [x] (95844) Provided verbal/tactile cueing for activities related to improving balance, coordination, kinesthetic sense, posture, motor skill, proprioception  to assist with cervical, scapular, scapulothoracic and UE control with self care, reaching, carrying, lifting, house/yardwork, driving/computer work. Therapeutic Activities:    [] (74173 or 52631) Provided verbal/tactile cueing for activities related to improving balance, coordination, kinesthetic sense, posture, motor skill, proprioception and motor activation to allow for proper function of cervical, scapular, scapulothoracic and UE control with self care, carrying, lifting, driving/computer work.      Home Exercise Program:    [x] (29965) Reviewed/Progressed HEP activities related to strengthening, flexibility, endurance, ROM of cervical, scapular, scapulothoracic and UE control with self care, reaching, carrying, lifting, house/yardwork, driving/computer work  [] (00161) Reviewed/Progressed HEP activities related to improving balance, coordination, kinesthetic sense, posture, motor skill, proprioception of cervical, scapular, scapulothoracic and UE control with self care, reaching, carrying, lifting, house/yardwork, driving/computer work      Manual Treatments:  PROM / STM / Oscillations-Mobs:  G-I, II, III, IV (PA's, Inf., Post.)  [x] (20186) Provided manual therapy to mobilize soft tissue/joints of cervical/CT, scapular GHJ and UE for the purpose of decreasing headache, modulating pain, promoting relaxation,  increasing ROM, reducing/eliminating soft tissue swelling/inflammation/restriction, improving soft tissue extensibility and allowing for proper ROM for normal function with self care, reaching, carrying, lifting, house/yardwork, driving/computer work    Mechanical cervical traction: 2' x2, 30 deg flex 10-15lbs pressure; dc'd after 1 session d/t increased pain 1. Disability index score of 28% or less for the NDI to assist with reaching prior level of function. [] Progressing: [x] Met: [] Not Met: [] Adjusted  2. Patient will demonstrate increased cervical flexion and ext  AROM to greater than or equal to 45 deg,  to allow for proper joint functioning as indicated by patients Functional Deficits. [x] Progressing: [] Met: [] Not Met: [] Adjusted  3. Patient will return to ADLs and work without increased symptoms or restriction. [x] Progressing: [] Met: [] Not Met: [] Adjusted  4. Patient will report returning to independent workouts pain free. [x] Progressing: [] Met: [] Not Met: [] Adjusted     Overall Progression Towards Functional goals/ Treatment Progress Update:  [x] Patient is progressing as expected towards functional goals listed. [] Progression is slowed due to complexities/Impairments listed. [] Progression has been slowed due to co-morbidities. [] Plan just implemented, too soon to assess goals progression <30days   [] Goals require adjustment due to lack of progress  [] Patient is not progressing as expected and requires additional follow up with physician  [] Other    Prognosis for POC: [x] Good [] Fair  [] Poor      Patient requires continued skilled intervention: [x] Yes  [] No    Treatment/Activity Tolerance:  [x] Patient tolerated treatment well [] Patient limited by fatique  [] Patient limited by pain  [] Patient limited by other medical complications  [] Other:  trialed home cervical traction unit today and pt noted that she had increased discomfort after 2 min at 12lbs pressure, this was discontinued. She did tolerated iastm well, noted that \"lower area of shoulder is looser but point by ear is flared up\". Continue with iastm to cervical region, consider cupping as tolerated next session.

## 2021-02-03 ENCOUNTER — APPOINTMENT (OUTPATIENT)
Dept: PHYSICAL THERAPY | Age: 61
End: 2021-02-03
Payer: COMMERCIAL

## 2021-02-04 ENCOUNTER — HOSPITAL ENCOUNTER (OUTPATIENT)
Dept: PHYSICAL THERAPY | Age: 61
Setting detail: THERAPIES SERIES
Discharge: HOME | End: 2021-02-04
Payer: COMMERCIAL

## 2021-02-04 NOTE — FLOWSHEET NOTE
[facility]     [facility] Therapy  Cancellation/No-show Note  Patient Name:  Corin Figueroa  :  1960   Date:  2021  Cancelled visits to date:   No-shows to date: 0    For today's appointment patient:  [x]  Cancelled  []  Rescheduled appointment  []  No-show     Reason given by patient:  [x]  Patient ill: not feeling well after covid vaccine   []  Conflicting appointment  []  No transportation    []  Conflict with work  []  No reason given  []  Other:     Comments:      Electronically signed by:  Fabio Parker PT

## 2021-02-10 ENCOUNTER — HOSPITAL ENCOUNTER (OUTPATIENT)
Dept: PHYSICAL THERAPY | Age: 61
Setting detail: THERAPIES SERIES
Discharge: HOME | End: 2021-02-10
Payer: COMMERCIAL

## 2021-02-10 PROCEDURE — 97140 MANUAL THERAPY 1/> REGIONS: CPT

## 2021-02-10 NOTE — FLOWSHEET NOTE
[address], 122 [facility]  Phone: [phone]   Fax: [phone]         Physical Therapy Treatment Note/ Progress Report:       Date:  2/10/2021    Patient Name:  Corin Figueroa    :  1960  MRN: [id number]  Restrictions/Precautions:    Medical/Treatment Diagnosis Information:  Diagnosis: Neck pain - M54.2  Treatment Diagnosis: decreased ADL status, ROM, strength, and posture  Insurance/Certification information:  PT Insurance Information: Med mutual  Physician Information:  Referring Practitioner: Dr. Briana Baron  Has the plan of care been signed (Y/N):        []  Yes  [x]  No     Date of Patient follow up with Physician: 21      Is this a Progress Report:     [x]  Yes  []  No        Progress report/ Recertification will be due (10 Rx or 30 days whichever is less): 21      Visit # Insurance Allowable Auth Required   5 ( 29 in )  BMN []  Yes []  No        Functional Scale:   NDI  6/11 - 56% limitation   /15 - 32% limitation  8/12 - 20% limitation  /10 - 30% limitation    / - 16% limitation    / - 20% limitation   16 - 24% limitation    21 - 20% limitation       Latex Allergy:  [x]NO      []YES  Preferred Language for Healthcare:   [x]English       []other:      Pain level: 1 /10     SUBJECTIVE:  Pt notes that she was very sore for 2 days following trial of home traction unit. She feels improved, most of discomfort is by mastoid process at ear.  She was off work for an extended period of time when she had a reaction to the vaccine and notes she felt a lot better having more days from using the helmet at work and being able to lay down/rest.      OBJECTIVE: 21   Observation:    Test measurements:       AROM Left  Right   Comments   Cervical Flexion       40   Cervical Extension       40   Cervical Side bend 50 45   55 and 45 after iastm 21   Cervical Rotation 60 60           ^11/11   plank Side planks                 Therapeutic Exercise and NMR EXR  [x] (01960) Provided verbal/tactile cueing for activities related to strengthening, flexibility, endurance, ROM  for improvements in cervical, postural, scapular, scapulothoracic and UE control with self care, reaching, carrying, lifting, house/yardwork, driving/computer work. [x] (21093) Provided verbal/tactile cueing for activities related to improving balance, coordination, kinesthetic sense, posture, motor skill, proprioception  to assist with cervical, scapular, scapulothoracic and UE control with self care, reaching, carrying, lifting, house/yardwork, driving/computer work. Therapeutic Activities:    [] (65665 or 13477) Provided verbal/tactile cueing for activities related to improving balance, coordination, kinesthetic sense, posture, motor skill, proprioception and motor activation to allow for proper function of cervical, scapular, scapulothoracic and UE control with self care, carrying, lifting, driving/computer work.      Home Exercise Program:    [x] (26308) Reviewed/Progressed HEP activities related to strengthening, flexibility, endurance, ROM of cervical, scapular, scapulothoracic and UE control with self care, reaching, carrying, lifting, house/yardwork, driving/computer work  [] (44892) Reviewed/Progressed HEP activities related to improving balance, coordination, kinesthetic sense, posture, motor skill, proprioception of cervical, scapular, scapulothoracic and UE control with self care, reaching, carrying, lifting, house/yardwork, driving/computer work      Manual Treatments:  PROM / STM / Oscillations-Mobs:  G-I, II, III, IV (PA's, Inf., Post.)  [x] (68817) Provided manual therapy to mobilize soft tissue/joints of cervical/CT, scapular GHJ and UE for the purpose of decreasing headache, modulating pain, promoting relaxation,  increasing ROM, reducing/eliminating soft tissue swelling/inflammation/restriction, improving soft tissue extensibility and allowing for proper ROM for normal function with self care, reaching, carrying, lifting, house/yardwork, driving/computer work    Manual Cervical traction with suboccipital release and SCM trigger point release at mastoid process: 12'     Instrument Assisted Soft Tissue Mobilization (IASTM): was applied to the following muscles: L upper trap, L cervical paraspinals, L scm , with [employer] including multi-tool. Treatment consisted of IASTM strokes including sweeping, fanning, brushing, strumming, filleting, pinning and framing, based on body region contours, nature of the soft tissue restriction and desired treatment outcomes. These techniques were used to restore neurophysiology, improve mechanotransduction, enhance fluid dynamics and break collagen crosslinks. The treatment area was exposed and the patient was draped in an appropriate manner. Upon completion the clinician cleaned the IASTM tools as per [facility] recommendations. Skin check pre: normal   Skin check post: redness in tx area, skin intact  Intermittent tx time: 12'    Modalities:    [] GAME READY (VASO)- for significant edema, swelling, pain control. Charges:  Timed Code Treatment Minutes: 45'    Total Treatment Minutes: 39       [] EVAL (LOW) 82860 (typically 20 minutes face-to-face)  [] EVAL (MOD) 04117 (typically 30 minutes face-to-face)  [] EVAL (HIGH) 95397 (typically 45 minutes face-to-face)  [] RE-EVAL     [] IG(04001) x    [] IONTO  [] NMR (16945) x   [] VASO  [x] Manual (79825) x 2      [] Other:  [] TA x    [] Mech Traction (75308)  [] ES(attended) (88224)      [] ES (un) (13441):        ASSESSMENT:      GOALS:  Patient stated goal: pain relief and gain strength    [x] Progressing: [] Met: [] Not Met: [] Adjusted    Therapist goals for Patient:   Short Term Goals: To be achieved in: 2 weeks  1. Independent in HEP and progression per patient tolerance, in order to prevent re-injury.      [] Progressing: [x] Met: [] Not Met: [] Adjusted   2. Patient will have a decrease in pain to facilitate improvement in movement, function, and ADLs as indicated by Functional Deficits. [] Progressing: [x] Met: [] Not Met: [] Adjusted    Long Term Goals: To be achieved in: 4-6 weeks  1. Disability index score of 28% or less for the NDI to assist with reaching prior level of function. [] Progressing: [x] Met: [] Not Met: [] Adjusted  2. Patient will demonstrate increased cervical flexion and ext  AROM to greater than or equal to 45 deg,  to allow for proper joint functioning as indicated by patients Functional Deficits. [] Progressing: [x] Met: [] Not Met: [] Adjusted  3. Patient will return to ADLs and work without increased symptoms or restriction. [x] Progressing: [] Met: [] Not Met: [] Adjusted  4. Patient will report returning to independent workouts pain free. [x] Progressing: [] Met: [] Not Met: [] Adjusted     Overall Progression Towards Functional goals/ Treatment Progress Update:  [x] Patient is progressing as expected towards functional goals listed. [] Progression is slowed due to complexities/Impairments listed. [] Progression has been slowed due to co-morbidities. [] Plan just implemented, too soon to assess goals progression <30days   [] Goals require adjustment due to lack of progress  [] Patient is not progressing as expected and requires additional follow up with physician  [] Other    Prognosis for POC: [x] Good [] Fair  [] Poor      Patient requires continued skilled intervention: [x] Yes  [] No    Treatment/Activity Tolerance:  [x] Patient tolerated treatment well [] Patient limited by fatique  [] Patient limited by pain  [] Patient limited by other medical complications  [] Other:  Pt tolerated iastm well again today, notes that after manual traction she felt like something released.  Since she did not tolerate the home traction unit we did talk about the inflatable traction devices that she may be able to order to help with discomfort with use of helmet at work. Patient education: Patient education on PT and plan of care including diagnosis, prognosis, treatment goals and options. Patient agrees with discussed POC and treatment and is aware of rehab process. 6/1    PLAN: 1x/ per week for 4 weeks  (12/16/20 - 1/13/21)   [x] Continue per plan of care [] Alter current plan (see comments above)  [] Plan of care initiated [] Hold pending MD visit [] Discharge    Electronically signed by:      Jacobo Clay PT,   Jacobo Clay PT, DPT, Cert DN                Note: If patient does not return for scheduled/ recommended follow up visits, this note will serve as a discharge from care along with most recent update on progress.

## 2021-02-17 ENCOUNTER — OFFICE VISIT (OUTPATIENT)
Dept: ORTHOPEDIC SURGERY | Age: 61
End: 2021-02-17
Payer: COMMERCIAL

## 2021-02-17 ENCOUNTER — HOSPITAL ENCOUNTER (OUTPATIENT)
Dept: PHYSICAL THERAPY | Age: 61
Setting detail: THERAPIES SERIES
Discharge: HOME | End: 2021-02-17
Payer: COMMERCIAL

## 2021-02-17 VITALS — BODY MASS INDEX: 23.92 KG/M2 | RESPIRATION RATE: 12 BRPM | WEIGHT: 130 LBS | HEIGHT: 62 IN | TEMPERATURE: 96.2 F

## 2021-02-17 DIAGNOSIS — M48.02 FORAMINAL STENOSIS OF CERVICAL REGION: ICD-10-CM

## 2021-02-17 DIAGNOSIS — M47.812 CERVICAL SPONDYLOSIS WITHOUT MYELOPATHY: Primary | ICD-10-CM

## 2021-02-17 DIAGNOSIS — M50.30 DDD (DEGENERATIVE DISC DISEASE), CERVICAL: ICD-10-CM

## 2021-02-17 DIAGNOSIS — M79.18 MYOFASCIAL MUSCLE PAIN: ICD-10-CM

## 2021-02-17 PROCEDURE — 97110 THERAPEUTIC EXERCISES: CPT

## 2021-02-17 PROCEDURE — 99213 OFFICE O/P EST LOW 20 MIN: CPT | Performed by: STUDENT IN AN ORGANIZED HEALTH CARE EDUCATION/TRAINING PROGRAM

## 2021-02-17 PROCEDURE — 97140 MANUAL THERAPY 1/> REGIONS: CPT

## 2021-02-17 RX ORDER — MELOXICAM 15 MG/1
15 TABLET ORAL DAILY
Qty: 30 TABLET | Refills: 0 | Status: SHIPPED | OUTPATIENT
Start: 2021-02-17 | End: 2021-03-18

## 2021-02-17 RX ORDER — TIZANIDINE HYDROCHLORIDE 2 MG/1
2 CAPSULE, GELATIN COATED ORAL EVERY EVENING
Qty: 90 CAPSULE | Refills: 0 | Status: SHIPPED | OUTPATIENT
Start: 2021-02-17 | End: 2021-07-13

## 2021-02-17 NOTE — FLOWSHEET NOTE
[address], [address] [place], [address]  Phone: [phone]   Fax: [phone]         Physical Therapy Treatment Note/ Progress Report:       Date:  2021    Patient Name:  Leah aPrmar    :  1960  MRN: [id number]  Restrictions/Precautions:    Medical/Treatment Diagnosis Information:  Diagnosis: Neck pain - M54.2  Treatment Diagnosis: decreased ADL status, ROM, strength, and posture  Insurance/Certification information:  PT Insurance Information: Med mutual  Physician Information:  Referring Practitioner: Dr. Dejuan De Souza  Has the plan of care been signed (Y/N):        []  Yes  [x]  No     Date of Patient follow up with Physician: 21      Is this a Progress Report:     [x]  Yes  []  No        Progress report/ Recertification will be due (10 Rx or 30 days whichever is less): 21      Visit # Insurance Allowable Auth Required   6 ( 29 in )  BMN []  Yes []  No        Functional Scale:   NDI  6/11 - 56% limitation   7/15 - 32% limitation  8/12 - 20% limitation  9/10 - 30% limitation    11/4 - 16% limitation    12/2 - 20% limitation   12/16 - 24% limitation    21 - 20% limitation       Latex Allergy:  [x]NO      []YES  Preferred Language for Healthcare:   [x]English       []other:      Pain level: 1-2 /10     SUBJECTIVE:  Pt notes that she slipped down her steps yesterday in the snow and fell. Later that night she felt increased strain in her left side neck. She did get an inflatable cervical traction home unit from [facility] and notes that it does felt like it lets her relax and gives her support. Currently she feels like it she rotates her head to the right she will feel a strain on the left side of her neck but rates that only a 1-2/10. OBJECTIVE: 21  ?  Observation:   ? Test measurements:       AROM Left  Right   Comments   Cervical Flexion       40   Cervical Extension       40 Cervical Side bend 50 45   55 and 45 after iastm 1/27/21   Cervical Rotation 60 60                      Mobility Testing  Comments   Cervical Downglides  Hypomobile- L   Cervical Flexion mobility     Thoracic PA mobility  Hypomobile - L    Rib mobility  Hypomobile - L only             Red Flags           Positive    Positive    Headaches (worst patient has ever had) Pos Altered consciousness Neg   Dizziness Neg Radiating Pain Neg   Diplopia Neg Gait disturbances Neg   Dysphagia Neg Multi-segmental weakness Neg   Dysarthria Neg Tru/quad paresis Neg   Drop Attacks Neg Fracture Neg   Facial symptoms  Neg Numbness Neg   Nystagmus Neg Nausea  Pos      Joint mobility:               []?Normal                      [x]? Hypo              []?Hyper     Palpation: L upper trap     Functional Mobility/Transfers: independent      Posture: guarded upper traps       RESTRICTIONS/PRECAUTIONS: none    Exercises/Interventions:       Therapeutic Ex (78839) Sets/sec Reps Notes/CUES    Added 9/23   Upper trap stretch 30 sec 3 each side Added 6/11   Lev scap stretch 30 sec 3    Cervical rotation ROM - pain free ROM    SCM stretch 30 3           Resisted scapular retraction Black TB,    Resisted shoulder ext  Black TB,   Shoulder ER Black  TB,    Shoulder IR Black TB,                Manual Intervention (55338)      Cerv mobs/manip with suboccipital release  Grade III-V,  1/6   Thoracic mobs/manip CT manip Rib mobilizations 1 st and 2 nd 3'  Grade III-IV,1/6   IASTM 10'    STM to upper trap       NMR re-education (31124)   CUES NEEDED         Cervical isometrics (FLex, ext, SB)             Lat pulldown  5 plates, Added 1/6               Therapeutic Activity (35185)      Wall posture 10 sec 10 Added 10/21   Standing scaption  3#, ^11/11   Chin tuck 10 sec 10 Added 6/11   Scapular retraction  Prone T on physioball 2#, ^12/2   Prone Y on physioball 2# ^12/2   Prone chest lift on physioball 2# ^12/2 Modified planks with rows  5#, Added 12/23   Wall push-ups Added 1/6         Downdog Ball on the wall  4-way, 2# ball, ^11/11   plank Side planks                 Therapeutic Exercise and NMR EXR  [x] (06841) Provided verbal/tactile cueing for activities related to strengthening, flexibility, endurance, ROM  for improvements in cervical, postural, scapular, scapulothoracic and UE control with self care, reaching, carrying, lifting, house/yardwork, driving/computer work. [x] (00320) Provided verbal/tactile cueing for activities related to improving balance, coordination, kinesthetic sense, posture, motor skill, proprioception  to assist with cervical, scapular, scapulothoracic and UE control with self care, reaching, carrying, lifting, house/yardwork, driving/computer work. Therapeutic Activities:    [] (00568 or 77407) Provided verbal/tactile cueing for activities related to improving balance, coordination, kinesthetic sense, posture, motor skill, proprioception and motor activation to allow for proper function of cervical, scapular, scapulothoracic and UE control with self care, carrying, lifting, driving/computer work.      Home Exercise Program:    [x] (38183) Reviewed/Progressed HEP activities related to strengthening, flexibility, endurance, ROM of cervical, scapular, scapulothoracic and UE control with self care, reaching, carrying, lifting, house/yardwork, driving/computer work  [] (29416) Reviewed/Progressed HEP activities related to improving balance, coordination, kinesthetic sense, posture, motor skill, proprioception of cervical, scapular, scapulothoracic and UE control with self care, reaching, carrying, lifting, house/yardwork, driving/computer work      Manual Treatments:  PROM / STM / Oscillations-Mobs:  G-I, II, III, IV (PA's, Inf., Post.) Therapist goals for Patient:   Short Term Goals: To be achieved in: 2 weeks  1. Independent in HEP and progression per patient tolerance, in order to prevent re-injury. [] Progressing: [x] Met: [] Not Met: [] Adjusted   2. Patient will have a decrease in pain to facilitate improvement in movement, function, and ADLs as indicated by Functional Deficits. [] Progressing: [x] Met: [] Not Met: [] Adjusted    Long Term Goals: To be achieved in: 4-6 weeks  1. Disability index score of 28% or less for the NDI to assist with reaching prior level of function. [] Progressing: [x] Met: [] Not Met: [] Adjusted  2. Patient will demonstrate increased cervical flexion and ext  AROM to greater than or equal to 45 deg,  to allow for proper joint functioning as indicated by patients Functional Deficits. [] Progressing: [x] Met: [] Not Met: [] Adjusted  3. Patient will return to ADLs and work without increased symptoms or restriction. [x] Progressing: [] Met: [] Not Met: [] Adjusted  4. Patient will report returning to independent workouts pain free. [x] Progressing: [] Met: [] Not Met: [] Adjusted     Overall Progression Towards Functional goals/ Treatment Progress Update:  [x] Patient is progressing as expected towards functional goals listed. [] Progression is slowed due to complexities/Impairments listed. [] Progression has been slowed due to co-morbidities.   [] Plan just implemented, too soon to assess goals progression <30days   [] Goals require adjustment due to lack of progress  [] Patient is not progressing as expected and requires additional follow up with physician  [] Other    Prognosis for POC: [x] Good [] Fair  [] Poor      Patient requires continued skilled intervention: [x] Yes  [] No    Treatment/Activity Tolerance:  [x] Patient tolerated treatment well [] Patient limited by fatique  [] Patient limited by pain  [] Patient limited by other medical complications

## 2021-02-17 NOTE — PROGRESS NOTES
Follow up: Alfonso Castrejon  1960  [id number]      CHIEF COMPLAINT:    Chief Complaint   Patient presents with    Neck Pain     neck pain. completed 5 total PT sessions. new fall 2/16/21, neck pain now at 3/10         HISTORY OF PRESENT ILLNESS:  Ms. Tess Mcdonough is a 64 y.o. female returns for a follow up visit for multiple medical problems. Her current presenting problems are   1. Cervical spondylosis without myelopathy    2. DDD (degenerative disc disease), cervical    3. Foraminal stenosis of cervical region    4. Myofascial muscle pain    . As per information/history obtained from the PADT(patient assessment and documentation tool) - She complains of pain in the neck with radiation to the head She rates the pain 1-4/10 and describes it as throbbing. Pain is made worse by: lifting. She denies side effects from the current pain regimen. Patient reports that since the last follow up visit the physical functioning is worse, family/social relationships are unchanged, mood is unchanged and sleep patterns are unchanged, and that the overall functioning is unchanged. Patient denies neurological bowel or bladder. The patient is today for follow-up of ongoing neck pain. The patient has been continuing physical therapy and implementing new exercises and traction. She states the traction provides minimal relief. The patient did have a new fall on 2/16/2021 which increased her left-sided neck pain. 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 She continues to complain of headaches and intermittent migraines. Patient's neurologist increased the gabapentin to 300 mg nightly. She believes this is helping somewhat. We plan to repeat the cervical radiofrequency ablation at the 6-month dorothea in April. We also discussed pursuing therapy as the patient is under significant stress. She has been to therapy in the past which has helped.   I discussed with the patient that I would speak with Dr. Andi Whitman as well to inquire about any further pain management techniques. She would like a refill of the meloxicam and Zanaflex which helps with her neck pain.     Associated signs and symptoms:   Neurogenic bowel or bladder symptoms:  no   Perceived weakness:  no   Difficulty walking:  no              Past Medical History:   Past Medical History:   Diagnosis Date    Abnormal mammogram 6/8/2011    saw Dr. Neville wood or associate 6/2011 Ok to return for regular mammogram's     Anxiety 6/8/2011    Hyperlipidemia     Kidney stone     Medical history reviewed with no changes     Pyelonephritis 7/28/2011    Recurrent Change to augmentin and levaquin and ck blood cx's 7/27/2011 On daily macrobid per Dr. Delaney Apt disorder, acute 3/23/2013    Due to 's infidelity and leaving her 3/2013 To see counselor- increase celexa   still living w the family as of 6/2013       Past Surgical History:     Past Surgical History:   Procedure Laterality Date    CARPAL TUNNEL RELEASE  12/29/11    Left    CARPAL TUNNEL RELEASE Right 12-    CYSTOSCOPY      with stent placement-x3    PAIN MANAGEMENT PROCEDURE Left 8/26/2020    LEFT C2, THIRD OCCIPITAL NERVE, C3, C4, C5 MEDIAL BRANCH BLOCK WITH FLUOROSCOPY (80160, 08037)  #1 performed by Lucio Owens MD at [address] [place] [address] Avenue Left 9/9/2020    LEFT C2,TON,C3,C4 C5 MEDIAL BRANCH BLOCKS WITH FLUOROSCOPY performed by Lucio Owens MD at [address] [place] [address]  10/07/2020    PAIN MANAGEMENT PROCEDURE Left 10/7/2020    LEFT C2,TON,C3,C4,C5 RADIOFREQUENCY ABLATION WITH FLUOROSCOPY performed by Lucio Owens MD at [facility]      L maxillary gland removal     Current Medications:     Current Outpatient Medications:     meloxicam (MOBIC) 15 MG tablet, Take 1 tablet by mouth daily, Disp: 30 tablet, Rfl: 0    tiZANidine (ZANAFLEX) 2 MG capsule, Take 1 capsule by mouth Diabetes Maternal Grandmother     Cancer Maternal Grandfather     Diabetes Maternal Grandfather     Heart Disease Other     High Blood Pressure Other        REVIEW OF SYSTEMS:   CONSTITUTIONAL: Denies unexplained weight loss, fevers, chills or fatigue  NEUROLOGICAL: Denies unsteady gait or progressive weakness  MUSCULOSKELETAL: Denies joint swelling or redness  GI: Denies nausea, vomiting, diarrhea   : Denies bowel or bladder issues       PHYSICAL EXAM:    Vitals: Temperature 96.2 °F (35.7 °C), resp. rate 12, height 5' 2\" (1.575 m), weight 130 lb (59 kg), last menstrual period 10/07/2012. GENERAL EXAM:  · General Apparence: Patient is adequately groomed with no evidence of malnutrition. · Psychiatric: Orientation: The patient is oriented to time, place and person. The patient's mood and affect are appropriate   · Vascular: Examination reveals no swelling and palpation reveals no tenderness in upper or lower extremities. Good capillary refill. · The lymphatic examination of the neck, axillae and groin reveals all areas to be without enlargement or induration  · Sensation is intact without deficit in the upper and lower extremities to light touch and pinprick  · Coordination of the upper and lower extremities are normal.    CERVICAL EXAMINATION:  · Inspection: Local inspection shows no step-off or bruising. Cervical alignment is normal. No instability is noted. · Palpation and Percussion: No evidence of tenderness at the midline. Paraspinal tenderness is not present. There is no paraspinal spasm. Skin:There are no rashes, ulcerations or lesions  · Range of Motion:  limited by 25% in all planes due to pain, specifically with extension and facet loading to the left    · Strength: 5/5 bilateral upper extremities  · Special Tests:   Spurling's and Nguyen's are negative bilaterally. Metcalf and Impingement tests are negative bilaterally.   · Skin:There are no rashes, ulcerations or lesions in right & left upper extremities. · Reflexes: Bilaterally triceps, biceps and brachioradialis are 1+. Clonus absent bilaterally at the feet. No pathological reflexes are noted. · Gait & station: normal, patient ambulates without assistance  · Additional Examinations:  · RIGHT UPPER EXTREMITY:  Inspection/examination of the right upper extremity does not show any tenderness, deformity or injury. Range of motion is unremarkable and pain-free. There is no gross instability. There are no rashes, ulcerations or lesions. Strength and tone are normal. No atrophy or abnormal movements are noted. · LEFT UPPER EXTREMITY: Inspection/examination of the left upper extremity does not show any tenderness, deformity or injury. Range of motion is unremarkable and pain-free. There is no gross instability. There are no rashes, ulcerations or lesions. Strength and tone are normal. No atrophy or abnormal movements are noted. · RIGHT LOWER EXTREMITY: Inspection/examination of the right lower extremity does not show any tenderness, deformity or injury. Range of motion is normal and pain-free. There is no gross instability. There are no rashes, ulcerations or lesions. Strength and tone are normal. No atrophy or abnormal movements are noted. · LEFT LOWER EXTREMITY:  Inspection/examination of the left lower extremity does not show any tenderness, deformity or injury. Range of motion is normal and pain-free. There is no gross instability. There are no rashes, ulcerations or lesions. Strength and tone are normal. No atrophy or abnormal movements are noted. Diagnostic Testing:    No new diagnostics  Results for orders placed or performed in visit on 09/17/20   Valproic acid level, total   Result Value Ref Range    Valproic Acid Lvl 28.4 (L) 50.0 - 100.0 ug/mL     Impression:       1. Cervical spondylosis without myelopathy    2. DDD (degenerative disc disease), cervical    3. Foraminal stenosis of cervical region    4.  Myofascial muscle pain        Plan:  Clinical Course: Above diagnoses are worsening    I discussed the diagnosis and the treatment options with Kory De La Rosa today. In Summary:  The various treatment options were outlined and discussed with Kory De La Rosa including:  Conservative care options: physical therapy, ice, medications, bracing, and activity modification. The indications for therapeutic injections. The indications for additional imaging/laboratory studies. The indications for (possible future) interventions. After considering the various options discussed, Kory De La Rosa elected to pursue a course of treatment that includes the followin. Medications:  I will prescribe Meloxicam 15 mg PO daily to help with inflammation and reduce pain. CV, GI and Nephro risks were reviewed. I will refill Zanaflex 4 mg PO nightly to the current regimen. Counseled on risks, benefits and alternatives and recommended not to take the medicine and drive or operate heavy machinery. 2. PT:  Encouraged to continue with HEP. 3. Further studies:  No further studies. 4. Interventional:  We will plan to repeat the cervical radiofrequency ablation in April. I will discuss other treatment options with Dr. Marcelino Chance and call the patient to discuss. 5. Follow up:  4-6 weeks      Kory De La Rosa was instructed to call the office if her symptoms worsen or if new symptoms appear prior to the next scheduled visit. She is specifically instructed to contact the office between now & her scheduled appointment if she has concerns related to her condition or if she needs assistance in scheduling the above tests. She is welcome to call for an appointment sooner if she has any additional concerns or questions.        Aruna Lopez PA-C   Board Certified by the [employer] on Certification of Physician Assistants  Nyla Figueroa 60  Partner of [facility] ([facility])             This dictation was performed with a verbal recognition program (DRAGON) and it was checked for errors. It is possible that there are still dictated errors within this office note. If so, please bring any errors to my attention for an addendum. All efforts were made to ensure that this office note is accurate.

## 2021-02-24 ENCOUNTER — HOSPITAL ENCOUNTER (OUTPATIENT)
Age: 61
Discharge: HOME | End: 2021-02-24
Payer: COMMERCIAL

## 2021-02-24 ENCOUNTER — HOSPITAL ENCOUNTER (OUTPATIENT)
Dept: GENERAL RADIOLOGY | Age: 61
Discharge: HOME | End: 2021-02-24
Payer: COMMERCIAL

## 2021-02-24 ENCOUNTER — APPOINTMENT (OUTPATIENT)
Dept: PHYSICAL THERAPY | Age: 61
End: 2021-02-24
Payer: COMMERCIAL

## 2021-02-24 DIAGNOSIS — N20.0 RENAL CALCULI: ICD-10-CM

## 2021-02-24 PROCEDURE — 74018 RADEX ABDOMEN 1 VIEW: CPT

## 2021-03-03 ENCOUNTER — HOSPITAL ENCOUNTER (OUTPATIENT)
Dept: CT IMAGING | Age: 61
Discharge: HOME | End: 2021-03-03
Payer: COMMERCIAL

## 2021-03-03 ENCOUNTER — HOSPITAL ENCOUNTER (OUTPATIENT)
Dept: PHYSICAL THERAPY | Age: 61
Setting detail: THERAPIES SERIES
Discharge: HOME | End: 2021-03-03
Payer: COMMERCIAL

## 2021-03-03 DIAGNOSIS — N20.0 KIDNEY STONE: ICD-10-CM

## 2021-03-03 PROCEDURE — 74176 CT ABD & PELVIS W/O CONTRAST: CPT

## 2021-03-03 PROCEDURE — 97110 THERAPEUTIC EXERCISES: CPT

## 2021-03-03 NOTE — PLAN OF CARE
Tanna 79, [address] [place], [address]  Phone: [phone]   Fax: [phone]     Physical Therapy Re-Certification Plan of Care/MD UPDATE      Dear   Dr. Grayson Quinones,      We had the pleasure of treating the following patient for physical therapy services at [address] [place] [address]. A summary of our findings can be found in the updated assessment below. This includes our plan of care. If you have any questions or concerns regarding these findings, please do not hesitate to contact me at the office phone number checked above. Thank you for the referral.     Physician Signature:________________________________Date:__________________  By signing above (or electronic signature), therapists plan is approved by physician    Date Range Of Visits: 20-3/3/21  Total Visits to Date: 28  Overall Response to Treatment:   []Patient is responding well to treatment and improvement is noted with regards  to goals   []Patient should continue to improve in reasonable time if they continue HEP   []Patient has plateaued and is no longer responding to skilled PT intervention    []Patient is getting worse and would benefit from return to referring MD   []Patient unable to adhere to initial POC   []Other: Pt shows improved cervical range of motion and decreased pain. She has been able to return to most ADLs painfree. She is independent with HEP and has home cervical traction device to help with intermittent discomfort. At this time, skilled intervention is no longer required. JULISSA PT.         Physical Therapy Treatment Note/ Progress Report:       Date:  3/3/2021    Patient Name:  Arash Cohen    :  1960  MRN: [id number]  Restrictions/Precautions:    Medical/Treatment Diagnosis Information:  Diagnosis: Neck pain - M54.2  Treatment Diagnosis: decreased ADL status, ROM, strength, and posture  Insurance/Certification information:  PT Insurance Information: Med mutual  Physician Information:  Referring Practitioner: Dr. Gloria Banks  Has the plan of care been signed (Y/N):        []  Yes  [x]  No     Date of Patient follow up with Physician: 2/17/21      Is this a Progress Report:     [x]  Yes  []  No        Progress report/ Recertification will be due (10 Rx or 30 days whichever is less):       Visit # Insurance Allowable Auth Required   6 ( 29 in 2020)  BMN []  Yes []  No        Functional Scale:   NDI  6/11 - 56% limitation   7/15 - 32% limitation  8/12 - 20% limitation  9/10 - 30% limitation    11/4 - 16% limitation    12/2 - 20% limitation   12/16 - 24% limitation    1/27/21 - 20% limitation   3/3/21 - 14% limitation  Latex Allergy:  [x]NO      []YES  Preferred Language for Healthcare:   [x]English       []other:      Pain level: 0-1 /10     SUBJECTIVE:  Pt notes that she is doing well, she has been able to reduce the time she is wearing the helmet at work which has been helpful. She does note that quick extension or rotation has been painful at times in the last week. She has been going to pilates 1x per week. OBJECTIVE: 3/3/21   Observation:    Test measurements:       AROM Left  Right   Comments   Cervical Flexion       60   Cervical Extension       50   Cervical Side bend 50 50      Cervical Rotation 60 60                      Mobility Testing  Comments   Cervical Downglides     Cervical Flexion mobility    Thoracic PA mobility     Rib mobility              Red Flags           Positive    Positive    Headaches (worst patient has ever had) Neg Altered consciousness Neg   Dizziness Neg Radiating Pain Neg   Diplopia Neg Gait disturbances Neg   Dysphagia Neg Multi-segmental weakness Neg   Dysarthria Neg Tru/quad paresis Neg   Drop Attacks Neg Fracture Neg   Facial symptoms  Neg Numbness Neg   Nystagmus Neg Nausea  Neg       Joint mobility:               []?Normal                      [x]? Hypo              []?Hyper     Palpation: L upper trap     Functional Mobility/Transfers: independent      Posture: guarded upper traps       RESTRICTIONS/PRECAUTIONS: none    Exercises/Interventions:       Therapeutic Ex (21010) Sets/sec Reps Notes/CUES    Added 9/23   Upper trap stretch 30 sec 3 each side Added 6/11   Lev scap stretch 30 sec 3    Cervical rotation ROM - pain free ROM    SCM stretch 30 3           Resisted scapular retraction Black TB,    Resisted shoulder ext  Black TB,   Shoulder ER Black  TB,    Shoulder IR Black TB,                Manual Intervention (49821)      Cerv mobs/manip with suboccipital release  Grade III-V,  1/6   Thoracic mobs/manip CT manip Rib mobilizations 1 st and 2 nd 3'  Grade III-IV,1/6   IASTM 10'    STM to upper trap       NMR re-education (11449)   CUES NEEDED         Cervical isometrics (FLex, ext, SB)             Lat pulldown  5 plates, Added 1/6               Therapeutic Activity (57900)      Wall posture 10 sec 10 Added 10/21   Standing scaption  3#, ^11/11   Chin tuck 10 sec 10 Added 6/11   Scapular retraction  Prone T on physioball 2#, ^12/2   Prone Y on physioball 2# ^12/2   Prone chest lift on physioball 2# ^12/2       Modified planks with rows  5#, Added 12/23   Wall push-ups Added 1/6         Downdog Ball on the wall  4-way, 2# ball, ^11/11   plank Side planks                 Therapeutic Exercise and NMR EXR  [x] (90463) Provided verbal/tactile cueing for activities related to strengthening, flexibility, endurance, ROM  for improvements in cervical, postural, scapular, scapulothoracic and UE control with self care, reaching, carrying, lifting, house/yardwork, driving/computer work. [x] (90372) Provided verbal/tactile cueing for activities related to improving balance, coordination, kinesthetic sense, posture, motor skill, proprioception  to assist with cervical, scapular, scapulothoracic and UE control with self care, reaching, carrying, lifting, house/yardwork, driving/computer work.     Therapeutic [] ES () (80497):        ASSESSMENT:      GOALS:  Patient stated goal: pain relief and gain strength    [] Progressing: [x] Met: [] Not Met: [] Adjusted    Therapist goals for Patient:   Short Term Goals: To be achieved in: 2 weeks  1. Independent in HEP and progression per patient tolerance, in order to prevent re-injury. [] Progressing: [x] Met: [] Not Met: [] Adjusted   2. Patient will have a decrease in pain to facilitate improvement in movement, function, and ADLs as indicated by Functional Deficits. [] Progressing: [x] Met: [] Not Met: [] Adjusted    Long Term Goals: To be achieved in: 4-6 weeks  1. Disability index score of 28% or less for the NDI to assist with reaching prior level of function. [] Progressing: [x] Met: [] Not Met: [] Adjusted  2. Patient will demonstrate increased cervical flexion and ext  AROM to greater than or equal to 45 deg,  to allow for proper joint functioning as indicated by patients Functional Deficits. [] Progressing: [x] Met: [] Not Met: [] Adjusted  3. Patient will return to ADLs and work without increased symptoms or restriction. [] Progressing: [x] Met: [] Not Met: [] Adjusted  4. Patient will report returning to independent workouts pain free. [] Progressing: [x] Met: [] Not Met: [] Adjusted     Overall Progression Towards Functional goals/ Treatment Progress Update:  [x] Patient is progressing as expected towards functional goals listed. [] Progression is slowed due to complexities/Impairments listed. [] Progression has been slowed due to co-morbidities.   [] Plan just implemented, too soon to assess goals progression <30days   [] Goals require adjustment due to lack of progress  [] Patient is not progressing as expected and requires additional follow up with physician  [] Other    Prognosis for POC: [x] Good [] Fair  [] Poor      Patient requires continued skilled intervention: [x] Yes  [] No    Treatment/Activity Tolerance:  [x] Patient tolerated treatment well [] Patient limited by fatique  [] Patient limited by pain  [] Patient limited by other medical complications  [] Other:  With patients recent discomfort with certain quick mov't we did discuss possibly trying chiropractic manipulation in order to loosen the facet joints as needed and she can continue with HEP on her own as well as using her home traction device and self trigger point release techniques to manage symptoms. Patient education: Patient education on PT and plan of care including diagnosis, prognosis, treatment goals and options. Patient agrees with discussed POC and treatment and is aware of rehab process. 6/1    PLAN:  DC PT   [x] Continue per plan of care [] Alter current plan (see comments above)  [] Plan of care initiated [] Hold pending MD visit [] Discharge    Electronically signed by:      Aniceto Morel PT,   Aniceto Morel PT, DPT, Cert DN                Note: If patient does not return for scheduled/ recommended follow up visits, this note will serve as a discharge from care along with most recent update on progress.

## 2021-03-11 DIAGNOSIS — G43.011 INTRACTABLE MIGRAINE WITHOUT AURA AND WITH STATUS MIGRAINOSUS: Primary | ICD-10-CM

## 2021-03-17 ENCOUNTER — OFFICE VISIT (OUTPATIENT)
Dept: ORTHOPEDIC SURGERY | Age: 61
End: 2021-03-17
Payer: COMMERCIAL

## 2021-03-17 VITALS — HEIGHT: 62 IN | WEIGHT: 130 LBS | BODY MASS INDEX: 23.92 KG/M2

## 2021-03-17 DIAGNOSIS — M47.812 CERVICAL SPONDYLOSIS WITHOUT MYELOPATHY: Primary | ICD-10-CM

## 2021-03-17 DIAGNOSIS — M47.812 CERVICAL SPONDYLOSIS WITHOUT MYELOPATHY: ICD-10-CM

## 2021-03-17 DIAGNOSIS — M48.02 FORAMINAL STENOSIS OF CERVICAL REGION: ICD-10-CM

## 2021-03-17 DIAGNOSIS — M50.30 DDD (DEGENERATIVE DISC DISEASE), CERVICAL: ICD-10-CM

## 2021-03-17 PROCEDURE — 99213 OFFICE O/P EST LOW 20 MIN: CPT | Performed by: STUDENT IN AN ORGANIZED HEALTH CARE EDUCATION/TRAINING PROGRAM

## 2021-03-17 NOTE — PROGRESS NOTES
Follow up: [address]  1960  [id number]      CHIEF COMPLAINT:    Chief Complaint   Patient presents with    Follow-up     Last seen in February for cervical spondylosis. Patient is attending therapy and reports her pain is better         HISTORY OF PRESENT ILLNESS:  Ms. Barbara Niño is a 64 y.o. female returns for a follow up visit for multiple medical problems. Her current presenting problems are   1. Cervical spondylosis without myelopathy    2. DDD (degenerative disc disease), cervical    3. Foraminal stenosis of cervical region    . As per information/history obtained from the PADT(patient assessment and documentation tool) - She complains of pain in the neck with radiation to the head She rates the pain 2/10 and describes it as dull. Pain is made worse by: movement, driving. She denies side effects from the current pain regimen. Patient reports that since the last follow up visit the physical functioning is unchanged, family/social relationships are unchanged, mood is unchanged and sleep patterns are better, and that the overall functioning is better. Patient denies neurological bowel or bladder. The patient presents for ongoing left-sided neck pain. She continues to complain of left-sided neck pain with radiation to the base of the skull and associated headaches. She was discharged from physical therapy a few weeks ago as her range of motion and pain have greatly improved. The patient is a Covid ICU nurse and states she has not needed to use the PAPAR headgear as Covid cases have been decreasing. She feels that her pain has improved significantly because of this. She does state that quick movements or turning her head to the left cause sharp pain at the base of her skull and her neck. Driving or sitting in a chair for 2 hours more aggravates her pain. Lying down and resting help alleviate her pain.   The patient's neurologist increased her gabapentin dose to 400 mg daily and plans to titrate up to 600 mg. The patient feels this is helping however it is making her more drowsy. She would like to continue with her physical therapy as she feels it does help. We plan to repeat the cervical radiofrequency ablation in April as this did give her greater than 50% relief for 5-6 months.     Associated signs and symptoms:   Neurogenic bowel or bladder symptoms:  no   Perceived weakness:  no   Difficulty walking:  no              Past Medical History:   Past Medical History:   Diagnosis Date    Abnormal mammogram 6/8/2011    saw Dr. Neville wood or associate 6/2011 Ok to return for regular mammogram's     Anxiety 6/8/2011    Hyperlipidemia     Kidney stone     Medical history reviewed with no changes     Pyelonephritis 7/28/2011    Recurrent Change to augmentin and levaquin and ck blood cx's 7/27/2011 On daily macrobid per Dr. Watts Lira disorder, acute 3/23/2013    Due to 's infidelity and leaving her 3/2013 To see counselor- increase celexa   still living w the family as of 6/2013       Past Surgical History:     Past Surgical History:   Procedure Laterality Date    CARPAL TUNNEL RELEASE  12/29/11    Left    CARPAL TUNNEL RELEASE Right 12-    CYSTOSCOPY      with stent placement-x3    PAIN MANAGEMENT PROCEDURE Left 8/26/2020    LEFT C2, THIRD OCCIPITAL NERVE, C3, C4, C5 MEDIAL BRANCH BLOCK WITH FLUOROSCOPY (61472, 17393)  #1 performed by Venice Mccarthy MD at [facility] Left 9/9/2020    LEFT C2,TON,C3,C4 C5 MEDIAL BRANCH BLOCKS WITH FLUOROSCOPY performed by Venice Mccarthy MD at [address] [place] [address] Avenue  10/07/2020    PAIN MANAGEMENT PROCEDURE Left 10/7/2020    LEFT C2,TON,C3,C4,C5 RADIOFREQUENCY ABLATION WITH FLUOROSCOPY performed by Venice Mccarthy MD at [address]      L maxillary gland removal     Current Medications:     Current Outpatient Medications:     meloxicam (MOBIC) 15 MG tablet, Take 1 tablet by mouth daily, Disp: 30 tablet, Rfl: 0    tiZANidine (ZANAFLEX) 2 MG capsule, Take 1 capsule by mouth every evening Intended Supply: 90 days, Disp: 90 capsule, Rfl: 0    gabapentin (NEURONTIN) 100 MG capsule, Take 1 capsule by mouth nightly for 90 days. Intended supply: 90 days, Disp: 90 capsule, Rfl: 0    citalopram (CELEXA) 10 MG tablet, TAKE TWO TABLETS BY MOUTH ONE TIME DAILY, Disp: 180 tablet, Rfl: 3    potassium chloride (KLOR-CON M) 10 MEQ extended release tablet, TAKE 1 TABLET BY MOUTH ONE TIME A DAY, Disp: 90 tablet, Rfl: 3    cycloSPORINE (RESTASIS) 0.05 % ophthalmic emulsion, Restasis 0.05 % eye drops in a dropperette, Disp: , Rfl:     montelukast (SINGULAIR) 10 MG tablet, TAKE 1 TABLET BY MOUTH ONE TIME A DAY, Disp: 90 tablet, Rfl: 3    loratadine (CLARITIN) 10 MG tablet, Take 10 mg by mouth daily, Disp: , Rfl:     divalproex (DEPAKOTE ER) 250 MG extended release tablet, Take 500 mg by mouth daily , Disp: , Rfl:     Vitamin D, Ergocalciferol, 50 MCG (2000 UT) CAPS, Take 4,000 Units by mouth, Disp: , Rfl:     fluticasone (FLONASE) 50 MCG/ACT nasal spray, USE 1 SPRAY IN EACH NOSTRIL DAILY, Disp: 16 g, Rfl: 3    rizatriptan (MAXALT-MLT) 10 MG disintegrating tablet, TALE 1 TABLET BY MOUTH AS NEEDED FOR MIGRAINES MAY REPEAT 2 HOURS LATER IF NEEDED, Disp: 27 tablet, Rfl: 3    butalbital-acetaminophen-caffeine (FIORICET, ESGIC) -40 MG per tablet, TAKE ONE TABLET BY MOUTH EVERY 4 HOURS AS NEEDED FOR PAIN UP TO 10 DAYS, Disp: 30 tablet, Rfl: 3    folic acid (FOLVITE) 1 MG tablet, Take 1 mg by mouth daily, Disp: , Rfl:     calcium citrate-vitamin D (CALCITRATE/VITAMIN D) 315-200 MG-UNIT per tablet, Take 1 tablet by mouth daily. , Disp: , Rfl:   Allergies:  Patient has no known allergies. Social History:    reports that she has never smoked. She has never used smokeless tobacco. She reports that she does not drink alcohol or use drugs.   Family History:   Family History Problem Relation Age of Onset    Diabetes Mother     Kidney Cancer Mother         RIGHT    Cancer Maternal Grandmother     Diabetes Maternal Grandmother     Cancer Maternal Grandfather     Diabetes Maternal Grandfather     Heart Disease Other     High Blood Pressure Other        REVIEW OF SYSTEMS:   CONSTITUTIONAL: Denies unexplained weight loss, fevers, chills or fatigue  NEUROLOGICAL: Denies unsteady gait or progressive weakness  MUSCULOSKELETAL: Denies joint swelling or redness  GI: Denies nausea, vomiting, diarrhea   : Denies bowel or bladder issues       PHYSICAL EXAM:    Vitals: Height 5' 2\" (1.575 m), weight 130 lb (59 kg), last menstrual period 10/07/2012. GENERAL EXAM:  · General Apparence: Patient is adequately groomed with no evidence of malnutrition. · Psychiatric: Orientation: The patient is oriented to time, place and person. The patient's mood and affect are appropriate   · Vascular: Examination reveals no swelling and palpation reveals no tenderness in upper or lower extremities. Good capillary refill. · The lymphatic examination of the neck, axillae and groin reveals all areas to be without enlargement or induration  · Sensation is intact without deficit in the upper and lower extremities to light touch and pinprick  · Coordination of the upper and lower extremities are normal.    CERVICAL EXAMINATION:  · Inspection: Local inspection shows no step-off or bruising. Cervical alignment is normal. No instability is noted. · Palpation and Percussion: No evidence of tenderness at the midline. Paraspinal tenderness is not present. There is no paraspinal spasm. Skin:There are no rashes, ulcerations or lesions  · Range of Motion:  limited by 25% in all planes due to pain, especially with extension and facet loading to the left    · Strength: 5/5 bilateral upper extremities  · Special Tests:   Spurling's and Nguyen's are negative bilaterally.     Metcalf and Impingement tests are negative bilaterally. · Skin:There are no rashes, ulcerations or lesions in right & left upper extremities. · Reflexes: Bilaterally triceps, biceps and brachioradialis are 1+. Clonus absent bilaterally at the feet. No pathological reflexes are noted. · Gait & station: normal, patient ambulates without assistance  · Additional Examinations:  · RIGHT UPPER EXTREMITY:  Inspection/examination of the right upper extremity does not show any tenderness, deformity or injury. Range of motion is unremarkable and pain-free. There is no gross instability. There are no rashes, ulcerations or lesions. Strength and tone are normal. No atrophy or abnormal movements are noted. · LEFT UPPER EXTREMITY: Inspection/examination of the left upper extremity does not show any tenderness, deformity or injury. Range of motion is unremarkable and pain-free. There is no gross instability. There are no rashes, ulcerations or lesions. Strength and tone are normal. No atrophy or abnormal movements are noted. · RIGHT LOWER EXTREMITY: Inspection/examination of the right lower extremity does not show any tenderness, deformity or injury. Range of motion is normal and pain-free. There is no gross instability. There are no rashes, ulcerations or lesions. Strength and tone are normal. No atrophy or abnormal movements are noted. · LEFT LOWER EXTREMITY:  Inspection/examination of the left lower extremity does not show any tenderness, deformity or injury. Range of motion is normal and pain-free. There is no gross instability. There are no rashes, ulcerations or lesions. Strength and tone are normal. No atrophy or abnormal movements are noted.       Diagnostic Testing:    MR cervical spine shows 6/11/2020  FINDINGS:   BONES/ALIGNMENT: There is normal alignment of the cervical spine.  There is   no acute fracture or listhesis.  Bone marrow signal intensity is normal.   There is disc desiccation and disc space narrowing C5-C6.  Mild degenerative endplate changes are present at C5-C6.  Bone marrow signal intensity is   normal.       SPINAL CORD: The cervical spinal cord is normal in size and signal   intensities.       SOFT TISSUES: There is no paraspinal mass identified.       C2-C3: There is no disc bulge or protrusion present.  There is no significant   spinal canal stenosis or neural foraminal narrowing present.       C3-C4: There is a focal 2 mm central disc protrusion.  There is no   significant spinal canal stenosis or neural foraminal narrowing.       C4-C5: There is a disc bulge and uncovertebral overgrowth.  There is no   significant spinal canal stenosis.  Mild to moderate left neural foraminal   narrowing is present.  There is no significant right neural foraminal   narrowing.       C5-C6: There is a disc bulge and uncovertebral overgrowth.  There is no   significant spinal canal stenosis.  Mild bilateral neural foraminal narrowing   is present.       C6-C7: There is no disc bulge or protrusion present.  There is no significant   spinal canal stenosis or neural foraminal narrowing present.       C7-T1: There is no disc bulge or protrusion present.  There is no significant   spinal canal stenosis or neural foraminal narrowing present.           Impression   1. Mild bilateral neural foraminal narrowing at C5-C6 secondary to a disc   bulge and uncovertebral overgrowth. 2. Mild-to-moderate left neural foraminal narrowing at C4-C5 secondary to a   disc bulge and uncovertebral overgrowth. 3. Focal 2 mm central disc protrusion C3-C4 without significant spinal canal   stenosis or neural foraminal narrowing evident. Results for orders placed or performed in visit on 09/17/20   Valproic acid level, total   Result Value Ref Range    Valproic Acid Lvl 28.4 (L) 50.0 - 100.0 ug/mL     Impression:       1. Cervical spondylosis without myelopathy    2. DDD (degenerative disc disease), cervical    3.  Foraminal stenosis of cervical region        Plan: Clinical Course: Above diagnoses are worsening    I discussed the diagnosis and the treatment options with Delfina Rogers today. In Summary:  The various treatment options were outlined and discussed with Delfina Rogers including:  Conservative care options: physical therapy, ice, medications, bracing, and activity modification. The indications for therapeutic injections. The indications for additional imaging/laboratory studies. The indications for (possible future) interventions. After considering the various options discussed, Delfina Rogers elected to pursue a course of treatment that includes the followin. Medications:  Continue anti-inflammatories with appropriate GI Precautions including to stop if develop dark tarry stools or GI upset and to take with food. 2. PT:  Encouraged to continue with HEP. 3. Further studies:  No further studies. 4. Interventional:  We have discussed options such as radiofrequency ablation at the LEFT C2,TON,C3,C4,C5 levels after undergoing 2 successful lumbar medial branch blocks and one successful radiofrequency ablation. Risks include but limited to bleeding, infection, neuritis, increased pain, lack of pain relief, motor nerve injury. This does not include all possible risks. The patient verbalized understanding would like to proceed. Side effects and benefits were also discussed. The patient experienced 50-60% pain relief of left sided neck pain for 6 months following her first radiofrequency ablation. 5. Follow up:  4-6 weeks      Delfina Rogers was instructed to call the office if her symptoms worsen or if new symptoms appear prior to the next scheduled visit. She is specifically instructed to contact the office between now & her scheduled appointment if she has concerns related to her condition or if she needs assistance in scheduling the above tests.  She is welcome to call for an appointment sooner if she has any additional concerns or questions. Lynn Rodriguez PA-C   Board Certified by the [employer] on Certification of Physician Assistants  [id number]  Partner of [facility] ([facility])             This dictation was performed with a verbal recognition program [facility]) and it was checked for errors. It is possible that there are still dictated errors within this office note. If so, please bring any errors to my attention for an addendum. All efforts were made to ensure that this office note is accurate.

## 2021-03-18 RX ORDER — MELOXICAM 15 MG/1
TABLET ORAL
Qty: 30 TABLET | Refills: 0 | Status: SHIPPED | OUTPATIENT
Start: 2021-03-18 | End: 2021-05-28

## 2021-03-18 NOTE — TELEPHONE ENCOUNTER
Patient last seen yesterday 3/17/21 and medication last filled 21:    1. Cervical spondylosis without myelopathy    2. DDD (degenerative disc disease), cervical    3. Foraminal stenosis of cervical region          Plan:  Clinical Course: Above diagnoses are worsening     I discussed the diagnosis and the treatment options with Hemant Rodriguez today.      In Summary:  The various treatment options were outlined and discussed with Hemant Rodriguez including:  Conservative care options: physical therapy, ice, medications, bracing, and activity modification. The indications for therapeutic injections. The indications for additional imaging/laboratory studies. The indications for (possible future) interventions.      After considering the various options discussed, Hemant Michael elected to pursue a course of treatment that includes the followin. Medications:  Continue anti-inflammatories with appropriate GI Precautions including to stop if develop dark tarry stools or GI upset and to take with food.     2. PT:  Encouraged to continue with HEP.     3. Further studies:  No further studies.     4. Interventional:  We have discussed options such as radiofrequency ablation at the LEFT C2,TON,C3,C4,C5 levels after undergoing 2 successful lumbar medial branch blocks and one successful radiofrequency ablation. Risks include but limited to bleeding, infection, neuritis, increased pain, lack of pain relief, motor nerve injury. This does not include all possible risks. The patient verbalized understanding would like to proceed. Side effects and benefits were also discussed. The patient experienced 50-60% pain relief of left sided neck pain for 6 months following her first radiofrequency ablation.         5.  Follow up:  4-6 weeks

## 2021-04-01 ENCOUNTER — OFFICE VISIT (OUTPATIENT)
Dept: INTERNAL MEDICINE CLINIC | Age: 61
End: 2021-04-01
Payer: COMMERCIAL

## 2021-04-01 VITALS
BODY MASS INDEX: 25.03 KG/M2 | WEIGHT: 136 LBS | HEIGHT: 62 IN | DIASTOLIC BLOOD PRESSURE: 78 MMHG | SYSTOLIC BLOOD PRESSURE: 112 MMHG | TEMPERATURE: 97.2 F

## 2021-04-01 DIAGNOSIS — F32.A DEPRESSION, UNSPECIFIED DEPRESSION TYPE: ICD-10-CM

## 2021-04-01 DIAGNOSIS — Z01.818 PRE-OP EXAMINATION: Primary | ICD-10-CM

## 2021-04-01 DIAGNOSIS — G44.89 OTHER HEADACHE SYNDROME: ICD-10-CM

## 2021-04-01 DIAGNOSIS — M54.2 NECK PAIN: ICD-10-CM

## 2021-04-01 DIAGNOSIS — M25.512 ACUTE PAIN OF LEFT SHOULDER: ICD-10-CM

## 2021-04-01 DIAGNOSIS — N20.0 KIDNEY STONES: ICD-10-CM

## 2021-04-01 PROBLEM — E87.6 HYPOKALEMIA: Status: RESOLVED | Noted: 2017-09-06 | Resolved: 2021-04-01

## 2021-04-01 PROCEDURE — 93000 ELECTROCARDIOGRAM COMPLETE: CPT | Performed by: INTERNAL MEDICINE

## 2021-04-01 PROCEDURE — 99244 OFF/OP CNSLTJ NEW/EST MOD 40: CPT | Performed by: INTERNAL MEDICINE

## 2021-04-01 ASSESSMENT — ENCOUNTER SYMPTOMS
COLOR CHANGE: 0
BACK PAIN: 0
CHEST TIGHTNESS: 0
ABDOMINAL PAIN: 0
WHEEZING: 0

## 2021-04-01 ASSESSMENT — PATIENT HEALTH QUESTIONNAIRE - PHQ9
2. FEELING DOWN, DEPRESSED OR HOPELESS: 0
SUM OF ALL RESPONSES TO PHQ QUESTIONS 1-9: 0
1. LITTLE INTEREST OR PLEASURE IN DOING THINGS: 0
SUM OF ALL RESPONSES TO PHQ QUESTIONS 1-9: 0

## 2021-04-01 NOTE — LETTER
[facility] [address]  [address] [address]  Phone: [phone]  Fax: [phone]    Serenity Pizarro MD        April 1, 2021     Patient: Sg Corral   YOB: 1960   Date of Visit: 4/1/2021       To Whom It May Concern: It is my medical opinion that Aaron Mac is unable to work 4/17 and 4/18 due to need to recuperate after surgery. She may rtw on 4/20/2021   If you have any questions or concerns, please don't hesitate to call.     Sincerely,        Serenity Pizarro MD

## 2021-04-01 NOTE — LETTER
[facility] [address]  [address] [address]  Phone: [phone]  Fax: [phone]    Francisco J Cabral MD        April 1, 2021       Patient: Manish Bernstein   MR Number: [id number]   YOB: 1960   Date of Visit: 4/1/2021       Dear Dr. Jimbo Tony:    Thank you for the request for consultation for Humble Hoffmann to me for preop evaluation prior to cystoscopy. Below are the relevant portions of my assessment and plan of care. Subjective:      Patient ID: Manish Bernstein is a 64 y.o. female. Chief Complaint   Patient presents with    Pre-op Exam     sched for the 15th with Dr. Jibmo Tony for cystoscopy      Has been having blood in urine after a fall in January. Now for cystoscopy for evaluation. Has multiple stones bilaterally. Recent scan showed the stones and now obstruction. Otherwise having a lot of neck pain. To have a C3-5 ablation this coming week. Did injure her left shoulder w the fall also. Having pain in armpit but sometimes keeps her awake at night. Has decreased rom. Still having elbow pain related to work injury. Depression and anxiety seems worse w all of the pain and illness. Nadir Castrejon  1960    No Known Allergies  Current Outpatient Medications   Medication Sig Dispense Refill    meloxicam (MOBIC) 15 MG tablet TAKE 1 TABLET BY MOUTH ONE TIME A DAY 30 tablet 0    tiZANidine (ZANAFLEX) 2 MG capsule Take 1 capsule by mouth every evening Intended Supply: 90 days 90 capsule 0    gabapentin (NEURONTIN) 100 MG capsule Take 1 capsule by mouth nightly for 90 days. Intended supply: 90 days (Patient taking differently: Take 300 mg by mouth nightly.  Intended supply: 90 days) 90 capsule 0    citalopram (CELEXA) 10 MG tablet TAKE TWO TABLETS BY MOUTH ONE TIME DAILY 180 tablet 3    potassium chloride (KLOR-CON M) 10 MEQ extended release tablet TAKE 1 TABLET BY MOUTH ONE TIME A DAY 90 tablet 3    cycloSPORINE (RESTASIS) 0.05 % ophthalmic emulsion Restasis 0.05 % eye drops in a dropperette      montelukast (SINGULAIR) 10 MG tablet TAKE 1 TABLET BY MOUTH ONE TIME A DAY 90 tablet 3    loratadine (CLARITIN) 10 MG tablet Take 10 mg by mouth daily      Vitamin D, Ergocalciferol, 50 MCG (2000 UT) CAPS Take 4,000 Units by mouth      fluticasone (FLONASE) 50 MCG/ACT nasal spray USE 1 SPRAY IN EACH NOSTRIL DAILY 16 g 3    rizatriptan (MAXALT-MLT) 10 MG disintegrating tablet TALE 1 TABLET BY MOUTH AS NEEDED FOR MIGRAINES MAY REPEAT 2 HOURS LATER IF NEEDED 27 tablet 3    butalbital-acetaminophen-caffeine (FIORICET, ESGIC) -40 MG per tablet TAKE ONE TABLET BY MOUTH EVERY 4 HOURS AS NEEDED FOR PAIN UP TO 10 DAYS 30 tablet 3    folic acid (FOLVITE) 1 MG tablet Take 1 mg by mouth daily      calcium citrate-vitamin D (CALCITRATE/VITAMIN D) 315-200 MG-UNIT per tablet Take 1 tablet by mouth daily. No current facility-administered medications for this visit. Vitals:    04/01/21 1347   BP: 112/78   Temp: 97.2 °F (36.2 °C)   Weight: 136 lb (61.7 kg)   Height: 5' 2\" (1.575 m)     Body mass index is 24.87 kg/m².      Wt Readings from Last 3 Encounters:   04/01/21 136 lb (61.7 kg)   03/17/21 130 lb (59 kg)   02/17/21 130 lb (59 kg)     BP Readings from Last 3 Encounters:   04/01/21 112/78   10/07/20 (!) 140/89   09/23/20 138/82         Immunization History   Administered Date(s) Administered    Influenza 10/29/2015    Influenza Vaccine, unspecified formulation 10/01/2016    Influenza Virus Vaccine 10/03/2017, 10/12/2018, 10/07/2019, 10/01/2020, 10/01/2020    Tdap (Boostrix, Adacel) 08/06/2012    Tetanus 06/08/2000       Past Medical History:   Diagnosis Date    Abnormal mammogram 6/8/2011    saw Dr. Neville wood or associate 6/2011 Ok to return for regular mammogram's     Anxiety 6/8/2011    Hyperlipidemia     Kidney stone     Medical history reviewed with no changes     Pyelonephritis 7/28/2011    Recurrent Change to augmentin and levaquin and ck blood cx's 7/27/2011 On daily macrobid per Dr. Reggie Smith disorder, acute 3/23/2013    Due to 's infidelity and leaving her 3/2013 To see counselor- increase celexa   still living w the family as of 6/2013      Past Surgical History:   Procedure Laterality Date    CARPAL TUNNEL RELEASE  12/29/11    Left    CARPAL TUNNEL RELEASE Right 12-    CYSTOSCOPY      with stent placement-x3    PAIN MANAGEMENT PROCEDURE Left 8/26/2020    LEFT C2, THIRD OCCIPITAL NERVE, C3, C4, C5 MEDIAL BRANCH BLOCK WITH FLUOROSCOPY (91547, [id number])  #1 performed by Kiet Covarrubias MD at [facility] Left 9/9/2020    LEFT C2,TON,C3,C4 C5 MEDIAL BRANCH BLOCKS WITH FLUOROSCOPY performed by Kiet Covarrubias MD at [address] [place] [address]  10/07/2020    PAIN MANAGEMENT PROCEDURE Left 10/7/2020    LEFT C2,TON,C3,C4,C5 RADIOFREQUENCY ABLATION WITH FLUOROSCOPY performed by Kiet Covarrubias MD at [address] maxillary gland removal     Family History   Problem Relation Age of Onset    Diabetes Mother     Kidney Cancer Mother         RIGHT    Cancer Maternal Grandmother     Diabetes Maternal Grandmother     Cancer Maternal Grandfather     Diabetes Maternal Grandfather     Heart Disease Other     High Blood Pressure Other      Social History     Socioeconomic History    Marital status:      Spouse name: Not on file    Number of children: 11    Years of education: Not on file    Highest education level: Not on file   Occupational History    Occupation: [employer]     Employer: [employer]   Social Needs    Financial resource strain: Not on file    Food insecurity     Worry: Not on file     Inability: Not on file    Transportation needs     Medical: Not on file     Non-medical: Not on file   Tobacco Use    Smoking status: Never Smoker    Smokeless tobacco: Never Used   Substance and Sexual Activity    Alcohol use: No    Drug use: No    Sexual activity: Not on file   Lifestyle    Physical activity     Days per week: Not on file     Minutes per session: Not on file    Stress: Not on file   Relationships    Social connections     Talks on phone: Not on file     Gets together: Not on file     Attends Zoroastrianism service: Not on file     Active member of club or organization: Not on file     Attends meetings of clubs or organizations: Not on file     Relationship status: Not on file    Intimate partner violence     Fear of current or ex partner: Not on file     Emotionally abused: Not on file     Physically abused: Not on file     Forced sexual activity: Not on file   Other Topics Concern    Not on file   Social History Narrative    Not on file             Review of Systems   Constitutional: Negative for activity change, appetite change and fatigue. HENT: Negative for congestion. Eyes: Negative for visual disturbance. Respiratory: Negative for chest tightness and wheezing. Cardiovascular: Negative for chest pain and palpitations. Gastrointestinal: Negative for abdominal pain. Musculoskeletal: Negative for arthralgias and back pain. Skin: Negative for color change and rash. Neurological: Negative for weakness, light-headedness and headaches. Objective:   Physical Exam  Constitutional:       Appearance: She is well-developed. HENT:      Head: Normocephalic and atraumatic. Eyes:      Conjunctiva/sclera: Conjunctivae normal.      Pupils: Pupils are equal, round, and reactive to light. Neck:      Musculoskeletal: Normal range of motion and neck supple. Cardiovascular:      Rate and Rhythm: Normal rate and regular rhythm. Heart sounds: Normal heart sounds. Pulmonary:      Effort: Pulmonary effort is normal. No respiratory distress. Breath sounds: Normal breath sounds. Abdominal:      Tenderness: There is abdominal tenderness. There is no guarding or rebound.    Musculoskeletal: Comments: Pain w external rotation left shoulder  Point tenderness of cervical spine with para spinal spasm. Decreased rom and pain w rom noted. Lymphadenopathy:      Cervical: No cervical adenopathy. Skin:     General: Skin is warm and dry. Neurological:      Mental Status: She is alert and oriented to person, place, and time. Psychiatric:         Mood and Affect: Mood normal.         Behavior: Behavior normal.         Thought Content: Thought content normal.         Judgment: Judgment normal.           Assessment and Plan:      Left shoulder pain  Do rotator cuff exercises and see Dr. Sotero Rain if persists     Neck pain  For ablation soon     Kidney stones  For cysto and possible ablation     Other headache syndrome  Cont f/u w neuro -doing better w neurontin    Depression  Sl worse w all of her issues          Patient has been seen-history and physical performed and is medically cleared for surgery. If you have questions, please do not hesitate to call me. I look forward to following Adam Adams along with you.     Sincerely,        Melissa Ramirez MD    CC providers:    No Recipients

## 2021-04-01 NOTE — PROGRESS NOTES
Subjective:      Patient ID: Harsh Mensah is a 64 y.o. female. Chief Complaint   Patient presents with    Pre-op Exam     sched for the 15th with Dr. Arturo Fay for cystoscopy      Has been having blood in urine after a fall in January. Now for cystoscopy for evaluation. Has multiple stones bilaterally. Recent scan showed the stones and now obstruction. Otherwise having a lot of neck pain. To have a C3-5 ablation this coming week. Did injure her left shoulder w the fall also. Having pain in armpit but sometimes keeps her awake at night. Has decreased rom. Still having elbow pain related to work injury. Depression and anxiety seems worse w all of the pain and illness. Hannah Poor Tegge  1960    No Known Allergies  Current Outpatient Medications   Medication Sig Dispense Refill    meloxicam (MOBIC) 15 MG tablet TAKE 1 TABLET BY MOUTH ONE TIME A DAY 30 tablet 0    tiZANidine (ZANAFLEX) 2 MG capsule Take 1 capsule by mouth every evening Intended Supply: 90 days 90 capsule 0    gabapentin (NEURONTIN) 100 MG capsule Take 1 capsule by mouth nightly for 90 days. Intended supply: 90 days (Patient taking differently: Take 300 mg by mouth nightly.  Intended supply: 90 days) 90 capsule 0    citalopram (CELEXA) 10 MG tablet TAKE TWO TABLETS BY MOUTH ONE TIME DAILY 180 tablet 3    potassium chloride (KLOR-CON M) 10 MEQ extended release tablet TAKE 1 TABLET BY MOUTH ONE TIME A DAY 90 tablet 3    cycloSPORINE (RESTASIS) 0.05 % ophthalmic emulsion Restasis 0.05 % eye drops in a dropperette      montelukast (SINGULAIR) 10 MG tablet TAKE 1 TABLET BY MOUTH ONE TIME A DAY 90 tablet 3    loratadine (CLARITIN) 10 MG tablet Take 10 mg by mouth daily      Vitamin D, Ergocalciferol, 50 MCG (2000 UT) CAPS Take 4,000 Units by mouth      fluticasone (FLONASE) 50 MCG/ACT nasal spray USE 1 SPRAY IN EACH NOSTRIL DAILY 16 g 3    rizatriptan (MAXALT-MLT) 10 MG disintegrating tablet TALE 1 TABLET BY MOUTH AS NEEDED FOR MIGRAINES MAY REPEAT 2 HOURS LATER IF NEEDED 27 tablet 3    butalbital-acetaminophen-caffeine (FIORICET, ESGIC) -40 MG per tablet TAKE ONE TABLET BY MOUTH EVERY 4 HOURS AS NEEDED FOR PAIN UP TO 10 DAYS 30 tablet 3    folic acid (FOLVITE) 1 MG tablet Take 1 mg by mouth daily      calcium citrate-vitamin D (CALCITRATE/VITAMIN D) 315-200 MG-UNIT per tablet Take 1 tablet by mouth daily. No current facility-administered medications for this visit. Vitals:    04/01/21 1347   BP: 112/78   Temp: 97.2 °F (36.2 °C)   Weight: 136 lb (61.7 kg)   Height: 5' 2\" (1.575 m)     Body mass index is 24.87 kg/m².      Wt Readings from Last 3 Encounters:   04/01/21 136 lb (61.7 kg)   03/17/21 130 lb (59 kg)   02/17/21 130 lb (59 kg)     BP Readings from Last 3 Encounters:   04/01/21 112/78   10/07/20 (!) 140/89   09/23/20 138/82         Immunization History   Administered Date(s) Administered    Influenza 10/29/2015    Influenza Vaccine, unspecified formulation 10/01/2016    Influenza Virus Vaccine 10/03/2017, 10/12/2018, 10/07/2019, 10/01/2020, 10/01/2020    Tdap (Boostrix, Adacel) 08/06/2012    Tetanus 06/08/2000       Past Medical History:   Diagnosis Date    Abnormal mammogram 6/8/2011    saw Dr. Edil Morris or associate 6/2011 Ok to return for regular mammogram's     Anxiety 6/8/2011    Hyperlipidemia     Kidney stone     Medical history reviewed with no changes     Pyelonephritis 7/28/2011    Recurrent Change to augmentin and levaquin and ck blood cx's 7/27/2011 On daily macrobid per Dr. Balbuena Speak disorder, acute 3/23/2013    Due to 's infidelity and leaving her 3/2013 To see counselor- increase celexa   still living w the family as of 6/2013      Past Surgical History:   Procedure Laterality Date    CARPAL TUNNEL RELEASE  12/29/11    Left    CARPAL TUNNEL RELEASE Right 12-    CYSTOSCOPY      with stent placement-x3    PAIN MANAGEMENT PROCEDURE Left 8/26/2020    LEFT C2, THIRD OCCIPITAL NERVE, C3, C4, C5 MEDIAL BRANCH BLOCK WITH FLUOROSCOPY (56557, [id number])  #1 performed by Nathaniel Blair MD at [address] Left 9/9/2020    LEFT C2,TON,C3,C4 C5 MEDIAL BRANCH BLOCKS WITH FLUOROSCOPY performed by Nathaniel Blair MD at [facility]  10/07/2020    PAIN MANAGEMENT PROCEDURE Left 10/7/2020    LEFT C2,TON,C3,C4,C5 RADIOFREQUENCY ABLATION WITH FLUOROSCOPY performed by Nathaniel Blair MD at [address] maxillary gland removal     Family History   Problem Relation Age of Onset    Diabetes Mother     Kidney Cancer Mother         RIGHT    Cancer Maternal Grandmother     Diabetes Maternal Grandmother     Cancer Maternal Grandfather     Diabetes Maternal Grandfather     Heart Disease Other     High Blood Pressure Other      Social History     Socioeconomic History    Marital status:      Spouse name: Not on file    Number of children: 11    Years of education: Not on file    Highest education level: Not on file   Occupational History    Occupation: [employer]     Employer: [employer]   Social Needs    Financial resource strain: Not on file    Food insecurity     Worry: Not on file     Inability: Not on file   WHATT needs     Medical: Not on file     Non-medical: Not on file   Tobacco Use    Smoking status: Never Smoker    Smokeless tobacco: Never Used   Substance and Sexual Activity    Alcohol use: No    Drug use: No    Sexual activity: Not on file   Lifestyle    Physical activity     Days per week: Not on file     Minutes per session: Not on file    Stress: Not on file   Relationships    Social connections     Talks on phone: Not on file     Gets together: Not on file     Attends Yazidism service: Not on file     Active member of club or organization: Not on file     Attends meetings of clubs or organizations: Not on file     Relationship status: Not on file    Intimate

## 2021-04-01 NOTE — PATIENT INSTRUCTIONS
Patient Education        Rotator Cuff: Exercises  Introduction  Here are some examples of exercises for you to try. The exercises may be suggested for a condition or for rehabilitation. Start each exercise slowly. Ease off the exercises if you start to have pain. You will be told when to start these exercises and which ones will work best for you. How to do the exercises  Pendulum swing   If you have pain in your back, do not do this exercise. 1. Hold on to a table or the back of a chair with your good arm. Then bend forward a little and let your sore arm hang straight down. This exercise does not use the arm muscles. Rather, use your legs and your hips to create movement that makes your arm swing freely. 2. Use the movement from your hips and legs to guide the slightly swinging arm back and forth like a pendulum (or elephant trunk). Then guide it in circles that start small (about the size of a dinner plate). Make the circles a bit larger each day, as your pain allows. 3. Do this exercise for 5 minutes, 5 to 7 times each day. 4. As you have less pain, try bending over a little farther to do this exercise. This will increase the amount of movement at your shoulder. Posterior stretching exercise   1. Hold the elbow of your injured arm with your other hand. 2. Use your hand to pull your injured arm gently up and across your body. You will feel a gentle stretch across the back of your injured shoulder. 3. Hold for at least 15 to 30 seconds. Then slowly lower your arm. 4. Repeat 2 to 4 times. Up-the-back stretch   Your doctor or physical therapist may want you to wait to do this stretch until you have regained most of your range of motion and strength. You can do this stretch in different ways. Hold any of these stretches for at least 15 to 30 seconds. Repeat them 2 to 4 times. 1. Light stretch: Put your hand in your back pocket. Let it rest there to stretch your shoulder. 2. Moderate stretch:  With your other hand, hold your injured arm (palm outward) behind your back by the wrist. Pull your arm up gently to stretch your shoulder. 3. Advanced stretch: Put a towel over your other shoulder. Put the hand of your injured arm behind your back. Now hold the back end of the towel. With the other hand, hold the front end of the towel in front of your body. Pull gently on the front end of the towel. This will bring your hand farther up your back to stretch your shoulder. Overhead stretch   1. Standing about an arm's length away, grasp onto a solid surface. You could use a countertop, a doorknob, or the back of a sturdy chair. 2. With your knees slightly bent, bend forward with your arms straight. Lower your upper body, and let your shoulders stretch. 3. As your shoulders are able to stretch farther, you may need to take a step or two backward. 4. Hold for at least 15 to 30 seconds. Then stand up and relax. If you had stepped back during your stretch, step forward so you can keep your hands on the solid surface. 5. Repeat 2 to 4 times. Shoulder flexion (lying down)   To make a wand for this exercise, use a piece of PVC pipe or a broom handle with the broom removed. Make the wand about a foot wider than your shoulders. 1. Lie on your back, holding a wand with both hands. Your palms should face down as you hold the wand. 2. Keeping your elbows straight, slowly raise your arms over your head. Raise them until you feel a stretch in your shoulders, upper back, and chest.  3. Hold for 15 to 30 seconds. 4. Repeat 2 to 4 times. Shoulder rotation (lying down)   To make a wand for this exercise, use a piece of PVC pipe or a broom handle with the broom removed. Make the wand about a foot wider than your shoulders. 1. Lie on your back. Hold a wand with both hands with your elbows bent and palms up. 2. Keep your elbows close to your body, and move the wand across your body toward the sore arm.   3. Hold for 8 to 12 seconds. 4. Repeat 2 to 4 times. Wall climbing (to the side)   Avoid any movement that is straight to your side, and be careful not to arch your back. Your arm should stay about 30 degrees to the front of your side. 1. Stand with your side to a wall so that your fingers can just touch it at an angle about 30 degrees toward the front of your body. 2. Walk the fingers of your injured arm up the wall as high as pain permits. Try not to shrug your shoulder up toward your ear as you move your arm up. 3. Hold that position for a count of at least 15 to 20.  4. Walk your fingers back down to the starting position. 5. Repeat at least 2 to 4 times. Try to reach higher each time. Wall climbing (to the front)   During this stretching exercise, be careful not to arch your back. 1. Face a wall, and stand so your fingers can just touch it. 2. Keeping your shoulder down, walk the fingers of your injured arm up the wall as high as pain permits. (Don't shrug your shoulder up toward your ear.)  3. Hold your arm in that position for at least 15 to 30 seconds. 4. Slowly walk your fingers back down to where you started. 5. Repeat at least 2 to 4 times. Try to reach higher each time. Shoulder blade squeeze   1. Stand with your arms at your sides, and squeeze your shoulder blades together. Do not raise your shoulders up as you squeeze. 2. Hold 6 seconds. 3. Repeat 8 to 12 times. Scapular exercise: Arm reach   1. Lie flat on your back. This exercise is a very slight motion that starts with your arms raised (elbows straight, arms straight). 2. From this position, reach higher toward the dennis or ceiling. Keep your elbows straight. All motion should be from your shoulder blade only. 3. Relax your arms back to where you started. 4. Repeat 8 to 12 times. Arm raise to the side   During this strengthening exercise, your arm should stay about 30 degrees to the front of your side.   1. Slowly raise your injured arm to the counter. You can then slowly progress to the end of a couch, then to a sturdy chair, and finally to the floor. Scapular exercise: Retraction   For this exercise, you will need elastic exercise material, such as surgical tubing or Thera-Band. 1. Put the band around a solid object at about waist level. (A bedpost will work well.) Each hand should hold an end of the band. 2. With your elbows at your sides and bent to 90 degrees, pull the band back. Your shoulder blades should move toward each other. Then move your arms back where you started. 3. Repeat 8 to 12 times. 4. If you have good range of motion in your shoulders, try this exercise with your arms lifted out to the sides. Keep your elbows at a 90-degree angle. Raise the elastic band up to about shoulder level. Pull the band back to move your shoulder blades toward each other. Then move your arms back where you started. Internal rotator strengthening exercise   1. Start by tying a piece of elastic exercise material to a doorknob. You can use surgical tubing or Thera-Band. 2. Stand or sit with your shoulder relaxed and your elbow bent 90 degrees. Your upper arm should rest comfortably against your side. Squeeze a rolled towel between your elbow and your body for comfort. This will help keep your arm at your side. 3. Hold one end of the elastic band in the hand of the painful arm. 4. Slowly rotate your forearm toward your body until it touches your belly. Slowly move it back to where you started. 5. Keep your elbow and upper arm firmly tucked against the towel roll or at your side. 6. Repeat 8 to 12 times. External rotator strengthening exercise   1. Start by tying a piece of elastic exercise material to a doorknob. You can use surgical tubing or Thera-Band. (You may also hold one end of the band in each hand.)  2. Stand or sit with your shoulder relaxed and your elbow bent 90 degrees. Your upper arm should rest comfortably against your side.

## 2021-04-05 ENCOUNTER — PATIENT MESSAGE (OUTPATIENT)
Dept: INTERNAL MEDICINE CLINIC | Age: 61
End: 2021-04-05

## 2021-04-06 ENCOUNTER — APPOINTMENT (OUTPATIENT)
Dept: GENERAL RADIOLOGY | Age: 61
End: 2021-04-06
Attending: ANESTHESIOLOGY
Payer: COMMERCIAL

## 2021-04-06 ENCOUNTER — HOSPITAL ENCOUNTER (OUTPATIENT)
Age: 61
Setting detail: OUTPATIENT SURGERY
Discharge: HOME | End: 2021-04-06
Attending: ANESTHESIOLOGY | Admitting: ANESTHESIOLOGY
Payer: COMMERCIAL

## 2021-04-06 VITALS
SYSTOLIC BLOOD PRESSURE: 178 MMHG | TEMPERATURE: 98.2 F | HEIGHT: 62 IN | WEIGHT: 132 LBS | BODY MASS INDEX: 24.29 KG/M2 | RESPIRATION RATE: 16 BRPM | DIASTOLIC BLOOD PRESSURE: 93 MMHG | HEART RATE: 63 BPM | OXYGEN SATURATION: 99 %

## 2021-04-06 PROCEDURE — 2709999900 HC NON-CHARGEABLE SUPPLY: Performed by: ANESTHESIOLOGY

## 2021-04-06 PROCEDURE — 2500000003 HC RX 250 WO HCPCS: Performed by: ANESTHESIOLOGY

## 2021-04-06 PROCEDURE — 3610000059 HC PAIN LEVEL 5 ADDL 15 MIN (NON-OR): Performed by: ANESTHESIOLOGY

## 2021-04-06 PROCEDURE — 77003 FLUOROGUIDE FOR SPINE INJECT: CPT

## 2021-04-06 PROCEDURE — 6360000002 HC RX W HCPCS: Performed by: ANESTHESIOLOGY

## 2021-04-06 PROCEDURE — 3610000058 HC PAIN LEVEL 5 BASE (NON-OR): Performed by: ANESTHESIOLOGY

## 2021-04-06 PROCEDURE — 99152 MOD SED SAME PHYS/QHP 5/>YRS: CPT | Performed by: ANESTHESIOLOGY

## 2021-04-06 PROCEDURE — 99153 MOD SED SAME PHYS/QHP EA: CPT | Performed by: ANESTHESIOLOGY

## 2021-04-06 RX ORDER — MIDAZOLAM HYDROCHLORIDE 1 MG/ML
INJECTION INTRAMUSCULAR; INTRAVENOUS
Status: COMPLETED | OUTPATIENT
Start: 2021-04-06 | End: 2021-04-06

## 2021-04-06 RX ORDER — BUPIVACAINE HYDROCHLORIDE 5 MG/ML
INJECTION, SOLUTION EPIDURAL; INTRACAUDAL
Status: COMPLETED | OUTPATIENT
Start: 2021-04-06 | End: 2021-04-06

## 2021-04-06 RX ORDER — LIDOCAINE HYDROCHLORIDE 10 MG/ML
INJECTION, SOLUTION EPIDURAL; INFILTRATION; INTRACAUDAL; PERINEURAL
Status: COMPLETED | OUTPATIENT
Start: 2021-04-06 | End: 2021-04-06

## 2021-04-06 RX ORDER — FENTANYL CITRATE 50 UG/ML
INJECTION, SOLUTION INTRAMUSCULAR; INTRAVENOUS
Status: COMPLETED | OUTPATIENT
Start: 2021-04-06 | End: 2021-04-06

## 2021-04-06 NOTE — PROGRESS NOTES
Teaching / education initiated regarding perioperative experience, expectations, and pain management during stay. Patient verbalized understanding.  Renetta Titus RN

## 2021-04-06 NOTE — PROGRESS NOTES
Ride here. Discharged to home with . Has instructions, purse and all belongings. Brought own fluids to go.

## 2021-04-07 NOTE — H&P
Update History & Physical    The patient's History and Physical of March 17,  was reviewed with the patient and I examined the patient. There was no change. The surgical site was confirmed by the patient and me. Plan: The risks, benefits, expected outcome, and alternative to the recommended procedure have been discussed with the patient. Patient understands and wants to proceed with the procedure.      Electronically signed by Sanju Segal MD on 4/7/2021 at 11:45 AM

## 2021-04-07 NOTE — OP NOTE
Operative Note      Patient: Adam Mcbride  YOB: 1960  MRN: [id number]    Date of Procedure: 4/6/2021    Pre-Op Diagnosis: M47.812  SPONDYLOSIS WITHOUT MYELOPATHY OR RADICULOPATHY CERVICAL  M50.30  OTHER CERVICAL DISC DEGENERATION CERVICAL  M48.02  SPINAL STENOSIS CERVICAL    Post-Op Diagnosis: Same       Procedure(s):  LEFT C2, THIRD OCCIPITAL NERVE, C3, C4, C5  RADIOFREQUENCY ABLATION WITH FLUOROSCOPY    Surgeon(s):  Rekha Bates MD    Assistant:   * No surgical staff found *    Anesthesia: IV Sedation    Estimated Blood Loss (mL): Minimal    Complications: None    Specimens:   * No specimens in log *    Implants:  * No implants in log *      Drains: * No LDAs found *    Findings:     Detailed Description of Procedure:   Procedure in Detail:   The patient's chart was reviewed. After obtaining written informed consent, the patient had a 20 g IV placed, and NS was running at 30 ml/hour, the patient was placed in the supine position with the leg slightly flexed. Versed and Fentanyl was given to the patient intravenous, a NC at 4 l was placed and monitors attached. PROCEDURE: MEDICAL NECESSITY: This patient has chronic pain that has failed to respond to conservative measures as outlined in the original history and last physical exam.   The patient's symptoms include Pain and disability of a moderate to severe degree with intermittent refereed pain. The goals of treatment are to:  1) Achieve optimal pain control, recognizing that a pain-free state may not be achievable;   2) Minimize adverse outcomes;   3) Enhance functional abilities, and physical and psychological well-being; and   4) Enhance the quality of life for patients with chronic pain.   The patient has documented pathology through radiographic imaging, the patient has the same or similar procedure in the past which resulted in significant improvement more than 50% reduction in the pain, as well improvement in their Activity of daily living and quality of life, and this would be the goal for the first time procedure. PROCEDURE NOTE: After obtaining written informed consent patient was taken to the procedure room. Pre-procedure blood pressure and pulse were stable and recorded in patients clinic chart. The patient was placed in a prone position and the neck was prepped with chloroprep and draped in the usual sterile fashion. The skin over the Left C2 facet joint was infiltrated with 1% Lidocaine for local anesthesia. A 22-gauge, 3.5-inch needle was inserted into the Cervical medial branch under radiographic guidance. Both AP and lateral views were obtained. Following placement of the needle and negative aspiration, 1 ml = 2 mg of Dexamethasone followed by 1 cc of Bupivacaine 0.25% was injected. The identical procedure was performed on facet joint levels Left C 3, C4. During the procedure there was no evidence of CSF, paresthesias or heme. After the procedure the needles were flushed with preservative free local anesthetic and removed. Skin was cleaned and a sterile dressing was applied. Following the procedure the patient's vital signs were stable. The patient was discharged home in good condition after being given discharge instructions.     Electronically signed by Courtney Birmingham MD on 4/7/2021 at 11:44 AM

## 2021-04-09 ENCOUNTER — OFFICE VISIT (OUTPATIENT)
Dept: PRIMARY CARE CLINIC | Age: 61
End: 2021-04-09
Payer: COMMERCIAL

## 2021-04-09 DIAGNOSIS — Z01.818 PREOP EXAMINATION: Primary | ICD-10-CM

## 2021-04-09 LAB — SARS-COV-2: NOT DETECTED

## 2021-04-09 PROCEDURE — 99211 OFF/OP EST MAY X REQ PHY/QHP: CPT | Performed by: NURSE PRACTITIONER

## 2021-05-05 ENCOUNTER — OFFICE VISIT (OUTPATIENT)
Dept: ORTHOPEDIC SURGERY | Age: 61
End: 2021-05-05
Payer: COMMERCIAL

## 2021-05-05 VITALS — HEIGHT: 62 IN | WEIGHT: 132 LBS | BODY MASS INDEX: 24.29 KG/M2

## 2021-05-05 DIAGNOSIS — M47.812 CERVICAL SPONDYLOSIS WITHOUT MYELOPATHY: Primary | ICD-10-CM

## 2021-05-05 DIAGNOSIS — M48.02 FORAMINAL STENOSIS OF CERVICAL REGION: ICD-10-CM

## 2021-05-05 DIAGNOSIS — M50.30 DDD (DEGENERATIVE DISC DISEASE), CERVICAL: ICD-10-CM

## 2021-05-05 PROCEDURE — 99213 OFFICE O/P EST LOW 20 MIN: CPT | Performed by: STUDENT IN AN ORGANIZED HEALTH CARE EDUCATION/TRAINING PROGRAM

## 2021-05-05 RX ORDER — METHYLPREDNISOLONE 4 MG/1
TABLET ORAL
Qty: 1 KIT | Refills: 0 | Status: SHIPPED | OUTPATIENT
Start: 2021-05-05 | End: 2021-05-11

## 2021-05-05 NOTE — LETTER
[address] [place] [address]  [address] [place] [id number]  Phone: [phone]  Fax: [phone]    Lisa Cole        May 5, 2021     Patient: Kel Sorto   YOB: 1960   Date of Visit: 5/5/2021       To Whom It May Concern: It is my medical opinion that Bola Campos should not wear the PAPAR for the next two weeks due to the following diagnoses and treatment plan:     1. Cervical spondylosis without myelopathy    2. DDD (degenerative disc disease), cervical    3. Foraminal stenosis of cervical region      Recent procedure: LEFT C2, THIRD OCCIPITAL NERVE, C3, C4, C5  RADIOFREQUENCY ABLATION on 4/6/21    We plan to continue PT for 4-6 weeks. If you have any questions or concerns, please don't hesitate to call.     Sincerely,        Norma Melo PA-C

## 2021-05-05 NOTE — LETTER
[address] [place] [address]  [address] [facility],First [address] [address]  Phone: [phone]  Fax: [phone]    Lisa Goddard        May 19, 2021     Patient: Ochoa Gutierrez   YOB: 1960   Date of Visit: 5/19/2021       To Whom It May Concern: It is my medical opinion that Jo Ann Watson should not wear the PAPAR for the next two weeks due to the following diagnoses and treatment plan:     1. Cervical spondylosis without myelopathy    2. DDD (degenerative disc disease), cervical    3. Foraminal stenosis of cervical region      Recent procedure: LEFT C2, THIRD OCCIPITAL NERVE, C3, C4, C5  RADIOFREQUENCY ABLATION on 4/6/21    We plan to continue PT for 4-6 weeks. If you have any questions or concerns, please don't hesitate to call.     Sincerely,        Sophia Mayo PA-C

## 2021-05-05 NOTE — PROGRESS NOTES
Follow up: [address]  1960  [id number]      CHIEF COMPLAINT:    Chief Complaint   Patient presents with    Neck Pain     FU CSP, Patient had a ablation on 4/6/21. States she has sensitivity at the injection site. Sensitivity with pressure and touch. Patient notes she feels like she has \"bobble head\"         HISTORY OF PRESENT ILLNESS:  Ms. Scooby Whitehead is a 64 y.o. female returns for a follow up visit for multiple medical problems. Her current presenting problems are   1. Cervical spondylosis without myelopathy    2. DDD (degenerative disc disease), cervical    3. Foraminal stenosis of cervical region    . As per information/history obtained from the PADT(patient assessment and documentation tool) - She complains of pain in the neck with radiation to the shoulders Left She rates the pain 2/10 and describes it as sharp, dull, aching. Pain is made worse by: movement. She denies side effects from the current pain regimen. Patient reports that since the last follow up visit the physical functioning is unchanged, family/social relationships are unchanged, mood is unchanged and sleep patterns are unchanged, and that the overall functioning is better. Patient denies neurological bowel or bladder. The patient presents follow-up of ongoing left-sided neck pain. She recently underwent a second cervical radiofrequency ablation on 4/6/2021. The patient reports the procedure was more painful than her prior ablation. She did go hiking the following day and noticed increased pain. She has soreness at the injection site and feels weakness with holding her head. 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 This equipment includes a helmet like device to protect her from COVID-19. This was a lot of stress on the neck and shoulders. She has pain with looking up, cervical rotation and prolonged driving due to the cervical rotation.   Staying in a neutral position, lying in bed using the support of a pillow or using ice packs helps with her pain. She is not quite ready to return to physical therapy however she is interested in doing so. We discussed trying to take a break from wearing the PAPAR in order to take some of the stress off of her neck and head. She does report she has not been taking the meloxicam since prior to the procedure. She has continued gabapentin which she states helps somewhat.     Associated signs and symptoms:   Neurogenic bowel or bladder symptoms:  no   Perceived weakness:  no   Difficulty walking:  no              Past Medical History:   Past Medical History:   Diagnosis Date    Abnormal mammogram 6/8/2011    saw Dr. Neville wood or associate 6/2011 Ok to return for regular mammogram's     Anxiety 6/8/2011    Hyperlipidemia     Kidney stone     Medical history reviewed with no changes     Pyelonephritis 7/28/2011    Recurrent Change to augmentin and levaquin and ck blood cx's 7/27/2011 On daily macrobid per Dr. Марина Garber disorder, acute 3/23/2013    Due to 's infidelity and leaving her 3/2013 To see counselor- increase celexa   still living w the family as of 6/2013       Past Surgical History:     Past Surgical History:   Procedure Laterality Date    CARPAL TUNNEL RELEASE  12/29/11    Left    CARPAL TUNNEL RELEASE Right 12-    CYSTOSCOPY      with stent placement-x3    PAIN MANAGEMENT PROCEDURE Left 8/26/2020    LEFT C2, THIRD OCCIPITAL NERVE, C3, C4, C5 MEDIAL BRANCH BLOCK WITH FLUOROSCOPY (07969, [id number])  #1 performed by Ronnie Calzada MD at [address] [place] [address] Left 9/9/2020    LEFT C2,TON,C3,C4 C5 MEDIAL BRANCH BLOCKS WITH FLUOROSCOPY performed by Ronnie Calzada MD at [address] [place] [address]  10/07/2020    PAIN MANAGEMENT PROCEDURE Left 10/7/2020    LEFT C2,TON,C3,C4,C5 RADIOFREQUENCY ABLATION WITH FLUOROSCOPY performed by Ronnie Calzada MD at [place] [address] MANAGEMENT PROCEDURE Left 4/6/2021    LEFT C2, THIRD OCCIPITAL NERVE, C3, C4, C5  RADIOFREQUENCY ABLATION WITH FLUOROSCOPY performed by Juventino Vigil MD at [address] maxillary gland removal     Current Medications:     Current Outpatient Medications:     methylPREDNISolone (MEDROL, IRINA,) 4 MG tablet, Take by mouth., Disp: 1 kit, Rfl: 0    meloxicam (MOBIC) 15 MG tablet, TAKE 1 TABLET BY MOUTH ONE TIME A DAY, Disp: 30 tablet, Rfl: 0    tiZANidine (ZANAFLEX) 2 MG capsule, Take 1 capsule by mouth every evening Intended Supply: 90 days, Disp: 90 capsule, Rfl: 0    gabapentin (NEURONTIN) 100 MG capsule, Take 1 capsule by mouth nightly for 90 days. Intended supply: 90 days (Patient taking differently: Take 300 mg by mouth nightly.  Intended supply: 90 days), Disp: 90 capsule, Rfl: 0    citalopram (CELEXA) 10 MG tablet, TAKE TWO TABLETS BY MOUTH ONE TIME DAILY, Disp: 180 tablet, Rfl: 3    potassium chloride (KLOR-CON M) 10 MEQ extended release tablet, TAKE 1 TABLET BY MOUTH ONE TIME A DAY, Disp: 90 tablet, Rfl: 3    cycloSPORINE (RESTASIS) 0.05 % ophthalmic emulsion, Restasis 0.05 % eye drops in a dropperette, Disp: , Rfl:     montelukast (SINGULAIR) 10 MG tablet, TAKE 1 TABLET BY MOUTH ONE TIME A DAY, Disp: 90 tablet, Rfl: 3    loratadine (CLARITIN) 10 MG tablet, Take 10 mg by mouth daily, Disp: , Rfl:     Vitamin D, Ergocalciferol, 50 MCG (2000 UT) CAPS, Take 4,000 Units by mouth, Disp: , Rfl:     fluticasone (FLONASE) 50 MCG/ACT nasal spray, USE 1 SPRAY IN EACH NOSTRIL DAILY, Disp: 16 g, Rfl: 3    rizatriptan (MAXALT-MLT) 10 MG disintegrating tablet, TALE 1 TABLET BY MOUTH AS NEEDED FOR MIGRAINES MAY REPEAT 2 HOURS LATER IF NEEDED, Disp: 27 tablet, Rfl: 3    butalbital-acetaminophen-caffeine (FIORICET, ESGIC) -40 MG per tablet, TAKE ONE TABLET BY MOUTH EVERY 4 HOURS AS NEEDED FOR PAIN UP TO 10 DAYS, Disp: 30 tablet, Rfl: 3    folic acid (FOLVITE) 1 MG tablet, Take 1 mg by mouth daily, Disp: , Rfl:     calcium citrate-vitamin D (CALCITRATE/VITAMIN D) 315-200 MG-UNIT per tablet, Take 1 tablet by mouth daily. , Disp: , Rfl:   Allergies:  Patient has no known allergies. Social History:    reports that she has never smoked. She has never used smokeless tobacco. She reports that she does not drink alcohol or use drugs. Family History:   Family History   Problem Relation Age of Onset    Diabetes Mother     Kidney Cancer Mother         RIGHT    Cancer Maternal Grandmother     Diabetes Maternal Grandmother     Cancer Maternal Grandfather     Diabetes Maternal Grandfather     Heart Disease Other     High Blood Pressure Other        REVIEW OF SYSTEMS:   CONSTITUTIONAL: Denies unexplained weight loss, fevers, chills or fatigue  NEUROLOGICAL: Denies unsteady gait or progressive weakness  MUSCULOSKELETAL: Denies joint swelling or redness  GI: Denies nausea, vomiting, diarrhea   : Denies bowel or bladder issues       PHYSICAL EXAM:    Vitals: Height 5' 2\" (1.575 m), weight 132 lb (59.9 kg), last menstrual period 10/07/2012. GENERAL EXAM:  · General Apparence: Patient is adequately groomed with no evidence of malnutrition. · Psychiatric: Orientation: The patient is oriented to time, place and person. The patient's mood and affect are appropriate   · Vascular: Examination reveals no swelling and palpation reveals no tenderness in upper or lower extremities. Good capillary refill. · The lymphatic examination of the neck, axillae and groin reveals all areas to be without enlargement or induration  · Sensation is intact without deficit in the upper and lower extremities to light touch and pinprick  · Coordination of the upper and lower extremities are normal.    CERVICAL EXAMINATION:  · Inspection: Local inspection shows no step-off or bruising. Cervical alignment is normal. No instability is noted. · Palpation and Percussion: No evidence of tenderness at the midline.   Paraspinal are noted. Diagnostic Testing:    No new diagnostics  Results for orders placed or performed in visit on 21   COVID-19   Result Value Ref Range    SARS-CoV-2 Not Detected Not detected     Impression:       1. Cervical spondylosis without myelopathy    2. DDD (degenerative disc disease), cervical    3. Foraminal stenosis of cervical region        Plan:  Clinical Course: Above diagnoses are improving     I discussed the diagnosis and the treatment options with Sumi Mayo today. In Summary:  The various treatment options were outlined and discussed with Sumi Mayo including:  Conservative care options: physical therapy, ice, medications, bracing, and activity modification. The indications for therapeutic injections. The indications for additional imaging/laboratory studies. The indications for (possible future) interventions. After considering the various options discussed, Sumi Mayo elected to pursue a course of treatment that includes the followin. Medications:  I will prescribe a medrol dose pack to help with the inflammatory component of pain. Risks, benefits and alternatives were discussed. Recommended to stop any NSAIDs to reduce risk of GI bleed during the course of the dose pack. Continue Meloxicam following completion of Medrol Dosepak. 2. PT:  I will start the patient on a trial of PT to work on a cervical stabilization program to focus on stretching, strengthening, traction and modalities as indicated. 3. Further studies:  No further studies. 4. Interventional:  No relief following cervical radiofrequency ablation as of yet. We will give this 2-3 more weeks to take full effect. 5. Follow up:  4-6 weeks      Sumi Mayo was instructed to call the office if her symptoms worsen or if new symptoms appear prior to the next scheduled visit.  She is specifically instructed to contact the office between now & her scheduled appointment if she has concerns related to her condition or if she needs assistance in scheduling the above tests. She is welcome to call for an appointment sooner if she has any additional concerns or questions. Tanya Phelan PA-C   Board Certified by the [employer] on Certification of Physician Assistants  Nyla Figueroa 60  Partner of [facility] ([facility])             This dictation was performed with a verbal recognition program [facility]) and it was checked for errors. It is possible that there are still dictated errors within this office note. If so, please bring any errors to my attention for an addendum. All efforts were made to ensure that this office note is accurate.

## 2021-05-14 RX ORDER — FLUTICASONE PROPIONATE 50 MCG
SPRAY, SUSPENSION (ML) NASAL
Qty: 1 BOTTLE | Refills: 3 | Status: SHIPPED | OUTPATIENT
Start: 2021-05-14 | End: 2022-09-08 | Stop reason: SDUPTHER

## 2021-05-27 DIAGNOSIS — M48.02 FORAMINAL STENOSIS OF CERVICAL REGION: ICD-10-CM

## 2021-05-27 DIAGNOSIS — M47.812 CERVICAL SPONDYLOSIS WITHOUT MYELOPATHY: ICD-10-CM

## 2021-05-27 DIAGNOSIS — M50.30 DDD (DEGENERATIVE DISC DISEASE), CERVICAL: ICD-10-CM

## 2021-05-28 RX ORDER — MELOXICAM 15 MG/1
TABLET ORAL
Qty: 30 TABLET | Refills: 0 | Status: SHIPPED | OUTPATIENT
Start: 2021-05-28 | End: 2021-06-16

## 2021-05-28 NOTE — TELEPHONE ENCOUNTER
Patient last seen 21 and medication last filled 3/18/21:      Impression:         1. Cervical spondylosis without myelopathy    2. DDD (degenerative disc disease), cervical    3. Foraminal stenosis of cervical region          Plan:  Clinical Course: Above diagnoses are improving      I discussed the diagnosis and the treatment options with Aakash Hamilton today.      In Summary:  The various treatment options were outlined and discussed with Aakash Hamilton including:  Conservative care options: physical therapy, ice, medications, bracing, and activity modification. The indications for therapeutic injections. The indications for additional imaging/laboratory studies. The indications for (possible future) interventions.      After considering the various options discussed, Aakash Hamilton elected to pursue a course of treatment that includes the followin. Medications:  I will prescribe a medrol dose pack to help with the inflammatory component of pain. Risks, benefits and alternatives were discussed. Recommended to stop any NSAIDs to reduce risk of GI bleed during the course of the dose pack. Continue Meloxicam following completion of Medrol Dosepak.     2. PT:  I will start the patient on a trial of PT to work on a cervical stabilization program to focus on stretching, strengthening, traction and modalities as indicated.     3. Further studies:  No further studies.     4. Interventional:  No relief following cervical radiofrequency ablation as of yet. We will give this 2-3 more weeks to take full effect.     5.  Follow up:  4-6 weeks

## 2021-06-15 DIAGNOSIS — M50.30 DDD (DEGENERATIVE DISC DISEASE), CERVICAL: ICD-10-CM

## 2021-06-15 DIAGNOSIS — M48.02 FORAMINAL STENOSIS OF CERVICAL REGION: ICD-10-CM

## 2021-06-15 DIAGNOSIS — M47.812 CERVICAL SPONDYLOSIS WITHOUT MYELOPATHY: ICD-10-CM

## 2021-06-16 RX ORDER — MELOXICAM 15 MG/1
TABLET ORAL
Qty: 30 TABLET | Refills: 0 | Status: SHIPPED | OUTPATIENT
Start: 2021-06-16 | End: 2021-08-12

## 2021-06-16 NOTE — TELEPHONE ENCOUNTER
Patient last seen 2021 and medication last filled 2021:     Disp Refills Start End    meloxicam (MOBIC) 15 MG tablet 30 tablet 0 2021           Impression:         1. Cervical spondylosis without myelopathy    2. DDD (degenerative disc disease), cervical    3. Foraminal stenosis of cervical region          Plan:  Clinical Course: Above diagnoses are improving      I discussed the diagnosis and the treatment options with Prosper Mack today.      In Summary:  The various treatment options were outlined and discussed with Geoffbrandoabdullahi Giron including:  Conservative care options: physical therapy, ice, medications, bracing, and activity modification. The indications for therapeutic injections. The indications for additional imaging/laboratory studies. The indications for (possible future) interventions.      After considering the various options discussed, Prosper Pablo elected to pursue a course of treatment that includes the followin. Medications:  I will prescribe a medrol dose pack to help with the inflammatory component of pain. Risks, benefits and alternatives were discussed. Recommended to stop any NSAIDs to reduce risk of GI bleed during the course of the dose pack. Continue Meloxicam following completion of Medrol Dosepak.     2. PT:  I will start the patient on a trial of PT to work on a cervical stabilization program to focus on stretching, strengthening, traction and modalities as indicated.     3. Further studies:  No further studies.     4. Interventional:  No relief following cervical radiofrequency ablation as of yet. We will give this 2-3 more weeks to take full effect.     5.  Follow up:  4-6 weeks

## 2021-06-23 ENCOUNTER — OFFICE VISIT (OUTPATIENT)
Dept: ORTHOPEDIC SURGERY | Age: 61
End: 2021-06-23
Payer: COMMERCIAL

## 2021-06-23 VITALS — HEIGHT: 62 IN | BODY MASS INDEX: 24.29 KG/M2 | WEIGHT: 132 LBS

## 2021-06-23 DIAGNOSIS — M47.812 CERVICAL SPONDYLOSIS WITHOUT MYELOPATHY: Primary | ICD-10-CM

## 2021-06-23 DIAGNOSIS — M48.02 FORAMINAL STENOSIS OF CERVICAL REGION: ICD-10-CM

## 2021-06-23 DIAGNOSIS — M50.30 DDD (DEGENERATIVE DISC DISEASE), CERVICAL: ICD-10-CM

## 2021-06-23 PROCEDURE — 99213 OFFICE O/P EST LOW 20 MIN: CPT | Performed by: STUDENT IN AN ORGANIZED HEALTH CARE EDUCATION/TRAINING PROGRAM

## 2021-06-23 NOTE — PROGRESS NOTES
Recurrent Change to augmentin and levaquin and ck blood cx's 7/27/2011 On daily macrobid per Dr. Lydia Quevedo disorder, acute 3/23/2013    Due to 's infidelity and leaving her 3/2013 To see counselor- increase celexa   still living w the family as of 6/2013       Past Surgical History:     Past Surgical History:   Procedure Laterality Date    CARPAL TUNNEL RELEASE  12/29/11    Left    CARPAL TUNNEL RELEASE Right 12-    CYSTOSCOPY      with stent placement-x3    PAIN MANAGEMENT PROCEDURE Left 8/26/2020    LEFT C2, THIRD OCCIPITAL NERVE, C3, C4, C5 MEDIAL BRANCH BLOCK WITH FLUOROSCOPY (20416, [id number])  #1 performed by Melissa Harris MD at [facility] Left 9/9/2020    LEFT C2,TON,C3,C4 C5 MEDIAL BRANCH BLOCKS WITH FLUOROSCOPY performed by Melissa Harris MD at [address] [place] [address]  10/07/2020    PAIN MANAGEMENT PROCEDURE Left 10/7/2020    LEFT C2,TON,C3,C4,C5 RADIOFREQUENCY ABLATION WITH FLUOROSCOPY performed by Melissa Harris MD at [address] [place] [address] Left 4/6/2021    LEFT C2, THIRD OCCIPITAL NERVE, C3, C4, C5  RADIOFREQUENCY ABLATION WITH FLUOROSCOPY performed by Camilo Hutson MD at [address] maxillary gland removal     Current Medications:     Current Outpatient Medications:     meloxicam (MOBIC) 15 MG tablet, TAKE 1 TABLET BY MOUTH ONE TIME A DAY, Disp: 30 tablet, Rfl: 0    fluticasone (FLONASE) 50 MCG/ACT nasal spray, USE 1 SPRAYS IN EACH NOSTRIL DAILY, Disp: 1 Bottle, Rfl: 3    gabapentin (NEURONTIN) 100 MG capsule, Take 1 capsule by mouth nightly for 90 days. Intended supply: 90 days (Patient taking differently: Take 300 mg by mouth nightly.  Intended supply: 90 days), Disp: 90 capsule, Rfl: 0    citalopram (CELEXA) 10 MG tablet, TAKE TWO TABLETS BY MOUTH ONE TIME DAILY, Disp: 180 tablet, Rfl: 3    potassium chloride (KLOR-CON M) 10 MEQ extended release tablet, TAKE 1 TABLET BY MOUTH ONE TIME A DAY, Disp: 90 tablet, Rfl: 3    cycloSPORINE (RESTASIS) 0.05 % ophthalmic emulsion, Restasis 0.05 % eye drops in a dropperette, Disp: , Rfl:     montelukast (SINGULAIR) 10 MG tablet, TAKE 1 TABLET BY MOUTH ONE TIME A DAY, Disp: 90 tablet, Rfl: 3    loratadine (CLARITIN) 10 MG tablet, Take 10 mg by mouth daily, Disp: , Rfl:     Vitamin D, Ergocalciferol, 50 MCG (2000 UT) CAPS, Take 4,000 Units by mouth, Disp: , Rfl:     rizatriptan (MAXALT-MLT) 10 MG disintegrating tablet, TALE 1 TABLET BY MOUTH AS NEEDED FOR MIGRAINES MAY REPEAT 2 HOURS LATER IF NEEDED, Disp: 27 tablet, Rfl: 3    butalbital-acetaminophen-caffeine (FIORICET, ESGIC) -40 MG per tablet, TAKE ONE TABLET BY MOUTH EVERY 4 HOURS AS NEEDED FOR PAIN UP TO 10 DAYS, Disp: 30 tablet, Rfl: 3    folic acid (FOLVITE) 1 MG tablet, Take 1 mg by mouth daily, Disp: , Rfl:     calcium citrate-vitamin D (CALCITRATE/VITAMIN D) 315-200 MG-UNIT per tablet, Take 1 tablet by mouth daily. , Disp: , Rfl:   Allergies:  Patient has no known allergies. Social History:    reports that she has never smoked. She has never used smokeless tobacco. She reports that she does not drink alcohol and does not use drugs. Family History:   Family History   Problem Relation Age of Onset    Diabetes Mother     Kidney Cancer Mother         RIGHT    Cancer Maternal Grandmother     Diabetes Maternal Grandmother     Cancer Maternal Grandfather     Diabetes Maternal Grandfather     Heart Disease Other     High Blood Pressure Other        REVIEW OF SYSTEMS:   CONSTITUTIONAL: Denies unexplained weight loss, fevers, chills or fatigue  NEUROLOGICAL: Denies unsteady gait or progressive weakness  MUSCULOSKELETAL: Denies joint swelling or redness  GI: Denies nausea, vomiting, diarrhea   : Denies bowel or bladder issues       PHYSICAL EXAM:    Vitals: Height 5' 2\" (1.575 m), weight 132 lb (59.9 kg), last menstrual period 10/07/2012.     GENERAL EXAM:  · General Apparence: Patient is adequately groomed with no evidence of malnutrition. · Psychiatric: Orientation: The patient is oriented to time, place and person. The patient's mood and affect are appropriate   · Vascular: Examination reveals no swelling and palpation reveals no tenderness in upper or lower extremities. Good capillary refill. · The lymphatic examination of the neck, axillae and groin reveals all areas to be without enlargement or induration  · Sensation is intact without deficit in the upper and lower extremities to light touch and pinprick  · Coordination of the upper and lower extremities are normal.    CERVICAL EXAMINATION:  · Inspection: Local inspection shows no step-off or bruising. Cervical alignment is normal. No instability is noted. · Palpation and Percussion: No evidence of tenderness at the midline. Paraspinal tenderness is not present. There is no paraspinal spasm. Skin:There are no rashes, ulcerations or lesions  · Range of Motion:  limited by 25% in all planes due to pain   · Strength: 5/5 bilateral upper extremities  · Special Tests:   Spurling's and Nguyen's are negative bilaterally. Metcalf and Impingement tests are negative bilaterally. · Skin:There are no rashes, ulcerations or lesions in right & left upper extremities. · Reflexes: Bilaterally triceps, biceps and brachioradialis are 2+. Clonus absent bilaterally at the feet. No pathological reflexes are noted. · Gait & station: normal, patient ambulates without assistance  · Additional Examinations:  · RIGHT UPPER EXTREMITY:  Inspection/examination of the right upper extremity does not show any tenderness, deformity or injury. Range of motion is unremarkable and pain-free. There is no gross instability. There are no rashes, ulcerations or lesions. Strength and tone are normal. No atrophy or abnormal movements are noted.   · LEFT UPPER EXTREMITY: Inspection/examination of the left upper extremity does not show any tenderness, deformity or injury. Range of motion is unremarkable and pain-free. There is no gross instability. There are no rashes, ulcerations or lesions. Strength and tone are normal. No atrophy or abnormal movements are noted. · RIGHT LOWER EXTREMITY: Inspection/examination of the right lower extremity does not show any tenderness, deformity or injury. Range of motion is normal and pain-free. There is no gross instability. There are no rashes, ulcerations or lesions. Strength and tone are normal. No atrophy or abnormal movements are noted. · LEFT LOWER EXTREMITY:  Inspection/examination of the left lower extremity does not show any tenderness, deformity or injury. Range of motion is normal and pain-free. There is no gross instability. There are no rashes, ulcerations or lesions. Strength and tone are normal. No atrophy or abnormal movements are noted. Diagnostic Testing:    No new diagnostics  Results for orders placed or performed in visit on 21   COVID-19   Result Value Ref Range    SARS-CoV-2 Not Detected Not detected     Impression:       No diagnosis found. Plan:  Clinical Course: Above diagnoses are improving     I discussed the diagnosis and the treatment options with Harish Hollins today. In Summary:  The various treatment options were outlined and discussed with Harish Hollins including:  Conservative care options: physical therapy, ice, medications, bracing, and activity modification. The indications for therapeutic injections. The indications for additional imaging/laboratory studies. The indications for (possible future) interventions. After considering the various options discussed, Harish Hollins elected to pursue a course of treatment that includes the followin. Medications:  Continue anti-inflammatories with appropriate GI Precautions including to stop if develop dark tarry stools or GI upset and to take with food.     2. PT:  Encouraged to continue with HEP.    3. Follow up:  4-6 weeks      Xiomara Squires was instructed to call the office if her symptoms worsen or if new symptoms appear prior to the next scheduled visit. She is specifically instructed to contact the office between now & her scheduled appointment if she has concerns related to her condition or if she needs assistance in scheduling the above tests. She is welcome to call for an appointment sooner if she has any additional concerns or questions. Maria Luisa Nur PA-C   Board Certified by the [employer] on Certification of [address] [facility]                This dictation was performed with a verbal recognition program [facility]) and it was checked for errors. It is possible that there are still dictated errors within this office note. If so, please bring any errors to my attention for an addendum. All efforts were made to ensure that this office note is accurate.

## 2021-06-23 NOTE — PROGRESS NOTES
Follow up: Kiet Mikey  1960  [id number]      CHIEF COMPLAINT:  No chief complaint on file. HISTORY OF PRESENT ILLNESS:  Ms. Mariella Grey is a 64 y.o. female returns for a follow up visit for multiple medical problems. Her current presenting problems are No diagnosis found. .    As per information/history obtained from the PADT(patient assessment and documentation tool) - She complains of pain in the {ANATOMY;ORTHO:88951} with radiation to the {ANATOMY;ORTHO:14438} She rates the pain {NUMBERS; 1-10 (OUT OF 10):26684} and describes it as {PAIN QUALITY:31002}. Pain is made worse by: {CAUSES; AGGRAVATING FACTORS PAIN EXTREMITIES:59005}. She {DENIES/HAS SIDE EFFECTS:20768} side effects from the current pain regimen. Patient reports that since the last follow up visit the physical functioning is {DESC; BETTER/WORSE:85650}, family/social relationships are {DESC; BETTER/WORSE:92273}, mood is {DESC; BETTER/WORSE:49512} and sleep patterns are {DESC; BETTER/WORSE:76858}, and that the overall functioning is {DESC; BETTER/WORSE:02687}. Patient denies neurological bowel or bladder.          Associated signs and symptoms:   Neurogenic bowel or bladder symptoms:  {yes/no:429629:s}   Perceived weakness:  {yes/no:530842:s}   Difficulty walking:  {yes/no:066077:s}              Past Medical History:   Past Medical History:   Diagnosis Date    Abnormal mammogram 6/8/2011    saw Dr. Lombardi or associate 6/2011 Ok to return for regular mammogram's     Anxiety 6/8/2011    Hyperlipidemia     Kidney stone     Medical history reviewed with no changes     Pyelonephritis 7/28/2011    Recurrent Change to augmentin and levaquin and ck blood cx's 7/27/2011 On daily macrobid per Dr. Miley Gagnon disorder, acute 3/23/2013    Due to 's infidelity and leaving her 3/2013 To see counselor- increase celexa   still living w the family as of 6/2013       Past Surgical History:     Past Surgical History:   Procedure Laterality Date    CARPAL TUNNEL RELEASE  12/29/11    Left    CARPAL TUNNEL RELEASE Right 12-    CYSTOSCOPY      with stent placement-x3    PAIN MANAGEMENT PROCEDURE Left 8/26/2020    LEFT C2, THIRD OCCIPITAL NERVE, C3, C4, C5 MEDIAL BRANCH BLOCK WITH FLUOROSCOPY (05505, 57234)  #1 performed by Isidoro Darling MD at [address] [place] [address] Left 9/9/2020    LEFT C2,TON,C3,C4 C5 MEDIAL BRANCH BLOCKS WITH FLUOROSCOPY performed by Isidoro Darling MD at [address] [place] [address]  10/07/2020    PAIN MANAGEMENT PROCEDURE Left 10/7/2020    LEFT C2,TON,C3,C4,C5 RADIOFREQUENCY ABLATION WITH FLUOROSCOPY performed by Isidoro Darling MD at [address] [place] [address] Left 4/6/2021    LEFT C2, THIRD OCCIPITAL NERVE, C3, C4, C5  RADIOFREQUENCY ABLATION WITH FLUOROSCOPY performed by Brianna Hooks MD at [address] maxillary gland removal     Current Medications:     Current Outpatient Medications:     meloxicam (MOBIC) 15 MG tablet, TAKE 1 TABLET BY MOUTH ONE TIME A DAY, Disp: 30 tablet, Rfl: 0    fluticasone (FLONASE) 50 MCG/ACT nasal spray, USE 1 SPRAYS IN EACH NOSTRIL DAILY, Disp: 1 Bottle, Rfl: 3    gabapentin (NEURONTIN) 100 MG capsule, Take 1 capsule by mouth nightly for 90 days. Intended supply: 90 days (Patient taking differently: Take 300 mg by mouth nightly.  Intended supply: 90 days), Disp: 90 capsule, Rfl: 0    citalopram (CELEXA) 10 MG tablet, TAKE TWO TABLETS BY MOUTH ONE TIME DAILY, Disp: 180 tablet, Rfl: 3    potassium chloride (KLOR-CON M) 10 MEQ extended release tablet, TAKE 1 TABLET BY MOUTH ONE TIME A DAY, Disp: 90 tablet, Rfl: 3    cycloSPORINE (RESTASIS) 0.05 % ophthalmic emulsion, Restasis 0.05 % eye drops in a dropperette, Disp: , Rfl:     montelukast (SINGULAIR) 10 MG tablet, TAKE 1 TABLET BY MOUTH ONE TIME A DAY, Disp: 90 tablet, Rfl: 3    loratadine (CLARITIN) 10 MG tablet, Take 10 mg by mouth daily, Disp: , Rfl:     Vitamin D, Ergocalciferol, 50 MCG (2000 UT) CAPS, Take 4,000 Units by mouth, Disp: , Rfl:     rizatriptan (MAXALT-MLT) 10 MG disintegrating tablet, TALE 1 TABLET BY MOUTH AS NEEDED FOR MIGRAINES MAY REPEAT 2 HOURS LATER IF NEEDED, Disp: 27 tablet, Rfl: 3    butalbital-acetaminophen-caffeine (FIORICET, ESGIC) -40 MG per tablet, TAKE ONE TABLET BY MOUTH EVERY 4 HOURS AS NEEDED FOR PAIN UP TO 10 DAYS, Disp: 30 tablet, Rfl: 3    folic acid (FOLVITE) 1 MG tablet, Take 1 mg by mouth daily, Disp: , Rfl:     calcium citrate-vitamin D (CALCITRATE/VITAMIN D) 315-200 MG-UNIT per tablet, Take 1 tablet by mouth daily. , Disp: , Rfl:   Allergies:  Patient has no known allergies. Social History:    reports that she has never smoked. She has never used smokeless tobacco. She reports that she does not drink alcohol and does not use drugs. Family History:   Family History   Problem Relation Age of Onset    Diabetes Mother     Kidney Cancer Mother         RIGHT    Cancer Maternal Grandmother     Diabetes Maternal Grandmother     Cancer Maternal Grandfather     Diabetes Maternal Grandfather     Heart Disease Other     High Blood Pressure Other        REVIEW OF SYSTEMS:   CONSTITUTIONAL: Denies unexplained weight loss, fevers, chills or fatigue  NEUROLOGICAL: Denies unsteady gait or progressive weakness  MUSCULOSKELETAL: Denies joint swelling or redness  GI: Denies nausea, vomiting, diarrhea   : Denies bowel or bladder issues       PHYSICAL EXAM:    Vitals: Last menstrual period 10/07/2012. GENERAL EXAM:  · General Apparence: Patient is adequately groomed with no evidence of malnutrition. · Psychiatric: Orientation: The patient is oriented to time, place and person. The patient's mood and affect are appropriate   · Vascular: Examination reveals no swelling and palpation reveals no tenderness in upper or lower extremities. Good capillary refill.    · The lymphatic examination of the neck, axillae and groin reveals all areas to be without enlargement or induration  · Sensation is intact without deficit in the upper and lower extremities to light touch and pinprick  · Coordination of the upper and lower extremities are normal.    CERVICAL EXAMINATION:  · Inspection: Local inspection shows no step-off or bruising. Cervical alignment is normal. No instability is noted. · Palpation and Percussion: No evidence of tenderness at the midline. Paraspinal tenderness is not present. There is no paraspinal spasm. Skin:There are no rashes, ulcerations or lesions  · Range of Motion:  {Blank single:66101::\"pain-free ROM\",\"limited by 25% in all planes due to pain\",\"limited by 50% in all planes due to pain\",\"very limited due to pain\"}   · Strength: 5/5 bilateral upper extremities  · Special Tests:   Spurling's and Nguyen's are negative bilaterally. Metcalf and Impingement tests are negative bilaterally. · Skin:There are no rashes, ulcerations or lesions in right & left upper extremities. · Reflexes: Bilaterally triceps, biceps and brachioradialis are 2+. Clonus absent bilaterally at the feet. No pathological reflexes are noted. · Gait & station: {Blank single:52659::\"normal, patient ambulates without assistance\",\"uses a single point cane to ambulate\",\"ambulates with a walker\",\"antalgic on the right\",\"antalgic on the left\"}  · Additional Examinations:  · RIGHT UPPER EXTREMITY:  Inspection/examination of the right upper extremity does not show any tenderness, deformity or injury. Range of motion is unremarkable and pain-free. There is no gross instability. There are no rashes, ulcerations or lesions. Strength and tone are normal. No atrophy or abnormal movements are noted. · LEFT UPPER EXTREMITY: Inspection/examination of the left upper extremity does not show any tenderness, deformity or injury. Range of motion is unremarkable and pain-free. There is no gross instability.   There are no rashes, ulcerations or lesions. Strength and tone are normal. No atrophy or abnormal movements are noted. LUMBAR/SACRAL EXAMINATION:  · Inspection: Local inspection shows no step-off or bruising. Lumbar alignment is normal. No instability is noted. · Palpation:   No evidence of tenderness at the midline. Lumbar paraspinal tenderness: {Blank single:25039::\"Mild L4/5 and L5/S1 tenderness\",\"No tenderness to palpation of the lumbar paraspinals\",\"Very limited\"}  Bursal tenderness {Blank single:38407::\"Right greater trochanteric tenderness\",\"Left greater trochanteric tenderness\",\"No tenderness bilaterally\"}  There is no paraspinal spasm. · Range of Motion: {Blank single:06581::\"pain-free ROM\",\"limited by 25% in all planes due to pain\",\"limited by 50% in all planes due to pain\",\"very limited due to pain\"}  · Strength:   Strength testing is 5/5 in all muscle groups tested. · Special Tests:   Straight leg raise and crossed SLR negative. Martín's testing is negative bilaterally. FADIR's testing is negative bilaterally. · Skin: There are no rashes, ulcerations or lesions. · Reflexes: Reflexes are symmetrically 2+ at the patellar and ankle tendons. Clonus absent bilaterally at the feet. · Gait & station: {Blank single:68266::\"normal, patient ambulates without assistance\",\"uses a single point cane to ambulate\",\"ambulates with a walker\",\"antalgic on the right\",\"antalgic on the left\"}  · Additional Examinations:  · RIGHT LOWER EXTREMITY: Inspection/examination of the right lower extremity does not show any tenderness, deformity or injury. Range of motion is normal and pain-free. There is no gross instability. There are no rashes, ulcerations or lesions. Strength and tone are normal. No atrophy or abnormal movements are noted. · LEFT LOWER EXTREMITY:  Inspection/examination of the left lower extremity does not show any tenderness, deformity or injury. Range of motion is normal and pain-free. There is no gross instability. patient on a trial of PT to work on a lumbar stabilization program to focus on core strengthening, core stabilizing, lumbar stretches, hamstring flexibility, modalities as indicated for 6-8 visits over the next 4-6 weeks. \",\"I will start the patient on a trial of PT to work on a cervical stabilization program to focus on stretching, strengthening, traction and modalities as indicated. \",\"Encouraged to continue with HEP. \"}    3. Further studies:  {Blank single:88624::\"No further studies. \",\"I will obtain a Cervical MR without contrast to evaluate for soft tissue pathology or stenosis contributing to the neck pain and paresthesias. \",\"I will obtain a Lumbar MR without contrast to evaluate for soft tissue pathology or stenosis contributing to the back pain and paresthesias. \",\"Obtain EMG to evaluate for paresthesias to rule out a nerve entrapment. \"}    4. Interventional:  {Blank single:72533::\"After further imaging is obtained, interventional options will be reviewed and recommended. \",\"We discussed pursuing a *** epidural steroid injection to address the pain. Radiologic imaging and symptoms confirm the pain etiology. Risks, benefits and alternatives of interventional options were discussed. These include and are not limited to bleeding, infection, spinal headache, nerve injury and lack of pain relief. The patient verbalized understanding and would like to proceed. The patient will be scheduled accordingly. \",\"We discussed pursuing lumbar medial branch blocks for diagnostic purposes. Based on physical exam findings of increased pain with facet loading and radiologic imaging, we will pursue lumbar medial branch blocks at the *** levels. Risks, benefits and alternatives were discussed. These include but are not limited to bleeding, infection, increased pain, lack of pain relief and nerve injury. The patient verbalized understanding and would like to proceed.  If there is significant pain relief and functional improvement, the patient may be a good candidate for Radiofrequency ablation. \",\"We discussed pursuing a *** Sacroiliac joint injection as SI joint pathology is suspected as the etiology of the chronic low back/hip pain. There has been failure of non-invasive and conservative treatment including physical therapy/home exercise program, rest, NSAIDs or acetaminophen. Risks include but are not limited to bleeding, infection, nerve injury, increase in pain and lack of pain relief. The patient verbalized understanding and would like to proceed. \"}    5. Follow up:  {Blank single:35050::\"1-2 weeks\",\"2-3 weeks\",\"4-6 weeks\",\"2-3 months\",\"4-6 months\"}      Martínez Mcpherson was instructed to call the office if her symptoms worsen or if new symptoms appear prior to the next scheduled visit. She is specifically instructed to contact the office between now & her scheduled appointment if she has concerns related to her condition or if she needs assistance in scheduling the above tests. She is welcome to call for an appointment sooner if she has any additional concerns or questions. Toshia Garcias PA-C   Board Certified by the [employer] on Certification of [address] [facility]                This dictation was performed with a verbal recognition program [facility]) and it was checked for errors. It is possible that there are still dictated errors within this office note. If so, please bring any errors to my attention for an addendum. All efforts were made to ensure that this office note is accurate.

## 2021-07-12 DIAGNOSIS — M50.30 DDD (DEGENERATIVE DISC DISEASE), CERVICAL: ICD-10-CM

## 2021-07-12 DIAGNOSIS — M79.18 MYOFASCIAL MUSCLE PAIN: ICD-10-CM

## 2021-07-12 DIAGNOSIS — M48.02 FORAMINAL STENOSIS OF CERVICAL REGION: ICD-10-CM

## 2021-07-12 DIAGNOSIS — M47.812 CERVICAL SPONDYLOSIS WITHOUT MYELOPATHY: ICD-10-CM

## 2021-07-13 RX ORDER — TIZANIDINE HYDROCHLORIDE 2 MG/1
CAPSULE, GELATIN COATED ORAL
Qty: 90 CAPSULE | Refills: 0 | Status: SHIPPED | OUTPATIENT
Start: 2021-07-13 | End: 2021-10-05

## 2021-07-13 NOTE — TELEPHONE ENCOUNTER
Patient last seen 2021 and medication last filled 2021:    Lang Gold Start End    tiZANidine (ZANAFLEX) 2 MG capsule 90 capsule 0 2021         Impression:         No diagnosis found.     Plan:  Clinical Course: Above diagnoses are improving      I discussed the diagnosis and the treatment options with Bernardo Reid today.      In Summary:  The various treatment options were outlined and discussed with Bernrado Reid including:  Conservative care options: physical therapy, ice, medications, bracing, and activity modification. The indications for therapeutic injections. The indications for additional imaging/laboratory studies. The indications for (possible future) interventions.      After considering the various options discussed, Bernardo Reid elected to pursue a course of treatment that includes the followin. Medications:  Continue anti-inflammatories with appropriate GI Precautions including to stop if develop dark tarry stools or GI upset and to take with food.     2. PT:  Encouraged to continue with HEP.     3.  Follow up:  4-6 weeks

## 2021-07-21 ENCOUNTER — HOSPITAL ENCOUNTER (OUTPATIENT)
Dept: MAMMOGRAPHY | Age: 61
Discharge: HOME | End: 2021-07-21
Payer: COMMERCIAL

## 2021-07-21 VITALS — WEIGHT: 134 LBS | BODY MASS INDEX: 24.66 KG/M2 | HEIGHT: 62 IN

## 2021-07-21 DIAGNOSIS — Z12.31 VISIT FOR SCREENING MAMMOGRAM: ICD-10-CM

## 2021-07-21 PROCEDURE — 77067 SCR MAMMO BI INCL CAD: CPT

## 2021-08-04 ENCOUNTER — OFFICE VISIT (OUTPATIENT)
Dept: ORTHOPEDIC SURGERY | Age: 61
End: 2021-08-04
Payer: COMMERCIAL

## 2021-08-04 DIAGNOSIS — M47.812 CERVICAL SPONDYLOSIS WITHOUT MYELOPATHY: Primary | ICD-10-CM

## 2021-08-04 DIAGNOSIS — M50.30 DDD (DEGENERATIVE DISC DISEASE), CERVICAL: ICD-10-CM

## 2021-08-04 DIAGNOSIS — M48.02 FORAMINAL STENOSIS OF CERVICAL REGION: ICD-10-CM

## 2021-08-04 PROCEDURE — 99213 OFFICE O/P EST LOW 20 MIN: CPT | Performed by: STUDENT IN AN ORGANIZED HEALTH CARE EDUCATION/TRAINING PROGRAM

## 2021-08-04 NOTE — PROGRESS NOTES
[address]: [address]  1960  [id number]      CHIEF COMPLAINT:    Chief Complaint   Patient presents with    Follow-up     FU CSP, no new injuries,falls or symptoms. HISTORY OF PRESENT ILLNESS:  Ms. Nabil Delgado is a 64 y.o. female returns for a follow up visit for multiple medical problems. Her current presenting problems are No diagnosis found. .    As per information/history obtained from the PADT(patient assessment and documentation tool) - She complains of pain in the neck with radiation to the neck She rates the pain 1/10 and describes it as aching. Pain is made worse by: driving, looking down, computer work. She denies side effects from the current pain regimen. Patient reports that since the last follow up visit the physical functioning is better, family/social relationships are unchanged, mood is unchanged and sleep patterns are unchanged, and that the overall functioning is better. Patient denies neurological bowel or bladder. Patient presents for follow up of ongoing neck pain. The patient continues to complain of neck pain after stressful shifts at work. She has been feeling a lot better. 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 She reports a small spot of pain at the occiput and left paraspinal region however this is manageable. We discussed returning to physical therapy to work on strengthening and to make sure she is continuing her home exercise program with good form.      Associated signs and symptoms:   Neurogenic bowel or bladder symptoms:  no   Perceived weakness:  no   Difficulty walking:  no              Past Medical History:   Past Medical History:   Diagnosis Date    Abnormal mammogram 6/8/2011    saw Dr[facility] or associate 6/2011 Ok to return for regular mammogram's     Anxiety 6/8/2011    Hyperlipidemia     Kidney stone     Medical history reviewed with no changes     Pyelonephritis 7/28/2011    Recurrent Rfl: 3    potassium chloride (KLOR-CON M) 10 MEQ extended release tablet, TAKE 1 TABLET BY MOUTH ONE TIME A DAY, Disp: 90 tablet, Rfl: 3    cycloSPORINE (RESTASIS) 0.05 % ophthalmic emulsion, Restasis 0.05 % eye drops in a dropperette, Disp: , Rfl:     montelukast (SINGULAIR) 10 MG tablet, TAKE 1 TABLET BY MOUTH ONE TIME A DAY, Disp: 90 tablet, Rfl: 3    loratadine (CLARITIN) 10 MG tablet, Take 10 mg by mouth daily, Disp: , Rfl:     Vitamin D, Ergocalciferol, 50 MCG (2000 UT) CAPS, Take 4,000 Units by mouth, Disp: , Rfl:     rizatriptan (MAXALT-MLT) 10 MG disintegrating tablet, TALE 1 TABLET BY MOUTH AS NEEDED FOR MIGRAINES MAY REPEAT 2 HOURS LATER IF NEEDED, Disp: 27 tablet, Rfl: 3    butalbital-acetaminophen-caffeine (FIORICET, ESGIC) -40 MG per tablet, TAKE ONE TABLET BY MOUTH EVERY 4 HOURS AS NEEDED FOR PAIN UP TO 10 DAYS, Disp: 30 tablet, Rfl: 3    folic acid (FOLVITE) 1 MG tablet, Take 1 mg by mouth daily, Disp: , Rfl:     calcium citrate-vitamin D (CALCITRATE/VITAMIN D) 315-200 MG-UNIT per tablet, Take 1 tablet by mouth daily. , Disp: , Rfl:   Allergies:  Patient has no known allergies. Social History:    reports that she has never smoked. She has never used smokeless tobacco. She reports that she does not drink alcohol and does not use drugs.   Family History:   Family History   Problem Relation Age of Onset    Diabetes Mother     Kidney Cancer Mother         RIGHT    Breast Cancer Mother     Cancer Maternal Grandmother     Diabetes Maternal Grandmother     Cancer Maternal Grandfather     Diabetes Maternal Grandfather     Heart Disease Other     High Blood Pressure Other        REVIEW OF SYSTEMS:   CONSTITUTIONAL: Denies unexplained weight loss, fevers, chills or fatigue  NEUROLOGICAL: Denies unsteady gait or progressive weakness  MUSCULOSKELETAL: Denies joint swelling or redness  GI: Denies nausea, vomiting, diarrhea   : Denies bowel or bladder issues       PHYSICAL EXAM:    Vitals: Last menstrual period 10/07/2012. GENERAL EXAM:  · General Apparence: Patient is adequately groomed with no evidence of malnutrition. · Psychiatric: Orientation: The patient is oriented to time, place and person. The patient's mood and affect are appropriate   · Vascular: Examination reveals no swelling and palpation reveals no tenderness in upper or lower extremities. Good capillary refill. · The lymphatic examination of the neck, axillae and groin reveals all areas to be without enlargement or induration  · Sensation is intact without deficit in the upper and lower extremities to light touch and pinprick  · Coordination of the upper and lower extremities are normal.    CERVICAL EXAMINATION:  · Inspection: Local inspection shows no step-off or bruising. Cervical alignment is normal. No instability is noted. · Palpation and Percussion: No evidence of tenderness at the midline. Paraspinal tenderness is not present. There is no paraspinal spasm. Skin:There are no rashes, ulcerations or lesions  · Range of Motion:  limited by less than 25% in all planes due to pain   · Strength: 5/5 bilateral upper extremities  · Special Tests:   Spurling's and Nguyen's are negative bilaterally. Metcalf and Impingement tests are negative bilaterally. · Skin:There are no rashes, ulcerations or lesions in right & left upper extremities. · Reflexes: Bilaterally triceps, biceps and brachioradialis are 2+. Clonus absent bilaterally at the feet. No pathological reflexes are noted. · Gait & station: normal, patient ambulates without assistance  · Additional Examinations:  · RIGHT UPPER EXTREMITY:  Inspection/examination of the right upper extremity does not show any tenderness, deformity or injury. Range of motion is unremarkable and pain-free. There is no gross instability. There are no rashes, ulcerations or lesions. Strength and tone are normal. No atrophy or abnormal movements are noted.   · LEFT UPPER EXTREMITY: Inspection/examination of the left upper extremity does not show any tenderness, deformity or injury. Range of motion is unremarkable and pain-free. There is no gross instability. There are no rashes, ulcerations or lesions. Strength and tone are normal. No atrophy or abnormal movements are noted. · RIGHT LOWER EXTREMITY: Inspection/examination of the right lower extremity does not show any tenderness, deformity or injury. Range of motion is normal and pain-free. There is no gross instability. There are no rashes, ulcerations or lesions. Strength and tone are normal. No atrophy or abnormal movements are noted. · LEFT LOWER EXTREMITY:  Inspection/examination of the left lower extremity does not show any tenderness, deformity or injury. Range of motion is normal and pain-free. There is no gross instability. There are no rashes, ulcerations or lesions. Strength and tone are normal. No atrophy or abnormal movements are noted. Diagnostic Testing:    No new diagnostics  Results for orders placed or performed in visit on 21   COVID-19   Result Value Ref Range    SARS-CoV-2 Not Detected Not detected     Impression:       No diagnosis found. Plan:  Clinical Course: Above diagnoses are improving     I discussed the diagnosis and the treatment options with Misael Cooper today. In Summary:  The various treatment options were outlined and discussed with Misael Cooper including:  Conservative care options: physical therapy, ice, medications, bracing, and activity modification. The indications for therapeutic injections. The indications for additional imaging/laboratory studies. The indications for (possible future) interventions. After considering the various options discussed, Misael Cooper elected to pursue a course of treatment that includes the followin.  Medications:  Continue anti-inflammatories with appropriate GI Precautions including to stop if develop dark tarry stools or GI upset and to take with food. 2. PT:  Encouraged to continue with HEP. 3. Further studies:  No further studies. 4. Interventional:  Continued relief after cervical RFA. Provided with updated work note. 5. Follow up:  4-6 weeks      Romana Donning was instructed to call the office if her symptoms worsen or if new symptoms appear prior to the next scheduled visit. She is specifically instructed to contact the office between now & her scheduled appointment if she has concerns related to her condition or if she needs assistance in scheduling the above tests. She is welcome to call for an appointment sooner if she has any additional concerns or questions. Ameya Engel PA-C   Board Certified by the [employer] on Certification of [address] [facility] Orthopa[facility]ics                This dictation was performed with a verbal recognition program [facility]) and it was checked for errors. It is possible that there are still dictated errors within this office note. If so, please bring any errors to my attention for an addendum. All efforts were made to ensure that this office note is accurate.

## 2021-08-04 NOTE — LETTER
[address] [facility],First Floor [address]  Phone: [phone]  Fax: [phone], [place]        August 4, 2021     Patient: Chio Harp   YOB: 1960   Date of Visit: 8/4/2021       To Whom It May Concern: It is my medical opinion that Sole Bullock should not wear the PAPAR for the next month due to the following diagnoses and treatment plan:     1. Cervical spondylosis without myelopathy   2. DDD (degenerative disc disease), cervical   3. Foraminal stenosis of cervical region     Recent Procedure:LEFT C2, THIRD OCCIPITAL NERVE, C3, C4, C5  RADIOFREQUENCY ABLATION on 4/6/21    We plan to continue HEP indefinitely. If you have any questions or concerns, please don't hesitate to call.     Sincerely,        GUIDO Kirk

## 2021-08-12 DIAGNOSIS — M50.30 DDD (DEGENERATIVE DISC DISEASE), CERVICAL: ICD-10-CM

## 2021-08-12 DIAGNOSIS — M47.812 CERVICAL SPONDYLOSIS WITHOUT MYELOPATHY: ICD-10-CM

## 2021-08-12 DIAGNOSIS — M48.02 FORAMINAL STENOSIS OF CERVICAL REGION: ICD-10-CM

## 2021-08-12 RX ORDER — MELOXICAM 15 MG/1
TABLET ORAL
Qty: 30 TABLET | Refills: 0 | Status: SHIPPED | OUTPATIENT
Start: 2021-08-12 | End: 2021-09-09 | Stop reason: ALTCHOICE

## 2021-08-12 NOTE — TELEPHONE ENCOUNTER
Patient last seen 2021 and medication last filled 2021:  Disp Refills Start End    meloxicam (MOBIC) 15 MG tablet 30 tablet 0 2021          Impression:         No diagnosis found.     Plan:  Clinical Course: Above diagnoses are improving      I discussed the diagnosis and the treatment options with Darya Yen today.      In Summary:  The various treatment options were outlined and discussed with Darya Yen including:  Conservative care options: physical therapy, ice, medications, bracing, and activity modification. The indications for therapeutic injections. The indications for additional imaging/laboratory studies. The indications for (possible future) interventions.      After considering the various options discussed, Darya Yen elected to pursue a course of treatment that includes the followin. Medications:  Continue anti-inflammatories with appropriate GI Precautions including to stop if develop dark tarry stools or GI upset and to take with food.     2. PT:  Encouraged to continue with HEP.     3. Further studies:  No further studies.     4. Interventional:  Continued relief after cervical RFA. Provided with updated work note.      5.  Follow up:  4-6 weeks

## 2021-08-28 LAB
CHOLESTEROL, TOTAL: 309 MG/DL (ref 0–199)
GLUCOSE BLD-MCNC: 98 MG/DL (ref 70–99)
HDLC SERPL-MCNC: 57 MG/DL (ref 40–60)
LDL CHOLESTEROL CALCULATED: 218 MG/DL
TRIGL SERPL-MCNC: 169 MG/DL (ref 0–150)

## 2021-09-09 ENCOUNTER — TELEPHONE (OUTPATIENT)
Dept: ORTHOPEDIC SURGERY | Age: 61
End: 2021-09-09

## 2021-09-09 DIAGNOSIS — M47.812 CERVICAL SPONDYLOSIS WITHOUT MYELOPATHY: ICD-10-CM

## 2021-09-09 DIAGNOSIS — M48.02 FORAMINAL STENOSIS OF CERVICAL REGION: ICD-10-CM

## 2021-09-09 DIAGNOSIS — M50.30 DDD (DEGENERATIVE DISC DISEASE), CERVICAL: ICD-10-CM

## 2021-09-09 RX ORDER — MELOXICAM 15 MG/1
15 TABLET ORAL DAILY PRN
Qty: 90 TABLET | Refills: 0 | Status: SHIPPED | OUTPATIENT
Start: 2021-09-09 | End: 2021-12-07 | Stop reason: SDUPTHER

## 2021-09-09 NOTE — TELEPHONE ENCOUNTER
Calling to see if they can get a 90-day supply of Meloxicam order sent over to [facility].     [facility]

## 2021-09-09 NOTE — PROGRESS NOTES
I am seeing this patient in conjunction with Noy valencia PA-C for patient's back pain at the [facility] spine clinic. Patient needs a refill of her pain medication, Mobic, per her treatment plan. Patient aware of NSAID precautions.         Sade Davalos PA-C    Physician Assistant - Certified  [employer] and [address] [place] [address]    09/09/21 5:08 PM

## 2021-09-09 NOTE — TELEPHONE ENCOUNTER
Disp Refills Start End    meloxicam (MOBIC) 15 MG tablet 30 tablet 0 8/12/2021       Patient requesting 90 day supply, last seen 8/4/2021

## 2021-09-23 ENCOUNTER — HOSPITAL ENCOUNTER (OUTPATIENT)
Age: 61
Discharge: HOME | End: 2021-09-23
Payer: COMMERCIAL

## 2021-09-23 ENCOUNTER — HOSPITAL ENCOUNTER (OUTPATIENT)
Dept: GENERAL RADIOLOGY | Age: 61
Discharge: HOME | End: 2021-09-23
Payer: COMMERCIAL

## 2021-09-23 DIAGNOSIS — N20.0 KIDNEY STONE: ICD-10-CM

## 2021-09-23 PROCEDURE — 74018 RADEX ABDOMEN 1 VIEW: CPT

## 2021-09-25 RX ORDER — MONTELUKAST SODIUM 10 MG/1
TABLET ORAL
Qty: 90 TABLET | Refills: 0 | Status: SHIPPED | OUTPATIENT
Start: 2021-09-25 | End: 2021-12-06 | Stop reason: SDUPTHER

## 2021-10-01 ENCOUNTER — PATIENT MESSAGE (OUTPATIENT)
Dept: ORTHOPEDIC SURGERY | Age: 61
End: 2021-10-01

## 2021-10-04 DIAGNOSIS — M50.30 DDD (DEGENERATIVE DISC DISEASE), CERVICAL: ICD-10-CM

## 2021-10-04 DIAGNOSIS — M79.18 MYOFASCIAL MUSCLE PAIN: ICD-10-CM

## 2021-10-04 DIAGNOSIS — M47.812 CERVICAL SPONDYLOSIS WITHOUT MYELOPATHY: ICD-10-CM

## 2021-10-04 DIAGNOSIS — M48.02 FORAMINAL STENOSIS OF CERVICAL REGION: ICD-10-CM

## 2021-10-04 NOTE — TELEPHONE ENCOUNTER
Patient last seen 21 and medication last filled 21:   Disp Refills Start End    tiZANidine (ZANAFLEX) 2 MG capsule 90 capsule 0 2021         Impression:         No diagnosis found.     Plan:  Clinical Course: Above diagnoses are improving      I discussed the diagnosis and the treatment options with Aliyah Song today.      In Summary:  The various treatment options were outlined and discussed with Aliyah Song including:  Conservative care options: physical therapy, ice, medications, bracing, and activity modification. The indications for therapeutic injections. The indications for additional imaging/laboratory studies. The indications for (possible future) interventions.      After considering the various options discussed, Aliyah Song elected to pursue a course of treatment that includes the followin. Medications:  Continue anti-inflammatories with appropriate GI Precautions including to stop if develop dark tarry stools or GI upset and to take with food.     2. PT:  Encouraged to continue with HEP.     3. Further studies:  No further studies.     4. Interventional:  Continued relief after cervical RFA. Provided with updated work note.      5.  Follow up:  4-6 weeks

## 2021-10-04 NOTE — TELEPHONE ENCOUNTER
From: Leonel Santo  To: GUIDO Kaiser  Sent: 10/1/2021 3:48 PM EDT  Subject: Non-Urgent Medical Question    pg#3 We can talk Monday if you have a moment for suggestion. I still feel I need to be out of PAPR, and this set-back with my pt. last week isn't helping.  Estiven

## 2021-10-05 RX ORDER — TIZANIDINE HYDROCHLORIDE 2 MG/1
CAPSULE, GELATIN COATED ORAL
Qty: 90 CAPSULE | Refills: 0 | Status: SHIPPED | OUTPATIENT
Start: 2021-10-05 | End: 2022-01-03

## 2021-10-29 ENCOUNTER — PATIENT MESSAGE (OUTPATIENT)
Dept: ORTHOPEDIC SURGERY | Age: 61
End: 2021-10-29

## 2021-10-29 NOTE — LETTER
[address] [place] [address]  Phone: [phone]  Fax: [phone]    Lisa Haines        November 1, 2021     Patient: Gladys Gutierrez   YOB: 1960   Date of Visit: 10/29/2021       To Whom It May Concern:     It is my medical opinion that Veronika Castrejon should not wear the PAPAR for the next two months due to the following diagnoses and treatment plan:      1.         Cervical spondylosis without myelopathy   2.         DDD (degenerative disc disease), cervical   3.         Foraminal stenosis of cervical region      Recent Procedure:LEFT C2, THIRD OCCIPITAL NERVE, C3, C4, C5  RADIOFREQUENCY ABLATION on 4/6/21     We plan to continue HEP indefinitely and may repeat the radiofrequency ablation as needed.     Sincerely,             GUIDO Haines

## 2021-11-01 NOTE — TELEPHONE ENCOUNTER
From: Placido Powell  To: GUIDO Hill  Sent: 10/29/2021 5:10 PM EDT  Subject: Non-Urgent Medical Question    Georgi Aschoff I need a new note. I thought the one we did last was going to cover me till my visit Nov. 3rd. They are pushing me to try another mask . Please fax Monday am or soon since I work then and i have no access to my chart from there. [phone] is fax number.  Martha Knowles thanks

## 2021-11-03 ENCOUNTER — OFFICE VISIT (OUTPATIENT)
Dept: ORTHOPEDIC SURGERY | Age: 61
End: 2021-11-03
Payer: COMMERCIAL

## 2021-11-03 DIAGNOSIS — M47.812 CERVICAL SPONDYLOSIS WITHOUT MYELOPATHY: Primary | ICD-10-CM

## 2021-11-03 DIAGNOSIS — M50.30 DDD (DEGENERATIVE DISC DISEASE), CERVICAL: ICD-10-CM

## 2021-11-03 DIAGNOSIS — M48.02 FORAMINAL STENOSIS OF CERVICAL REGION: ICD-10-CM

## 2021-11-03 PROCEDURE — 99213 OFFICE O/P EST LOW 20 MIN: CPT | Performed by: STUDENT IN AN ORGANIZED HEALTH CARE EDUCATION/TRAINING PROGRAM

## 2021-11-03 RX ORDER — GABAPENTIN 100 MG/1
100 CAPSULE ORAL DAILY
Qty: 30 CAPSULE | Refills: 0 | Status: SHIPPED | OUTPATIENT
Start: 2021-11-03 | End: 2022-01-27

## 2021-11-03 NOTE — PROGRESS NOTES
Follow up: Jag Chaudhry  1960  [id number]      CHIEF COMPLAINT:  No chief complaint on file. HISTORY OF PRESENT ILLNESS:  Ms. Rosalba Reed is a 63 y.o. female returns for a follow up visit for multiple medical problems. Her current presenting problems are No diagnosis found. .    As per information/history obtained from the PADT(patient assessment and documentation tool) - She complains of pain in the neck with radiation to the head and shoulders Left She rates the pain 2/10 and describes it as aching. Pain is made worse by: PAPAR, driving long periods, pushing and pulling. She denies side effects from the current pain regimen. Patient reports that since the last follow up visit the physical functioning is unchanged, family/social relationships are unchanged, mood is unchanged and sleep patterns are unchanged, and that the overall functioning is unchanged. Patient denies neurological bowel or bladder. The patient presents for follow-up of ongoing left-sided neck pain. 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 Rest lying flat, medications and stretching help alleviate her symptoms. She is currently trying to figure out what kind of mask she can wear in the ICU that will not exacerbate her neck pain. The PAPAR has exacerbated pain significantly in the past and we have been trying to keep her from wearing this to prevent further progression of the cervical spondylosis and pain. She is 6 months post cervical radiofrequency ablation. She does not feel that she needs a repeat ablation yet as the pain has remained at baseline.     Associated signs and symptoms:   Neurogenic bowel or bladder symptoms:  no   Perceived weakness:  no   Difficulty walking:  no              Past Medical History:   Past Medical History:   Diagnosis Date    Abnormal mammogram 6/8/2011    saw Dr. Lombardi or associate 6/2011 Ok to return for regular mammogram's     Anxiety 6/8/2011    Hyperlipidemia     Kidney stone     Medical history reviewed with no changes     Pyelonephritis 7/28/2011    Recurrent Change to augmentin and levaquin and ck blood cx's 7/27/2011 On daily macrobid per Dr. Bobbi Morgan disorder, acute 3/23/2013    Due to 's infidelity and leaving her 3/2013 To see counselor- increase celexa   still living w the family as of 6/2013       Past Surgical History:     Past Surgical History:   Procedure Laterality Date    CARPAL TUNNEL RELEASE  12/29/11    Left    CARPAL TUNNEL RELEASE Right 12-    CYSTOSCOPY      with stent placement-x3    PAIN MANAGEMENT PROCEDURE Left 8/26/2020    LEFT C2, THIRD OCCIPITAL NERVE, C3, C4, C5 MEDIAL BRANCH BLOCK WITH FLUOROSCOPY (15805, [id number])  #1 performed by Debbie Cameron MD at [address] [place] [address] Left 9/9/2020    LEFT C2,TON,C3,C4 C5 MEDIAL BRANCH BLOCKS WITH FLUOROSCOPY performed by Debbie Cameron MD at [address] [place] [address]  10/07/2020    PAIN MANAGEMENT PROCEDURE Left 10/7/2020    LEFT C2,TON,C3,C4,C5 RADIOFREQUENCY ABLATION WITH FLUOROSCOPY performed by Debbie Cameron MD at [address] [place] [address] Left 4/6/2021    LEFT C2, THIRD OCCIPITAL NERVE, C3, C4, C5  RADIOFREQUENCY ABLATION WITH FLUOROSCOPY performed by Key Mccurdy MD at [address] maxillary gland removal     Current Medications:     Current Outpatient Medications:     tiZANidine (ZANAFLEX) 2 MG capsule, TAKE ONE CAPSULE BY MOUTH EVERY EVENING, Disp: 90 capsule, Rfl: 0    montelukast (SINGULAIR) 10 MG tablet, TAKE 1 TABLET BY MOUTH ONE TIME A DAY, Disp: 90 tablet, Rfl: 0    meloxicam (MOBIC) 15 MG tablet, Take 1 tablet by mouth daily as needed for Pain, Disp: 90 tablet, Rfl: 0    fluticasone (FLONASE) 50 MCG/ACT nasal spray, USE 1 SPRAYS IN EACH NOSTRIL DAILY, Disp: 1 Bottle, Rfl: 3    gabapentin (NEURONTIN) 100 MG capsule, Take 1 capsule by mouth nightly for 90 days. Intended supply: 90 days (Patient taking differently: Take 300 mg by mouth nightly. Intended supply: 90 days), Disp: 90 capsule, Rfl: 0    citalopram (CELEXA) 10 MG tablet, TAKE TWO TABLETS BY MOUTH ONE TIME DAILY, Disp: 180 tablet, Rfl: 3    potassium chloride (KLOR-CON M) 10 MEQ extended release tablet, TAKE 1 TABLET BY MOUTH ONE TIME A DAY, Disp: 90 tablet, Rfl: 3    cycloSPORINE (RESTASIS) 0.05 % ophthalmic emulsion, Restasis 0.05 % eye drops in a dropperette, Disp: , Rfl:     loratadine (CLARITIN) 10 MG tablet, Take 10 mg by mouth daily, Disp: , Rfl:     Vitamin D, Ergocalciferol, 50 MCG (2000 UT) CAPS, Take 4,000 Units by mouth, Disp: , Rfl:     rizatriptan (MAXALT-MLT) 10 MG disintegrating tablet, TALE 1 TABLET BY MOUTH AS NEEDED FOR MIGRAINES MAY REPEAT 2 HOURS LATER IF NEEDED, Disp: 27 tablet, Rfl: 3    butalbital-acetaminophen-caffeine (FIORICET, ESGIC) -40 MG per tablet, TAKE ONE TABLET BY MOUTH EVERY 4 HOURS AS NEEDED FOR PAIN UP TO 10 DAYS, Disp: 30 tablet, Rfl: 3    folic acid (FOLVITE) 1 MG tablet, Take 1 mg by mouth daily, Disp: , Rfl:     calcium citrate-vitamin D (CALCITRATE/VITAMIN D) 315-200 MG-UNIT per tablet, Take 1 tablet by mouth daily. , Disp: , Rfl:   Allergies:  Patient has no known allergies. Social History:    reports that she has never smoked. She has never used smokeless tobacco. She reports that she does not drink alcohol and does not use drugs.   Family History:   Family History   Problem Relation Age of Onset    Diabetes Mother     Kidney Cancer Mother         RIGHT    Breast Cancer Mother     Cancer Maternal Grandmother     Diabetes Maternal Grandmother     Cancer Maternal Grandfather     Diabetes Maternal Grandfather     Heart Disease Other     High Blood Pressure Other        REVIEW OF SYSTEMS:   CONSTITUTIONAL: Denies unexplained weight loss, fevers, chills or fatigue  NEUROLOGICAL: Denies unsteady gait or progressive weakness  MUSCULOSKELETAL: Denies joint swelling or redness  GI: Denies nausea, vomiting, diarrhea   : Denies bowel or bladder issues       PHYSICAL EXAM:    Vitals: Last menstrual period 10/07/2012. GENERAL EXAM:  · General Apparence: Patient is adequately groomed with no evidence of malnutrition. · Psychiatric: Orientation: The patient is oriented to time, place and person. The patient's mood and affect are appropriate   · Vascular: Examination reveals no swelling and palpation reveals no tenderness in upper or lower extremities. Good capillary refill. · The lymphatic examination of the neck, axillae and groin reveals all areas to be without enlargement or induration  · Sensation is intact without deficit in the upper and lower extremities to light touch and pinprick  · Coordination of the upper and lower extremities are normal.    CERVICAL EXAMINATION:  · Inspection: Local inspection shows no step-off or bruising. Cervical alignment is normal. No instability is noted. · Palpation and Percussion: No evidence of tenderness at the midline. Paraspinal tenderness is not present. There is no paraspinal spasm. Skin:There are no rashes, ulcerations or lesions  · Range of Motion:  limited by 25% in all planes due to pain , worse with facet loading to the left   · Strength: 5/5 bilateral upper extremities  · Special Tests:   Spurling's and Nguyen's are negative bilaterally. Metcalf and Impingement tests are negative bilaterally. · Skin:There are no rashes, ulcerations or lesions in right & left upper extremities. · Reflexes: Bilaterally triceps, biceps and brachioradialis are 2+. Clonus absent bilaterally at the feet. No pathological reflexes are noted.   · Gait & station: normal, patient ambulates without assistance  · Additional Examinations:  · RIGHT UPPER EXTREMITY:  Inspection/examination of the right upper extremity does not show any tenderness, deformity or injury. Range of motion is unremarkable and pain-free. There is no gross instability. There are no rashes, ulcerations or lesions. Strength and tone are normal. No atrophy or abnormal movements are noted. · LEFT UPPER EXTREMITY: Inspection/examination of the left upper extremity does not show any tenderness, deformity or injury. Range of motion is unremarkable and pain-free. There is no gross instability. There are no rashes, ulcerations or lesions. Strength and tone are normal. No atrophy or abnormal movements are noted. · RIGHT LOWER EXTREMITY: Inspection/examination of the right lower extremity does not show any tenderness, deformity or injury. Range of motion is normal and pain-free. There is no gross instability. There are no rashes, ulcerations or lesions. Strength and tone are normal. No atrophy or abnormal movements are noted. · LEFT LOWER EXTREMITY:  Inspection/examination of the left lower extremity does not show any tenderness, deformity or injury. Range of motion is normal and pain-free. There is no gross instability. There are no rashes, ulcerations or lesions. Strength and tone are normal. No atrophy or abnormal movements are noted. Diagnostic Testing:    No new diagnostics  Results for orders placed or performed in visit on 08/28/21   [facility] Well Health Screen   Result Value Ref Range    Glucose 98 70 - 99 mg/dL    Cholesterol, Total 309 (H) 0 - 199 mg/dL    Triglycerides 169 (H) 0 - 150 mg/dL    HDL 57 40 - 60 mg/dL    LDL Calculated 218 (H) <100 mg/dL     Impression:       No diagnosis found. Plan:  Clinical Course: Above diagnoses are worsening    I discussed the diagnosis and the treatment options with Paige Hardy today.      In Summary:  The various treatment options were outlined and discussed with Paige Hardy including:  Conservative care options: physical therapy, ice, medications, bracing, and activity modification. The indications for therapeutic injections. The indications for additional imaging/laboratory studies. The indications for (possible future) interventions. After considering the various options discussed, Gladys Gutierrez elected to pursue a course of treatment that includes the followin. Medications:  I will add a 100 mg gabapentin dose increase to the current regimen. Counseled on risks, benefits and alternatives and recommended not to take the medicine and drive or operate heavy machinery. 2. PT:  Encouraged to continue with HEP. 3. Further studies:  No further studies. 4. Interventional:  We are at 6 months post cervical radiofrequency ablation. The patient is not ready to have this repeated yet. 5. Follow up:  2-3 months      Gladys Gutierrez was instructed to call the office if her symptoms worsen or if new symptoms appear prior to the next scheduled visit. She is specifically instructed to contact the office between now & her scheduled appointment if she has concerns related to her condition or if she needs assistance in scheduling the above tests. She is welcome to call for an appointment sooner if she has any additional concerns or questions. Ines Ortega PA-C   Board Certified by the [employer] on Certification of [address] [facility]                This dictation was performed with a verbal recognition program [facility]) and it was checked for errors. It is possible that there are still dictated errors within this office note. If so, please bring any errors to my attention for an addendum. All efforts were made to ensure that this office note is accurate.

## 2021-11-04 ENCOUNTER — TELEPHONE (OUTPATIENT)
Dept: ORTHOPEDIC SURGERY | Age: 61
End: 2021-11-04

## 2021-11-04 DIAGNOSIS — Z00.00 ANNUAL PHYSICAL EXAM: ICD-10-CM

## 2021-11-04 DIAGNOSIS — E55.9 VITAMIN D DEFICIENCY: ICD-10-CM

## 2021-11-04 DIAGNOSIS — R53.83 FATIGUE, UNSPECIFIED TYPE: ICD-10-CM

## 2021-11-04 LAB
A/G RATIO: 1.8 (ref 1.1–2.2)
ALBUMIN SERPL-MCNC: 4.4 G/DL (ref 3.4–5)
ALP BLD-CCNC: 73 U/L (ref 40–129)
ALT SERPL-CCNC: 20 U/L (ref 10–40)
ANION GAP SERPL CALCULATED.3IONS-SCNC: 17 MMOL/L (ref 3–16)
AST SERPL-CCNC: 21 U/L (ref 15–37)
BASOPHILS ABSOLUTE: 0 K/UL (ref 0–0.2)
BASOPHILS RELATIVE PERCENT: 0.7 %
BILIRUB SERPL-MCNC: 0.5 MG/DL (ref 0–1)
BUN BLDV-MCNC: 18 MG/DL (ref 7–20)
CALCIUM SERPL-MCNC: 9.4 MG/DL (ref 8.3–10.6)
CHLORIDE BLD-SCNC: 102 MMOL/L (ref 99–110)
CHOLESTEROL, TOTAL: 270 MG/DL (ref 0–199)
CO2: 26 MMOL/L (ref 21–32)
CREAT SERPL-MCNC: 0.8 MG/DL (ref 0.6–1.1)
EOSINOPHILS ABSOLUTE: 0.1 K/UL (ref 0–0.6)
EOSINOPHILS RELATIVE PERCENT: 3.3 %
GFR AFRICAN AMERICAN: >60
GFR NON-AFRICAN AMERICAN: >60
GLUCOSE BLD-MCNC: 91 MG/DL (ref 70–99)
HCT VFR BLD CALC: 45.6 % (ref 36–48)
HDLC SERPL-MCNC: 55 MG/DL (ref 40–60)
HEMOGLOBIN: 14.5 G/DL (ref 12–16)
LDL CHOLESTEROL CALCULATED: 185 MG/DL
LYMPHOCYTES ABSOLUTE: 1.5 K/UL (ref 1–5.1)
LYMPHOCYTES RELATIVE PERCENT: 38.7 %
MCH RBC QN AUTO: 28.1 PG (ref 26–34)
MCHC RBC AUTO-ENTMCNC: 31.7 G/DL (ref 31–36)
MCV RBC AUTO: 88.6 FL (ref 80–100)
MONOCYTES ABSOLUTE: 0.4 K/UL (ref 0–1.3)
MONOCYTES RELATIVE PERCENT: 9.2 %
NEUTROPHILS ABSOLUTE: 1.9 K/UL (ref 1.7–7.7)
NEUTROPHILS RELATIVE PERCENT: 48.1 %
PDW BLD-RTO: 13.2 % (ref 12.4–15.4)
PLATELET # BLD: 189 K/UL (ref 135–450)
PMV BLD AUTO: 8.1 FL (ref 5–10.5)
POTASSIUM SERPL-SCNC: 3.9 MMOL/L (ref 3.5–5.1)
RBC # BLD: 5.14 M/UL (ref 4–5.2)
SODIUM BLD-SCNC: 145 MMOL/L (ref 136–145)
TOTAL PROTEIN: 6.8 G/DL (ref 6.4–8.2)
TRIGL SERPL-MCNC: 148 MG/DL (ref 0–150)
TSH SERPL DL<=0.05 MIU/L-ACNC: 1.84 UIU/ML (ref 0.27–4.2)
VITAMIN D 25-HYDROXY: 76 NG/ML
VLDLC SERPL CALC-MCNC: 30 MG/DL
WBC # BLD: 4 K/UL (ref 4–11)

## 2021-11-04 NOTE — TELEPHONE ENCOUNTER
Medical Facility Question     Facility Name: [address] [place] [address], [address] [facility]  Contact Name: ZEYNEP  Contact Number: [phone] OPTION#0  Request or Information: THE PHARMACY NEED SOME CLARITY ON THIS MEDICATION GABAPENTIN ON THE DOSES.

## 2021-11-10 ENCOUNTER — OFFICE VISIT (OUTPATIENT)
Dept: INTERNAL MEDICINE CLINIC | Age: 61
End: 2021-11-10
Payer: COMMERCIAL

## 2021-11-10 VITALS
BODY MASS INDEX: 25.58 KG/M2 | DIASTOLIC BLOOD PRESSURE: 70 MMHG | HEIGHT: 62 IN | WEIGHT: 139 LBS | SYSTOLIC BLOOD PRESSURE: 132 MMHG

## 2021-11-10 DIAGNOSIS — N20.0 KIDNEY STONES: ICD-10-CM

## 2021-11-10 DIAGNOSIS — M25.562 ARTHRALGIA OF BOTH KNEES: ICD-10-CM

## 2021-11-10 DIAGNOSIS — M25.561 ARTHRALGIA OF BOTH KNEES: ICD-10-CM

## 2021-11-10 DIAGNOSIS — M43.16 SPONDYLOLISTHESIS OF LUMBAR REGION: ICD-10-CM

## 2021-11-10 DIAGNOSIS — F32.A DEPRESSION, UNSPECIFIED DEPRESSION TYPE: ICD-10-CM

## 2021-11-10 DIAGNOSIS — F41.9 ANXIETY: ICD-10-CM

## 2021-11-10 DIAGNOSIS — Z12.11 COLON CANCER SCREENING: Primary | ICD-10-CM

## 2021-11-10 DIAGNOSIS — E78.5 HYPERLIPIDEMIA, UNSPECIFIED HYPERLIPIDEMIA TYPE: ICD-10-CM

## 2021-11-10 PROCEDURE — 99396 PREV VISIT EST AGE 40-64: CPT | Performed by: INTERNAL MEDICINE

## 2021-11-10 SDOH — ECONOMIC STABILITY: FOOD INSECURITY: WITHIN THE PAST 12 MONTHS, THE FOOD YOU BOUGHT JUST DIDN'T LAST AND YOU DIDN'T HAVE MONEY TO GET MORE.: NEVER TRUE

## 2021-11-10 SDOH — ECONOMIC STABILITY: FOOD INSECURITY: WITHIN THE PAST 12 MONTHS, YOU WORRIED THAT YOUR FOOD WOULD RUN OUT BEFORE YOU GOT MONEY TO BUY MORE.: NEVER TRUE

## 2021-11-10 ASSESSMENT — ENCOUNTER SYMPTOMS
WHEEZING: 0
CHEST TIGHTNESS: 0
ABDOMINAL PAIN: 0
COLOR CHANGE: 0
BACK PAIN: 0

## 2021-11-10 ASSESSMENT — SOCIAL DETERMINANTS OF HEALTH (SDOH): HOW HARD IS IT FOR YOU TO PAY FOR THE VERY BASICS LIKE FOOD, HOUSING, MEDICAL CARE, AND HEATING?: NOT HARD AT ALL

## 2021-11-10 NOTE — PROGRESS NOTES
Well Adult Note  Name: Yelena Bautista Date: 11/10/2021   MRN: [id number] Sex: Female   Age: 63 y.o. Ethnicity: Non- / Non    : 1960 Race: White (non-)      Lonny Santacruz is here for well adult exam.  History:  Pt very frustrated w work issues- stress is severe - stress at work and family with recent deaths- cholesterol is still high although is somewhat improved-      Review of Systems   Constitutional: Negative for activity change, appetite change and fatigue. HENT: Negative for congestion. Eyes: Negative for visual disturbance. Respiratory: Negative for chest tightness and wheezing. Cardiovascular: Negative for chest pain and palpitations. Gastrointestinal: Negative for abdominal pain. Musculoskeletal: Negative for arthralgias and back pain. Skin: Negative for color change and rash. Neurological: Negative for weakness, light-headedness and headaches. Psychiatric/Behavioral: Negative for sleep disturbance. The patient is not nervous/anxious. Allergies   Allergen Reactions    Statins      myalgias         Prior to Visit Medications    Medication Sig Taking? Authorizing Provider   gabapentin (NEURONTIN) 100 MG capsule Take 1 capsule by mouth daily for 30 days. Intended supply: 30 days  Patient taking differently: Take 400 mg by mouth daily. Intended supply: 30 days Yes GUIDO Alfredo   tiZANidine (ZANAFLEX) 2 MG capsule TAKE ONE CAPSULE BY MOUTH EVERY EVENING Yes GUIDO Alfredo   montelukast (SINGULAIR) 10 MG tablet TAKE 1 TABLET BY MOUTH ONE TIME A DAY Yes Susan Trinidad MD   meloxicam (MOBIC) 15 MG tablet Take 1 tablet by mouth daily as needed for Pain Yes Yeyo Hernández PA-C   fluticasone (FLONASE) 50 MCG/ACT nasal spray USE 1 SPRAYS IN EACH NOSTRIL DAILY Yes Susan Trinidad MD   gabapentin (NEURONTIN) 100 MG capsule Take 1 capsule by mouth nightly for 90 days.  Intended supply: 90 days  Patient taking differently: Take 300 mg by mouth nightly. Intended supply: 90 days Yes GUIDO Alfredo   citalopram (CELEXA) 10 MG tablet TAKE TWO TABLETS BY MOUTH ONE TIME DAILY Yes Aristeo Lomeli MD   potassium chloride (KLOR-CON M) 10 MEQ extended release tablet TAKE 1 TABLET BY MOUTH ONE TIME A DAY Yes Aristeo Lomeli MD   cycloSPORINE (RESTASIS) 0.05 % ophthalmic emulsion Restasis 0.05 % eye drops in a dropperette Yes Historical Provider, MD   loratadine (CLARITIN) 10 MG tablet Take 10 mg by mouth daily Yes Historical Provider, MD   Vitamin D, Ergocalciferol, 50 MCG (2000 UT) CAPS Take 4,000 Units by mouth Yes Historical Provider, MD   rizatriptan (MAXALT-MLT) 10 MG disintegrating tablet TALE 1 TABLET BY MOUTH AS NEEDED FOR MIGRAINES MAY REPEAT 2 HOURS LATER IF NEEDED Yes Aristeo Lomeli MD   butalbital-acetaminophen-caffeine (FIORICET, ESGIC) -40 MG per tablet TAKE ONE TABLET BY MOUTH EVERY 4 HOURS AS NEEDED FOR PAIN UP TO 10 DAYS Yes Aristeo Lomeli MD   folic acid (FOLVITE) 1 MG tablet Take 1 mg by mouth daily Yes Historical Provider, MD   calcium citrate-vitamin D (CALCITRATE/VITAMIN D) 315-200 MG-UNIT per tablet Take 1 tablet by mouth daily.  Yes Historical Provider, MD         Past Medical History:   Diagnosis Date    Abnormal mammogram 6/8/2011    saw Dr. Lombardi or associate 6/2011 Ok to return for regular mammogram's     Anxiety 6/8/2011    Hyperlipidemia     Kidney stone     Medical history reviewed with no changes     Pyelonephritis 7/28/2011    Recurrent Change to augmentin and levaquin and ck blood cx's 7/27/2011 On daily macrobid per Dr. Annette Gleason disorder, acute 3/23/2013    Due to 's infidelity and leaving her 3/2013 To see counselor- increase celexa   still living w the family as of 6/2013        Past Surgical History:   Procedure Laterality Date    CARPAL TUNNEL RELEASE  12/29/11    Left    CARPAL TUNNEL RELEASE Right 12-    CYSTOSCOPY      with stent placement-x3    PAIN MANAGEMENT PROCEDURE Left 8/26/2020    LEFT C2, THIRD OCCIPITAL NERVE, C3, C4, C5 MEDIAL BRANCH BLOCK WITH FLUOROSCOPY (72333, [id number])  #1 performed by Rosalva Villegas MD at [address] [place] [address] Left 9/9/2020    LEFT C2,TON,C3,C4 C5 MEDIAL BRANCH BLOCKS WITH FLUOROSCOPY performed by Rosalva Villegas MD at [address] [place] [address]  10/07/2020    PAIN MANAGEMENT PROCEDURE Left 10/7/2020    LEFT C2,TON,C3,C4,C5 RADIOFREQUENCY ABLATION WITH FLUOROSCOPY performed by Rosalva Villegas MD at [address] [place] [address] Left 4/6/2021    LEFT C2, THIRD OCCIPITAL NERVE, C3, C4, C5  RADIOFREQUENCY ABLATION WITH FLUOROSCOPY performed by Oniel Colorado MD at [address] maxillary gland removal         Family History   Problem Relation Age of Onset    Diabetes Mother     Kidney Cancer Mother         RIGHT    Breast Cancer Mother     Cancer Maternal Grandmother     Diabetes Maternal Grandmother     Cancer Maternal Grandfather     Diabetes Maternal Grandfather     Heart Disease Other     High Blood Pressure Other        Social History     Tobacco Use    Smoking status: Never Smoker    Smokeless tobacco: Never Used   Vaping Use    Vaping Use: Never used   Substance Use Topics    Alcohol use: No    Drug use: No       Objective   /70 (Site: Left Upper Arm)   Ht 5' 2\" (1.575 m)   Wt 139 lb (63 kg)   LMP 10/07/2012   BMI 25.42 kg/m²   Wt Readings from Last 3 Encounters:   11/10/21 139 lb (63 kg)   07/21/21 134 lb (60.8 kg)   06/23/21 132 lb (59.9 kg)       Physical Exam  Constitutional:       Appearance: She is well-developed. HENT:      Head: Normocephalic and atraumatic. Eyes:      Conjunctiva/sclera: Conjunctivae normal.      Pupils: Pupils are equal, round, and reactive to light. Cardiovascular:      Rate and Rhythm: Normal rate and regular rhythm. Heart sounds: Normal heart sounds.    Pulmonary:      Effort: Pulmonary effort is normal. No respiratory distress. Breath sounds: Normal breath sounds. Abdominal:      General: There is no distension. Tenderness: There is no abdominal tenderness. Musculoskeletal:      Cervical back: Normal range of motion and neck supple. Lymphadenopathy:      Cervical: No cervical adenopathy. Skin:     General: Skin is warm and dry. Neurological:      Mental Status: She is alert and oriented to person, place, and time. Psychiatric:         Mood and Affect: Mood normal.         Behavior: Behavior normal.         Thought Content: Thought content normal.         Judgment: Judgment normal.           Assessment   Plan   1. Colon cancer screening  -     Cologuard (For External Results Only); Future  2. Kidney stones  Assessment & Plan:   No recent stones- for f/u w urology soon   3. Spondylolisthesis of lumbar region  Assessment & Plan:   Cont to work on stretches and strengthening   4. Hyperlipidemia, unspecified hyperlipidemia type  Assessment & Plan:   conts to refuse statin  5. Anxiety  Assessment & Plan:   Worse w stress-trying to do self care  6. Arthralgia of both knees  Assessment & Plan:   Cont f/u w ortho as needed     7. Depression, unspecified depression type  Assessment & Plan:   Worse w stressors-cont to try to cope -consider more counseling?          Personalized Preventive Plan   Current Health Maintenance Status  Immunization History   Administered Date(s) Administered    COVID-19, Moderna, Primary or Immunocompromised, PF, 100mcg/0.5mL 01/05/2021, 02/02/2021    Influenza 10/29/2015    Influenza Vaccine, unspecified formulation 10/01/2016    Influenza Virus Vaccine 10/03/2017, 10/12/2018, 10/07/2019, 10/01/2020, 10/01/2020, 09/19/2021    Tdap (Boostrix, Adacel) 08/06/2012    Tetanus 06/08/2000        Health Maintenance   Topic Date Due    Colon cancer screen colonoscopy  Never done    Shingles Vaccine (1 of 2) Never done    Cervical cancer screen  10/05/2021    Breast cancer screen 07/21/2022    DTaP/Tdap/Td vaccine (2 - Td or Tdap) 08/06/2022    Lipid screen  11/04/2026    Flu vaccine  Completed    COVID-19 Vaccine  Completed    Hepatitis C screen  Completed    HIV screen  Completed    Hepatitis A vaccine  Aged Out    Hepatitis B vaccine  Aged Out    Hib vaccine  Aged Out    Meningococcal (ACWY) vaccine  Aged Out    Pneumococcal 0-64 years Vaccine  Aged Out     Recommendations for [employer] Due: see orders and patient instructions/AVS.  .

## 2021-12-06 ENCOUNTER — TELEPHONE (OUTPATIENT)
Dept: ORTHOPEDIC SURGERY | Age: 61
End: 2021-12-06

## 2021-12-06 RX ORDER — MONTELUKAST SODIUM 10 MG/1
TABLET ORAL
Qty: 90 TABLET | Refills: 3 | Status: SHIPPED | OUTPATIENT
Start: 2021-12-06 | End: 2022-12-06

## 2021-12-06 NOTE — TELEPHONE ENCOUNTER
Pharmacy called to request a 90-day supply of Meloxicam 15 mg.     [address] [place] [address] Drive  [phone] No

## 2021-12-07 RX ORDER — MELOXICAM 15 MG/1
15 TABLET ORAL DAILY PRN
Qty: 90 TABLET | Refills: 0 | Status: SHIPPED | OUTPATIENT
Start: 2021-12-07 | End: 2022-04-15

## 2021-12-30 ENCOUNTER — TELEPHONE (OUTPATIENT)
Dept: ORTHOPEDIC SURGERY | Age: 61
End: 2021-12-30

## 2021-12-30 ENCOUNTER — OFFICE VISIT (OUTPATIENT)
Dept: ORTHOPEDIC SURGERY | Age: 61
End: 2021-12-30
Payer: COMMERCIAL

## 2021-12-30 VITALS — WEIGHT: 139 LBS | HEIGHT: 62 IN | BODY MASS INDEX: 25.58 KG/M2

## 2021-12-30 DIAGNOSIS — M47.812 CERVICAL SPONDYLOSIS WITHOUT MYELOPATHY: Primary | ICD-10-CM

## 2021-12-30 DIAGNOSIS — M48.02 FORAMINAL STENOSIS OF CERVICAL REGION: ICD-10-CM

## 2021-12-30 DIAGNOSIS — M50.30 DDD (DEGENERATIVE DISC DISEASE), CERVICAL: ICD-10-CM

## 2021-12-30 PROCEDURE — 99213 OFFICE O/P EST LOW 20 MIN: CPT | Performed by: STUDENT IN AN ORGANIZED HEALTH CARE EDUCATION/TRAINING PROGRAM

## 2021-12-30 RX ORDER — BACLOFEN 10 MG/1
10 TABLET ORAL 2 TIMES DAILY
Qty: 60 TABLET | Refills: 0 | Status: SHIPPED | OUTPATIENT
Start: 2021-12-30 | End: 2022-01-29

## 2021-12-30 NOTE — PROGRESS NOTES
Follow up: [address]  1960  [id number]      CHIEF COMPLAINT:    Chief Complaint   Patient presents with    Follow-up     FU CERVICAL,         HISTORY OF PRESENT ILLNESS:  Ms. Igor Breen is a 63 y.o. female returns for a follow up visit for multiple medical problems. Her current presenting problems are   1. Cervical spondylosis without myelopathy    2. DDD (degenerative disc disease), cervical    3. Foraminal stenosis of cervical region    . As per information/history obtained from the PADT(patient assessment and documentation tool) - She complains of pain in the neck with radiation to the shoulders Bilateral She rates the pain 4/10 and describes it as dull, aching, throbbing. Pain is made worse by: movement, lifting, looking down, prolonged driving. She denies side effects from the current pain regimen. Patient reports that since the last follow up visit the physical functioning is worse, family/social relationships are unchanged, mood is unchanged and sleep patterns are worse, and that the overall functioning is somtimes unchanged, sometimes worse. Patient denies neurological bowel or bladder. The patient presents for follow-up of ongoing neck pain. She had a left C2, TON, C3, C4, C5 radiofrequency ablation on 4/6/2021. She continues to experience some relief from the ablation however her pain levels did increase from 1-2 to 3-4 over the past 3 months. 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 Wearing the PA PAR at work also aggravates her neck pain. Lying down, sleeping with a heating pad and using her prescribed medications of gabapentin and meloxicam help alleviate her pain. She has not been performing her strengthening exercises as often as she did while in PT.   She feels like the pain is worsening somewhat however still manageable and she is not ready to repeat the ablation.     Associated signs and symptoms:   Neurogenic bowel or bladder symptoms:  no   Perceived weakness:  no   Difficulty walking:  no              Past Medical History:   Past Medical History:   Diagnosis Date    Abnormal mammogram 6/8/2011    saw Dr. Neville wood or associate 6/2011 Ok to return for regular mammogram's     Anxiety 6/8/2011    Hyperlipidemia     Kidney stone     Medical history reviewed with no changes     Pyelonephritis 7/28/2011    Recurrent Change to augmentin and levaquin and ck blood cx's 7/27/2011 On daily macrobid per Dr. Bella Rodriguez disorder, acute 3/23/2013    Due to 's infidelity and leaving her 3/2013 To see counselor- increase celexa   still living w the family as of 6/2013       Past Surgical History:     Past Surgical History:   Procedure Laterality Date    CARPAL TUNNEL RELEASE  12/29/11    Left    CARPAL TUNNEL RELEASE Right 12-    CYSTOSCOPY      with stent placement-x3    PAIN MANAGEMENT PROCEDURE Left 8/26/2020    LEFT C2, THIRD OCCIPITAL NERVE, C3, C4, C5 MEDIAL BRANCH BLOCK WITH FLUOROSCOPY ([id number], [id number])  #1 performed by Bertha Escamilla MD at [facility] Left 9/9/2020    LEFT C2,TON,C3,C4 C5 MEDIAL BRANCH BLOCKS WITH FLUOROSCOPY performed by Bertha Escamilla MD at [address] [place] [address]  10/07/2020    PAIN MANAGEMENT PROCEDURE Left 10/7/2020    LEFT C2,TON,C3,C4,C5 RADIOFREQUENCY ABLATION WITH FLUOROSCOPY performed by Bertha Escamilla MD at [facility] Left 4/6/2021    LEFT C2, THIRD OCCIPITAL NERVE, C3, C4, C5  RADIOFREQUENCY ABLATION WITH FLUOROSCOPY performed by Izabel Brandt MD at [address] maxillary gland removal     Current Medications:     Current Outpatient Medications:     baclofen (LIORESAL) 10 MG tablet, Take 1 tablet by mouth 2 times daily, Disp: 60 tablet, Rfl: 0    meloxicam (MOBIC) 15 MG tablet, Take 1 tablet by mouth daily as needed for Pain, Disp: 90 tablet, Rfl: 0    montelukast (SINGULAIR) 10 MG tablet, TAKE 1 TABLET BY MOUTH ONE TIME A DAY, Disp: 90 tablet, Rfl: 3    gabapentin (NEURONTIN) 100 MG capsule, Take 1 capsule by mouth daily for 30 days. Intended supply: 30 days (Patient taking differently: Take 400 mg by mouth daily. Intended supply: 30 days), Disp: 30 capsule, Rfl: 0    tiZANidine (ZANAFLEX) 2 MG capsule, TAKE ONE CAPSULE BY MOUTH EVERY EVENING, Disp: 90 capsule, Rfl: 0    fluticasone (FLONASE) 50 MCG/ACT nasal spray, USE 1 SPRAYS IN EACH NOSTRIL DAILY, Disp: 1 Bottle, Rfl: 3    gabapentin (NEURONTIN) 100 MG capsule, Take 1 capsule by mouth nightly for 90 days. Intended supply: 90 days (Patient taking differently: Take 300 mg by mouth nightly. Intended supply: 90 days), Disp: 90 capsule, Rfl: 0    citalopram (CELEXA) 10 MG tablet, TAKE TWO TABLETS BY MOUTH ONE TIME DAILY, Disp: 180 tablet, Rfl: 3    potassium chloride (KLOR-CON M) 10 MEQ extended release tablet, TAKE 1 TABLET BY MOUTH ONE TIME A DAY, Disp: 90 tablet, Rfl: 3    cycloSPORINE (RESTASIS) 0.05 % ophthalmic emulsion, Restasis 0.05 % eye drops in a dropperette, Disp: , Rfl:     loratadine (CLARITIN) 10 MG tablet, Take 10 mg by mouth daily, Disp: , Rfl:     Vitamin D, Ergocalciferol, 50 MCG (2000 UT) CAPS, Take 4,000 Units by mouth, Disp: , Rfl:     rizatriptan (MAXALT-MLT) 10 MG disintegrating tablet, TALE 1 TABLET BY MOUTH AS NEEDED FOR MIGRAINES MAY REPEAT 2 HOURS LATER IF NEEDED, Disp: 27 tablet, Rfl: 3    butalbital-acetaminophen-caffeine (FIORICET, ESGIC) -40 MG per tablet, TAKE ONE TABLET BY MOUTH EVERY 4 HOURS AS NEEDED FOR PAIN UP TO 10 DAYS, Disp: 30 tablet, Rfl: 3    folic acid (FOLVITE) 1 MG tablet, Take 1 mg by mouth daily, Disp: , Rfl:     calcium citrate-vitamin D (CALCITRATE/VITAMIN D) 315-200 MG-UNIT per tablet, Take 1 tablet by mouth daily. , Disp: , Rfl:   Allergies:  Statins  Social History:    reports that she has never smoked. She has never used smokeless tobacco. She reports that she does not drink alcohol and does not use drugs. Family History:   Family History   Problem Relation Age of Onset    Diabetes Mother     Kidney Cancer Mother         RIGHT    Breast Cancer Mother     Cancer Maternal Grandmother     Diabetes Maternal Grandmother     Cancer Maternal Grandfather     Diabetes Maternal Grandfather     Heart Disease Other     High Blood Pressure Other        REVIEW OF SYSTEMS:   CONSTITUTIONAL: Denies unexplained weight loss, fevers, chills or fatigue  NEUROLOGICAL: Denies unsteady gait or progressive weakness  MUSCULOSKELETAL: Denies joint swelling or redness  GI: Denies nausea, vomiting, diarrhea   : Denies bowel or bladder issues       PHYSICAL EXAM:    Vitals: Height 5' 2\" (1.575 m), weight 139 lb (63 kg), last menstrual period 10/07/2012. GENERAL EXAM:  · General Apparence: Patient is adequately groomed with no evidence of malnutrition. · Psychiatric: Orientation: The patient is oriented to time, place and person. The patient's mood and affect are appropriate   · Vascular: Examination reveals no swelling and palpation reveals no tenderness in upper or lower extremities. Good capillary refill. · The lymphatic examination of the neck, axillae and groin reveals all areas to be without enlargement or induration  · Sensation is intact without deficit in the upper and lower extremities to light touch and pinprick  · Coordination of the upper and lower extremities are normal.    CERVICAL EXAMINATION:  · Inspection: Local inspection shows no step-off or bruising. Cervical alignment is normal. No instability is noted. · Palpation and Percussion: No evidence of tenderness at the midline. Paraspinal tenderness is not present. There is no paraspinal spasm.    Skin:There are no rashes, ulcerations or lesions  · Range of Motion:  limited by 25% in all planes due to pain   · Strength: 5/5 bilateral upper extremities  · Special Tests:   Spurling's and Nguyen's are negative bilaterally. Metcalf and Impingement tests are negative bilaterally. · Skin:There are no rashes, ulcerations or lesions in right & left upper extremities. · Reflexes: Bilaterally triceps, biceps and brachioradialis are 2+. Clonus absent bilaterally at the feet. No pathological reflexes are noted. · Gait & station: normal, patient ambulates without assistance  · Additional Examinations:  · RIGHT UPPER EXTREMITY:  Inspection/examination of the right upper extremity does not show any tenderness, deformity or injury. Range of motion is unremarkable and pain-free. There is no gross instability. There are no rashes, ulcerations or lesions. Strength and tone are normal. No atrophy or abnormal movements are noted. · LEFT UPPER EXTREMITY: Inspection/examination of the left upper extremity does not show any tenderness, deformity or injury. Range of motion is unremarkable and pain-free. There is no gross instability. There are no rashes, ulcerations or lesions. Strength and tone are normal. No atrophy or abnormal movements are noted. · RIGHT LOWER EXTREMITY: Inspection/examination of the right lower extremity does not show any tenderness, deformity or injury. Range of motion is normal and pain-free. There is no gross instability. There are no rashes, ulcerations or lesions. Strength and tone are normal. No atrophy or abnormal movements are noted. · LEFT LOWER EXTREMITY:  Inspection/examination of the left lower extremity does not show any tenderness, deformity or injury. Range of motion is normal and pain-free. There is no gross instability. There are no rashes, ulcerations or lesions. Strength and tone are normal. No atrophy or abnormal movements are noted.       Diagnostic Testing:    No new diagnostics  Results for orders placed or performed in visit on 11/04/21   Vitamin D 25 Hydroxy   Result Value Ref Range    Vit D, 25-Hydroxy 76.0 >=30 ng/mL   CBC Auto Differential   Result Value Ref Range    WBC 4.0 4.0 - 11.0 K/uL    RBC 5.14 4.00 - 5.20 M/uL    Hemoglobin 14.5 12.0 - 16.0 g/dL    Hematocrit 45.6 36.0 - 48.0 %    MCV 88.6 80.0 - 100.0 fL    MCH 28.1 26.0 - 34.0 pg    MCHC 31.7 31.0 - 36.0 g/dL    RDW 13.2 12.4 - 15.4 %    Platelets 348 687 - 658 K/uL    MPV 8.1 5.0 - 10.5 fL    Neutrophils % 48.1 %    Lymphocytes % 38.7 %    Monocytes % 9.2 %    Eosinophils % 3.3 %    Basophils % 0.7 %    Neutrophils Absolute 1.9 1.7 - 7.7 K/uL    Lymphocytes Absolute 1.5 1.0 - 5.1 K/uL    Monocytes Absolute 0.4 0.0 - 1.3 K/uL    Eosinophils Absolute 0.1 0.0 - 0.6 K/uL    Basophils Absolute 0.0 0.0 - 0.2 K/uL   Comprehensive Metabolic Panel   Result Value Ref Range    Sodium 145 136 - 145 mmol/L    Potassium 3.9 3.5 - 5.1 mmol/L    Chloride 102 99 - 110 mmol/L    CO2 26 21 - 32 mmol/L    Anion Gap 17 (H) 3 - 16    Glucose 91 70 - 99 mg/dL    BUN 18 7 - 20 mg/dL    CREATININE 0.8 0.6 - 1.1 mg/dL    GFR Non-African American >60 >60    GFR African American >60 >60    Calcium 9.4 8.3 - 10.6 mg/dL    Total Protein 6.8 6.4 - 8.2 g/dL    Albumin 4.4 3.4 - 5.0 g/dL    Albumin/Globulin Ratio 1.8 1.1 - 2.2    Total Bilirubin 0.5 0.0 - 1.0 mg/dL    Alkaline Phosphatase 73 40 - 129 U/L    ALT 20 10 - 40 U/L    AST 21 15 - 37 U/L   Lipid Panel   Result Value Ref Range    Cholesterol, Total 270 (H) 0 - 199 mg/dL    Triglycerides 148 0 - 150 mg/dL    HDL 55 40 - 60 mg/dL    LDL Calculated 185 (H) <100 mg/dL    VLDL Cholesterol Calculated 30 Not Established mg/dL   TSH without Reflex   Result Value Ref Range    TSH 1.84 0.27 - 4.20 uIU/mL     Impression:       1. Cervical spondylosis without myelopathy    2. DDD (degenerative disc disease), cervical    3. Foraminal stenosis of cervical region        Plan:  Clinical Course: Above diagnoses are worsening    I discussed the diagnosis and the treatment options with Bill Abdi today.      In Summary:  The various treatment options were outlined and discussed with Bryce Cohen including:  Conservative care options: physical therapy, ice, medications, bracing, and activity modification. The indications for therapeutic injections. The indications for additional imaging/laboratory studies. The indications for (possible future) interventions. After considering the various options discussed, Bryce Cohen elected to pursue a course of treatment that includes the followin. Medications:  I will add a baclofen 10 mg BID PRN to the current regimen. Counseled on risks, benefits and alternatives and recommended not to take the medicine and drive or operate heavy machinery. 2. PT:  Encouraged to continue with HEP. 3. Further studies:  No further studies. 4. Interventional: No further intervention at this time. 5. Follow up:  2-3 months      Bryce Cohen was instructed to call the office if her symptoms worsen or if new symptoms appear prior to the next scheduled visit. She is specifically instructed to contact the office between now & her scheduled appointment if she has concerns related to her condition or if she needs assistance in scheduling the above tests. She is welcome to call for an appointment sooner if she has any additional concerns or questions. Donal Rey PA-C   Board Certified by the [employer] on Certification of [address] and Orthopa[facility]                This dictation was performed with a verbal recognition program [facility]) and it was checked for errors. It is possible that there are still dictated errors within this office note. If so, please bring any errors to my attention for an addendum. All efforts were made to ensure that this office note is accurate.

## 2021-12-30 NOTE — LETTER
[address] [address]  Phone: [phone]  Fax: [phone]    Lisa Kaiser        December 30, 2021     Patient: Leonel Santo   YOB: 1960   Date of Visit: 12/30/2021     To Whom It May Concern:     It is my medical opinion that Veronika Castrejon should not wear the PAPAR for the next two months due to the following diagnoses and treatment plan:      1.         Cervical spondylosis without myelopathy   2.         DDD (degenerative disc disease), cervical   3.         Foraminal stenosis of cervical region      Recent Procedure:LEFT C2, THIRD OCCIPITAL NERVE, C3, C4, C5  RADIOFREQUENCY ABLATION on 4/6/21.     We plan to continue HEP indefinitely and may repeat the radiofrequency ablation as needed.     Sincerely,          GUIDO Kaiser

## 2022-01-02 DIAGNOSIS — M47.812 CERVICAL SPONDYLOSIS WITHOUT MYELOPATHY: ICD-10-CM

## 2022-01-02 DIAGNOSIS — M48.02 FORAMINAL STENOSIS OF CERVICAL REGION: ICD-10-CM

## 2022-01-02 DIAGNOSIS — M79.18 MYOFASCIAL MUSCLE PAIN: ICD-10-CM

## 2022-01-02 DIAGNOSIS — M50.30 DDD (DEGENERATIVE DISC DISEASE), CERVICAL: ICD-10-CM

## 2022-01-03 RX ORDER — TIZANIDINE HYDROCHLORIDE 2 MG/1
CAPSULE, GELATIN COATED ORAL
Qty: 90 CAPSULE | Refills: 0 | Status: SHIPPED | OUTPATIENT
Start: 2022-01-03 | End: 2022-04-05

## 2022-01-03 NOTE — TELEPHONE ENCOUNTER
Patient last seen 2021 and medication last filled 10/5/2021:      Disp Refills Start End    tiZANidine (ZANAFLEX) 2 MG capsule 90 capsule 0 10/5/2021         Impression:         1. Cervical spondylosis without myelopathy    2. DDD (degenerative disc disease), cervical    3. Foraminal stenosis of cervical region          Plan:  Clinical Course: Above diagnoses are worsening     I discussed the diagnosis and the treatment options with Onesimo Shaffer today.      In Summary:  The various treatment options were outlined and discussed with Echols Chenellie including:  Conservative care options: physical therapy, ice, medications, bracing, and activity modification. The indications for therapeutic injections. The indications for additional imaging/laboratory studies. The indications for (possible future) interventions.      After considering the various options discussed, Onesimo Shaffer elected to pursue a course of treatment that includes the followin. Medications:  I will add a baclofen 10 mg BID PRN to the current regimen. Counseled on risks, benefits and alternatives and recommended not to take the medicine and drive or operate heavy machinery.     2. PT:  Encouraged to continue with HEP.     3. Further studies:  No further studies.     4. Interventional: No further intervention at this time.      5.  Follow up:  2-3 months

## 2022-01-03 NOTE — TELEPHONE ENCOUNTER
Pharmacy calling to clarify that baclofen taken PRN during the day and tiZANidine to be taken at night.

## 2022-01-07 RX ORDER — GABAPENTIN 100 MG/1
CAPSULE ORAL
Qty: 30 CAPSULE | Refills: 0 | Status: SHIPPED | OUTPATIENT
Start: 2022-01-07 | End: 2022-01-27 | Stop reason: SDUPTHER

## 2022-01-07 NOTE — TELEPHONE ENCOUNTER
Patient last seen 2021 and medication last filled 11/3/2021:     Last lab result completed/reported on: 2021     Impression:         1. Cervical spondylosis without myelopathy    2. DDD (degenerative disc disease), cervical    3. Foraminal stenosis of cervical region          Plan:  Clinical Course: Above diagnoses are worsening     I discussed the diagnosis and the treatment options with Sumi Mayo today.      In Summary:  The various treatment options were outlined and discussed with Sumi Mayo including:  Conservative care options: physical therapy, ice, medications, bracing, and activity modification. The indications for therapeutic injections. The indications for additional imaging/laboratory studies. The indications for (possible future) interventions.      After considering the various options discussed, Sumi Mayo elected to pursue a course of treatment that includes the followin. Medications:  I will add a baclofen 10 mg BID PRN to the current regimen. Counseled on risks, benefits and alternatives and recommended not to take the medicine and drive or operate heavy machinery.     2. PT:  Encouraged to continue with HEP.     3. Further studies:  No further studies.     4. Interventional: No further intervention at this time.      5.  Follow up:  2-3 months

## 2022-01-27 ENCOUNTER — PATIENT MESSAGE (OUTPATIENT)
Dept: ORTHOPEDIC SURGERY | Age: 62
End: 2022-01-27

## 2022-01-27 DIAGNOSIS — M50.30 DDD (DEGENERATIVE DISC DISEASE), CERVICAL: ICD-10-CM

## 2022-01-27 DIAGNOSIS — M48.02 FORAMINAL STENOSIS OF CERVICAL REGION: ICD-10-CM

## 2022-01-27 DIAGNOSIS — M47.812 CERVICAL SPONDYLOSIS WITHOUT MYELOPATHY: Primary | ICD-10-CM

## 2022-01-27 RX ORDER — GABAPENTIN 100 MG/1
CAPSULE ORAL
Qty: 30 CAPSULE | Refills: 0 | Status: SHIPPED | OUTPATIENT
Start: 2022-01-27 | End: 2022-03-07

## 2022-01-27 NOTE — TELEPHONE ENCOUNTER
Prescription Refill     Medication Name: PATIENT REQUESTING GABAPENTIN BE SENT TO [address]. [facility] CANNOT FILL THE PRESCRIPTION. PLEASE ADVISE.    Pharmacy: [facility]   Patient Contact Number:  [phone]

## 2022-01-28 RX ORDER — GABAPENTIN 300 MG/1
300 CAPSULE ORAL NIGHTLY
Qty: 30 CAPSULE | Refills: 0 | Status: SHIPPED | OUTPATIENT
Start: 2022-01-28 | End: 2022-04-14 | Stop reason: SDUPTHER

## 2022-01-28 NOTE — TELEPHONE ENCOUNTER
From: Angelica Randolph  To: Shana Irene  Sent: 1/27/2022 2:12 PM EST  Subject: med    Alphonso Phillips, I need my 300mg Gabapentin called into [address] because I'M ALL OUT. [address] [place] [address] suggested this and when I need my next dose they will transfer prescription back to them. Sorry I didn't realize I hadn't called for a new refill. still working out my work night mare. ..have requested records.  Brett Langston

## 2022-02-02 ENCOUNTER — PATIENT MESSAGE (OUTPATIENT)
Dept: INTERNAL MEDICINE CLINIC | Age: 62
End: 2022-02-02

## 2022-02-02 RX ORDER — BUTALBITAL, ACETAMINOPHEN AND CAFFEINE 50; 325; 40 MG/1; MG/1; MG/1
TABLET ORAL
Qty: 30 TABLET | Refills: 3 | Status: SHIPPED | OUTPATIENT
Start: 2022-02-02

## 2022-02-02 RX ORDER — RIZATRIPTAN BENZOATE 10 MG/1
TABLET, ORALLY DISINTEGRATING ORAL
Qty: 27 TABLET | Refills: 3 | Status: SHIPPED | OUTPATIENT
Start: 2022-02-02

## 2022-02-03 RX ORDER — CITALOPRAM 20 MG/1
TABLET ORAL
Qty: 90 TABLET | Refills: 3 | Status: SHIPPED | OUTPATIENT
Start: 2022-02-03

## 2022-02-09 NOTE — TELEPHONE ENCOUNTER
It is still my medical opinion that wearing the PAPAR may exacerbate Veronika's neck pain. I advised that she not wear the PAPAR. It is her choice as to whether or not she wants to wear it.

## 2022-02-21 ENCOUNTER — TELEPHONE (OUTPATIENT)
Dept: ORTHOPEDIC SURGERY | Age: 62
End: 2022-02-21

## 2022-03-07 DIAGNOSIS — M50.30 DDD (DEGENERATIVE DISC DISEASE), CERVICAL: ICD-10-CM

## 2022-03-07 DIAGNOSIS — M48.02 FORAMINAL STENOSIS OF CERVICAL REGION: ICD-10-CM

## 2022-03-07 DIAGNOSIS — M47.812 CERVICAL SPONDYLOSIS WITHOUT MYELOPATHY: ICD-10-CM

## 2022-03-07 RX ORDER — GABAPENTIN 100 MG/1
CAPSULE ORAL
Qty: 30 CAPSULE | Refills: 0 | Status: SHIPPED | OUTPATIENT
Start: 2022-03-07 | End: 2022-03-08

## 2022-03-08 ENCOUNTER — OFFICE VISIT (OUTPATIENT)
Dept: ORTHOPEDIC SURGERY | Age: 62
End: 2022-03-08
Payer: COMMERCIAL

## 2022-03-08 VITALS — BODY MASS INDEX: 24.84 KG/M2 | WEIGHT: 135 LBS | HEIGHT: 62 IN

## 2022-03-08 DIAGNOSIS — M47.812 CERVICAL SPONDYLOSIS WITHOUT MYELOPATHY: Primary | ICD-10-CM

## 2022-03-08 DIAGNOSIS — M48.02 FORAMINAL STENOSIS OF CERVICAL REGION: ICD-10-CM

## 2022-03-08 DIAGNOSIS — M50.30 DDD (DEGENERATIVE DISC DISEASE), CERVICAL: ICD-10-CM

## 2022-03-08 DIAGNOSIS — M47.812 CERVICAL SPONDYLOSIS WITHOUT MYELOPATHY: ICD-10-CM

## 2022-03-08 PROCEDURE — 99214 OFFICE O/P EST MOD 30 MIN: CPT | Performed by: STUDENT IN AN ORGANIZED HEALTH CARE EDUCATION/TRAINING PROGRAM

## 2022-03-08 RX ORDER — GABAPENTIN 100 MG/1
CAPSULE ORAL
Qty: 30 CAPSULE | Refills: 0 | Status: SHIPPED | OUTPATIENT
Start: 2022-03-08 | End: 2022-06-03

## 2022-03-08 RX ORDER — BACLOFEN 10 MG/1
10 TABLET ORAL 2 TIMES DAILY
Qty: 60 TABLET | Refills: 0 | Status: SHIPPED | OUTPATIENT
Start: 2022-03-08 | End: 2022-05-03

## 2022-03-08 RX ORDER — METHYLPREDNISOLONE 4 MG/1
TABLET ORAL
Qty: 1 KIT | Refills: 0 | Status: SHIPPED | OUTPATIENT
Start: 2022-03-08 | End: 2022-03-14

## 2022-03-08 NOTE — PROGRESS NOTES
Follow up: [facility]  1960  [id number]      CHIEF COMPLAINT:    Chief Complaint   Patient presents with    Follow-up     FU CSP         HISTORY OF PRESENT ILLNESS:  Ms. Eriberto Robin is a 63 y.o. female returns for a follow up visit for multiple medical problems. Her current presenting problems are   1. Cervical spondylosis without myelopathy    2. Foraminal stenosis of cervical region    3. DDD (degenerative disc disease), cervical    .    As per information/history obtained from the PADT(patient assessment and documentation tool) - She complains of pain in the neck with radiation to the head She rates the pain 3/10 and describes it as dull, aching. Pain is made worse by: movement. She denies side effects from the current pain regimen. Patient reports that since the last follow up visit the physical functioning is worse, family/social relationships are unchanged, mood is unchanged and sleep patterns are unchanged, and that the overall functioning is worse. Patient denies neurological bowel or bladder. The patient presents for follow up of ongoing neck pain. She started in a new position where she does not perform as much lifting or tugging. She has also not had to wear the PAPAR. She is still noticing increasing left sided neck pain. She started weight lifting again 2 months ago which seems to be going okay, however there is increased soreness. She is considering returning to physical therapy. She is also asking about increasing baclofen to twice a day. Her last cervical RFA was in April of 2021.      Associated signs and symptoms:   Neurogenic bowel or bladder symptoms:  no   Perceived weakness:  no   Difficulty walking:  no              Past Medical History:   Past Medical History:   Diagnosis Date    Abnormal mammogram 6/8/2011    saw Dr. Katlin Torres or associate 6/2011 Ok to return for regular mammogram's     Anxiety 6/8/2011    Hyperlipidemia     Kidney stone     Medical history reviewed with no changes     Pyelonephritis 7/28/2011    Recurrent Change to augmentin and levaquin and ck blood cx's 7/27/2011 On daily macrobid per Dr. Laya Li disorder, acute 3/23/2013    Due to 's infidelity and leaving her 3/2013 To see counselor- increase celexa   still living w the family as of 6/2013       Past Surgical History:     Past Surgical History:   Procedure Laterality Date    CARPAL TUNNEL RELEASE  12/29/11    Left    CARPAL TUNNEL RELEASE Right 12-    CYSTOSCOPY      with stent placement-x3    PAIN MANAGEMENT PROCEDURE Left 8/26/2020    LEFT C2, THIRD OCCIPITAL NERVE, C3, C4, C5 MEDIAL BRANCH BLOCK WITH FLUOROSCOPY (88413, [id number])  #1 performed by Hakan Haddad MD at [address] [place] [address] Left 9/9/2020    LEFT C2,TON,C3,C4 C5 MEDIAL BRANCH BLOCKS WITH FLUOROSCOPY performed by Hakan Haddad MD at [address] [place] [address]  10/07/2020    PAIN MANAGEMENT PROCEDURE Left 10/7/2020    LEFT C2,TON,C3,C4,C5 RADIOFREQUENCY ABLATION WITH FLUOROSCOPY performed by Hakan Haddad MD at [address] [place] [address] Left 4/6/2021    LEFT C2, THIRD OCCIPITAL NERVE, C3, C4, C5  RADIOFREQUENCY ABLATION WITH FLUOROSCOPY performed by Alfredo Klein MD at [address] maxillary gland removal     Current Medications:     Current Outpatient Medications:     baclofen (LIORESAL) 10 MG tablet, Take 1 tablet by mouth 2 times daily, Disp: 60 tablet, Rfl: 0    methylPREDNISolone (MEDROL, IRINA,) 4 MG tablet, Take by mouth., Disp: 1 kit, Rfl: 0    gabapentin (NEURONTIN) 100 MG capsule, TAKE 1 CAPSULE BY MOUTH ONE TIME A DAY, Disp: 30 capsule, Rfl: 0    citalopram (CELEXA) 20 MG tablet, TAKE 1 tab BY MOUTH ONE TIME DAILY, Disp: 90 tablet, Rfl: 3    rizatriptan (MAXALT-MLT) 10 MG disintegrating tablet, TALE 1 TABLET BY MOUTH AS NEEDED FOR MIGRAINES MAY REPEAT 2 HOURS LATER IF NEEDED, Disp: 27 tablet, Rfl: 3   butalbital-acetaminophen-caffeine (FIORICET, ESGIC) -40 MG per tablet, TAKE ONE TABLET BY MOUTH EVERY 4 HOURS AS NEEDED FOR PAIN UP TO 10 DAYS, Disp: 30 tablet, Rfl: 3    gabapentin (NEURONTIN) 300 MG capsule, Take 1 capsule by mouth nightly for 30 days. Intended supply: 30 days, Disp: 30 capsule, Rfl: 0    tiZANidine (ZANAFLEX) 2 MG capsule, TAKE ONE CAPSULE BY MOUTH EVERY EVENING, Disp: 90 capsule, Rfl: 0    meloxicam (MOBIC) 15 MG tablet, Take 1 tablet by mouth daily as needed for Pain, Disp: 90 tablet, Rfl: 0    montelukast (SINGULAIR) 10 MG tablet, TAKE 1 TABLET BY MOUTH ONE TIME A DAY, Disp: 90 tablet, Rfl: 3    fluticasone (FLONASE) 50 MCG/ACT nasal spray, USE 1 SPRAYS IN EACH NOSTRIL DAILY, Disp: 1 Bottle, Rfl: 3    potassium chloride (KLOR-CON M) 10 MEQ extended release tablet, TAKE 1 TABLET BY MOUTH ONE TIME A DAY, Disp: 90 tablet, Rfl: 3    cycloSPORINE (RESTASIS) 0.05 % ophthalmic emulsion, Restasis 0.05 % eye drops in a dropperette, Disp: , Rfl:     loratadine (CLARITIN) 10 MG tablet, Take 10 mg by mouth daily, Disp: , Rfl:     Vitamin D, Ergocalciferol, 50 MCG (2000 UT) CAPS, Take 4,000 Units by mouth, Disp: , Rfl:     folic acid (FOLVITE) 1 MG tablet, Take 1 mg by mouth daily, Disp: , Rfl:     calcium citrate-vitamin D (CALCITRATE/VITAMIN D) 315-200 MG-UNIT per tablet, Take 1 tablet by mouth daily. , Disp: , Rfl:   Allergies:  Statins  Social History:    reports that she has never smoked. She has never used smokeless tobacco. She reports that she does not drink alcohol and does not use drugs.   Family History:   Family History   Problem Relation Age of Onset    Diabetes Mother     Kidney Cancer Mother         RIGHT    Breast Cancer Mother     Cancer Maternal Grandmother     Diabetes Maternal Grandmother     Cancer Maternal Grandfather     Diabetes Maternal Grandfather     Heart Disease Other     High Blood Pressure Other        REVIEW OF SYSTEMS:   CONSTITUTIONAL: Denies unexplained weight loss, fevers, chills or fatigue  NEUROLOGICAL: Denies unsteady gait or progressive weakness  MUSCULOSKELETAL: Denies joint swelling or redness  GI: Denies nausea, vomiting, diarrhea   : Denies bowel or bladder issues       PHYSICAL EXAM:    Vitals: Height 5' 2\" (1.575 m), weight 135 lb (61.2 kg), last menstrual period 10/07/2012. GENERAL EXAM:  · General Apparence: Patient is adequately groomed with no evidence of malnutrition. · Psychiatric: Orientation: The patient is oriented to time, place and person. The patient's mood and affect are appropriate   · Vascular: Examination reveals no swelling and palpation reveals no tenderness in upper or lower extremities. Good capillary refill. · The lymphatic examination of the neck, axillae and groin reveals all areas to be without enlargement or induration  · Sensation is intact without deficit in the upper and lower extremities to light touch and pinprick  · Coordination of the upper and lower extremities are normal.    CERVICAL EXAMINATION:  · Inspection: Local inspection shows no step-off or bruising. Cervical alignment is normal. No instability is noted. · Palpation and Percussion: No evidence of tenderness at the midline. Paraspinal tenderness is not present. There is no paraspinal spasm. Skin:There are no rashes, ulcerations or lesions  · Range of Motion:  limited by 25% in all planes due to pain - worse with facet loading to the left   · Strength: 5/5 bilateral upper extremities  · Special Tests:   Spurling's and Nguyen's are negative bilaterally. Metcalf and Impingement tests are negative bilaterally. · Skin:There are no rashes, ulcerations or lesions in right & left upper extremities. · Reflexes: Bilaterally triceps, biceps and brachioradialis are 2+. Clonus absent bilaterally at the feet. No pathological reflexes are noted.   · Gait & station: normal, patient ambulates without assistance  · Additional Examinations:  · RIGHT UPPER EXTREMITY:  Inspection/examination of the right upper extremity does not show any tenderness, deformity or injury. Range of motion is unremarkable and pain-free. There is no gross instability. There are no rashes, ulcerations or lesions. Strength and tone are normal. No atrophy or abnormal movements are noted. · LEFT UPPER EXTREMITY: Inspection/examination of the left upper extremity does not show any tenderness, deformity or injury. Range of motion is unremarkable and pain-free. There is no gross instability. There are no rashes, ulcerations or lesions. Strength and tone are normal. No atrophy or abnormal movements are noted. · RIGHT LOWER EXTREMITY: Inspection/examination of the right lower extremity does not show any tenderness, deformity or injury. Range of motion is normal and pain-free. There is no gross instability. There are no rashes, ulcerations or lesions. Strength and tone are normal. No atrophy or abnormal movements are noted. · LEFT LOWER EXTREMITY:  Inspection/examination of the left lower extremity does not show any tenderness, deformity or injury. Range of motion is normal and pain-free. There is no gross instability. There are no rashes, ulcerations or lesions. Strength and tone are normal. No atrophy or abnormal movements are noted. Diagnostic Testing:    MR cervical spine shows   1. Mild bilateral neural foraminal narrowing at C5-C6 secondary to a disc   bulge and uncovertebral overgrowth. 2. Mild-to-moderate left neural foraminal narrowing at C4-C5 secondary to a   disc bulge and uncovertebral overgrowth. 3. Focal 2 mm central disc protrusion C3-C4 without significant spinal canal   stenosis or neural foraminal narrowing evident.        Results for orders placed or performed in visit on 11/04/21   Vitamin D 25 Hydroxy   Result Value Ref Range    Vit D, 25-Hydroxy 76.0 >=30 ng/mL   CBC Auto Differential   Result Value Ref Range    WBC 4.0 4.0 - 11.0 K/uL    RBC 5.14 4.00 - 5.20 M/uL    Hemoglobin 14.5 12.0 - 16.0 g/dL    Hematocrit 45.6 36.0 - 48.0 %    MCV 88.6 80.0 - 100.0 fL    MCH 28.1 26.0 - 34.0 pg    MCHC 31.7 31.0 - 36.0 g/dL    RDW 13.2 12.4 - 15.4 %    Platelets 287 272 - 229 K/uL    MPV 8.1 5.0 - 10.5 fL    Neutrophils % 48.1 %    Lymphocytes % 38.7 %    Monocytes % 9.2 %    Eosinophils % 3.3 %    Basophils % 0.7 %    Neutrophils Absolute 1.9 1.7 - 7.7 K/uL    Lymphocytes Absolute 1.5 1.0 - 5.1 K/uL    Monocytes Absolute 0.4 0.0 - 1.3 K/uL    Eosinophils Absolute 0.1 0.0 - 0.6 K/uL    Basophils Absolute 0.0 0.0 - 0.2 K/uL   Comprehensive Metabolic Panel   Result Value Ref Range    Sodium 145 136 - 145 mmol/L    Potassium 3.9 3.5 - 5.1 mmol/L    Chloride 102 99 - 110 mmol/L    CO2 26 21 - 32 mmol/L    Anion Gap 17 (H) 3 - 16    Glucose 91 70 - 99 mg/dL    BUN 18 7 - 20 mg/dL    CREATININE 0.8 0.6 - 1.1 mg/dL    GFR Non-African American >60 >60    GFR African American >60 >60    Calcium 9.4 8.3 - 10.6 mg/dL    Total Protein 6.8 6.4 - 8.2 g/dL    Albumin 4.4 3.4 - 5.0 g/dL    Albumin/Globulin Ratio 1.8 1.1 - 2.2    Total Bilirubin 0.5 0.0 - 1.0 mg/dL    Alkaline Phosphatase 73 40 - 129 U/L    ALT 20 10 - 40 U/L    AST 21 15 - 37 U/L   Lipid Panel   Result Value Ref Range    Cholesterol, Total 270 (H) 0 - 199 mg/dL    Triglycerides 148 0 - 150 mg/dL    HDL 55 40 - 60 mg/dL    LDL Calculated 185 (H) <100 mg/dL    VLDL Cholesterol Calculated 30 Not Established mg/dL   TSH without Reflex   Result Value Ref Range    TSH 1.84 0.27 - 4.20 uIU/mL     Impression:       1. Cervical spondylosis without myelopathy    2. Foraminal stenosis of cervical region    3. DDD (degenerative disc disease), cervical        Plan:  Clinical Course: Above diagnoses are worsening    I discussed the diagnosis and the treatment options with Jessica Lindsey today.      In Summary:  The various treatment options were outlined and discussed with Jessica Lindsey including:  Conservative care options: for an addendum. All efforts were made to ensure that this office note is accurate.

## 2022-03-30 ENCOUNTER — NURSE TRIAGE (OUTPATIENT)
Dept: OTHER | Facility: CLINIC | Age: 62
End: 2022-03-30

## 2022-03-30 NOTE — TELEPHONE ENCOUNTER
Employee calling regarding positive COVID test and return to work process. Also had several questions regarding that process. I directed her to [facility] and her manager for further guidance and walked her through the reporting process in [employer].     Reason for Disposition   Information only question and nurse able to answer    Protocols used: INFORMATION ONLY CALL - NO TRIAGE-ADULT-[place]

## 2022-03-31 NOTE — TELEPHONE ENCOUNTER
Employee calling to clarify when to return to work. She states her manager told her she is supposed to call [employer] for NEEL after day 3. Discussed with employee, per new protocol effective 2/14/22, the standard operating procedure for return to work found on [address] [place] [address], quarantine for 5 days, return to work if fever free for 24 hours and improvement of symptoms. Only need to call [employer] and file NEEL if not returning to work after 5 days.

## 2022-04-04 DIAGNOSIS — M47.812 CERVICAL SPONDYLOSIS WITHOUT MYELOPATHY: ICD-10-CM

## 2022-04-04 DIAGNOSIS — M48.02 FORAMINAL STENOSIS OF CERVICAL REGION: ICD-10-CM

## 2022-04-04 DIAGNOSIS — M79.18 MYOFASCIAL MUSCLE PAIN: ICD-10-CM

## 2022-04-04 DIAGNOSIS — M50.30 DDD (DEGENERATIVE DISC DISEASE), CERVICAL: ICD-10-CM

## 2022-04-05 RX ORDER — TIZANIDINE HYDROCHLORIDE 2 MG/1
CAPSULE, GELATIN COATED ORAL
Qty: 90 CAPSULE | Refills: 0 | Status: SHIPPED | OUTPATIENT
Start: 2022-04-05 | End: 2022-06-09

## 2022-04-14 ENCOUNTER — TELEPHONE (OUTPATIENT)
Dept: ORTHOPEDIC SURGERY | Age: 62
End: 2022-04-14

## 2022-04-14 DIAGNOSIS — M48.02 FORAMINAL STENOSIS OF CERVICAL REGION: ICD-10-CM

## 2022-04-14 DIAGNOSIS — M50.30 DDD (DEGENERATIVE DISC DISEASE), CERVICAL: ICD-10-CM

## 2022-04-14 DIAGNOSIS — M47.812 CERVICAL SPONDYLOSIS WITHOUT MYELOPATHY: ICD-10-CM

## 2022-04-14 RX ORDER — GABAPENTIN 300 MG/1
300 CAPSULE ORAL NIGHTLY
Qty: 90 CAPSULE | Refills: 0 | Status: SHIPPED | OUTPATIENT
Start: 2022-04-14 | End: 2022-06-07

## 2022-04-15 RX ORDER — MELOXICAM 15 MG/1
TABLET ORAL
Qty: 90 TABLET | Refills: 0 | Status: SHIPPED | OUTPATIENT
Start: 2022-04-15 | End: 2022-06-09

## 2022-05-03 DIAGNOSIS — M48.02 FORAMINAL STENOSIS OF CERVICAL REGION: ICD-10-CM

## 2022-05-03 DIAGNOSIS — M50.30 DDD (DEGENERATIVE DISC DISEASE), CERVICAL: ICD-10-CM

## 2022-05-03 DIAGNOSIS — M47.812 CERVICAL SPONDYLOSIS WITHOUT MYELOPATHY: ICD-10-CM

## 2022-05-03 RX ORDER — BACLOFEN 10 MG/1
TABLET ORAL
Qty: 60 TABLET | Refills: 0 | Status: SHIPPED | OUTPATIENT
Start: 2022-05-03 | End: 2022-06-09

## 2022-05-09 ENCOUNTER — OFFICE VISIT (OUTPATIENT)
Dept: ORTHOPEDIC SURGERY | Age: 62
End: 2022-05-09
Payer: COMMERCIAL

## 2022-05-09 VITALS — HEIGHT: 62 IN | BODY MASS INDEX: 24.84 KG/M2 | WEIGHT: 135 LBS

## 2022-05-09 DIAGNOSIS — M50.30 DDD (DEGENERATIVE DISC DISEASE), CERVICAL: ICD-10-CM

## 2022-05-09 DIAGNOSIS — M47.812 CERVICAL SPONDYLOSIS WITHOUT MYELOPATHY: Primary | ICD-10-CM

## 2022-05-09 DIAGNOSIS — M48.02 FORAMINAL STENOSIS OF CERVICAL REGION: ICD-10-CM

## 2022-05-09 PROCEDURE — 99213 OFFICE O/P EST LOW 20 MIN: CPT | Performed by: STUDENT IN AN ORGANIZED HEALTH CARE EDUCATION/TRAINING PROGRAM

## 2022-05-09 NOTE — PROGRESS NOTES
[facility]  1960  [id number]      CHIEF COMPLAINT:    Chief Complaint   Patient presents with    Follow-up     FU CSP         HISTORY OF PRESENT ILLNESS:  Ms. Wyatt Chase is a 63 y.o. female returns for a follow up visit for multiple medical problems. Her current presenting problems are   1. Cervical spondylosis without myelopathy    2. DDD (degenerative disc disease), cervical    3. Foraminal stenosis of cervical region    . As per information/history obtained from the PADT(patient assessment and documentation tool) - She complains of pain in the neck with radiation to the head She rates the pain 1/10 and describes it as aching. Pain is made worse by: certain movements, PAPAR. She denies side effects from the current pain regimen. Patient reports that since the last follow up visit the physical functioning is better, family/social relationships are unchanged, mood is unchanged and sleep patterns are unchanged, and that the overall functioning is better. Patient denies neurological bowel or bladder. Patient presents for follow-up of ongoing neck pain. The Medrol Dosepak that was prescribed in March significantly helps with her pain. She has continued gabapentin, tizanidine, meloxicam and baclofen. This regimen seems to be keeping her pain levels at bay. She switched pillows as well which seems to be helping with sleeping. She was unable to get into physical therapy as the physical therapist she normally goes to switched offices.   She has been continuing her home exercise program.      Associated signs and symptoms:   Neurogenic bowel or bladder symptoms:  no   Perceived weakness:  no   Difficulty walking:  no              Past Medical History:   Past Medical History:   Diagnosis Date    Abnormal mammogram 6/8/2011    saw Dr. Neville wood or associate 6/2011 Ok to return for regular mammogram's     Anxiety 6/8/2011    Hyperlipidemia     Kidney stone     Medical history reviewed with no changes     Pyelonephritis 7/28/2011    Recurrent Change to augmentin and levaquin and ck blood cx's 7/27/2011 On daily macrobid per Dr. Benton Fam disorder, acute 3/23/2013    Due to 's infidelity and leaving her 3/2013 To see counselor- increase celexa   still living w the family as of 6/2013       Past Surgical History:     Past Surgical History:   Procedure Laterality Date    CARPAL TUNNEL RELEASE  12/29/11    Left    CARPAL TUNNEL RELEASE Right 12-    CYSTOSCOPY      with stent placement-x3    PAIN MANAGEMENT PROCEDURE Left 8/26/2020    LEFT C2, THIRD OCCIPITAL NERVE, C3, C4, C5 MEDIAL BRANCH BLOCK WITH FLUOROSCOPY (25131, [id number])  #1 performed by Pablo Rosario MD at [facility] Left 9/9/2020    LEFT C2,TON,C3,C4 C5 MEDIAL BRANCH BLOCKS WITH FLUOROSCOPY performed by Pablo Rosario MD at [address] [place] [address]  10/07/2020    PAIN MANAGEMENT PROCEDURE Left 10/7/2020    LEFT C2,TON,C3,C4,C5 RADIOFREQUENCY ABLATION WITH FLUOROSCOPY performed by Pablo Rosario MD at [facility] Left 4/6/2021    LEFT C2, THIRD OCCIPITAL NERVE, C3, C4, C5  RADIOFREQUENCY ABLATION WITH FLUOROSCOPY performed by Leonel Garcia MD at [address] maxillary gland removal     Current Medications:     Current Outpatient Medications:     baclofen (LIORESAL) 10 MG tablet, TAKE 1 TABLET BY MOUTH 2 TIMES A DAY, Disp: 60 tablet, Rfl: 0    meloxicam (MOBIC) 15 MG tablet, TAKE 1 TABLET BY MOUTH ONE TIME A DAY AS NEEDED FOR PAIN, Disp: 90 tablet, Rfl: 0    gabapentin (NEURONTIN) 300 MG capsule, Take 1 capsule by mouth nightly for 90 days.  Intended supply: 90 days, Disp: 90 capsule, Rfl: 0    tiZANidine (ZANAFLEX) 2 MG capsule, TAKE ONE CAPSULE BY MOUTH EVERY EVENING, Disp: 90 capsule, Rfl: 0    gabapentin (NEURONTIN) 100 MG capsule, TAKE 1 CAPSULE BY MOUTH ONE TIME A DAY, Disp: 30 capsule, Rfl: 0    citalopram (CELEXA) 20 MG tablet, TAKE 1 tab BY MOUTH ONE TIME DAILY, Disp: 90 tablet, Rfl: 3    rizatriptan (MAXALT-MLT) 10 MG disintegrating tablet, TALE 1 TABLET BY MOUTH AS NEEDED FOR MIGRAINES MAY REPEAT 2 HOURS LATER IF NEEDED, Disp: 27 tablet, Rfl: 3    butalbital-acetaminophen-caffeine (FIORICET, ESGIC) -40 MG per tablet, TAKE ONE TABLET BY MOUTH EVERY 4 HOURS AS NEEDED FOR PAIN UP TO 10 DAYS, Disp: 30 tablet, Rfl: 3    montelukast (SINGULAIR) 10 MG tablet, TAKE 1 TABLET BY MOUTH ONE TIME A DAY, Disp: 90 tablet, Rfl: 3    fluticasone (FLONASE) 50 MCG/ACT nasal spray, USE 1 SPRAYS IN EACH NOSTRIL DAILY, Disp: 1 Bottle, Rfl: 3    potassium chloride (KLOR-CON M) 10 MEQ extended release tablet, TAKE 1 TABLET BY MOUTH ONE TIME A DAY, Disp: 90 tablet, Rfl: 3    cycloSPORINE (RESTASIS) 0.05 % ophthalmic emulsion, Restasis 0.05 % eye drops in a dropperette, Disp: , Rfl:     loratadine (CLARITIN) 10 MG tablet, Take 10 mg by mouth daily, Disp: , Rfl:     Vitamin D, Ergocalciferol, 50 MCG (2000 UT) CAPS, Take 4,000 Units by mouth, Disp: , Rfl:     folic acid (FOLVITE) 1 MG tablet, Take 1 mg by mouth daily, Disp: , Rfl:     calcium citrate-vitamin D (CALCITRATE/VITAMIN D) 315-200 MG-UNIT per tablet, Take 1 tablet by mouth daily. , Disp: , Rfl:   Allergies:  Statins  Social History:    reports that she has never smoked. She has never used smokeless tobacco. She reports that she does not drink alcohol and does not use drugs.   Family History:   Family History   Problem Relation Age of Onset    Diabetes Mother     Kidney Cancer Mother         RIGHT    Breast Cancer Mother     Cancer Maternal Grandmother     Diabetes Maternal Grandmother     Cancer Maternal Grandfather     Diabetes Maternal Grandfather     Heart Disease Other     High Blood Pressure Other        REVIEW OF SYSTEMS:   CONSTITUTIONAL: Denies unexplained weight loss, fevers, chills or fatigue  NEUROLOGICAL: Denies unsteady gait or progressive weakness  MUSCULOSKELETAL: Denies joint swelling or redness  GI: Denies nausea, vomiting, diarrhea   : Denies bowel or bladder issues       PHYSICAL EXAM:    Vitals: Height 5' 2\" (1.575 m), weight 135 lb (61.2 kg), last menstrual period 10/07/2012. GENERAL EXAM:  · General Apparence: Patient is adequately groomed with no evidence of malnutrition. · Psychiatric: Orientation: The patient is oriented to time, place and person. The patient's mood and affect are appropriate   · Vascular: Examination reveals no swelling and palpation reveals no tenderness in upper or lower extremities. Good capillary refill. · The lymphatic examination of the neck, axillae and groin reveals all areas to be without enlargement or induration  · Sensation is intact without deficit in the upper and lower extremities to light touch and pinprick  · Coordination of the upper and lower extremities are normal.    CERVICAL EXAMINATION:  · Inspection: Local inspection shows no step-off or bruising. Cervical alignment is normal. No instability is noted. · Palpation and Percussion: No evidence of tenderness at the midline. Paraspinal tenderness is not present. There is no paraspinal spasm. Skin:There are no rashes, ulcerations or lesions  · Range of Motion:  limited by 25% in all planes due to pain - mostly with extension   · Strength: 5/5 bilateral upper extremities  · Special Tests:   Spurling's and Nguyen's are negative bilaterally. Metcalf and Impingement tests are negative bilaterally. · Skin:There are no rashes, ulcerations or lesions in right & left upper extremities. · Reflexes: Bilaterally triceps, biceps and brachioradialis are 2+. Clonus absent bilaterally at the feet. No pathological reflexes are noted.   · Gait & station: normal, patient ambulates without assistance  · Additional Examinations:  · RIGHT UPPER EXTREMITY:  Inspection/examination of the right upper extremity does not show any tenderness, deformity or injury. Range of motion is unremarkable and pain-free. There is no gross instability. There are no rashes, ulcerations or lesions. Strength and tone are normal. No atrophy or abnormal movements are noted. · LEFT UPPER EXTREMITY: Inspection/examination of the left upper extremity does not show any tenderness, deformity or injury. Range of motion is unremarkable and pain-free. There is no gross instability. There are no rashes, ulcerations or lesions. Strength and tone are normal. No atrophy or abnormal movements are noted. · RIGHT LOWER EXTREMITY: Inspection/examination of the right lower extremity does not show any tenderness, deformity or injury. Range of motion is normal and pain-free. There is no gross instability. There are no rashes, ulcerations or lesions. Strength and tone are normal. No atrophy or abnormal movements are noted. · LEFT LOWER EXTREMITY:  Inspection/examination of the left lower extremity does not show any tenderness, deformity or injury. Range of motion is normal and pain-free. There is no gross instability. There are no rashes, ulcerations or lesions. Strength and tone are normal. No atrophy or abnormal movements are noted.       Diagnostic Testing:    No new diagnostics  Results for orders placed or performed in visit on 11/04/21   Vitamin D 25 Hydroxy   Result Value Ref Range    Vit D, 25-Hydroxy 76.0 >=30 ng/mL   CBC Auto Differential   Result Value Ref Range    WBC 4.0 4.0 - 11.0 K/uL    RBC 5.14 4.00 - 5.20 M/uL    Hemoglobin 14.5 12.0 - 16.0 g/dL    Hematocrit 45.6 36.0 - 48.0 %    MCV 88.6 80.0 - 100.0 fL    MCH 28.1 26.0 - 34.0 pg    MCHC 31.7 31.0 - 36.0 g/dL    RDW 13.2 12.4 - 15.4 %    Platelets 198 808 - 808 K/uL    MPV 8.1 5.0 - 10.5 fL    Neutrophils % 48.1 %    Lymphocytes % 38.7 %    Monocytes % 9.2 %    Eosinophils % 3.3 %    Basophils % 0.7 %    Neutrophils Absolute 1.9 1.7 - 7.7 K/uL    Lymphocytes Absolute 1.5 1.0 - 5.1 K/uL    Monocytes Absolute 0.4 0.0 - 1.3 K/uL    Eosinophils Absolute 0.1 0.0 - 0.6 K/uL    Basophils Absolute 0.0 0.0 - 0.2 K/uL   Comprehensive Metabolic Panel   Result Value Ref Range    Sodium 145 136 - 145 mmol/L    Potassium 3.9 3.5 - 5.1 mmol/L    Chloride 102 99 - 110 mmol/L    CO2 26 21 - 32 mmol/L    Anion Gap 17 (H) 3 - 16    Glucose 91 70 - 99 mg/dL    BUN 18 7 - 20 mg/dL    CREATININE 0.8 0.6 - 1.1 mg/dL    GFR Non-African American >60 >60    GFR African American >60 >60    Calcium 9.4 8.3 - 10.6 mg/dL    Total Protein 6.8 6.4 - 8.2 g/dL    Albumin 4.4 3.4 - 5.0 g/dL    Albumin/Globulin Ratio 1.8 1.1 - 2.2    Total Bilirubin 0.5 0.0 - 1.0 mg/dL    Alkaline Phosphatase 73 40 - 129 U/L    ALT 20 10 - 40 U/L    AST 21 15 - 37 U/L   Lipid Panel   Result Value Ref Range    Cholesterol, Total 270 (H) 0 - 199 mg/dL    Triglycerides 148 0 - 150 mg/dL    HDL 55 40 - 60 mg/dL    LDL Calculated 185 (H) <100 mg/dL    VLDL Cholesterol Calculated 30 Not Established mg/dL   TSH without Reflex   Result Value Ref Range    TSH 1.84 0.27 - 4.20 uIU/mL     Impression:       1. Cervical spondylosis without myelopathy    2. DDD (degenerative disc disease), cervical    3. Foraminal stenosis of cervical region        Plan:  Clinical Course: Above diagnoses are improving     I discussed the diagnosis and the treatment options with Dawit Lindsey today. In Summary:  The various treatment options were outlined and discussed with Dawit Lindsey including:  Conservative care options: physical therapy, ice, medications, bracing, and activity modification. The indications for therapeutic injections. The indications for additional imaging/laboratory studies. The indications for (possible future) interventions. After considering the various options discussed, Darryn Calles elected to pursue a course of treatment that includes the followin. Medications:   The patient has had great sustained relief with a medrol dose pack prescribed 3/8/22. She may benefit from another round of steroid if her pain flares up while I am on maternity leave. She plans to follow up with me in September. She will need refills of her gabapentin, baclofen, tizanidin and mobic while I am gone. 2. PT:  Encouraged to continue with HEP. 3. Further studies:  No further studies. 4. Interventional:  We can repeat the cervical radiofrequency ablation if pain returns within the next several months. Last cervical RFA was in April of 2021. Would schedule with Dr. Lamar Jasso. 5. Follow up:  4-6 months      Annis Closs was instructed to call the office if her symptoms worsen or if new symptoms appear prior to the next scheduled visit. She is specifically instructed to contact the office between now & her scheduled appointment if she has concerns related to her condition or if she needs assistance in scheduling the above tests. She is welcome to call for an appointment sooner if she has any additional concerns or questions. Belle Villa PA-C  Board Certified by the [employer] on Certification of [address] and [address] [address]               This dictation was performed with a verbal recognition program [facility]) and it was checked for errors. It is possible that there are still dictated errors within this office note. If so, please bring any errors to my attention for an addendum. All efforts were made to ensure that this office note is accurate.

## 2022-06-02 DIAGNOSIS — M50.30 DDD (DEGENERATIVE DISC DISEASE), CERVICAL: ICD-10-CM

## 2022-06-02 DIAGNOSIS — M47.812 CERVICAL SPONDYLOSIS WITHOUT MYELOPATHY: ICD-10-CM

## 2022-06-02 DIAGNOSIS — M48.02 FORAMINAL STENOSIS OF CERVICAL REGION: ICD-10-CM

## 2022-06-03 RX ORDER — GABAPENTIN 100 MG/1
CAPSULE ORAL
Qty: 30 CAPSULE | Refills: 0 | Status: SHIPPED | OUTPATIENT
Start: 2022-06-03 | End: 2022-07-25

## 2022-06-03 NOTE — TELEPHONE ENCOUNTER
Patient last seen 2022 and medication last filled 3/8/2022:    Gabapentin 100 mg    Impression:         1. Cervical spondylosis without myelopathy    2. DDD (degenerative disc disease), cervical    3. Foraminal stenosis of cervical region          Plan:  Clinical Course: Above diagnoses are improving      I discussed the diagnosis and the treatment options with Russel Lindsey today.      In Summary:  The various treatment options were outlined and discussed with Russel Lindsey including:  Conservative care options: physical therapy, ice, medications, bracing, and activity modification. The indications for therapeutic injections. The indications for additional imaging/laboratory studies. The indications for (possible future) interventions.      After considering the various options discussed, Russel Lindsey elected to pursue a course of treatment that includes the followin. Medications: The patient has had great sustained relief with a medrol dose pack prescribed 3/8/22. She may benefit from another round of steroid if her pain flares up while I am on maternity leave. She plans to follow up with me in September. She will need refills of her gabapentin, baclofen, tizanidin and mobic while I am gone.       2. PT:  Encouraged to continue with HEP.     3. Further studies:  No further studies.     4. Interventional:  We can repeat the cervical radiofrequency ablation if pain returns within the next several months. Last cervical RFA was in 2021. Would schedule with Dr. Lamar Jasso.      5.  Follow up:  4-6 months

## 2022-06-06 DIAGNOSIS — M47.812 CERVICAL SPONDYLOSIS WITHOUT MYELOPATHY: ICD-10-CM

## 2022-06-06 DIAGNOSIS — M50.30 DDD (DEGENERATIVE DISC DISEASE), CERVICAL: ICD-10-CM

## 2022-06-06 DIAGNOSIS — M48.02 FORAMINAL STENOSIS OF CERVICAL REGION: ICD-10-CM

## 2022-06-07 ENCOUNTER — TELEPHONE (OUTPATIENT)
Dept: ORTHOPEDIC SURGERY | Age: 62
End: 2022-06-07

## 2022-06-07 DIAGNOSIS — M48.02 FORAMINAL STENOSIS OF CERVICAL REGION: ICD-10-CM

## 2022-06-07 DIAGNOSIS — M50.30 DDD (DEGENERATIVE DISC DISEASE), CERVICAL: ICD-10-CM

## 2022-06-07 DIAGNOSIS — M47.812 CERVICAL SPONDYLOSIS WITHOUT MYELOPATHY: ICD-10-CM

## 2022-06-07 RX ORDER — GABAPENTIN 300 MG/1
CAPSULE ORAL
Qty: 90 CAPSULE | Refills: 0 | Status: SHIPPED | OUTPATIENT
Start: 2022-06-07 | End: 2022-07-25

## 2022-06-07 RX ORDER — GABAPENTIN 100 MG/1
CAPSULE ORAL
Qty: 30 CAPSULE | Refills: 0 | OUTPATIENT
Start: 2022-06-07

## 2022-06-07 NOTE — TELEPHONE ENCOUNTER
Spoke with [employer]. 100 mg was sent 6/6/2022 by pharmacy. Waiting on Mary Kemp to approved Gabapentin 300 mg.

## 2022-06-07 NOTE — TELEPHONE ENCOUNTER
REFILL DUPLICATE    Patient last seen 59/2022 and medication last filled 6/3/2022:    Gabapentin 100 mg refilled on 6/3/2022.      Ajay Maldonado 68, [address], [address] [place] [address]      Cosign for Ordering: Accepted by Lisa Mccauley on 6/3/2022  8:37 AM    E-Prescribing Status: Receipt confirmed by pharmacy (6/3/2022  8:17 AM EDT)

## 2022-06-07 NOTE — TELEPHONE ENCOUNTER
Patient last seen  and medication last filled 6/3/2022:  Gabapentin 100 mg refilled on 6/3/2022. Impression:         1. Cervical spondylosis without myelopathy    2. DDD (degenerative disc disease), cervical    3. Foraminal stenosis of cervical region          Plan:  Clinical Course: Above diagnoses are improving      I discussed the diagnosis and the treatment options with Veronika Lindsey today.      In Summary:  The various treatment options were outlined and discussed with Veronika Lindsey including:  Conservative care options: physical therapy, ice, medications, bracing, and activity modification. The indications for therapeutic injections. The indications for additional imaging/laboratory studies.  The indications for (possible future) interventions.      After considering the various options discussed, Veronika Lindsey elected to pursue a course of treatment that includes the followin. [address] patient has had great sustained relief with a medrol dose pack prescribed 3/8/22. She may benefit from another round of steroid if her pain flares up while I am on maternity leave. She plans to follow up with me in September. She will need refills of her gabapentin, baclofen, tizanidin and mobic while I am gone.       2. PT:  Encouraged to continue with HEP.     3. Further studies:  No further studies.     4. Interventional:  We can repeat the cervical radiofrequency ablation if pain returns within the next several months. Last cervical RFA was in 2021.  Would schedule with Dr. Bhanu Rivas.      5.  Follow up:  4-6 months

## 2022-06-07 NOTE — TELEPHONE ENCOUNTER
[id number]  [phone]    Please call with new Rx for Gabapentin 100/300 mg. Mail order pharmacy can refill next Friday with new Rx.

## 2022-06-07 NOTE — TELEPHONE ENCOUNTER
Patient last seen 2022 and medication last filled 2022:    Gabapentin 300 mg    Impression:         1. Cervical spondylosis without myelopathy    2. DDD (degenerative disc disease), cervical    3. Foraminal stenosis of cervical region          Plan:  Clinical Course: Above diagnoses are improving      I discussed the diagnosis and the treatment options with Veronika Lindsey today.      In Summary:  The various treatment options were outlined and discussed with Veronika Lindsey including:  Conservative care options: physical therapy, ice, medications, bracing, and activity modification. The indications for therapeutic injections. The indications for additional imaging/laboratory studies.  The indications for (possible future) interventions.      After considering the various options discussed, Veronika Lindsey elected to pursue a course of treatment that includes the followin. [address] patient has had great sustained relief with a medrol dose pack prescribed 3/8/22. She may benefit from another round of steroid if her pain flares up while I am on maternity leave. She plans to follow up with me in September. She will need refills of her gabapentin, baclofen, tizanidin and mobic while I am gone.       2. PT:  Encouraged to continue with HEP.     3. Further studies:  No further studies.     4. Interventional:  We can repeat the cervical radiofrequency ablation if pain returns within the next several months. Last cervical RFA was in 2021.  Would schedule with Dr. Viki Payne.      5.  Follow up:  4-6 months

## 2022-06-08 DIAGNOSIS — M79.18 MYOFASCIAL MUSCLE PAIN: ICD-10-CM

## 2022-06-08 DIAGNOSIS — M48.02 FORAMINAL STENOSIS OF CERVICAL REGION: ICD-10-CM

## 2022-06-08 DIAGNOSIS — M50.30 DDD (DEGENERATIVE DISC DISEASE), CERVICAL: ICD-10-CM

## 2022-06-08 DIAGNOSIS — M47.812 CERVICAL SPONDYLOSIS WITHOUT MYELOPATHY: ICD-10-CM

## 2022-06-09 ENCOUNTER — TELEPHONE (OUTPATIENT)
Dept: ORTHOPEDIC SURGERY | Age: 62
End: 2022-06-09

## 2022-06-09 RX ORDER — TIZANIDINE HYDROCHLORIDE 2 MG/1
CAPSULE, GELATIN COATED ORAL
Qty: 90 CAPSULE | Refills: 0 | Status: SHIPPED | OUTPATIENT
Start: 2022-06-09

## 2022-06-09 RX ORDER — BACLOFEN 10 MG/1
TABLET ORAL
Qty: 60 TABLET | Refills: 0 | Status: SHIPPED | OUTPATIENT
Start: 2022-06-09 | End: 2022-09-02 | Stop reason: SDUPTHER

## 2022-06-09 RX ORDER — MELOXICAM 15 MG/1
TABLET ORAL
Qty: 90 TABLET | Refills: 0 | Status: SHIPPED | OUTPATIENT
Start: 2022-06-09 | End: 2022-08-24 | Stop reason: SDUPTHER

## 2022-06-09 NOTE — TELEPHONE ENCOUNTER
Patient last seen 2022 and medication last filled:    4/15/2022 Meloxicam 15 mg  2022 Tizanidine 2 mg  5/3/2022 Baclofen 10 mg    Impression:         1. Cervical spondylosis without myelopathy    2. DDD (degenerative disc disease), cervical    3. Foraminal stenosis of cervical region          Plan:  Clinical Course: Above diagnoses are improving      I discussed the diagnosis and the treatment options with Esther Lindsey today.      In Summary:  The various treatment options were outlined and discussed with Esther Lindsey including:  Conservative care options: physical therapy, ice, medications, bracing, and activity modification. The indications for therapeutic injections. The indications for additional imaging/laboratory studies. The indications for (possible future) interventions.      After considering the various options discussed, Esther Halle Lindsey elected to pursue a course of treatment that includes the followin. Medications: The patient has had great sustained relief with a medrol dose pack prescribed 3/8/22. She may benefit from another round of steroid if her pain flares up while I am on maternity leave. She plans to follow up with me in September. She will need refills of her gabapentin, baclofen, tizanidin and mobic while I am gone.       2. PT:  Encouraged to continue with HEP.     3. Further studies:  No further studies.     4. Interventional:  We can repeat the cervical radiofrequency ablation if pain returns within the next several months. Last cervical RFA was in 2021. Would schedule with Dr. Venancio Severs.      5.  Follow up:  4-6 months

## 2022-06-13 ENCOUNTER — TELEPHONE (OUTPATIENT)
Dept: ORTHOPEDIC SURGERY | Age: 62
End: 2022-06-13

## 2022-06-13 DIAGNOSIS — M47.812 CERVICAL SPONDYLOSIS WITHOUT MYELOPATHY: ICD-10-CM

## 2022-06-13 DIAGNOSIS — M50.30 DDD (DEGENERATIVE DISC DISEASE), CERVICAL: ICD-10-CM

## 2022-06-13 DIAGNOSIS — M48.02 FORAMINAL STENOSIS OF CERVICAL REGION: ICD-10-CM

## 2022-06-13 NOTE — TELEPHONE ENCOUNTER
Patient last seen 5/9/2022 and medication last filled by Maynor Perez PA-C:    6/9/2022 Tizanidine 2 mg  6/9/2022 Baclofen 10 mg    Called from [facility] [place], [place] in regards to the above prescriptions. Tizanidine switched sig to NIGHTLY instead of morning.

## 2022-07-22 DIAGNOSIS — M50.30 DDD (DEGENERATIVE DISC DISEASE), CERVICAL: ICD-10-CM

## 2022-07-22 DIAGNOSIS — M48.02 FORAMINAL STENOSIS OF CERVICAL REGION: ICD-10-CM

## 2022-07-22 DIAGNOSIS — M47.812 CERVICAL SPONDYLOSIS WITHOUT MYELOPATHY: ICD-10-CM

## 2022-07-25 RX ORDER — GABAPENTIN 100 MG/1
CAPSULE ORAL
Qty: 30 CAPSULE | Refills: 0 | Status: SHIPPED | OUTPATIENT
Start: 2022-07-25 | End: 2022-09-15 | Stop reason: SDUPTHER

## 2022-07-25 NOTE — TELEPHONE ENCOUNTER
Patient last seen 2022 - LKS and medication last filled 6/3/2022:     Last lab result completed/reported on: 2021     Impression:         1. Cervical spondylosis without myelopathy   2. DDD (degenerative disc disease), cervical   3. Foraminal stenosis of cervical region          Plan:  Clinical Course: Above diagnoses are improving      I discussed the diagnosis and the treatment options with Joceline Lindsey today. In Summary:  The various treatment options were outlined and discussed with Joceline Lindsey including:  Conservative care options: physical therapy, ice, medications, bracing, and activity modification. The indications for therapeutic injections. The indications for additional imaging/laboratory studies. The indications for (possible future) interventions. After considering the various options discussed, Erin Mills elected to pursue a course of treatment that includes the followin. Medications: The patient has had great sustained relief with a medrol dose pack prescribed 3/8/22. She may benefit from another round of steroid if her pain flares up while I am on maternity leave. She plans to follow up with me in September. She will need refills of her gabapentin, baclofen, tizanidin and mobic while I am gone. 2. PT:  Encouraged to continue with HEP. 3. Further studies:  No further studies. 4. Interventional:  We can repeat the cervical radiofrequency ablation if pain returns within the next several months. Last cervical RFA was in 2021. Would schedule with Dr. Flaquito Valdez. 5. Follow up:  4-6 months        Erin Mills was instructed to call the office if her symptoms worsen or if new symptoms appear prior to the next scheduled visit. She is specifically instructed to contact the office between now & her scheduled appointment if she has concerns related to her condition or if she needs assistance in scheduling the above tests.  She is welcome to call for an appointment sooner if she has any additional concerns or questions.                Mallika Pappas PA-C  Board Certified by the [employer] on Certification of [address] [facility] and [facility] [facility]

## 2022-07-26 LAB
CHOLESTEROL, TOTAL: 254 MG/DL (ref 0–199)
GLUCOSE BLD-MCNC: 94 MG/DL (ref 70–99)
HDLC SERPL-MCNC: 57 MG/DL (ref 40–60)
LDL CHOLESTEROL CALCULATED: 164 MG/DL
TRIGL SERPL-MCNC: 167 MG/DL (ref 0–150)

## 2022-07-27 LAB
ESTIMATED AVERAGE GLUCOSE: 114 MG/DL
HBA1C MFR BLD: 5.6 %

## 2022-07-29 ENCOUNTER — HOSPITAL ENCOUNTER (OUTPATIENT)
Dept: MAMMOGRAPHY | Age: 62
Discharge: HOME | End: 2022-07-29
Payer: COMMERCIAL

## 2022-07-29 VITALS — BODY MASS INDEX: 24.48 KG/M2 | WEIGHT: 133 LBS | HEIGHT: 62 IN

## 2022-07-29 DIAGNOSIS — Z12.31 VISIT FOR SCREENING MAMMOGRAM: ICD-10-CM

## 2022-07-29 PROCEDURE — 77067 SCR MAMMO BI INCL CAD: CPT

## 2022-08-24 ENCOUNTER — TELEPHONE (OUTPATIENT)
Dept: ORTHOPEDIC SURGERY | Age: 62
End: 2022-08-24

## 2022-08-24 DIAGNOSIS — M47.812 CERVICAL SPONDYLOSIS WITHOUT MYELOPATHY: ICD-10-CM

## 2022-08-24 DIAGNOSIS — M50.30 DDD (DEGENERATIVE DISC DISEASE), CERVICAL: ICD-10-CM

## 2022-08-24 DIAGNOSIS — M48.02 FORAMINAL STENOSIS OF CERVICAL REGION: ICD-10-CM

## 2022-08-24 RX ORDER — MELOXICAM 15 MG/1
TABLET ORAL
Qty: 90 TABLET | Refills: 0 | Status: SHIPPED | OUTPATIENT
Start: 2022-08-24

## 2022-08-30 RX ORDER — MELOXICAM 15 MG/1
TABLET ORAL
Qty: 90 TABLET | Refills: 0 | OUTPATIENT
Start: 2022-08-30

## 2022-08-30 RX ORDER — GABAPENTIN 100 MG/1
CAPSULE ORAL
Qty: 30 CAPSULE | Refills: 0 | OUTPATIENT
Start: 2022-08-30

## 2022-09-01 ENCOUNTER — TELEPHONE (OUTPATIENT)
Dept: ORTHOPEDIC SURGERY | Age: 62
End: 2022-09-01

## 2022-09-01 NOTE — TELEPHONE ENCOUNTER
Appointment Request     Patient requesting earlier appointment: Yes  Appointment offered to patient: N/A  Patient Contact Number: [phone]  FU CERVICAL /after 2:30PM    Also, needs refills on Gabapentin 100mg & Bacfloed.

## 2022-09-02 ENCOUNTER — TELEPHONE (OUTPATIENT)
Dept: ORTHOPEDIC SURGERY | Age: 62
End: 2022-09-02

## 2022-09-02 DIAGNOSIS — M50.30 DDD (DEGENERATIVE DISC DISEASE), CERVICAL: ICD-10-CM

## 2022-09-02 DIAGNOSIS — M48.02 FORAMINAL STENOSIS OF CERVICAL REGION: ICD-10-CM

## 2022-09-02 DIAGNOSIS — M47.812 CERVICAL SPONDYLOSIS WITHOUT MYELOPATHY: ICD-10-CM

## 2022-09-02 RX ORDER — BACLOFEN 10 MG/1
TABLET ORAL
Qty: 60 TABLET | Refills: 0 | Status: SHIPPED | OUTPATIENT
Start: 2022-09-02

## 2022-09-08 RX ORDER — FLUTICASONE PROPIONATE 50 MCG
SPRAY, SUSPENSION (ML) NASAL
Qty: 1 EACH | Refills: 0 | Status: SHIPPED | OUTPATIENT
Start: 2022-09-08

## 2022-09-15 ENCOUNTER — OFFICE VISIT (OUTPATIENT)
Dept: ORTHOPEDIC SURGERY | Age: 62
End: 2022-09-15
Payer: COMMERCIAL

## 2022-09-15 VITALS — BODY MASS INDEX: 25.1 KG/M2 | RESPIRATION RATE: 14 BRPM | HEIGHT: 62 IN | WEIGHT: 136.4 LBS

## 2022-09-15 DIAGNOSIS — M50.30 DDD (DEGENERATIVE DISC DISEASE), CERVICAL: ICD-10-CM

## 2022-09-15 DIAGNOSIS — M47.812 CERVICAL SPONDYLOSIS WITHOUT MYELOPATHY: ICD-10-CM

## 2022-09-15 DIAGNOSIS — M48.02 FORAMINAL STENOSIS OF CERVICAL REGION: ICD-10-CM

## 2022-09-15 PROCEDURE — 99214 OFFICE O/P EST MOD 30 MIN: CPT | Performed by: STUDENT IN AN ORGANIZED HEALTH CARE EDUCATION/TRAINING PROGRAM

## 2022-09-15 RX ORDER — GABAPENTIN 100 MG/1
CAPSULE ORAL
Qty: 90 CAPSULE | Refills: 0 | Status: SHIPPED | OUTPATIENT
Start: 2022-09-15 | End: 2022-10-16

## 2022-09-15 RX ORDER — GABAPENTIN 300 MG/1
300 CAPSULE ORAL NIGHTLY
Qty: 30 CAPSULE | Refills: 2 | Status: SHIPPED | OUTPATIENT
Start: 2022-09-15 | End: 2022-10-25

## 2022-09-15 NOTE — PROGRESS NOTES
Follow up: [facility]  1960  [id number]      CHIEF COMPLAINT:    Chief Complaint   Patient presents with    Neck Pain     CK CSP          HISTORY OF PRESENT ILLNESS:  Ms. Olga Lidia Joyce is a 63 y.o. female returns for a follow up visit for multiple medical problems. Her current presenting problems are   1. Cervical spondylosis without myelopathy    2. DDD (degenerative disc disease), cervical    3. Foraminal stenosis of cervical region    . As per information/history obtained from the PADT(patient assessment and documentation tool) - She complains of pain in the neck with radiation to the head and shoulders Left She rates the pain 3/10 and describes it as dull, aching. Pain is made worse by: lifting, using her head and upper body. She denies side effects from the current pain regimen. Patient reports that since the last follow up visit the physical functioning is unchanged, family/social relationships are unchanged, mood is unchanged and sleep patterns are unchanged, and that the overall functioning is unchanged. Patient denies neurological bowel or bladder. The patient presents for follow up of ongoing neck pain. She is currently taking gabapentin 300 mg at baseline with the additional 100 mg as needed for flare ups of pain. She presents today for refill of this medication. She denies any medication side effects. She also takes tizanidine, meloxicam, and baclofen to help control her pain. She is interested in dry needling with physical therapy.         Associated signs and symptoms:   Neurogenic bowel or bladder symptoms:  no   Perceived weakness:  no   Difficulty walking:  no              Past Medical History:   Past Medical History:   Diagnosis Date    Abnormal mammogram 6/8/2011    saw Dr. Neville wood or associate 6/2011 Ok to return for regular mammogram's     Anxiety 6/8/2011    Hyperlipidemia     Kidney stone     Medical history reviewed with no changes     Pyelonephritis 7/28/2011 Recurrent Change to augmentin and levaquin and ck blood cx's 7/27/2011 On daily macrobid per Dr. Dariana French disorder, acute 3/23/2013    Due to 's infidelity and leaving her 3/2013 To see counselor- increase celexa   still living w the family as of 6/2013       Past Surgical History:     Past Surgical History:   Procedure Laterality Date    BREAST BIOPSY Left     benign core biopsy    CARPAL TUNNEL RELEASE  12/29/2011    Left    CARPAL TUNNEL RELEASE Right 12/30/2014    CYSTOSCOPY      with stent placement-x3    PAIN MANAGEMENT PROCEDURE Left 08/26/2020    LEFT C2, THIRD OCCIPITAL NERVE, C3, C4, C5 MEDIAL BRANCH BLOCK WITH FLUOROSCOPY (22951, [id number])  #1 performed by Mason Jaeger MD at [address] [place] [address] Left 09/09/2020    LEFT C2,TON,C3,C4 C5 MEDIAL BRANCH BLOCKS WITH FLUOROSCOPY performed by Mason Jaeger MD at [address] [place] [address]  10/07/2020    PAIN MANAGEMENT PROCEDURE Left 10/07/2020    LEFT C2,TON,C3,C4,C5 RADIOFREQUENCY ABLATION WITH FLUOROSCOPY performed by Mason Jaeger MD at [address] [place] [address] Left 04/06/2021    LEFT C2, THIRD OCCIPITAL NERVE, C3, C4, C5  RADIOFREQUENCY ABLATION WITH FLUOROSCOPY performed by Connie Moraes MD at [address] [facility]      L maxillary gland removal     Current Medications:     Current Outpatient Medications:     gabapentin (NEURONTIN) 100 MG capsule, TAKE 1 CAPSULE BY MOUTH ONE TIME A DAY, Disp: 90 capsule, Rfl: 0    gabapentin (NEURONTIN) 300 MG capsule, Take 1 capsule by mouth nightly for 90 days.  Intended supply: 30 days, Disp: 30 capsule, Rfl: 2    fluticasone (FLONASE) 50 MCG/ACT nasal spray, USE 1 SPRAYS IN EACH NOSTRIL DAILY, Disp: 1 each, Rfl: 0    baclofen (LIORESAL) 10 MG tablet, TAKE 1 TABLET BY MOUTH 2 TIMES A DAY, Disp: 60 tablet, Rfl: 0    meloxicam (MOBIC) 15 MG tablet, TAKE 1 TABLET BY MOUTH ONE TIME A DAY AS NEEDED FOR PAIN, Disp: 90 tablet, Rfl: 0    tiZANidine (ZANAFLEX) 2 MG capsule, TAKE ONE CAPSULE BY MOUTH EVERY MORNING, Disp: 90 capsule, Rfl: 0    citalopram (CELEXA) 20 MG tablet, TAKE 1 tab BY MOUTH ONE TIME DAILY, Disp: 90 tablet, Rfl: 3    rizatriptan (MAXALT-MLT) 10 MG disintegrating tablet, TALE 1 TABLET BY MOUTH AS NEEDED FOR MIGRAINES MAY REPEAT 2 HOURS LATER IF NEEDED, Disp: 27 tablet, Rfl: 3    butalbital-acetaminophen-caffeine (FIORICET, ESGIC) -40 MG per tablet, TAKE ONE TABLET BY MOUTH EVERY 4 HOURS AS NEEDED FOR PAIN UP TO 10 DAYS, Disp: 30 tablet, Rfl: 3    montelukast (SINGULAIR) 10 MG tablet, TAKE 1 TABLET BY MOUTH ONE TIME A DAY, Disp: 90 tablet, Rfl: 3    potassium chloride (KLOR-CON M) 10 MEQ extended release tablet, TAKE 1 TABLET BY MOUTH ONE TIME A DAY, Disp: 90 tablet, Rfl: 3    cycloSPORINE (RESTASIS) 0.05 % ophthalmic emulsion, Restasis 0.05 % eye drops in a dropperette, Disp: , Rfl:     loratadine (CLARITIN) 10 MG tablet, Take 10 mg by mouth daily, Disp: , Rfl:     Vitamin D, Ergocalciferol, 50 MCG (2000 UT) CAPS, Take 4,000 Units by mouth, Disp: , Rfl:     folic acid (FOLVITE) 1 MG tablet, Take 1 mg by mouth daily, Disp: , Rfl:     calcium citrate-vitamin D (CITRACAL+D) 315-200 MG-UNIT per tablet, Take 1 tablet by mouth daily. , Disp: , Rfl:   Allergies:  Statins  Social History:    reports that she has never smoked. She has never used smokeless tobacco. She reports that she does not drink alcohol and does not use drugs.   Family History:   Family History   Problem Relation Age of Onset    Diabetes Mother     Kidney Cancer Mother         RIGHT    Breast Cancer Mother 72    Cancer Maternal Grandmother     Diabetes Maternal Grandmother     Cancer Maternal Grandfather     Diabetes Maternal Grandfather     Heart Disease Other     High Blood Pressure Other        REVIEW OF SYSTEMS:   CONSTITUTIONAL: Denies unexplained weight loss, fevers, chills or fatigue  NEUROLOGICAL: Denies unsteady gait or progressive weakness  MUSCULOSKELETAL: Denies joint swelling or redness  GI: Denies nausea, vomiting, diarrhea   : Denies bowel or bladder issues       PHYSICAL EXAM:    Vitals: Resp. rate 14, height 5' 2\" (1.575 m), weight 136 lb 6.4 oz (61.9 kg), last menstrual period 10/07/2012. GENERAL EXAM:  General Apparence: Patient is adequately groomed with no evidence of malnutrition. Psychiatric: Orientation: The patient is oriented to time, place and person. The patient's mood and affect are appropriate   Vascular: Examination reveals no swelling and palpation reveals no tenderness in upper or lower extremities. Good capillary refill. The lymphatic examination of the neck, axillae and groin reveals all areas to be without enlargement or induration  Sensation is intact without deficit in the upper and lower extremities to light touch and pinprick  Coordination of the upper and lower extremities are normal.    CERVICAL EXAMINATION:  Inspection: Local inspection shows no step-off or bruising. Cervical alignment is normal. No instability is noted. Palpation and Percussion: No evidence of tenderness at the midline. Paraspinal tenderness is not present. There is no paraspinal spasm. Skin:There are no rashes, ulcerations or lesions  Range of Motion:  limited by 25% in all planes due to pain  Strength: 5/5 bilateral upper extremities  Special Tests:   Spurling's and Nguyen's are negative bilaterally. Metcalf and Impingement tests are negative bilaterally. Skin:There are no rashes, ulcerations or lesions in right & left upper extremities. Reflexes: Bilaterally triceps, biceps and brachioradialis are 2+. Clonus absent bilaterally at the feet. No pathological reflexes are noted. Gait & station: normal, patient ambulates without assistance  Additional Examinations:  RIGHT UPPER EXTREMITY:  Inspection/examination of the right upper extremity does not show any tenderness, deformity or injury. Range of motion is unremarkable and pain-free.  There is no gross instability. There are no rashes, ulcerations or lesions. Strength and tone are normal. No atrophy or abnormal movements are noted. LEFT UPPER EXTREMITY: Inspection/examination of the left upper extremity does not show any tenderness, deformity or injury. Range of motion is unremarkable and pain-free. There is no gross instability. There are no rashes, ulcerations or lesions. Strength and tone are normal. No atrophy or abnormal movements are noted. RIGHT LOWER EXTREMITY: Inspection/examination of the right lower extremity does not show any tenderness, deformity or injury. Range of motion is normal and pain-free. There is no gross instability. There are no rashes, ulcerations or lesions. Strength and tone are normal. No atrophy or abnormal movements are noted. LEFT LOWER EXTREMITY:  Inspection/examination of the left lower extremity does not show any tenderness, deformity or injury. Range of motion is normal and pain-free. There is no gross instability. There are no rashes, ulcerations or lesions. Strength and tone are normal. No atrophy or abnormal movements are noted. Diagnostic Testing:    No new diagnostics  Results for orders placed or performed in visit on 07/26/22   Hemoglobin A1C   Result Value Ref Range    Hemoglobin A1C 5.6 See comment %    eAG 114.0 mg/dL     Impression:       1. Cervical spondylosis without myelopathy    2. DDD (degenerative disc disease), cervical    3. Foraminal stenosis of cervical region        Plan:  Clinical Course: Above diagnoses are improving     I discussed the diagnosis and the treatment options with Sparkle Lindsey today. In Summary:  The various treatment options were outlined and discussed with Sparkle Lindsey including:  Conservative care options: physical therapy, ice, medications, bracing, and activity modification. The indications for therapeutic injections. The indications for additional imaging/laboratory studies.   The indications for (possible future) interventions. After considering the various options discussed, Leonidas Silva elected to pursue a course of treatment that includes the followin. Medications: I will send gabapentin 300 mg and 100 mg to the pharmacy. PDMP reviewed. No adverse effects. She takes one tablet of gabapentin 300 mg daily. The 100 mg tablet is for flare ups of pain in addition to the 300 mg tablet if needed. 2. PT:   Referral provided to see Allyne Lands. 3. Follow up:   3 months       Grant Rosa PengCash was instructed to call the office if her symptoms worsen or if new symptoms appear prior to the next scheduled visit. She is specifically instructed to contact the office between now & her scheduled appointment if she has concerns related to her condition or if she needs assistance in scheduling the above tests. She is welcome to call for an appointment sooner if she has any additional concerns or questions. Lori Flores PA-C  Board Certified by the [employer] on Certification of [address] [facility] and [id number] [address]               This dictation was performed with a verbal recognition program [facility]) and it was checked for errors. It is possible that there are still dictated errors within this office note. If so, please bring any errors to my attention for an addendum. All efforts were made to ensure that this office note is accurate.

## 2022-10-21 DIAGNOSIS — M50.30 DDD (DEGENERATIVE DISC DISEASE), CERVICAL: ICD-10-CM

## 2022-10-21 DIAGNOSIS — M47.812 CERVICAL SPONDYLOSIS WITHOUT MYELOPATHY: ICD-10-CM

## 2022-10-21 DIAGNOSIS — M48.02 FORAMINAL STENOSIS OF CERVICAL REGION: ICD-10-CM

## 2022-10-25 ENCOUNTER — HOSPITAL ENCOUNTER (OUTPATIENT)
Dept: PHYSICAL THERAPY | Age: 62
Setting detail: THERAPIES SERIES
Discharge: HOME | End: 2022-10-25
Payer: COMMERCIAL

## 2022-10-25 PROCEDURE — 97530 THERAPEUTIC ACTIVITIES: CPT

## 2022-10-25 PROCEDURE — 97163 PT EVAL HIGH COMPLEX 45 MIN: CPT

## 2022-10-25 RX ORDER — GABAPENTIN 300 MG/1
CAPSULE ORAL
Qty: 90 CAPSULE | Refills: 0 | Status: SHIPPED | OUTPATIENT
Start: 2022-10-25 | End: 2023-01-23

## 2022-10-25 NOTE — TELEPHONE ENCOUNTER
Patient last seen 9/15/2022 and medication last filled 9/15/2022:     Last lab result completed/reported on: 2021     Impression:      1. Cervical spondylosis without myelopathy    2. DDD (degenerative disc disease), cervical    3. Foraminal stenosis of cervical region       Plan:  Clinical Course: Above diagnoses are improving      I discussed the diagnosis and the treatment options with Sophykp Joseph César today. In Summary:  The various treatment options were outlined and discussed with Beata Lindsey including:  Conservative care options: physical therapy, ice, medications, bracing, and activity modification. The indications for therapeutic injections. The indications for additional imaging/laboratory studies. The indications for (possible future) interventions. After considering the various options discussed, Blima Felty elected to pursue a course of treatment that includes the followin. Medications: I will send gabapentin 300 mg and 100 mg to the pharmacy. PDMP reviewed. No adverse effects. She takes one tablet of gabapentin 300 mg daily. The 100 mg tablet is for flare ups of pain in addition to the 300 mg tablet if needed. 2. PT:   Referral provided to see Eden Joseph. 3. Follow up:   3 months      Beata Lindsey was instructed to call the office if her symptoms worsen or if new symptoms appear prior to the next scheduled visit. She is specifically instructed to contact the office between now & her scheduled appointment if she has concerns related to her condition or if she needs assistance in scheduling the above tests. She is welcome to call for an appointment sooner if she has any additional concerns or questions.             HANNAH Nelson, BRYANT

## 2022-10-25 NOTE — PLAN OF CARE
[address] [address]  Phone: [phone]   Fax:     [phone]                                                       Physical Therapy Certification    Dear GUIDO Gaxiola  ,    We had the pleasure of evaluating the following patient for physical therapy services at [address] [place] [address]. A summary of our findings can be found in the initial assessment below. This includes our plan of care. If you have any questions or concerns regarding these findings, please do not hesitate to contact me at the office phone number checked above. Thank you for the referral.       Physician Signature:_______________________________Date:__________________  By signing above (or electronic signature), therapists plan is approved by physician      Patient: Damir Lindsey   : 1960   MRN: [id number]  Referring Physician: GUIDO Gaxiola        Evaluation Date: 10/25/2022      Medical Diagnosis Information:  Cervical spondylosis without myelopathy [M47.812]  DDD (degenerative disc disease), cervical [M50.30]  Foraminal stenosis of cervical region [M48.02]   PT diagnosis: impaired cervical mobility, decreased spinal ROM, soft tissue inflexibility, L shoulder strain, L shoulder pain                                        Insurance information: PT Insurance Information: ANTHEM - DED NOT MET - NO COPAY - NO AUTH    Precautions/ Contra-indications:   Latex Allergy:  [x]NO      []YES  Preferred Language for Healthcare:   [x]English       []Other:    C-SSRS Triggered by Intake questionnaire (Past 2 wk assessment ):   [x] No, Questionnaire did not trigger screening.   [] Yes, Patient intake triggered C-SSRS Screening     [] Completed, no further action required. [] Completed, PCP notified via Epic    SUBJECTIVE: Patient stated complaint: she has had neck pain for years. She was recently put on 400mg gabapentin. Trying to coordinate her taking of medication is difficult due to her work schedule, and how it makes her feel - so she avoids taking it before she is on call. She had cervical ablation in October of 2020, and another 4/21. She has a neurologist working the case now, due to symptoms and presentation. She was working on the covid floor during the pandemic (wearing PAPR units for hours on end), and as things started to die down, she came off the floor for a short time. She ended up being let go from her position but now she has a new job; she has to work on call (and scheduled shifts) but and this results in spacing out medication, which is more inconsistent than she would like. She did PT at [facility] 10/2020 - 2/2021 and received some tpDN - but had a seizure-like experience with tpDN the first time, with decortical-like posture. She ended up being needled again after some time, about 1 week later, and had no other issues over 2-3 visits. She went through [facility] training\" with PT, but hasn't kept up with this since. She has kept up with chin tucks, LTR, \"[facility] wiper\" with chin, neck stretches - but only as needed. She states she has lost strength and she hasn't been able to keep up with group strength classes that were terminated with the pandemic. She goes to beth and step class when she can, but she finds the classes to be boring. She hasn't been in 2-3 weeks. She would like to get into yoga for relaxation, as it is evident there is not much relaxation or self-care in her life. She did recently get a puppy, and has been enjoying caring for it. In the last 2 weeks, she has noticed heaviness and thickness in her left hand/arm. She noticed this bilaterally developing, but noticed it most after walking her puppy with her son and his dogs in [place].  The puppy was walking circles constantly and she kept moving her arm in a lasso fashion overhead to avoid getting twisted up, which she believes triggered the issue at the L shoulder. This reminder her that back in winter she was going down steps and held onto rail as she slipped down the steps - which she was concerned that she had developed a RTC tear. She sleeps on her left side because this feels best for her neck, and this increases L arm pain. She cannot sleep on her back, just due to unsettled feeling. She cannot sleep on her right side because this makes her neck worse. She is limited with holding L arm up on wheel for driving. She feels dizzy randomly when turning head (especially quickly) and denies dizziness at other times. She developed reactive arthritis some years ago and has seen rheumatologist for this. She reports coldness in bilateral fingers, and was concerned a few weeks ago about bloodflow to these areas. She has tried traction and soft tissue work in PT (and even got inflatable traction device), and soft tissue was more effective for her. Her goal for therapy is to reduce her pain.     Fear avoidance: I should not do physical activities that (might) make my pain worse   [x] True   [] False     Relevant Medical History: anxiety (celexa), hyperlipidemia, Hx of bilateral carpal tunnel release  Functional Outcome: FOTO physical FS primary measure score = 58; Risk adjusted = 38    Pain Scale: 1-2/10  Easing factors: 100mg gabapentin, soft tissue work   Provocative factors: driving, lifting, reaching, sleeping, sitting, turning head     Type: [x]Constant   [x]Intermittent worsening  [x]Radiating (L arm) []Localized  []other:     Numbness/Tingling: L hand in the last 2 weeks    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     Living Status/Prior Level of Function: Prior to this injury / incident, pt was independent with ADLs and IADLs; sometimes opening jars is limited, picking/pulling apart things, donning necklace    OBJECTIVE:   Palpation: +1 TTP bilateral upper trap, cervical paraspinals, left scalenes    Functional Mobility/Transfers: independent    Posture: fair; rounded shoulders, increased kyphosis at T/S, noted at CT junction    Bandages/Dressings/Incisions: NA    Gait: (include devices/WB status):  WNL     Dermatomes Normal Abnormal Comments   Top of head (C1) x     Posterior occipital region (C2) x     Side of neck (C3) x     Top of shoulder (C4) x     Lateral deltoid (C5) x     Tip of thumb (C6) x     Distal middle finger (C7) x     Distal fifth finger (C8) x     Medial forearm (T1) x     Lower extremity x         Reflexes Normal Abnormal Comments   C5-6 Biceps x     C5-6 Brachioradialis      C7-8 Triceps x     Nguyens x     S1-2 Seated achilles x     S1-2 Prone knee bend      L3-4 Patellar tendon x     Clonus x     Babinski          CERV ROM     Cervical Flexion 56    Cervical Extension 40* L    Cervical SB 42 R (pn on L), 32* L    Cervical rotation 32 L, 48 R         ROM Left Right   Shoulder Flex 155 160   Shoulder Abd 138 160   Shoulder ER 90 85   Shoulder IR 85 85             Strength / Myotomes  *intermittent resistance from patient throughout to indicate pain/uncertainty* Left Right   Cervical Flexion (C1-2) 4 4   Cervical Side-bending (C3) 4 4   Shoulder Shrug (C4) 4 4   Shoulder Flex 4* 4   Shoulder Scap 4* 4   Shoulder Abduction (C5) 4* 4   Shoulder ER 4* 4   Shoulder IR 4 4   Biceps (C6) 4 4   Triceps (C7) 4 4   Wrist Extension (C6) 4 4   Wrist Flexion (C7) 4 4         Thumb Abduction (C8) 4 4   Finger Abduction (T1) 4 4     Lower extremity myotomes:   [x]Normal     []Abnormal     Joint mobility: L cervical spine, cervical sideglides/PA on L; T/S generally   []Normal    [x]Hypo   []Hyper    Orthopaedic Special Tests  Positive  Negative  NT Comments    Hautard's   x     Rhomberg       Sharps-Brianne  x     Cervical Torsion / Body Rotation   x     C2 Kick  x     Modified Shear       Compression  x     Distraction  x   Relief of LUE sxs   Spurling  x  R/L   TOS  x     IR Lift off: + L  Belly press:(negative) bilaterally    ULTT:  1 + L, - R  2 + L, - R  3 - L, - R    Vestibular screen:  Oculomotor tests: negative  Positional: not tested due to restricted neck ROM  VOR: negative (but ROM short)                       [x] Patient history, allergies, meds reviewed. Medical chart reviewed. See intake form. Review Of Systems (ROS):  [x]Performed Review of systems (Integumentary, CardioPulmonary, Neurological) by intake and observation. Intake form has been scanned into medical record. Patient has been instructed to contact their primary care physician regarding ROS issues if not already being addressed at this time.       Co-morbidities/Complexities (which will affect course of rehabilitation):   []None        []Hx of COVID   Arthritic conditions   []Rheumatoid arthritis (M05.9)  []Osteoarthritis (M19.91)  []Gout   Cardiovascular conditions   []Hypertension (I10)  [x]Hyperlipidemia (E78.5)  []Angina pectoris (I20)  []Atherosclerosis (I70)  []Pacemaker  []Hx of CABG/stent/  cardiac surgeries   Musculoskeletal conditions   []Disc pathology   []Congenital spine pathologies   []Osteoporosis (M81.8)  []Osteopenia (M85.8)  []Scoliosis       Endocrine conditions   []Hypothyroid (E03.9)  []Hyperthyroid Gastrointestinal conditions   []Constipation (F10.85)   Metabolic conditions   []Morbid obesity (E66.01)  []Diabetes type 1(E10.65) or 2 (E11.65)   []Neuropathy (G60.9)     Cardio/Pulmonary conditions   []Asthma (J45)  []Coughing   []COPD (J44.9)  []CHF  []A-fib   Psychological Disorders  [x]Anxiety (F41.9)  []Depression (F32.9)   []Other:   Developmental Disorders  []Autism (F84.0)  []CP (G80)  []Down Syndrome (Q90.9)  []Developmental delay     Neurological conditions  []Prior Stroke (I69.30)  []Parkinson's (G20)  []Encephalopathy (G93.40)  []MS (G35)  []Post-polio (G14)  []SCI  []TBI  []ALS Other conditions  []Fibromyalgia (M79.7)  []Vertigo  []Syncope  []Kidney Failure  []Cancer      []currently undergoing treatment  []Pregnancy  []Incontinence   Prior surgeries  []involved limb  []previous spinal surgery  [] section birth  []hysterectomy  []bowel / bladder surgery  []other relevant surgeries   [x]Other:  Hx of carpal tunnel release, Hx of dizziness, chronic kidney stones, Hx of migraines           Barriers to/and or personal factors that will affect rehab potential:              []Age  []Sex   []Smoker              []Motivation/Lack of Motivation                        [x]Co-Morbidities              []Cognitive Function, education/learning barriers              []Environmental, home barriers              [x]profession/work barriers -  RN  [x]past PT/medical experience  []other:  Justification: co-morbidities (anxiety), and experience as RN - may have negative impact on potential    Falls Risk Assessment (30 days):   [x] Falls Risk assessed and no intervention required. [] Falls Risk assessed and Patient requires intervention due to being higher risk   TUG score (>12s at risk):     [] Falls education provided, including         ASSESSMENT: The patient is a 63year old female who presents with cervical stiffness, reduced neck mobility, impaired ROM and soft tissue flexibility. She will benefit from skilled PT services (which may include tpDN) to reduce pain, restore ROM, improve DCF endurance and activation, promote relaxation and improved flexibility of tissues, as well as increase strength globally for BUEs, neck and peripscapular muscles. PT will need to address anxiety throughout this POC, as it was evident that the patient has a high-stress occupation and life. Increased time taken this date at evaluation to educate the patient on clinical findings, implications of treatment, and to address concerns that she had prior to evaluation.      Functional Impairments:     [x]Noted cervical/thoracic/GHJ joint hypomobility   []Noted cervical/thoracic/GHJ joint hypermobility   [x]Decreased cervical/UE functional ROM   [x]Noted Headache pain aggravated by neck movements with/without dizziness   []Abnormal reflexes/sensation/myotomal/dermatomal deficits   [x]Decreased DCF control or ability to hold head up   [x]Decreased RC/scapular/core strength and neuromuscular control    [x]Decreased UE functional strength   []other:      Functional Activity Limitations (from functional questionnaire and intake)   [x]Reduced ability to tolerate prolonged functional positions   []Reduced ability or difficulty with changes of positions or transfers between positions   []Reduced ability to maintain good posture and demonstrate good body mechanics with sitting, bending, and lifting   [x] Reduced ability or tolerance with driving and/or computer work   [x]Reduced ability to perform lifting, reaching, carrying tasks   []Reduced ability to concentrate   [x]Reduced ability to sleep    [x]Reduced ability to tolerate any impact through UE or spine   []Reduced ability to ambulate prolonged functional periods/distances   []other:    Participation Restrictions   []Reduced participation in self care activities   []Reduced participation in home management activities   [x]Reduced participation in work activities   []Reduced participation in social activities. [x]Reduced participation in sport/recreational activities.     Classification/Subgrouping:   [x]signs/symptoms consistent with neck pain with mobility deficits     []signs/symptoms consistent with neck pain with movement coordinated impairments    [x]signs/symptoms consistent with neck pain with radiating pain    [x]signs/symptoms consistent with neck pain with headaches (cervicogenic)    []Signs/symptoms consistent with nerve root involvement including myotome & dermatome dysfunction   []sign/symptoms consistent with facet dysfunction of cervical and thoracic spine    []signs/symptoms consistent suggesting central cord compression/UMN syndromes   []signs/symptoms consistent with discogenic cervical pain   []signs/symptoms consistent with rib dysfunction   []signs/symptoms consistent with postural dysfunction   []signs/symptoms consistent with shoulder pathology    []signs/symptoms consistent with post-surgical status including decreased ROM, strength and function. [x]signs/symptoms consistent with pathology which may benefit from Dry Needling   []signs/symptoms which may limit the use of advanced manual therapy techniques: (Hypertension, recent trauma, intolerance to end range positions, prior TIA, visual issues, UE myotomes loss )     Prognosis/Rehab Potential:      []Excellent   [x]Good    []Fair   []Poor    Tolerance of evaluation/treatment:    []Excellent   [x]Good    [x]Fair   []Poor    Physical Therapy Evaluation Complexity Justification  [x] A history of present problem with:  [] no personal factors and/or comorbidities that impact the plan of care;  []1-2 personal factors and/or comorbidities that impact the plan of care  [x]3 personal factors and/or comorbidities that impact the plan of care  [x] An examination of body systems using standardized tests and measures addressing any of the following: body structures and functions (impairments), activity limitations, and/or participation restrictions;:  [] a total of 1-2 or more elements   [] a total of 3 or more elements   [x] a total of 4 or more elements   [x] A clinical presentation with:  [] stable and/or uncomplicated characteristics   [x] evolving clinical presentation with changing characteristics  [] unstable and unpredictable characteristics;   [x] Clinical decision making of [] low, [] moderate, [x] high complexity using standardized patient assessment instrument and/or measurable assessment of functional outcome.     [] EVAL (LOW) 67924 (typically 20 minutes face-to-face)  [] EVAL (MOD) 00740 (typically 30 minutes face-to-face)  [x] EVAL (HIGH) 54192 (typically 45 minutes face-to-face)  [] RE-EVAL     PLAN:   Frequency/Duration:  1-2 days per week for 4 Weeks:  Interventions:  [x]  Therapeutic exercise including: strength training, ROM, for cervical spine,scapula, core and Upper extremity, including postural re-education. [x]  NMR activation and proprioception for Deep cervical flexors, periscapular and RC muscles and Core, including postural re-education. [x]  Manual therapy as indicated for C/T spine, ribs, Soft tissue to include: Dry Needling/IASTM, STM, PROM, Gr I-IV mobilizations, manipulation. [x] Modalities as needed that may include: thermal agents, E-stim, Biofeedback, US, iontophoresis as indicated  [x] Patient education on joint protection, postural re-education, activity modification, progression of HEP. HEP instruction: None provided this date due to lengthy evaluation and patient subjective report of symptoms after evaluation/examination    GOALS:  Patient stated goal: to reduce pain  [] Progressing: [] Met: [] Not Met: [] Adjusted    Therapist goals for Patient:   Short Term Goals: To be achieved in: 2 weeks  1. Independent in HEP and progression per patient tolerance, in order to promote ROM and flexibility gains, as well as DCF endurance and activity tolerance  [] Progressing: [] Met: [] Not Met: [] Adjusted  2. Patient will have a decrease in pain to <2/10 on average to facilitate improvement in movement, function, and ADLs  [] Progressing: [] Met: [] Not Met: [] Adjusted    Long Term Goals: To be achieved in: 4 weeks  1. FOTO score of at least 63 to assist with reaching prior level of function. [] Progressing: [] Met: [] Not Met: [] Adjusted  2. Patient will demonstrate increased AROM symmetric cervical sidebend, rotation, 50deg ext, 60deg flex to allow for proper joint functioning needed at the neck for navigation of environment. [] Progressing: [] Met: [] Not Met: [] Adjusted  3.  Patient will demonstrate an increase in postural awareness and control and activation of Deep cervical stabilizers >30sec to allow for proper stabilization of cervical spine needed for loaded/standing tasks. [] Progressing: [] Met: [] Not Met: [] Adjusted  4. Patient will return to functional activities including group workouts twice per week without restrictions. [] Progressing: [] Met: [] Not Met: [] Adjusted  5. The patient will drive without increased LUE sxs for 1 week. [] Progressing: [] Met: [] Not Met: [] Adjusted   6. The patient will sleep without neck-induced waking-pain for 1 week. [] Progressing: [] Met: [] Not Met: [] Adjusted   7. The patient will have a decrease in pain to <1/10 on average with ADLs and iADLs.   [] Progressing: [] Met: [] Not Met: [] Adjusted     Electronically signed by:  Jannette Nair, PT, DPT, OMT-C

## 2022-10-26 NOTE — FLOWSHEET NOTE
[facility]  Phone: [phone]   Fax: [phone]    Physical Therapy Daily Treatment Note    Date:  10/25/2022     Patient Name:  Ursula Roberson    :  1960  MRN: [id number]  Medical Diagnosis Information:  Cervical spondylosis without myelopathy [M47.812]  DDD (degenerative disc disease), cervical [M50.30]  Foraminal stenosis of cervical region [M48.02]            PT diagnosis: impaired cervical mobility, decreased spinal ROM, soft tissue inflexibility, L shoulder strain, L shoulder pain                                        Insurance information: PT Insurance Information: ANTHEM - DED NOT MET - NO COPAY - NO AUTH  Physician Information:  GUIDO Jay    Plan of care signed (Y/N): []  Yes [x]  No     Date of Patient follow up with Physician:      Progress Report: []  Yes  [x]  No     Date Range for reporting period:  Beginning: 10/25/2022  Ending: TBD    Progress report due (10 Rx/or 30 days whichever is less): visit #4 or     Recertification due (POC duration/ or 90 days whichever is less): visit #4 or 22 (date)     Visit # Insurance Allowable Auth required?  Date Range    30V pcy []  Yes  [x]  No      Latex Allergy:  [x]NO      []YES  Preferred Language for Healthcare:   [x]English       []other:    Functional Scale:           Date assessed:  FOTO physical FS primary measure score = 58; Risk adjusted = 38  10/26/22    Pain level:  1-2/10; 4/10 worst     SUBJECTIVE:  See eval    OBJECTIVE: See eval    RESTRICTIONS/PRECAUTIONS: anxiety    Exercises/Interventions:     Therapeutic Exercises (96326) Resistance / level Sets/sec Reps Notes                                                                  Therapeutic Activities (14383)                                   Gait ()                                   Neuromuscular Re-ed (84449)                                                 Manual Intervention ([address] [facility]) scapular, scapulothoracic and UE control with self care, reaching, carrying, lifting, house/yardwork, driving, computer work. NMR and Therapeutic Activities:    [x] (99521 or 57797) Provided verbal/tactile cueing for activities related to improving balance, coordination, kinesthetic sense, posture, motor skill, proprioception and motor activation to allow for proper function of   [x] LE: / Lumbar core, proximal hip and LE with self care and ADLs  [x] UE / Cervical: cervical, postural, scapular, scapulothoracic and UE control with self care, carrying, lifting, driving, computer work.   [] (72110) Gait Re-education- Provided training and instruction to the patient for proper LE, core and proximal hip recruitment and positioning and eccentric body weight control with ambulation re-education including up and down stairs     Home Management Training / Self Care:  [] (20779) Provided self-care/home management training related to activities of daily living and compensatory training, and/or use of adaptive equipment for improvement with: ADLs and compensatory training, meal preparation, safety procedures and instruction in use of adaptive equipment, including bathing, grooming, dressing, personal hygiene, basic household cleaning and chores.      Home Exercise Program:    [] (06158) Reviewed/Progressed HEP activities related to strengthening, flexibility, endurance, ROM of   [] LE / Lumbar: core, proximal hip and LE for functional self-care, mobility, lifting and ambulation/stair navigation   [] UE / Cervical: cervical, postural, scapular, scapulothoracic and UE control with self care, reaching, carrying, lifting, house/yardwork, driving, computer work  [] (55419)Reviewed/Progressed HEP activities related to improving balance, coordination, kinesthetic sense, posture, motor skill, proprioception of   [] LE: core, proximal hip and LE for self care, mobility, lifting, and ambulation/stair navigation    [] UE / Cervical: cervical, postural,  scapular, scapulothoracic and UE control with self care, reaching, carrying, lifting, house/yardwork, driving, computer work    Manual Treatments:  PROM / STM / Oscillations-Mobs:  G-I, II, III, IV (PA's, Inf., Post.)  [] (14934) Provided manual therapy to mobilize LE, proximal hip and/or LS spine soft tissue/joints for the purpose of modulating pain, promoting relaxation,  increasing ROM, reducing/eliminating soft tissue swelling/inflammation/restriction, improving soft tissue extensibility and allowing for proper ROM for normal function with   [] LE / lumbar: self care, mobility, lifting and ambulation. [] UE / Cervical: self care, reaching, carrying, lifting, house/yardwork, driving, computer work. Modalities:  [] (56792) Vasopneumatic compression: Utilized vasopneumatic compression to decrease edema / swelling for the purpose of improving mobility and quad tone / recruitment which will allow for increased overall function including but not limited to self-care, transfers, ambulation, and ascending / descending stairs. Charges:  Timed Code Treatment Minutes: 15   Total Treatment Minutes: 85     [] EVAL - LOW (14341)   [] EVAL - MOD (64621)  [x] EVAL - HIGH (56528)  [] RE-EVAL (76466)  [] XW(67950) x       [] Ionto  [] NMR (27291) x       [] Vaso  [] Manual (55906) x       [] Ultrasound  [x] TA x 1        [] Mech Traction (35876)  [] Aquatic Therapy x     [] ES (un) (54498):   [] Home Management Training x  [] ES(attended) (52647)   [] Dry Needling 1-2 muscles (29778):  [] Dry Needling 3+ muscles (191217)  [] Group:      [] Other:     GOALS:  Patient stated goal: to reduce pain  [] Progressing: [] Met: [] Not Met: [] Adjusted     Therapist goals for Patient:   Short Term Goals: To be achieved in: 2 weeks  1.  Independent in HEP and progression per patient tolerance, in order to promote ROM and flexibility gains, as well as DCF endurance and activity tolerance  [] Progressing: [] Met: [] Not Met: [] Adjusted  2. Patient will have a decrease in pain to <2/10 on average to facilitate improvement in movement, function, and ADLs  [] Progressing: [] Met: [] Not Met: [] Adjusted     Long Term Goals: To be achieved in: 4 weeks  1. FOTO score of at least 63 to assist with reaching prior level of function. [] Progressing: [] Met: [] Not Met: [] Adjusted  2. Patient will demonstrate increased AROM symmetric cervical sidebend, rotation, 50deg ext, 60deg flex to allow for proper joint functioning needed at the neck for navigation of environment. [] Progressing: [] Met: [] Not Met: [] Adjusted  3. Patient will demonstrate an increase in postural awareness and control and activation of Deep cervical stabilizers >30sec to allow for proper stabilization of cervical spine needed for loaded/standing tasks. [] Progressing: [] Met: [] Not Met: [] Adjusted  4. Patient will return to functional activities including group workouts twice per week without restrictions. [] Progressing: [] Met: [] Not Met: [] Adjusted  5. The patient will drive without increased LUE sxs for 1 week. [] Progressing: [] Met: [] Not Met: [] Adjusted       6. The patient will sleep without neck-induced waking-pain for 1 week. [] Progressing: [] Met: [] Not Met: [] Adjusted       7. The patient will have a decrease in pain to <1/10 on average with ADLs and iADLs. [] Progressing: [] Met: [] Not Met: [] Adjusted       Overall Progression Towards Functional goals/ Treatment Progress Update:  [] Patient is progressing as expected towards functional goals listed. [] Progression is slowed due to complexities/Impairments listed. [] Progression has been slowed due to co-morbidities.   [x] Plan just implemented, too soon to assess goals progression <30days   [] Goals require adjustment due to lack of progress  [] Patient is not progressing as expected and requires additional follow up with physician  [] Other    Persisting Functional Limitations/Impairments:  [x]Sleeping [x]Sitting               []Standing []Transfers        []Walking []Kneeling               []Stairs []Squatting / bending   [x]ADLs [x]Reaching  [x]Lifting  []Housework  [x]Driving [x]Job related tasks  [x]Sports/Recreation []Other:        ASSESSMENT:  See eval  Treatment/Activity Tolerance:  [] Patient able to complete tx [] Patient limited by fatigue  [x] Patient limited by pain  [] Patient limited by other medical complications  [] Other:     Prognosis: [x] Good [x] Fair  [] Poor    Patient Requires Follow-up: [x] Yes  [] No    Plan for next treatment session: manual cervical/thoracic mobilization, DCF activation (tuck and lifts), nerve flossing (ULTT 1 & 2), peripscapular strengthening    PLAN: See eval. PT 1-2x / week for 8 weeks. [] Continue per plan of care [] Alter current plan (see comments)  [x] Plan of care initiated [] Hold pending MD visit [] Discharge    Electronically signed by: Temi Valdez, PT PT, DPT, OMT-C    Note: If patient does not return for scheduled/ recommended follow up visits, this note will serve as a discharge from care along with most recent update on progress.

## 2022-11-08 ENCOUNTER — HOSPITAL ENCOUNTER (OUTPATIENT)
Dept: PHYSICAL THERAPY | Age: 62
Setting detail: THERAPIES SERIES
Discharge: HOME | End: 2022-11-08
Payer: COMMERCIAL

## 2022-11-08 PROCEDURE — 97110 THERAPEUTIC EXERCISES: CPT

## 2022-11-08 PROCEDURE — 97112 NEUROMUSCULAR REEDUCATION: CPT

## 2022-11-08 PROCEDURE — 97140 MANUAL THERAPY 1/> REGIONS: CPT

## 2022-11-08 NOTE — FLOWSHEET NOTE
[facility]  Phone: [phone]   Fax: [phone]    Physical Therapy Daily Treatment Note    Date:  2022     Patient Name:  Ozzie Murphy    :  1960  MRN: [id number]  Medical Diagnosis Information:  Cervical spondylosis without myelopathy [M47.812]  DDD (degenerative disc disease), cervical [M50.30]  Foraminal stenosis of cervical region [M48.02]            PT diagnosis: impaired cervical mobility, decreased spinal ROM, soft tissue inflexibility, L shoulder strain, L shoulder pain                                        Insurance information: PT Insurance Information: ANTHEM - DED NOT MET - NO COPAY - NO AUTH  Physician Information:  GUIDO Pedro    Plan of care signed (Y/N): [x]  Yes []  No     Date of Patient follow up with Physician:      Progress Report: []  Yes  [x]  No     Date Range for reporting period:  Beginning: 10/25/2022  Ending: TBD    Progress report due (10 Rx/or 30 days whichever is less): visit #4 or     Recertification due (POC duration/ or 90 days whichever is less): visit #4 or 22 (date)     Visit # Insurance Allowable Auth required? Date Range    30V pcy []  Yes  [x]  No      Latex Allergy:  [x]NO      []YES  Preferred Language for Healthcare:   [x]English       []other:    Functional Scale:           Date assessed:  FOTO physical FS primary measure score = 58; Risk adjusted = 38  10/26/22    Pain level:  2-3/10 L UT region     SUBJECTIVE:  She reports she has finally settled back down after eval. Still having symptoms in neck with end range extension and flexion.     OBJECTIVE:  - 40ext, 50deg flex, 40deg R/L SB cervical AROM    RESTRICTIONS/PRECAUTIONS: anxiety    Exercises/Interventions:     Therapeutic Exercises (60058) Resistance / level Sets/sec Reps Notes          UT Stretch  10'' 2    LS Stretch  10'' 2    TB HABD: palm up/down  2 10 ea    TB BUE ER  2 10 Therapeutic Activities (64344)                                   Gait (89103)                                   Neuromuscular Re-ed (57896)       Seated Median Nerve Flossing   1 x 5 R/L    Cervical Tucks w/ Biofeedback 20mmHg - 30mmHg  10x    Cervical Tuck & Lift w/ Biofeedback 30mmHg to 20mmHg 3'' 3x 11/8 - difficulty                        Manual Intervention (25523)       Prone T/S mobs G2-3 T2-7 10 MOBS, 3 PASSES    Seated MWM upper T/S  T3,4,5 8 MOBS EA    Prone C/S mobs C7-C3 G2-3 5 mobs ea Stiff C3                          Modalities:     Pt. Education:  10/25/2022  -patient educated extensively on diagnosis, prognosis and expectations for rehab. Patient educated on what PT can provide for patient, and perceived gaps in interventions from prior therapies. - PT shared and educated on clinical presentation of TOS, cervical spondylosis, cervical radiculopathy, RTC tendonopathy versus shoulder strain and dizziness (cervicogenic versus peripheral). -all patient questions were answered  - 15'    Home Exercise Program:  11/8 - Access Code: [email or web address]  URL: [email or web address]  Date: 11/08/2022  Prepared by: Radha Fields    Exercises  Seated Cervical Sidebending Stretch - 2-3 x daily - 6 x weekly - 1 sets - 3 reps - 10 hold  Seated Levator Scapulae Stretch - 2-3 x daily - 6 x weekly - 1 sets - 3 reps - 10 hold  Supine Chin Tuck - 2-3 x daily - 6 x weekly - 1 sets - 5 reps - 3 hold  Median Nerve Flossing - Tray - 2 x daily - 5 x weekly - 1 sets - 8 reps  Shoulder External Rotation and Scapular Retraction with Resistance - 1 x daily - 4 x weekly - 2 sets - 10 reps  Standing Shoulder Horizontal Abduction with Resistance - 1 x daily - 4 x weekly - 2 sets - 10 reps      Therapeutic Exercise and NMR EXR  [x] (46002) Provided verbal/tactile cueing for activities related to strengthening, flexibility, endurance, ROM for improvements in  [] LE / Lumbar: LE, proximal hip, and core control with self care, mobility, lifting, ambulation. [x] UE / Cervical: cervical, postural, scapular, scapulothoracic and UE control with self care, reaching, carrying, lifting, house/yardwork, driving, computer work. [x] (41483) Provided verbal/tactile cueing for activities related to improving balance, coordination, kinesthetic sense, posture, motor skill, proprioception to assist with   [] LE / lumbar: LE, proximal hip, and core control in self care, mobility, lifting, ambulation and eccentric single leg control. [x] UE / cervical: cervical, scapular, scapulothoracic and UE control with self care, reaching, carrying, lifting, house/yardwork, driving, computer work.   [] (61120) Therapist is in constant attendance of 2 or more patients providing skilled therapy interventions, but not providing any significant amount of measurable one-on-one time to either patient, for improvements in  [] LE / lumbar: LE, proximal hip, and core control in self care, mobility, lifting, ambulation and eccentric single leg control. [] UE / cervical: cervical, scapular, scapulothoracic and UE control with self care, reaching, carrying, lifting, house/yardwork, driving, computer work.      NMR and Therapeutic Activities:    [x] (93810 or 05503) Provided verbal/tactile cueing for activities related to improving balance, coordination, kinesthetic sense, posture, motor skill, proprioception and motor activation to allow for proper function of   [x] LE: / Lumbar core, proximal hip and LE with self care and ADLs  [x] UE / Cervical: cervical, postural, scapular, scapulothoracic and UE control with self care, carrying, lifting, driving, computer work.   [] (39053) Gait Re-education- Provided training and instruction to the patient for proper LE, core and proximal hip recruitment and positioning and eccentric body weight control with ambulation re-education including up and down stairs     Home Management Training / Self Care:  [] (26067) Provided self-care/home management training related to activities of daily living and compensatory training, and/or use of adaptive equipment for improvement with: ADLs and compensatory training, meal preparation, safety procedures and instruction in use of adaptive equipment, including bathing, grooming, dressing, personal hygiene, basic household cleaning and chores. Home Exercise Program:    [x] (27161) Reviewed/Progressed HEP activities related to strengthening, flexibility, endurance, ROM of   [] LE / Lumbar: core, proximal hip and LE for functional self-care, mobility, lifting and ambulation/stair navigation   [x] UE / Cervical: cervical, postural, scapular, scapulothoracic and UE control with self care, reaching, carrying, lifting, house/yardwork, driving, computer work  [x] (66417)Reviewed/Progressed HEP activities related to improving balance, coordination, kinesthetic sense, posture, motor skill, proprioception of   [] LE: core, proximal hip and LE for self care, mobility, lifting, and ambulation/stair navigation    [x] UE / Cervical: cervical, postural,  scapular, scapulothoracic and UE control with self care, reaching, carrying, lifting, house/yardwork, driving, computer work    Manual Treatments:  PROM / STM / Oscillations-Mobs:  G-I, II, III, IV (PA's, Inf., Post.)  [x] (38366) Provided manual therapy to mobilize LE, proximal hip and/or LS spine soft tissue/joints for the purpose of modulating pain, promoting relaxation,  increasing ROM, reducing/eliminating soft tissue swelling/inflammation/restriction, improving soft tissue extensibility and allowing for proper ROM for normal function with   [] LE / lumbar: self care, mobility, lifting and ambulation. [x] UE / Cervical: self care, reaching, carrying, lifting, house/yardwork, driving, computer work.      Modalities:  [] (22129) Vasopneumatic compression: Utilized vasopneumatic compression to decrease edema / swelling for the purpose of improving mobility and quad tone / recruitment which will allow for increased overall function including but not limited to self-care, transfers, ambulation, and ascending / descending stairs. Charges:  Timed Code Treatment Minutes: 50   Total Treatment Minutes: 50     [] EVAL - LOW (54352)   [] EVAL - MOD (62823)  [] EVAL - HIGH (74479)  [] RE-EVAL (01416)  [x] UK(71377) x 1      [] Ionto  [x] NMR (26448) x 1      [] Vaso  [x] Manual (48623) x 1       [] Ultrasound  [] TA x         [] Mech Traction (57400)  [] Aquatic Therapy x     [] ES (un) (12061):   [] Home Management Training x  [] ES(attended) (24804)   [] Dry Needling 1-2 muscles (85728):  [] Dry Needling 3+ muscles (839845)  [] Group:      [] Other:     GOALS:  Patient stated goal: to reduce pain  [] Progressing: [] Met: [] Not Met: [] Adjusted     Therapist goals for Patient:   Short Term Goals: To be achieved in: 2 weeks  1. Independent in HEP and progression per patient tolerance, in order to promote ROM and flexibility gains, as well as DCF endurance and activity tolerance  [] Progressing: [] Met: [] Not Met: [] Adjusted  2. Patient will have a decrease in pain to <2/10 on average to facilitate improvement in movement, function, and ADLs  [] Progressing: [] Met: [] Not Met: [] Adjusted     Long Term Goals: To be achieved in: 4 weeks  1. FOTO score of at least 63 to assist with reaching prior level of function. [] Progressing: [] Met: [] Not Met: [] Adjusted  2. Patient will demonstrate increased AROM symmetric cervical sidebend, rotation, 50deg ext, 60deg flex to allow for proper joint functioning needed at the neck for navigation of environment. [] Progressing: [] Met: [] Not Met: [] Adjusted  3. Patient will demonstrate an increase in postural awareness and control and activation of Deep cervical stabilizers >30sec to allow for proper stabilization of cervical spine needed for loaded/standing tasks. [] Progressing: [] Met: [] Not Met: [] Adjusted  4.  Patient will return to functional activities including group workouts twice per week without restrictions. [] Progressing: [] Met: [] Not Met: [] Adjusted  5. The patient will drive without increased LUE sxs for 1 week. [] Progressing: [] Met: [] Not Met: [] Adjusted       6. The patient will sleep without neck-induced waking-pain for 1 week. [] Progressing: [] Met: [] Not Met: [] Adjusted       7. The patient will have a decrease in pain to <1/10 on average with ADLs and iADLs. [] Progressing: [] Met: [] Not Met: [] Adjusted       Overall Progression Towards Functional goals/ Treatment Progress Update:  [] Patient is progressing as expected towards functional goals listed. [] Progression is slowed due to complexities/Impairments listed. [] Progression has been slowed due to co-morbidities. [x] Plan just implemented, too soon to assess goals progression <30days   [] Goals require adjustment due to lack of progress  [] Patient is not progressing as expected and requires additional follow up with physician  [] Other    Persisting Functional Limitations/Impairments:  [x]Sleeping [x]Sitting               []Standing []Transfers        []Walking []Kneeling               []Stairs []Squatting / bending   [x]ADLs [x]Reaching  [x]Lifting  []Housework  [x]Driving [x]Job related tasks  [x]Sports/Recreation []Other:     ASSESSMENT:  Patient able to participate fully in session this date. Nerve flossing, scapular strengthening and mobilization applied with slight improvement in cervical ROM. The patient's pain did not change by end of session. HEP created and reviewed with patient for performance throughout the week. Patient had great difficulty with DCF activation with lift, and PT was mindful to keep dosages of exercises and intensity low throughout session to avoid symptom flare up.     Treatment/Activity Tolerance:  [x] Patient able to complete tx [] Patient limited by fatigue  [x] Patient limited by pain  [] Patient limited by other medical complications  [] Other:     Prognosis: [x] Good [x] Fair  [] Poor    Patient Requires Follow-up: [x] Yes  [] No    Plan for next treatment session: manual cervical/thoracic mobilization, DCF activation (tuck and lifts), nerve flossing (ULTT 1 & 2), peripscapular strengthening    PLAN: See eval. PT 1-2x / week for 8 weeks. [x] Continue per plan of care [] Alter current plan (see comments)  [] Plan of care initiated [] Hold pending MD visit [] Discharge    Electronically signed by: Manish Preciado, PT PT, DPT, OMT-C    Note: If patient does not return for scheduled/ recommended follow up visits, this note will serve as a discharge from care along with most recent update on progress.

## 2022-11-10 ENCOUNTER — TELEPHONE (OUTPATIENT)
Dept: ORTHOPEDIC SURGERY | Age: 62
End: 2022-11-10

## 2022-11-10 NOTE — TELEPHONE ENCOUNTER
General Question     Subject: Patient trying to resched her cervical appt with Shakira Gaines for the latest she can in the day.     Patient: Blank Mak  Contact Number: [phone]

## 2022-11-10 NOTE — TELEPHONE ENCOUNTER
No answer. LM for patient to call back. Appointment must be schedule in an appropriate time slot (type: spine triage follow up). We are sorry if this is inconvenient for the patient, but the template has been tailored to allow for ortho and spine patients to be seen in a timely manner.

## 2022-11-11 NOTE — TELEPHONE ENCOUNTER
L/M FOR DINORA  Returning her call. . explained the first available, latest appointment at [facility] is 11/21/2022. I have scheduled this appointment for her and asked she call back if this is inconvenient for her.

## 2022-11-11 NOTE — TELEPHONE ENCOUNTER
PATIENT RETUNING MISS CALL. PATIENT IS WANTING TO FOLLOW AT [facility]. DID NOT SEE ANY AVAILABLE SLOTS. PLEASE ADVISE.

## 2022-11-13 DIAGNOSIS — M50.30 DDD (DEGENERATIVE DISC DISEASE), CERVICAL: ICD-10-CM

## 2022-11-13 DIAGNOSIS — M47.812 CERVICAL SPONDYLOSIS WITHOUT MYELOPATHY: ICD-10-CM

## 2022-11-13 DIAGNOSIS — M48.02 FORAMINAL STENOSIS OF CERVICAL REGION: ICD-10-CM

## 2022-11-14 NOTE — TELEPHONE ENCOUNTER
Refill request for baclofen medication.      Name of Cade Womack visit - 9/15/22 with Becky Cervantes     Pending visit - 12/19/22    Last refill -9/2/22    Additional Comments med pended if you are agreeable

## 2022-11-15 ENCOUNTER — HOSPITAL ENCOUNTER (OUTPATIENT)
Dept: PHYSICAL THERAPY | Age: 62
Setting detail: THERAPIES SERIES
Discharge: HOME | End: 2022-11-15
Payer: COMMERCIAL

## 2022-11-15 ENCOUNTER — TELEPHONE (OUTPATIENT)
Dept: INTERNAL MEDICINE CLINIC | Age: 62
End: 2022-11-15

## 2022-11-15 PROCEDURE — 97140 MANUAL THERAPY 1/> REGIONS: CPT

## 2022-11-15 PROCEDURE — 97530 THERAPEUTIC ACTIVITIES: CPT

## 2022-11-15 RX ORDER — BACLOFEN 10 MG/1
TABLET ORAL
Qty: 60 TABLET | Refills: 0 | Status: SHIPPED | OUTPATIENT
Start: 2022-11-15 | End: 2022-11-29

## 2022-11-15 NOTE — FLOWSHEET NOTE
[address] [facility] Physical Therapy  Phone: [phone]   Fax: [phone]    Physical Therapy Daily Treatment Note    Date:  11/15/2022     Patient Name:  Mery Miller    :  1960  MRN: [id number]  Medical Diagnosis Information:  Cervical spondylosis without myelopathy [M47.812]  DDD (degenerative disc disease), cervical [M50.30]  Foraminal stenosis of cervical region [M48.02]            PT diagnosis: impaired cervical mobility, decreased spinal ROM, soft tissue inflexibility, L shoulder strain, L shoulder pain                                        Insurance information: PT Insurance Information: ANTHEM - DED NOT MET - NO COPAY - NO AUTH  Physician Information:  GUIDO [place]    Plan of care signed (Y/N): [x]  Yes []  No     Date of Patient follow up with Physician:      Progress Report: []  Yes  [x]  No     Date Range for reporting period:  Beginning: 10/25/2022  Ending: TBD    Progress report due (10 Rx/or 30 days whichever is less): visit #4 or     Recertification due (POC duration/ or 90 days whichever is less): visit #4 or 22 (date)     Visit # Insurance Allowable Auth required? Date Range   3/8 30V pcy []  Yes  [x]  No      Latex Allergy:  [x]NO      []YES  Preferred Language for Healthcare:   [x]English       []other:    Functional Scale:           Date assessed:  FOTO physical FS primary measure score = 58; Risk adjusted = 38  10/26/22    Pain level:  4/10 L UT region >> 2/10 (end of session)    SUBJECTIVE:  The patient reports increased soreness today. She has had a very stressful weekend (12hour home-meet for gymnastics with her daughter), her mother got COVID and was admitted to the hospital and was found to have incarcerated hernia. She has not done any stretches. She admits she is still having trouble getting in with her referring physician, and she needs some medications refilled.  Symptoms are still left base of skull and L UT.    OBJECTIVE:   11/15 - SB R = 30deg, SB L = 40deg; Rot L = 30deg, Rot R = 25; supine rotation = 60R; 45L >> post-manual >> flex = 52, extension = 45; 44deg SB L, 30deg SB R  11/8 - 40ext, 50deg flex, 40deg R/L SB cervical AROM    RESTRICTIONS/PRECAUTIONS: anxiety    Exercises/Interventions:     Therapeutic Exercises (88088) Resistance / level Sets/sec Reps Notes          UT Stretch     LS Stretch     TB HABD: palm up/down     TB BUE ER                                 Therapeutic Activities (88958)       Patient discussion/edu:   25' Jaw pain, joint restrictions vs musculature, PT hypothesis of joint restriction before muscle, life/work stressors, patient ability to participate in beth/shower/eat without symptoms but mother's illness significantly stirring up symptoms. Some discussion of patient/spouse relation and how this places increased chores/responsibilities on her and adds to her symptoms. PT's recommendation to set aside time for beth/self-care with much less compromise, as this is her health and wellness. Gait (81435)                                   Neuromuscular Re-ed (32870)       Seated Median Nerve Flossing Cervical Tucks w/ Biofeedback Cervical Tuck & Lift w/ Biofeedback                      Manual Intervention (24804) - 30'       Prone T/S mobs G2-3 T2-7 10 MOBS, 2 PASSES    Seated MWM upper T/S     Prone C/S mobs C7-C2 G2-4 5 mobs ea Stiff C3   Supine O-A Gapping L   10 x L 5''    Cervical Traction   10 x 10''    1st / 2nd Rib Mobs - L G3-4  8x ea    R mid-cervical locking & manip:   C5-6 manip  C3 stretch  1x  2x 11/15     Modalities:     Pt. Education:  10/25/2022  -patient educated extensively on diagnosis, prognosis and expectations for rehab. Patient educated on what PT can provide for patient, and perceived gaps in interventions from prior therapies.   - PT shared and educated on clinical presentation of TOS, cervical spondylosis, cervical radiculopathy, RTC tendonopathy versus shoulder strain and dizziness (cervicogenic versus peripheral). -all patient questions were answered  - 15'    Home Exercise Program:  11/8 - Access Code: [email or web address]  URL: [email or web address]  Date: 11/08/2022  Prepared by: Sarah Beth Melgar    Exercises  Seated Cervical Sidebending Stretch - 2-3 x daily - 6 x weekly - 1 sets - 3 reps - 10 hold  Seated Levator Scapulae Stretch - 2-3 x daily - 6 x weekly - 1 sets - 3 reps - 10 hold  Supine Chin Tuck - 2-3 x daily - 6 x weekly - 1 sets - 5 reps - 3 hold  Median Nerve Flossing - Tray - 2 x daily - 5 x weekly - 1 sets - 8 reps  Shoulder External Rotation and Scapular Retraction with Resistance - 1 x daily - 4 x weekly - 2 sets - 10 reps  Standing Shoulder Horizontal Abduction with Resistance - 1 x daily - 4 x weekly - 2 sets - 10 reps    Therapeutic Exercise and NMR EXR  [] (75981) Provided verbal/tactile cueing for activities related to strengthening, flexibility, endurance, ROM for improvements in  [] LE / Lumbar: LE, proximal hip, and core control with self care, mobility, lifting, ambulation. [] UE / Cervical: cervical, postural, scapular, scapulothoracic and UE control with self care, reaching, carrying, lifting, house/yardwork, driving, computer work.  [] (63792) Provided verbal/tactile cueing for activities related to improving balance, coordination, kinesthetic sense, posture, motor skill, proprioception to assist with   [] LE / lumbar: LE, proximal hip, and core control in self care, mobility, lifting, ambulation and eccentric single leg control.    [] UE / cervical: cervical, scapular, scapulothoracic and UE control with self care, reaching, carrying, lifting, house/yardwork, driving, computer work.   [] (51174) Therapist is in constant attendance of 2 or more patients providing skilled therapy interventions, but not providing any significant amount of measurable one-on-one time to either patient, for improvements in  [] LE / lumbar: LE, proximal hip, and core control in self care, mobility, lifting, ambulation and eccentric single leg control. [] UE / cervical: cervical, scapular, scapulothoracic and UE control with self care, reaching, carrying, lifting, house/yardwork, driving, computer work. NMR and Therapeutic Activities:    [x] (63788 or 74531) Provided verbal/tactile cueing for activities related to improving balance, coordination, kinesthetic sense, posture, motor skill, proprioception and motor activation to allow for proper function of   [x] LE: / Lumbar core, proximal hip and LE with self care and ADLs  [x] UE / Cervical: cervical, postural, scapular, scapulothoracic and UE control with self care, carrying, lifting, driving, computer work.   [] (87687) Gait Re-education- Provided training and instruction to the patient for proper LE, core and proximal hip recruitment and positioning and eccentric body weight control with ambulation re-education including up and down stairs     Home Management Training / Self Care:  [] (56793) Provided self-care/home management training related to activities of daily living and compensatory training, and/or use of adaptive equipment for improvement with: ADLs and compensatory training, meal preparation, safety procedures and instruction in use of adaptive equipment, including bathing, grooming, dressing, personal hygiene, basic household cleaning and chores.      Home Exercise Program:    [] (14825) Reviewed/Progressed HEP activities related to strengthening, flexibility, endurance, ROM of   [] LE / Lumbar: core, proximal hip and LE for functional self-care, mobility, lifting and ambulation/stair navigation   [] UE / Cervical: cervical, postural, scapular, scapulothoracic and UE control with self care, reaching, carrying, lifting, house/yardwork, driving, computer work  [] (79366)Reviewed/Progressed HEP activities related to improving balance, coordination, kinesthetic sense, posture, motor skill, proprioception of   [] LE: core, proximal hip and LE for self care, mobility, lifting, and ambulation/stair navigation    [] UE / Cervical: cervical, postural,  scapular, scapulothoracic and UE control with self care, reaching, carrying, lifting, house/yardwork, driving, computer work    Manual Treatments:  PROM / STM / Oscillations-Mobs:  G-I, II, III, IV (PA's, Inf., Post.)  [x] (63588) Provided manual therapy to mobilize LE, proximal hip and/or LS spine soft tissue/joints for the purpose of modulating pain, promoting relaxation,  increasing ROM, reducing/eliminating soft tissue swelling/inflammation/restriction, improving soft tissue extensibility and allowing for proper ROM for normal function with   [] LE / lumbar: self care, mobility, lifting and ambulation. [x] UE / Cervical: self care, reaching, carrying, lifting, house/yardwork, driving, computer work. Modalities:  [] (32511) Vasopneumatic compression: Utilized vasopneumatic compression to decrease edema / swelling for the purpose of improving mobility and quad tone / recruitment which will allow for increased overall function including but not limited to self-care, transfers, ambulation, and ascending / descending stairs. Charges:  Timed Code Treatment Minutes: 55   Total Treatment Minutes: 55     [] EVAL - LOW (61587)   [] EVAL - MOD (41762)  [] EVAL - HIGH (83629)  [] RE-EVAL (24857)  [x] BARROW(50236) x 2      [] Ionto  [] NMR (69003) x    [] Vaso  [x] Manual (96718) x 2      [] Ultrasound  [] TA x         [] Mech Traction (69737)  [] Aquatic Therapy x     [] ES (un) (34851):   [] Home Management Training x  [] ES(attended) (42132)   [] Dry Needling 1-2 muscles (70829):  [] Dry Needling 3+ muscles (856880)  [] Group:      [] Other:     GOALS:  Patient stated goal: to reduce pain  [] Progressing: [] Met: [] Not Met: [] Adjusted     Therapist goals for Patient:   Short Term Goals: To be achieved in: 2 weeks  1.  Independent in HEP and progression per patient tolerance, in order to promote ROM and flexibility gains, as well as DCF endurance and activity tolerance  [] Progressing: [] Met: [] Not Met: [] Adjusted  2. Patient will have a decrease in pain to <2/10 on average to facilitate improvement in movement, function, and ADLs  [] Progressing: [] Met: [] Not Met: [] Adjusted     Long Term Goals: To be achieved in: 4 weeks  1. FOTO score of at least 63 to assist with reaching prior level of function. [] Progressing: [] Met: [] Not Met: [] Adjusted  2. Patient will demonstrate increased AROM symmetric cervical sidebend, rotation, 50deg ext, 60deg flex to allow for proper joint functioning needed at the neck for navigation of environment. [] Progressing: [] Met: [] Not Met: [] Adjusted  3. Patient will demonstrate an increase in postural awareness and control and activation of Deep cervical stabilizers >30sec to allow for proper stabilization of cervical spine needed for loaded/standing tasks. [] Progressing: [] Met: [] Not Met: [] Adjusted  4. Patient will return to functional activities including group workouts twice per week without restrictions. [] Progressing: [] Met: [] Not Met: [] Adjusted  5. The patient will drive without increased LUE sxs for 1 week. [] Progressing: [] Met: [] Not Met: [] Adjusted       6. The patient will sleep without neck-induced waking-pain for 1 week. [] Progressing: [] Met: [] Not Met: [] Adjusted       7. The patient will have a decrease in pain to <1/10 on average with ADLs and iADLs. [] Progressing: [] Met: [] Not Met: [] Adjusted       Overall Progression Towards Functional goals/ Treatment Progress Update:  [] Patient is progressing as expected towards functional goals listed. [] Progression is slowed due to complexities/Impairments listed. [] Progression has been slowed due to co-morbidities.   [x] Plan just implemented, too soon to assess goals progression <30days   [] Goals require adjustment due to lack of progress  [] Patient is not progressing as expected and requires additional follow up with physician  [] Other    Persisting Functional Limitations/Impairments:  [x]Sleeping [x]Sitting               []Standing []Transfers        []Walking []Kneeling               []Stairs []Squatting / bending   [x]ADLs [x]Reaching  [x]Lifting  []Housework  [x]Driving [x]Job related tasks  [x]Sports/Recreation []Other:     ASSESSMENT:  Patient's pain reduced after manual work and ROM improved this date. Extensive discussion and edu regarding jaw pain, joint restrictions vs musculature tightness, PT hypothesis of joint restriction primary versus muscle, life/work stressors, patient ability to participate in beth/shower/eat without symptoms but mother's illness significantly stirring up symptoms. Some discussion of patient/spouse relation and how this places increased chores/responsibilities on her and adds to her symptoms. PT's recommendation to set aside time for beth/self-care with much less compromise, as this is her health and wellness. Treatment/Activity Tolerance:  [x] Patient able to complete tx [] Patient limited by fatigue  [x] Patient limited by pain  [] Patient limited by other medical complications  [] Other:     Prognosis: [x] Good [x] Fair  [] Poor    Patient Requires Follow-up: [x] Yes  [] No    Plan for next treatment session: manual cervical/thoracic mobilization, neck/shoulder strengthening, increase activity participation    PLAN: See sonu. PT 1-2x / week for 8 weeks. [x] Continue per plan of care [] Alter current plan (see comments)  [] Plan of care initiated [] Hold pending MD visit [] Discharge    Electronically signed by: Kiah Comer, PT PT, DPT, OMT-C    Note: If patient does not return for scheduled/ recommended follow up visits, this note will serve as a discharge from care along with most recent update on progress.

## 2022-11-15 NOTE — TELEPHONE ENCOUNTER
----- Message from Janna Ovalle sent at 11/15/2022 12:41 PM EST -----  Subject: Appointment Request    Reason for Call: Established Patient Appointment needed: New Patient   Request Appointment    QUESTIONS    Reason for appointment request? Available appointments did not meet   patient need     Additional Information for Provider? pt needs call back to reschedule   appointment with dr Reyes Comment, can only do appointments after 2:30  ---------------------------------------------------------------------------  --------------  [id number]  [phone]; OK to leave message on voicemail  ---------------------------------------------------------------------------  --------------  SCRIPT ANSWERS  COVID Screen: Renee Henry

## 2022-11-22 ENCOUNTER — HOSPITAL ENCOUNTER (OUTPATIENT)
Dept: PHYSICAL THERAPY | Age: 62
Setting detail: THERAPIES SERIES
Discharge: HOME | End: 2022-11-22
Payer: COMMERCIAL

## 2022-11-22 PROCEDURE — 97140 MANUAL THERAPY 1/> REGIONS: CPT

## 2022-11-22 PROCEDURE — 97530 THERAPEUTIC ACTIVITIES: CPT

## 2022-11-22 NOTE — PLAN OF CARE
[address] [facility]  Phone: [phone]   Fax: [phone]  Physical Therapy Re-Certification Plan of Care    Dear Lisa Peña  ,    We had the pleasure of treating the following patient for physical therapy services at [facility]. A summary of our findings can be found in the updated assessment below. This includes our plan of care. If you have any questions or concerns regarding these findings, please do not hesitate to contact me at the office phone number checked above. Thank you for the referral.     Physician Signature:________________________________Date:__________________  By signing above (or electronic signature), therapist's plan is approved by physician    Functional Outcome: FOTO: 48    Overall Response to Treatment:   []Patient is responding well to treatment and improvement is noted with regards  to goals   []Patient should continue to improve in reasonable time if they continue HEP   []Patient has plateaued and is no longer responding to skilled PT intervention    []Patient is getting worse and would benefit from return to referring MD   []Patient unable to adhere to initial POC   [x]Other: Despite worsening FOTO score, the patient is demonstrating improvements in cervical spine ROM, less intensive pain. She continues to be limited with pain that is also triggered by psychosocial stressors. She should continue with PT services at this time to promote ongoing improvement in spinal mobility, ROM, activity tolerance/function and reduction of pain. Date range of Visits: 10/26/22 - 22  Total Visits: 4    Recommendation:    [x]Continue PT 1x / wk for 4 weeks.                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]            PT diagnosis: impaired cervical mobility, decreased spinal ROM, soft tissue inflexibility, L shoulder strain, L shoulder pain                                        Insurance information: PT Insurance Information: ANTHEM - DED NOT MET - NO COPAY - NO AUTH  Physician Information:  GUIDO Latham    Plan of care signed (Y/N): [x]  Yes []  No     Date of Patient follow up with Physician:      Progress Report: []  Yes  [x]  No     Date Range for reporting period:  Beginning: 10/25/2022  POC: 11/22/22  Ending: TBD    Progress report due (10 Rx/or 30 days whichever is less): visit #4 or 61/07/55    Recertification due (POC duration/ or 90 days whichever is less): visit #4 or 11/22/22 (date)     Visit # Insurance Allowable Auth required? Date Range   4/8 30V pcy []  Yes  [x]  No 2022     Latex Allergy:  [x]NO      []YES  Preferred Language for Healthcare:   [x]English       []other:    Functional Scale:           Date assessed:  FOTO physical FS primary measure score = 58; Risk adjusted = 38  10/26/22  FOTO physical FS primary measure score = 53     11/22/22    Pain level:  2-3/10    SUBJECTIVE:  The patient had a moment reaching up and looking up to the right when she felt a grab in the left side of her neck. She felt stuck for a second, and then was able to move her neck out of the position. OBJECTIVE:   11/22 - seated cervical AROM: 70deg flex, 45deg ext, 36 R SB, 42 L SB, 46 R rot, 35deg L rot  11/15 - SB R = 30deg, SB L = 40deg;  Rot L = 30deg, Rot R = 25; supine rotation = 60R; 45L >> post-manual >> flex = 52, extension = 45; 44deg SB L, 30deg SB R  11/8 - 40ext, 50deg flex, 40deg R/L SB cervical AROM    RESTRICTIONS/PRECAUTIONS: anxiety    Exercises/Interventions:     Therapeutic Exercises (19164) Resistance / level Sets/sec Reps Notes          UT Stretch LS Stretch     TB HABD: palm up/down     TB BUE ER     Single Arm Pec Stretch  BUE Pec Stretch (Doorframe)  10''  10'' 3  3 11/22   Cervical Retraction    2x 11/22   Cervical Retraction + Extension   2x 11/22          Therapeutic Activities (39337)       Patient discussion/edu:   The patient was provided an assessment including evaluative tests and measures, as well as documentation to track progress     12'                  Gait (68208)                                   Neuromuscular Re-ed (60238)       Seated Median Nerve Flossing Cervical Tucks w/ Biofeedback Cervical Tuck & Lift w/ Biofeedback                      Manual Intervention ([phone]) - 33'       Prone T/S mobs G2-4 T2-7 10 MOBS, 3 PASSES    Seated MWM upper T/S     Supine PA  C/S mobs C7-C2 G2-4 10 mobs ea; 2 passes 11/22 -   Right Sidelying STM - hawkgrips   11' 11/22   Right Sidelying PA/Sideglides G2-3 C4-6 10 mobs each, 3 psses 11/22   Supine O-A Gapping L  Cervical Traction   2 x 10''    1st / 2nd Rib Mobs - L R mid-cervical locking & manip:     Modalities:       Pt. Education:  10/25/2022  -patient educated extensively on diagnosis, prognosis and expectations for rehab. Patient educated on what PT can provide for patient, and perceived gaps in interventions from prior therapies. - PT shared and educated on clinical presentation of TOS, cervical spondylosis, cervical radiculopathy, RTC tendonopathy versus shoulder strain and dizziness (cervicogenic versus peripheral). -all patient questions were answered  - 15'    Home Exercise Program:  11/8 - Access Code: [email or web address]  URL: [email or web address]  Date: 11/08/2022  Prepared by: Alexx Delgado    Exercises  Seated Cervical Sidebending Stretch - 2-3 x daily - 6 x weekly - 1 sets - 3 reps - 10 hold  Seated Levator Scapulae Stretch - 2-3 x daily - 6 x weekly - 1 sets - 3 reps - 10 hold  Supine Chin Tuck - 2-3 x daily - 6 x weekly - 1 sets - 5 reps - 3 hold  Median Nerve Flossing - Tray - 2 x daily - 5 x weekly - 1 sets - 8 reps  Shoulder External Rotation and Scapular Retraction with Resistance - 1 x daily - 4 x weekly - 2 sets - 10 reps  Standing Shoulder Horizontal Abduction with Resistance - 1 x daily - 4 x weekly - 2 sets - 10 reps    Therapeutic Exercise and NMR EXR  [x] (85716) Provided verbal/tactile cueing for activities related to strengthening, flexibility, endurance, ROM for improvements in  [] LE / Lumbar: LE, proximal hip, and core control with self care, mobility, lifting, ambulation. [x] UE / Cervical: cervical, postural, scapular, scapulothoracic and UE control with self care, reaching, carrying, lifting, house/yardwork, driving, computer work.  [] (59008) Provided verbal/tactile cueing for activities related to improving balance, coordination, kinesthetic sense, posture, motor skill, proprioception to assist with   [] LE / lumbar: LE, proximal hip, and core control in self care, mobility, lifting, ambulation and eccentric single leg control. [] UE / cervical: cervical, scapular, scapulothoracic and UE control with self care, reaching, carrying, lifting, house/yardwork, driving, computer work.   [] (19540) Therapist is in constant attendance of 2 or more patients providing skilled therapy interventions, but not providing any significant amount of measurable one-on-one time to either patient, for improvements in  [] LE / lumbar: LE, proximal hip, and core control in self care, mobility, lifting, ambulation and eccentric single leg control. [] UE / cervical: cervical, scapular, scapulothoracic and UE control with self care, reaching, carrying, lifting, house/yardwork, driving, computer work.      NMR and Therapeutic Activities:    [x] (76588 or 35770) Provided verbal/tactile cueing for activities related to improving balance, coordination, kinesthetic sense, posture, motor skill, proprioception and motor activation to allow for proper function of   [x] LE: / Lumbar core, proximal hip and LE reducing/eliminating soft tissue swelling/inflammation/restriction, improving soft tissue extensibility and allowing for proper ROM for normal function with   [] LE / lumbar: self care, mobility, lifting and ambulation. [x] UE / Cervical: self care, reaching, carrying, lifting, house/yardwork, driving, computer work. Modalities:  [] (79630) Vasopneumatic compression: Utilized vasopneumatic compression to decrease edema / swelling for the purpose of improving mobility and quad tone / recruitment which will allow for increased overall function including but not limited to self-care, transfers, ambulation, and ascending / descending stairs. Charges:  Timed Code Treatment Minutes: 47   Total Treatment Minutes: 47     [] EVAL - LOW (87024)   [] EVAL - MOD (10965)  [] EVAL - HIGH (72205)  [] RE-EVAL (13210)  [] ZP(35942) x      [] Ionto  [] NMR (88010) x    [] Vaso  [x] Manual (87388) x 2      [] Ultrasound  [x] TA x 1        [] Mech Traction (20111)  [] Aquatic Therapy x     [] ES (un) (54927):   [] Home Management Training x  [] ES(attended) (47684)   [] Dry Needling 1-2 muscles (88759):  [] Dry Needling 3+ muscles (510670)  [] Group:      [] Other:     GOALS:  Patient stated goal: to reduce pain  [x] Progressing: [] Met: [] Not Met: [] Adjusted     Therapist goals for Patient:   Short Term Goals: To be achieved in: 2 weeks  1. Independent in HEP and progression per patient tolerance, in order to promote ROM and flexibility gains, as well as DCF endurance and activity tolerance  [x] Progressing: [] Met: [] Not Met: [] Adjusted  2. Patient will have a decrease in pain to <2/10 on average to facilitate improvement in movement, function, and ADLs  [] Progressing: [x] Met: [] Not Met: [] Adjusted     Long Term Goals: To be achieved in: 4 weeks  1. FOTO score of at least 63 to assist with reaching prior level of function. [] Progressing: [] Met: [x] Not Met: [] Adjusted  2.  Patient will demonstrate increased AROM symmetric cervical sidebend, rotation, 50deg ext, 60deg flex to allow for proper joint functioning needed at the neck for navigation of environment. [x] Progressing: [] Met: [] Not Met: [] Adjusted  3. Patient will demonstrate an increase in postural awareness and control and activation of Deep cervical stabilizers >30sec to allow for proper stabilization of cervical spine needed for loaded/standing tasks. [] Progressing: [] Met: [x] Not Met: [] Adjusted  4. Patient will return to functional activities including group workouts twice per week without restrictions. [x] Progressing: [] Met: [] Not Met: [] Adjusted  5. The patient will drive without increased LUE sxs for 1 week. [x] Progressing: [] Met: [x] Not Met: [] Adjusted       6. The patient will sleep without neck-induced waking-pain for 1 week. [x] Progressing: [] Met: [x] Not Met: [] Adjusted       7. The patient will have a decrease in pain to <1/10 on average with ADLs and iADLs. [] Progressing: [x] Met: [] Not Met: [] Adjusted       Overall Progression Towards Functional goals/ Treatment Progress Update:  [x] Patient is progressing slowly as expected towards functional goals listed. [] Progression is slowed due to complexities/Impairments listed. [] Progression has been slowed due to co-morbidities.   [] Plan just implemented, too soon to assess goals progression <30days   [] Goals require adjustment due to lack of progress  [] Patient is not progressing as expected and requires additional follow up with physician  [] Other    Persisting Functional Limitations/Impairments:  [x]Sleeping [x]Sitting               []Standing []Transfers        []Walking []Kneeling               []Stairs []Squatting / bending   [x]ADLs [x]Reaching  [x]Lifting  []Housework  [x]Driving [x]Job related tasks  [x]Sports/Recreation []Other:     ASSESSMENT:  SEE POC ABOVE    Treatment/Activity Tolerance:  [x] Patient able to complete tx [] Patient limited by fatigue  [x] Patient limited by pain  [] Patient limited by other medical complications  [] Other:     Prognosis: [x] Good [x] Fair  [] Poor    Patient Requires Follow-up: [x] Yes  [] No    Plan for next treatment session: manual cervical/thoracic mobilization, neck/shoulder strengthening, increase activity participation    PLAN: See eval. PT 1-2x / week for 8 weeks. [x] Continue per plan of care [] Alter current plan (see comments)  [] Plan of care initiated [] Hold pending MD visit [] Discharge    Electronically signed by: Brown Pride, PT PT, DPT, OMT-C    Note: If patient does not return for scheduled/ recommended follow up visits, this note will serve as a discharge from care along with most recent update on progress.

## 2022-11-23 DIAGNOSIS — M47.812 CERVICAL SPONDYLOSIS WITHOUT MYELOPATHY: ICD-10-CM

## 2022-11-23 DIAGNOSIS — M48.02 FORAMINAL STENOSIS OF CERVICAL REGION: ICD-10-CM

## 2022-11-23 DIAGNOSIS — M50.30 DDD (DEGENERATIVE DISC DISEASE), CERVICAL: ICD-10-CM

## 2022-11-28 DIAGNOSIS — M50.30 DDD (DEGENERATIVE DISC DISEASE), CERVICAL: ICD-10-CM

## 2022-11-28 DIAGNOSIS — M48.02 FORAMINAL STENOSIS OF CERVICAL REGION: ICD-10-CM

## 2022-11-28 DIAGNOSIS — M47.812 CERVICAL SPONDYLOSIS WITHOUT MYELOPATHY: ICD-10-CM

## 2022-11-28 RX ORDER — BACLOFEN 10 MG/1
TABLET ORAL
Qty: 60 TABLET | Refills: 0 | OUTPATIENT
Start: 2022-11-28

## 2022-11-29 ENCOUNTER — HOSPITAL ENCOUNTER (OUTPATIENT)
Dept: PHYSICAL THERAPY | Age: 62
Setting detail: THERAPIES SERIES
Discharge: HOME | End: 2022-11-29
Payer: COMMERCIAL

## 2022-11-29 PROCEDURE — 97140 MANUAL THERAPY 1/> REGIONS: CPT

## 2022-11-29 PROCEDURE — 97110 THERAPEUTIC EXERCISES: CPT

## 2022-11-29 RX ORDER — BACLOFEN 10 MG/1
TABLET ORAL
Qty: 60 TABLET | Refills: 0 | Status: SHIPPED | OUTPATIENT
Start: 2022-12-15

## 2022-11-29 NOTE — FLOWSHEET NOTE
[address] [facility]  Phone: [phone]   Fax: [phone]    Physical Therapy Daily Treatment Note    Date:  2022     Patient Name:  Ursula Roberosn    :  1960  MRN: [id number]  Medical Diagnosis Information:  Cervical spondylosis without myelopathy [M47.812]  DDD (degenerative disc disease), cervical [M50.30]  Foraminal stenosis of cervical region [M48.02]            PT diagnosis: impaired cervical mobility, decreased spinal ROM, soft tissue inflexibility, L shoulder strain, L shoulder pain                                        Insurance information: PT Insurance Information: ANTHEM - DED NOT MET - NO COPAY - NO AUTH  Physician Information:  Lisa Jay    Plan of care signed (Y/N): [x]  Yes []  No     Date of Patient follow up with Physician:      Progress Report: []  Yes  [x]  No     Date Range for reporting period:  Beginning: 10/25/2022  POC: 22  Ending: TBD    Progress report due (10 Rx/or 30 days whichever is less): visit #4 or 10/43/45    Recertification due (POC duration/ or 90 days whichever is less): visit #4 or 22 (date)     Visit # Insurance Allowable Auth required? Date Range    30V pcy []  Yes  [x]  No      Latex Allergy:  [x]NO      []YES  Preferred Language for Healthcare:   [x]English       []other:    Functional Scale:           Date assessed:  FOTO physical FS primary measure score = 58; Risk adjusted = 38  10/26/22  FOTO physical FS primary measure score = 53     22    Pain level:  1-2/10    SUBJECTIVE:  The patient reports she was squatted down in a store looking at an item on the shelf and someone ran into her with a shopping cart, and noticed soreness the next day - but she is recovering. She still reports some lateral neck discomfort, but overall - pain levels are much less.     OBJECTIVE:    - AROM cervical 50deg retraction + extension   - seated cervical AROM: 70deg flex, 45deg ext, 36 R SB, 42 L SB, 46 R rot, 35deg L rot  11/15 - SB R = 30deg, SB L = 40deg; Rot L = 30deg, Rot R = 25; supine rotation = 60R; 45L >> post-manual >> flex = 52, extension = 45; 44deg SB L, 30deg SB R  11/8 - 40ext, 50deg flex, 40deg R/L SB cervical AROM    RESTRICTIONS/PRECAUTIONS: anxiety    Exercises/Interventions:     Therapeutic Exercises (21669) Resistance / level Sets/sec Reps Notes          UT Stretch     LS Stretch     TB HABD: palm up/down     TB BUE ER     Single Arm Pec Stretch  BUE Pec Stretch (Doorframe)  Cervical Retraction    1x 11/22   Cervical Retraction + Extension   2x 11/22   Prone Row 4# 2 5 11/29   Prone Ext 4# 2 5 11/29   S/L ER 2# 2 10 11/29   Therapeutic Activities (64475)       Patient discussion/edu:                 Gait (86391)                                   Neuromuscular Re-ed (27077)       Seated Median Nerve Flossing Cervical Tucks w/ Biofeedback Cervical Tuck & Lift w/ Biofeedback                      Manual Intervention (94655) - - 23'         Prone T/S mobs G2-4 T2-7 10 MOBS, 3 PASSES 11/29 - 2 cavitations   Seated MWM upper T/S     S.O.R. (supine)   3 min 11/29   Supine PA C/S mobs C7-C2 G2-4 10 mobs ea; 2 passes 11/22 -   Right Sidelying STM - hawkgrips   11' 11/22   Right Sidelying PA/Sideglides Supine O-A Gapping L  Cervical Traction   4 x 10''    1st / 2nd Rib Mobs - L Seated C/T Distraction Mobs G3-4  8x 11/29   R mid-cervical locking & manip:     Modalities:       Pt. Education:  10/25/2022  -patient educated extensively on diagnosis, prognosis and expectations for rehab. Patient educated on what PT can provide for patient, and perceived gaps in interventions from prior therapies. - PT shared and educated on clinical presentation of TOS, cervical spondylosis, cervical radiculopathy, RTC tendonopathy versus shoulder strain and dizziness (cervicogenic versus peripheral).   -all patient questions were answered  - 15'    Home Exercise Program:  11/8 - Access Code: [email or web address]  URL: [email or web address]  Date: 11/08/2022  Prepared by: Leonid Busby    Exercises  Seated Cervical Sidebending Stretch - 2-3 x daily - 6 x weekly - 1 sets - 3 reps - 10 hold  Seated Levator Scapulae Stretch - 2-3 x daily - 6 x weekly - 1 sets - 3 reps - 10 hold  Supine Chin Tuck - 2-3 x daily - 6 x weekly - 1 sets - 5 reps - 3 hold  Median Nerve Flossing - Tray - 2 x daily - 5 x weekly - 1 sets - 8 reps  Shoulder External Rotation and Scapular Retraction with Resistance - 1 x daily - 4 x weekly - 2 sets - 10 reps  Standing Shoulder Horizontal Abduction with Resistance - 1 x daily - 4 x weekly - 2 sets - 10 reps    Therapeutic Exercise and NMR EXR  [x] (57074) Provided verbal/tactile cueing for activities related to strengthening, flexibility, endurance, ROM for improvements in  [] LE / Lumbar: LE, proximal hip, and core control with self care, mobility, lifting, ambulation. [x] UE / Cervical: cervical, postural, scapular, scapulothoracic and UE control with self care, reaching, carrying, lifting, house/yardwork, driving, computer work.  [] (11651) Provided verbal/tactile cueing for activities related to improving balance, coordination, kinesthetic sense, posture, motor skill, proprioception to assist with   [] LE / lumbar: LE, proximal hip, and core control in self care, mobility, lifting, ambulation and eccentric single leg control. [] UE / cervical: cervical, scapular, scapulothoracic and UE control with self care, reaching, carrying, lifting, house/yardwork, driving, computer work.   [] (22793) Therapist is in constant attendance of 2 or more patients providing skilled therapy interventions, but not providing any significant amount of measurable one-on-one time to either patient, for improvements in  [] LE / lumbar: LE, proximal hip, and core control in self care, mobility, lifting, ambulation and eccentric single leg control.    [] UE / cervical: cervical, scapular, scapulothoracic and UE scapulothoracic and UE control with self care, reaching, carrying, lifting, house/yardwork, driving, computer work    Manual Treatments:  PROM / STM / Oscillations-Mobs:  G-I, II, III, IV (PA's, Inf., Post.)  [x] (93289) Provided manual therapy to mobilize LE, proximal hip and/or LS spine soft tissue/joints for the purpose of modulating pain, promoting relaxation,  increasing ROM, reducing/eliminating soft tissue swelling/inflammation/restriction, improving soft tissue extensibility and allowing for proper ROM for normal function with   [] LE / lumbar: self care, mobility, lifting and ambulation. [x] UE / Cervical: self care, reaching, carrying, lifting, house/yardwork, driving, computer work. Modalities:  [] (45603) Vasopneumatic compression: Utilized vasopneumatic compression to decrease edema / swelling for the purpose of improving mobility and quad tone / recruitment which will allow for increased overall function including but not limited to self-care, transfers, ambulation, and ascending / descending stairs. Charges:  Timed Code Treatment Minutes: 45   Total Treatment Minutes: 45     [] EVAL - LOW (94596)   [] EVAL - MOD (55522)  [] EVAL - HIGH (28260)  [] RE-EVAL (26423)  [x] LT(75507) x 1      [] Ionto  [] NMR (22119) x    [] Vaso  [x] Manual (23927) x 2      [] Ultrasound  [] TA x      [] Mech Traction (53735)  [] Aquatic Therapy x     [] ES (un) (89428):   [] Home Management Training x  [] ES(attended) (46386)   [] Dry Needling 1-2 muscles (44774):  [] Dry Needling 3+ muscles (664371)  [] Group:      [] Other:     GOALS:  Patient stated goal: to reduce pain  [x] Progressing: [] Met: [] Not Met: [] Adjusted     Therapist goals for Patient:   Short Term Goals: To be achieved in: 2 weeks  1. Independent in HEP and progression per patient tolerance, in order to promote ROM and flexibility gains, as well as DCF endurance and activity tolerance  [x] Progressing: [] Met: [] Not Met: [] Adjusted  2. Patient will have a decrease in pain to <2/10 on average to facilitate improvement in movement, function, and ADLs  [] Progressing: [x] Met: [] Not Met: [] Adjusted     Long Term Goals: To be achieved in: 4 weeks  1. FOTO score of at least 63 to assist with reaching prior level of function. [] Progressing: [] Met: [x] Not Met: [] Adjusted  2. Patient will demonstrate increased AROM symmetric cervical sidebend, rotation, 50deg ext, 60deg flex to allow for proper joint functioning needed at the neck for navigation of environment. [x] Progressing: [] Met: [] Not Met: [] Adjusted  3. Patient will demonstrate an increase in postural awareness and control and activation of Deep cervical stabilizers >30sec to allow for proper stabilization of cervical spine needed for loaded/standing tasks. [] Progressing: [] Met: [x] Not Met: [] Adjusted  4. Patient will return to functional activities including group workouts twice per week without restrictions. [x] Progressing: [] Met: [] Not Met: [] Adjusted  5. The patient will drive without increased LUE sxs for 1 week. [x] Progressing: [] Met: [x] Not Met: [] Adjusted       6. The patient will sleep without neck-induced waking-pain for 1 week. [x] Progressing: [] Met: [x] Not Met: [] Adjusted       7. The patient will have a decrease in pain to <1/10 on average with ADLs and iADLs. [] Progressing: [x] Met: [] Not Met: [] Adjusted       Overall Progression Towards Functional goals/ Treatment Progress Update:  [x] Patient is progressing slowly as expected towards functional goals listed. [] Progression is slowed due to complexities/Impairments listed. [] Progression has been slowed due to co-morbidities.   [] Plan just implemented, too soon to assess goals progression <30days   [] Goals require adjustment due to lack of progress  [] Patient is not progressing as expected and requires additional follow up with physician  [] Other    Persisting Functional Limitations/Impairments:  [x]Sleeping [x]Sitting               []Standing []Transfers        []Walking []Kneeling               []Stairs []Squatting / bending   [x]ADLs [x]Reaching  [x]Lifting  []Housework  [x]Driving [x]Job related tasks  [x]Sports/Recreation []Other:     ASSESSMENT:  The patient had 2 cavitations in T/S with manual this date. The patient was able to perform single arm resisted rows and shoulder extensions, isolating scapular retractors and depressors accurately, but with appropriate fatigue. The patient appears to be improving in spinal mobility and in reduction of pain. Ongoing HEP development for scapulohumeral strengthening and postural endurance required. Treatment/Activity Tolerance:  [x] Patient able to complete tx [] Patient limited by fatigue  [x] Patient limited by pain  [] Patient limited by other medical complications  [] Other:     Prognosis: [x] Good [x] Fair  [] Poor    Patient Requires Follow-up: [x] Yes  [] No    Plan for next treatment session: manual cervical/thoracic mobilization, shoulder/postural strengthening/endurance & HEP    PLAN: PT 1x / week for 4 weeks. [x] Continue per plan of care [] Alter current plan (see comments)  [] Plan of care initiated [] Hold pending MD visit [] Discharge    Electronically signed by: Norberto Oglesby, PT PT, DPT, OMT-C    Note: If patient does not return for scheduled/ recommended follow up visits, this note will serve as a discharge from care along with most recent update on progress.

## 2022-11-29 NOTE — TELEPHONE ENCOUNTER
Patient last seen 9/15/2022 and medication last filled 11/15/2022:     Last lab result completed/reported on:      Impression:         1. Cervical spondylosis without myelopathy    2. DDD (degenerative disc disease), cervical    3. Foraminal stenosis of cervical region          Plan:  Clinical Course: Above diagnoses are improving      I discussed the diagnosis and the treatment options with Joanie Lindsey today. In Summary:  The various treatment options were outlined and discussed with Joanie Lindsey including:  Conservative care options: physical therapy, ice, medications, bracing, and activity modification. The indications for therapeutic injections. The indications for additional imaging/laboratory studies. The indications for (possible future) interventions. After considering the various options discussed, Liberty Anderson elected to pursue a course of treatment that includes the followin. Medications: I will send gabapentin 300 mg and 100 mg to the pharmacy. PDMP reviewed. No adverse effects. She takes one tablet of gabapentin 300 mg daily. The 100 mg tablet is for flare ups of pain in addition to the 300 mg tablet if needed. 2. PT:   Referral provided to see Yohan Thomas.       3. Follow up:   3 months

## 2022-12-02 DIAGNOSIS — M47.812 CERVICAL SPONDYLOSIS WITHOUT MYELOPATHY: ICD-10-CM

## 2022-12-02 DIAGNOSIS — M50.30 DDD (DEGENERATIVE DISC DISEASE), CERVICAL: ICD-10-CM

## 2022-12-02 DIAGNOSIS — M48.02 FORAMINAL STENOSIS OF CERVICAL REGION: ICD-10-CM

## 2022-12-02 RX ORDER — GABAPENTIN 100 MG/1
CAPSULE ORAL
Qty: 90 CAPSULE | Refills: 0 | OUTPATIENT
Start: 2022-12-02

## 2022-12-02 NOTE — TELEPHONE ENCOUNTER
[facility],[phone], NEEDS SCRIPT FOR GABAPENTIN 100 MG. PREVIOUSLY DENIED PATIENT TAKES 2 DIFFERENT DOSES OF SAME MED.

## 2022-12-05 DIAGNOSIS — M50.30 DDD (DEGENERATIVE DISC DISEASE), CERVICAL: ICD-10-CM

## 2022-12-05 DIAGNOSIS — M47.812 CERVICAL SPONDYLOSIS WITHOUT MYELOPATHY: ICD-10-CM

## 2022-12-05 DIAGNOSIS — M79.18 MYOFASCIAL MUSCLE PAIN: ICD-10-CM

## 2022-12-05 DIAGNOSIS — M48.02 FORAMINAL STENOSIS OF CERVICAL REGION: ICD-10-CM

## 2022-12-05 RX ORDER — TIZANIDINE HYDROCHLORIDE 2 MG/1
CAPSULE, GELATIN COATED ORAL
Qty: 90 CAPSULE | Refills: 0 | Status: SHIPPED | OUTPATIENT
Start: 2022-12-05

## 2022-12-05 RX ORDER — GABAPENTIN 100 MG/1
CAPSULE ORAL
Qty: 90 CAPSULE | Refills: 0 | Status: SHIPPED | OUTPATIENT
Start: 2022-12-05 | End: 2023-01-05

## 2022-12-05 NOTE — TELEPHONE ENCOUNTER
Outpatient Speech Language Pathology   Peds Swallow Treatment Note       Patient Name: Richy Thompson  : 2008  MRN: [id number]  Today's Date: 2020         Visit Date: 2020    There is no problem list on file for this patient.      Visit Dx:    ICD-10-CM ICD-9-CM   1. Feeding difficulty  R63.3 783.3                         OP SLP Assessment/Plan - 20 0945        SLP Assessment    Functional Problems  Swallowing   -MM    Clinical Impression Comments  First seesion back since being off with grandma due to covid session. He was able to complete thin liquids and accepted chewy and toothbrush.   -MM       SLP Plan    Plan Comments  Continue POC   -MM      User Key  (r) = Recorded By, (t) = Taken By, (c) = Cosigned By    Initials Name Provider Type    Rosa Zuniga MS CCC-SLP Speech and Language Pathologist        SLP OP Goals     Row Name 20 09          Goal Type Needed    Goal Type Needed  Pediatric Goals  -MM        Subjective Comments    Subjective Comments  Child was happy during session.   -MM        Short-Term Goals    STG- 1  Patient's oral sensorimotor skills will be enhanced by eliminating/reducing oral hypersensitivity to allow more efficient eating by change in posture/desensitization of touch to face/oral cavity 90%  -MM     Status: STG- 1  Progressing as expected  -MM     Comments: STG- 1  Green chewy and toothbrush placed in oral cavity indep. Allowed SLP to place straw, edge of cup, and chewy to lips only.  -MM     STG- 2  Patient will reach for desired object from field of 2 with 50% accuracy.   -MM     Status: STG- 2  Progress slower than expected  -MM     Comments: STG- 2  Pushed items and hands away or reached toward items.   -MM     STG- 3  Patient will imitate action with 50% accuracy.   -MM     Status: STG- 3  Progress slower than expected  -MM     Comments: STG- 3  No imiations today.   -MM     STG- 4  Child will identify age appropriate objects  Requesting a 90-day supply of tizanidine 2 mg.     [address]:  [phone] from a field of two.  -MM     Status: STG- 4  Discontinued  -MM     Comments: STG- 4  Child unable to do this at this time.   -MM     STG- 5  Child will be accepting to tastes of purees and will show no overt s/s of aspiration.   -MM     Status: STG- 5  Progressing as expected  -MM     Comments: STG- 5  Accepted water today. Purees not offerred.   -MM     STG- 6  Child will be accepting to thin liquids without any adversive reactions or s/s of aspiration.   -MM     Status: STG- 6  Progressing as expected  -MM     Comments: STG- 6  Water presented via edge of cup and straw. Anterior loss noted from edge of cup 1x.   -MM     STG- 7  Child will compelte jaw strenghtening exercises to promote functional rotary chew to begin accepting soft foods when appropriate.   -MM     Status: STG- 7  Progressing as expected  -MM     Comments: STG- 7  Tonic bite only.   -MM        Long-Term Goals    LTG- 1  Patient will consume tastes of liquids and solids by mouth while maintaining nutrition and hydration with non-oral feeding.  -MM     Status: LTG- 1  Progressing as expected  -MM     Comments: LTG- 1  Milena via DOV. Address in tx. SLP spoke with grandmother week of 7/7 RE: starting some water PO in therapy. She is open to this as they are doing some small tastes at home including milkshakes, water, and suckers. SLP spoke with OT RE: picking child up for feeding. MD and OT wish for SLP to continue with feeding therapy.   -MM     LTG- 2  Richy will Communicate via gesture, sign, or alternative communication system to express basic wants and needs.   -MM     Status: LTG- 2  Progressing as expected  -MM     Comments: LTG- 2  Child uses actions to communicate in terms of pushing and pulling hands to and away from desrired or undesired objects.   -MM        SLP Time Calculation    SLP Goal Re-Cert Due Date  01/26/21  -MM       User Key  (r) = Recorded By, (t) = Taken By, (c) = Cosigned By    Initials Name Provider Type    MM  Rosa Barnes MS CCC-SLP Speech and Language Pathologist        OP SLP Education     Row Name 12/08/20 0945       Education    Barriers to Learning  No barriers identified  -MM    Education Comments  Session reviewed with caregivers.   -MM      User Key  (r) = Recorded By, (t) = Taken By, (c) = Cosigned By    Initials Name Effective Dates    Rosa Zuniga MS CCC-SLP 07/12/20 -                  Time Calculation:                       Rosa Barnes MS CCC-SLP  12/8/2020

## 2022-12-05 NOTE — TELEPHONE ENCOUNTER
Patient last seen 9/15/2022 and medication last filled 2022:     Last lab result completed/reported on: 2021     Impression:         1. Cervical spondylosis without myelopathy    2. DDD (degenerative disc disease), cervical    3. Foraminal stenosis of cervical region          Plan:  Clinical Course: Above diagnoses are improving      I discussed the diagnosis and the treatment options with Chrystal Lindsey today. In Summary:  The various treatment options were outlined and discussed with Chrystal Lindsey including:  Conservative care options: physical therapy, ice, medications, bracing, and activity modification. The indications for therapeutic injections. The indications for additional imaging/laboratory studies. The indications for (possible future) interventions. After considering the various options discussed, Bridger Burk elected to pursue a course of treatment that includes the followin. Medications: I will send gabapentin 300 mg and 100 mg to the pharmacy. PDMP reviewed. No adverse effects. She takes one tablet of gabapentin 300 mg daily. The 100 mg tablet is for flare ups of pain in addition to the 300 mg tablet if needed. 2. PT:   Referral provided to see Trinity Winters. 3. Follow up:   3 months         Chrystal KhouryPatrickheidy was instructed to call the office if her symptoms worsen or if new symptoms appear prior to the next scheduled visit. She is specifically instructed to contact the office between now & her scheduled appointment if she has concerns related to her condition or if she needs assistance in scheduling the above tests. She is welcome to call for an appointment sooner if she has any additional concerns or questions.

## 2022-12-06 ENCOUNTER — TELEPHONE (OUTPATIENT)
Dept: ORTHOPEDIC SURGERY | Age: 62
End: 2022-12-06

## 2022-12-06 ENCOUNTER — HOSPITAL ENCOUNTER (OUTPATIENT)
Dept: PHYSICAL THERAPY | Age: 62
Setting detail: THERAPIES SERIES
Discharge: HOME | End: 2022-12-06
Payer: COMMERCIAL

## 2022-12-06 PROCEDURE — 97140 MANUAL THERAPY 1/> REGIONS: CPT

## 2022-12-06 PROCEDURE — 97110 THERAPEUTIC EXERCISES: CPT

## 2022-12-06 NOTE — TELEPHONE ENCOUNTER
General Question     Subject: Yady from [address] called needing to ask a few questions regarding a patient's medication and is requesting a call back  Patient and /or Facility Request: Yady \"[facility]\"  Contact Number: [phone]

## 2022-12-06 NOTE — FLOWSHEET NOTE
[facility]  Phone: [phone]   Fax: [phone]    Physical Therapy Daily Treatment Note    Date:  2022     Patient Name:  Red Whitfield    :  1960  MRN: [id number]  Medical Diagnosis Information:  Cervical spondylosis without myelopathy [M47.812]  DDD (degenerative disc disease), cervical [M50.30]  Foraminal stenosis of cervical region [M48.02]            PT diagnosis: impaired cervical mobility, decreased spinal ROM, soft tissue inflexibility, L shoulder strain, L shoulder pain                                        Insurance information: PT Insurance Information: ANTHEM - DED NOT MET - NO COPAY - NO AUTH  Physician Information:  Lisa Galdamez    Plan of care signed (Y/N): [x]  Yes []  No     Date of Patient follow up with Physician:      Progress Report: []  Yes  [x]  No     Date Range for reporting period:  Beginning: 10/25/2022  POC: 22  Ending: TBD    Progress report due (10 Rx/or 30 days whichever is less): visit #4 or 46/15/50    Recertification due (POC duration/ or 90 days whichever is less): visit #4 or 22 (date)     Visit # Insurance Allowable Auth required? Date Range    30V pcy []  Yes  [x]  No      Latex Allergy:  [x]NO      []YES  Preferred Language for Healthcare:   [x]English       []other:    Functional Scale:           Date assessed:  FOTO physical FS primary measure score = 58; Risk adjusted = 38  10/26/22  FOTO physical FS primary measure score = 53     22    Pain level:  2/10    SUBJECTIVE:  The patient was sore until Thursday after last Tuesday's, and was achy when she tried to use her arms. \"It kept reminding me that I had done something. \" Her weekend was busy with kids and their plans. She has been playing catch up all day at home. She got called in for work Thursday night, which ended up with 22 hour shift.     OBJECTIVE:    - normal mobility L 1st rib, reduced mobility L 2nd rib, improving cervical mobility to 3/6 with sideglides and PA C3-6  11/29 - AROM cervical 50deg retraction + extension  11/22 - seated cervical AROM: 70deg flex, 45deg ext, 36 R SB, 42 L SB, 46 R rot, 35deg L rot  11/15 - SB R = 30deg, SB L = 40deg; Rot L = 30deg, Rot R = 25; supine rotation = 60R; 45L >> post-manual >> flex = 52, extension = 45; 44deg SB L, 30deg SB R  11/8 - 40ext, 50deg flex, 40deg R/L SB cervical AROM    RESTRICTIONS/PRECAUTIONS: anxiety    Exercises/Interventions:     Therapeutic Exercises (24852)  Resistance / level Sets/sec Reps Notes          UT Stretch     LS Stretch     TB HABD: palm up/down     TB BUE ER     BUE Pec Stretch (Doorframe)  10'' 3 Cervical Retraction    Cervical Retraction + Extension   Prone Row 3# 2 5 11/29   Prone Ext 3# 2 5 11/29   S/L ER 2# 2 10 L 11/29   UBE - Fwd/Retro - standing 90 RPM  1.5 min ea 12/6   Serratus Punch Plus 3# 2 10 R/L 12/6   TB Low T's lime 2 10 B 12/6                 Therapeutic Activities (98377)                     Gait (58453)                                   Neuromuscular Re-ed (79973)       Seated Median Nerve Flossing Cervical Tucks w/ Biofeedback Cervical Tuck & Lift w/ Biofeedback                      Manual Intervention (04866) - 22         Prone T/S mobs G2-4 T2-7 10 MOBS, 3 PASSES    Seated MWM upper T/S     Supine cervical sideglides - R/L G2-3 C3-6 5x ea, 1 pass R/L 12/6   S. O.R. (supine)   30sec 12/2   Supine PA C/S mobs C7-C2 G2-4 10 mobs ea; 2 passes 11/22 -   Right Sidelying STM - hawkgrips   8' 12/2   Right Sidelying PA/Sideglides Supine O-A Gapping L  Cervical Traction   2 x 10''    1st / 2nd Rib Mobs - L G3-4  10x ea 12/6 - 2nd rib is more restricted   Seated C/T Distraction Mobs R mid-cervical locking & manip:     Modalities:     Pt. Education:  10/25/2022  -patient educated extensively on diagnosis, prognosis and expectations for rehab.  Patient educated on what PT can provide for patient, and perceived gaps in interventions from prior therapies. - PT shared and educated on clinical presentation of TOS, cervical spondylosis, cervical radiculopathy, RTC tendonopathy versus shoulder strain and dizziness (cervicogenic versus peripheral). -all patient questions were answered  - 15'    Home Exercise Program:  Access Code: [place]  URL: [email or web address]  Date: 12/06/2022  Prepared by: Threasa Donnie    Exercises  Prone Shoulder Row - 1 x daily - 4 x weekly - 2 sets - 10 reps  Prone Shoulder Extension - Single Arm - 1 x daily - 4 x weekly - 2 sets - 10 reps  Sidelying Shoulder ER with Towel and Dumbbell - 1 x daily - 4 x weekly - 2 sets - 10 reps  Low T's \"Pull Aparts\" - 1 x daily - 4 x weekly - 2 sets - 10 reps  Supine Scapular Protraction in Flexion with Dumbbells - 1 x daily - 4 x weekly - 2 sets - 10 reps  Doorway Pec Stretch at 90 Degrees Abduction - 1-2 x daily - 5 x weekly - 1 sets - 3 reps - 10 hold    11/8 - Access Code: [email or web address]  URL: [email or web address]  Date: 11/08/2022  Prepared by: Threasa Donnie    Exercises  Seated Cervical Sidebending Stretch - 2-3 x daily - 6 x weekly - 1 sets - 3 reps - 10 hold  Seated Levator Scapulae Stretch - 2-3 x daily - 6 x weekly - 1 sets - 3 reps - 10 hold  Supine Chin Tuck - 2-3 x daily - 6 x weekly - 1 sets - 5 reps - 3 hold  Median Nerve Flossing - Tray - 2 x daily - 5 x weekly - 1 sets - 8 reps  Shoulder External Rotation and Scapular Retraction with Resistance - 1 x daily - 4 x weekly - 2 sets - 10 reps  Standing Shoulder Horizontal Abduction with Resistance - 1 x daily - 4 x weekly - 2 sets - 10 reps    Therapeutic Exercise and NMR EXR  [x] (96709) Provided verbal/tactile cueing for activities related to strengthening, flexibility, endurance, ROM for improvements in  [] LE / Lumbar: LE, proximal hip, and core control with self care, mobility, lifting, ambulation.   [x] UE / Cervical: cervical, postural, scapular, scapulothoracic and UE control with self care, reaching, carrying, lifting, house/yardwork, driving, computer work.  [] (92985) Provided verbal/tactile cueing for activities related to improving balance, coordination, kinesthetic sense, posture, motor skill, proprioception to assist with   [] LE / lumbar: LE, proximal hip, and core control in self care, mobility, lifting, ambulation and eccentric single leg control. [] UE / cervical: cervical, scapular, scapulothoracic and UE control with self care, reaching, carrying, lifting, house/yardwork, driving, computer work.   [] (78796) Therapist is in constant attendance of 2 or more patients providing skilled therapy interventions, but not providing any significant amount of measurable one-on-one time to either patient, for improvements in  [] LE / lumbar: LE, proximal hip, and core control in self care, mobility, lifting, ambulation and eccentric single leg control. [] UE / cervical: cervical, scapular, scapulothoracic and UE control with self care, reaching, carrying, lifting, house/yardwork, driving, computer work.      NMR and Therapeutic Activities:    [] (75482 or 51326) Provided verbal/tactile cueing for activities related to improving balance, coordination, kinesthetic sense, posture, motor skill, proprioception and motor activation to allow for proper function of   [] LE: / Lumbar core, proximal hip and LE with self care and ADLs  [] UE / Cervical: cervical, postural, scapular, scapulothoracic and UE control with self care, carrying, lifting, driving, computer work.   [] (24683) Gait Re-education- Provided training and instruction to the patient for proper LE, core and proximal hip recruitment and positioning and eccentric body weight control with ambulation re-education including up and down stairs     Home Management Training / Self Care:  [] (26642) Provided self-care/home management training related to activities of daily living and compensatory training, and/or use of adaptive equipment for improvement with: ADLs and compensatory training, meal preparation, safety procedures and instruction in use of adaptive equipment, including bathing, grooming, dressing, personal hygiene, basic household cleaning and chores. Home Exercise Program:    [] (13083) Reviewed/Progressed HEP activities related to strengthening, flexibility, endurance, ROM of   [] LE / Lumbar: core, proximal hip and LE for functional self-care, mobility, lifting and ambulation/stair navigation   [] UE / Cervical: cervical, postural, scapular, scapulothoracic and UE control with self care, reaching, carrying, lifting, house/yardwork, driving, computer work  [] (33357)Reviewed/Progressed HEP activities related to improving balance, coordination, kinesthetic sense, posture, motor skill, proprioception of   [] LE: core, proximal hip and LE for self care, mobility, lifting, and ambulation/stair navigation    [] UE / Cervical: cervical, postural,  scapular, scapulothoracic and UE control with self care, reaching, carrying, lifting, house/yardwork, driving, computer work    Manual Treatments:  PROM / STM / Oscillations-Mobs:  G-I, II, III, IV (PA's, Inf., Post.)  [x] (71876) Provided manual therapy to mobilize LE, proximal hip and/or LS spine soft tissue/joints for the purpose of modulating pain, promoting relaxation,  increasing ROM, reducing/eliminating soft tissue swelling/inflammation/restriction, improving soft tissue extensibility and allowing for proper ROM for normal function with   [] LE / lumbar: self care, mobility, lifting and ambulation. [x] UE / Cervical: self care, reaching, carrying, lifting, house/yardwork, driving, computer work.      Modalities:  [] (80563) Vasopneumatic compression: Utilized vasopneumatic compression to decrease edema / swelling for the purpose of improving mobility and quad tone / recruitment which will allow for increased overall function including but not limited to self-care, transfers, ambulation, and ascending / descending stairs. Charges:  Timed Code Treatment Minutes: 45   Total Treatment Minutes: 45     [] EVAL - LOW (60154)   [] EVAL - MOD (20967)  [] EVAL - HIGH (31874)  [] RE-EVAL (21594)  [x] EV(86963) x 2     [] Ionto  [] NMR (89597) x    [] Vaso  [x] Manual (11525) x 1      [] Ultrasound  [] TA x      [] Mech Traction (62563)  [] Aquatic Therapy x     [] ES (un) (33337):   [] Home Management Training x  [] ES(attended) (98761)   [] Dry Needling 1-2 muscles (41873):  [] Dry Needling 3+ muscles (828800)  [] Group:      [] Other:     GOALS:  Patient stated goal: to reduce pain  [x] Progressing: [] Met: [] Not Met: [] Adjusted     Therapist goals for Patient:   Short Term Goals: To be achieved in: 2 weeks  1. Independent in HEP and progression per patient tolerance, in order to promote ROM and flexibility gains, as well as DCF endurance and activity tolerance  [x] Progressing: [] Met: [] Not Met: [] Adjusted  2. Patient will have a decrease in pain to <2/10 on average to facilitate improvement in movement, function, and ADLs  [] Progressing: [x] Met: [] Not Met: [] Adjusted     Long Term Goals: To be achieved in: 4 weeks  1. FOTO score of at least 63 to assist with reaching prior level of function. [] Progressing: [] Met: [x] Not Met: [] Adjusted  2. Patient will demonstrate increased AROM symmetric cervical sidebend, rotation, 50deg ext, 60deg flex to allow for proper joint functioning needed at the neck for navigation of environment. [x] Progressing: [] Met: [] Not Met: [] Adjusted  3. Patient will demonstrate an increase in postural awareness and control and activation of Deep cervical stabilizers >30sec to allow for proper stabilization of cervical spine needed for loaded/standing tasks. [] Progressing: [] Met: [x] Not Met: [] Adjusted  4. Patient will return to functional activities including group workouts twice per week without restrictions.    [x] Progressing: [] Met: [] Not Met: [] Adjusted  5. The patient will drive without increased LUE sxs for 1 week. [x] Progressing: [] Met: [x] Not Met: [] Adjusted       6. The patient will sleep without neck-induced waking-pain for 1 week. [x] Progressing: [] Met: [x] Not Met: [] Adjusted       7. The patient will have a decrease in pain to <1/10 on average with ADLs and iADLs. [] Progressing: [x] Met: [] Not Met: [] Adjusted       Overall Progression Towards Functional goals/ Treatment Progress Update:  [x] Patient is progressing slowly as expected towards functional goals listed. [] Progression is slowed due to complexities/Impairments listed. [] Progression has been slowed due to co-morbidities. [] Plan just implemented, too soon to assess goals progression <30days   [] Goals require adjustment due to lack of progress  [] Patient is not progressing as expected and requires additional follow up with physician  [] Other    Persisting Functional Limitations/Impairments:  [x]Sleeping [x]Sitting               []Standing []Transfers        []Walking []Kneeling               []Stairs []Squatting / bending   [x]ADLs [x]Reaching  [x]Lifting  []Housework  [x]Driving [x]Job related tasks  [x]Sports/Recreation []Other:     ASSESSMENT:  PT adjusted intensity of resistance this date with shoulder strengthening due to DOMS after last visit. Ongoing manual with IASTM and mobilization performed, as these appear to be maintaining and improving spinal mobility, ROM. The patient's left 2nd rib appears to be restricted with mobilizations inferiorly. The patient's symptoms remain tied to her stress levels, as well as resisted UE activities, and certain neck movements at this time.     Treatment/Activity Tolerance:  [x] Patient able to complete tx [] Patient limited by fatigue  [] Patient limited by pain  [] Patient limited by other medical complications  [] Other:     Prognosis: [x] Good [x] Fair  [] Poor    Patient Requires Follow-up: [x] Yes  [] No    Plan for next treatment session: manual cervical/thoracic mobilization, shoulder/postural strengthening/endurance     PLAN: PT 1x / week for 4 weeks. [x] Continue per plan of care [] Alter current plan (see comments)  [] Plan of care initiated [] Hold pending MD visit [] Discharge    Electronically signed by: Stephan Urban, PT PT, DPT, OMT-C    Note: If patient does not return for scheduled/ recommended follow up visits, this note will serve as a discharge from care along with most recent update on progress.

## 2022-12-07 NOTE — TELEPHONE ENCOUNTER
Pharm calling for a refill on montelukast (SINGULAIR) 10 MG tablet         Ajay Jeffries [address], [address]    Ntp with Dr Reji Xie 1/31/2023      Please advise

## 2022-12-08 RX ORDER — MONTELUKAST SODIUM 10 MG/1
10 TABLET ORAL NIGHTLY
Qty: 90 TABLET | Refills: 0 | Status: SHIPPED | OUTPATIENT
Start: 2022-12-08 | End: 2023-12-08

## 2022-12-08 RX ORDER — MONTELUKAST SODIUM 10 MG/1
TABLET ORAL
Qty: 90 TABLET | Refills: 3 | OUTPATIENT
Start: 2022-12-08

## 2022-12-13 ENCOUNTER — HOSPITAL ENCOUNTER (OUTPATIENT)
Dept: PHYSICAL THERAPY | Age: 62
Setting detail: THERAPIES SERIES
Discharge: HOME | End: 2022-12-13
Payer: COMMERCIAL

## 2022-12-13 PROCEDURE — 97110 THERAPEUTIC EXERCISES: CPT

## 2022-12-13 PROCEDURE — 97140 MANUAL THERAPY 1/> REGIONS: CPT

## 2022-12-13 NOTE — FLOWSHEET NOTE
[address] [facility] Physical Therapy  Phone: [phone]   Fax: [phone]    Physical Therapy Daily Treatment Note    Date:  2022     Patient Name:  Taylor Florian    :  1960  MRN: [id number]  Medical Diagnosis Information:  Cervical spondylosis without myelopathy [M47.812]  DDD (degenerative disc disease), cervical [M50.30]  Foraminal stenosis of cervical region [M48.02]            PT diagnosis: impaired cervical mobility, decreased spinal ROM, soft tissue inflexibility, L shoulder strain, L shoulder pain                                        Insurance information: PT Insurance Information: ANTHEM - DED NOT MET - NO COPAY - NO AUTH  Physician Information:  Lisa Rai    Plan of care signed (Y/N): [x]  Yes []  No     Date of Patient follow up with Physician:      Progress Report: []  Yes  [x]  No     Date Range for reporting period:  Beginning: 10/25/2022  POC: 22  Ending: TBD    Progress report due (10 Rx/or 30 days whichever is less): visit #4 or     Recertification due (POC duration/ or 90 days whichever is less): visit #4 or 22 (date)     Visit # Insurance Allowable Auth required? Date Range    30V pcy []  Yes  [x]  No      Latex Allergy:  [x]NO      []YES  Preferred Language for Healthcare:   [x]English       []other:    Functional Scale:           Date assessed:  FOTO physical FS primary measure score = 58; Risk adjusted = 38  10/26/22  FOTO physical FS primary measure score = 53     22    Pain level:  10    SUBJECTIVE:  The patient had to perform BLS training today, which was very difficult due to new system update, and she worked extremely hard for nearly 1 hour. She reports increased stress, shoulder and neck tightness as a result.     OBJECTIVE:    - mid-thoracic mobility = 3/6 after manual mobs, +2 TTP bilateral UT this date   - normal mobility L 1st rib, reduced mobility L 2nd rib, improving cervical mobility to 3/6 with sideglides and PA C3-6  11/29 - AROM cervical 50deg retraction + extension  11/22 - seated cervical AROM: 70deg flex, 45deg ext, 36 R SB, 42 L SB, 46 R rot, 35deg L rot  11/15 - SB R = 30deg, SB L = 40deg; Rot L = 30deg, Rot R = 25; supine rotation = 60R; 45L >> post-manual >> flex = 52, extension = 45; 44deg SB L, 30deg SB R  11/8 - 40ext, 50deg flex, 40deg R/L SB cervical AROM    RESTRICTIONS/PRECAUTIONS: anxiety    Exercises/Interventions:     Therapeutic Exercises (08110)  Resistance / level Sets/sec Reps Notes          UT Stretch     LS Stretch     TB HABD: palm up/down     TB BUE ER     BUE Pec Stretch (Doorframe)  Cervical Retraction    Cervical Retraction + Extension   Prone Row 3# 2 5 11/29   Prone Ext 3# 2 5 11/29   S/L ER 2# 2 10 L 11/29   UBE - Fwd/Retro - standing 90 RPM  2 min ea 12/13 -  ^ time   Serratus Punch Plus TB Low T's Triceps Extensions (rope attachment) 15# 2 10 12/13   Seated BUE OH press 2# dB 1 10 12/13   Seated BUE Abduction (to 90) 2# db 1 10 12/13   Therapeutic Activities (37493)                     Gait (26144)                                   Neuromuscular Re-ed (79201)       Seated Median Nerve Flossing Cervical Tucks w/ Biofeedback Cervical Tuck & Lift w/ Biofeedback                      Manual Intervention (00791) - 24.5'         Prone T/S mobs G2-4 T2-7 6 MOBS, 3 PASSES 2 cavitation - 12/13   Seated MWM upper T/S     Supine cervical sideglides - R/L S.O.R. (supine)   30sec 12/2   Capital Flexion  5'' 8 12/13   Supine PA C/S mobs C7-C2 G2-4 10 mobs ea; 2 passes 11/22 -   STM - hawkgrips - bilateral UT   4' per side 12/13   Right Sidelying PA/Sideglides Supine O-A Gapping L  Cervical Traction   5 x 10''    1st / 2nd Rib Mobs - L G3-4  Seated C/T Distraction Mobs R mid-cervical locking & manip:     Modalities:     Pt. Education:  10/25/2022  -patient educated extensively on diagnosis, prognosis and expectations for rehab.  Patient educated on what PT can provide for patient, and perceived gaps in interventions from prior therapies. - PT shared and educated on clinical presentation of TOS, cervical spondylosis, cervical radiculopathy, RTC tendonopathy versus shoulder strain and dizziness (cervicogenic versus peripheral). -all patient questions were answered  - 15'    Home Exercise Program:  Access Code: Tyler Ritesh  URL: [employer]/  Date: 12/13/2022  Prepared by: Charmian [place]    Exercises  Prone Shoulder Row - 1 x daily - 4 x weekly - 2 sets - 10 reps  Prone Shoulder Extension - Single Arm - 1 x daily - 4 x weekly - 2 sets - 10 reps  Sidelying Shoulder ER with Towel and Dumbbell - 1 x daily - 4 x weekly - 2 sets - 10 reps  Low T's \"Pull Aparts\" - 1 x daily - 4 x weekly - 2 sets - 10 reps  Supine Scapular Protraction in Flexion with Dumbbells - 1 x daily - 4 x weekly - 2 sets - 10 reps  Doorway Pec Stretch at 90 Degrees Abduction - 1-2 x daily - 5 x weekly - 1 sets - 3 reps - 10 hold  Seated Overhead Press with Dumbbells - 1 x daily - 4 x weekly - 2 sets - 10 reps  CLX Tricep Pressdowns - 1 x daily - 4 x weekly - 2 sets - 10 reps  Seated Shoulder Abduction with Dumbbells - Thumbs Up - 1 x daily - 4 x weekly - 2 sets - 10 reps    Access Code: [email or web address]  URL: [email or web address]  Date: 12/06/2022  Prepared by: Charmian [place]    Exercises  Prone Shoulder Row - 1 x daily - 4 x weekly - 2 sets - 10 reps  Prone Shoulder Extension - Single Arm - 1 x daily - 4 x weekly - 2 sets - 10 reps  Sidelying Shoulder ER with Towel and Dumbbell - 1 x daily - 4 x weekly - 2 sets - 10 reps  Low T's \"Pull Aparts\" - 1 x daily - 4 x weekly - 2 sets - 10 reps  Supine Scapular Protraction in Flexion with Dumbbells - 1 x daily - 4 x weekly - 2 sets - 10 reps  Doorway Pec Stretch at 90 Degrees Abduction - 1-2 x daily - 5 x weekly - 1 sets - 3 reps - 10 hold    11/8 - Access Code: [email or web address]  URL: [email or web address]  Date: 11/08/2022  Prepared by: Danya Pablo Dom    Exercises  Seated Cervical Sidebending Stretch - 2-3 x daily - 6 x weekly - 1 sets - 3 reps - 10 hold  Seated Levator Scapulae Stretch - 2-3 x daily - 6 x weekly - 1 sets - 3 reps - 10 hold  Supine Chin Tuck - 2-3 x daily - 6 x weekly - 1 sets - 5 reps - 3 hold  Median Nerve Flossing - Tray - 2 x daily - 5 x weekly - 1 sets - 8 reps  Shoulder External Rotation and Scapular Retraction with Resistance - 1 x daily - 4 x weekly - 2 sets - 10 reps  Standing Shoulder Horizontal Abduction with Resistance - 1 x daily - 4 x weekly - 2 sets - 10 reps    Therapeutic Exercise and NMR EXR  [x] (38100) Provided verbal/tactile cueing for activities related to strengthening, flexibility, endurance, ROM for improvements in  [] LE / Lumbar: LE, proximal hip, and core control with self care, mobility, lifting, ambulation. [x] UE / Cervical: cervical, postural, scapular, scapulothoracic and UE control with self care, reaching, carrying, lifting, house/yardwork, driving, computer work.  [] (93509) Provided verbal/tactile cueing for activities related to improving balance, coordination, kinesthetic sense, posture, motor skill, proprioception to assist with   [] LE / lumbar: LE, proximal hip, and core control in self care, mobility, lifting, ambulation and eccentric single leg control. [] UE / cervical: cervical, scapular, scapulothoracic and UE control with self care, reaching, carrying, lifting, house/yardwork, driving, computer work.   [] (53132) Therapist is in constant attendance of 2 or more patients providing skilled therapy interventions, but not providing any significant amount of measurable one-on-one time to either patient, for improvements in  [] LE / lumbar: LE, proximal hip, and core control in self care, mobility, lifting, ambulation and eccentric single leg control.    [] UE / cervical: cervical, scapular, scapulothoracic and UE control with self care, reaching, carrying, lifting, house/yardwork, driving, computer work. NMR and Therapeutic Activities:    [] (06889 or 79307) Provided verbal/tactile cueing for activities related to improving balance, coordination, kinesthetic sense, posture, motor skill, proprioception and motor activation to allow for proper function of   [] LE: / Lumbar core, proximal hip and LE with self care and ADLs  [] UE / Cervical: cervical, postural, scapular, scapulothoracic and UE control with self care, carrying, lifting, driving, computer work.   [] (13021) Gait Re-education- Provided training and instruction to the patient for proper LE, core and proximal hip recruitment and positioning and eccentric body weight control with ambulation re-education including up and down stairs     Home Management Training / Self Care:  [] (76998) Provided self-care/home management training related to activities of daily living and compensatory training, and/or use of adaptive equipment for improvement with: ADLs and compensatory training, meal preparation, safety procedures and instruction in use of adaptive equipment, including bathing, grooming, dressing, personal hygiene, basic household cleaning and chores.      Home Exercise Program:    [x] (84127) Reviewed/Progressed HEP activities related to strengthening, flexibility, endurance, ROM of   [] LE / Lumbar: core, proximal hip and LE for functional self-care, mobility, lifting and ambulation/stair navigation   [x] UE / Cervical: cervical, postural, scapular, scapulothoracic and UE control with self care, reaching, carrying, lifting, house/yardwork, driving, computer work  [] (85416)Reviewed/Progressed HEP activities related to improving balance, coordination, kinesthetic sense, posture, motor skill, proprioception of   [] LE: core, proximal hip and LE for self care, mobility, lifting, and ambulation/stair navigation    [] UE / Cervical: cervical, postural,  scapular, scapulothoracic and UE control with self care, reaching, carrying, lifting, house/yardwork, driving, computer work    Manual Treatments:  PROM / STM / Oscillations-Mobs:  G-I, II, III, IV (PA's, Inf., Post.)  [x] (03867) Provided manual therapy to mobilize LE, proximal hip and/or LS spine soft tissue/joints for the purpose of modulating pain, promoting relaxation,  increasing ROM, reducing/eliminating soft tissue swelling/inflammation/restriction, improving soft tissue extensibility and allowing for proper ROM for normal function with   [] LE / lumbar: self care, mobility, lifting and ambulation. [x] UE / Cervical: self care, reaching, carrying, lifting, house/yardwork, driving, computer work. Modalities:  [] (43672) Vasopneumatic compression: Utilized vasopneumatic compression to decrease edema / swelling for the purpose of improving mobility and quad tone / recruitment which will allow for increased overall function including but not limited to self-care, transfers, ambulation, and ascending / descending stairs. Charges:  Timed Code Treatment Minutes: 50   Total Treatment Minutes: 50     [] EVAL - LOW (31482)   [] EVAL - MOD (71080)  [] EVAL - HIGH (32837)  [] RE-EVAL (17052)  [x] XF(21561) x 1     [] Ionto  [] NMR (79248) x    [] Vaso  [x] Manual (18695) x 2     [] Ultrasound  [] TA x      [] Mech Traction (11943)  [] Aquatic Therapy x     [] ES (un) (57625):   [] Home Management Training x  [] ES(attended) (71891)   [] Dry Needling 1-2 muscles (53805):  [] Dry Needling 3+ muscles (474417)  [] Group:      [] Other:     GOALS:  Patient stated goal: to reduce pain  [x] Progressing: [] Met: [] Not Met: [] Adjusted     Therapist goals for Patient:   Short Term Goals: To be achieved in: 2 weeks  1. Independent in HEP and progression per patient tolerance, in order to promote ROM and flexibility gains, as well as DCF endurance and activity tolerance  [x] Progressing: [] Met: [] Not Met: [] Adjusted  2.  Patient will have a decrease in pain to <2/10 on average to facilitate improvement in movement, function, and ADLs  [] Progressing: [x] Met: [] Not Met: [] Adjusted     Long Term Goals: To be achieved in: 4 weeks  1. FOTO score of at least 63 to assist with reaching prior level of function. [] Progressing: [] Met: [x] Not Met: [] Adjusted  2. Patient will demonstrate increased AROM symmetric cervical sidebend, rotation, 50deg ext, 60deg flex to allow for proper joint functioning needed at the neck for navigation of environment. [x] Progressing: [] Met: [] Not Met: [] Adjusted  3. Patient will demonstrate an increase in postural awareness and control and activation of Deep cervical stabilizers >30sec to allow for proper stabilization of cervical spine needed for loaded/standing tasks. [] Progressing: [] Met: [x] Not Met: [] Adjusted  4. Patient will return to functional activities including group workouts twice per week without restrictions. [x] Progressing: [] Met: [] Not Met: [] Adjusted  5. The patient will drive without increased LUE sxs for 1 week. [x] Progressing: [] Met: [x] Not Met: [] Adjusted       6. The patient will sleep without neck-induced waking-pain for 1 week. [x] Progressing: [] Met: [x] Not Met: [] Adjusted       7. The patient will have a decrease in pain to <1/10 on average with ADLs and iADLs. [] Progressing: [x] Met: [] Not Met: [] Adjusted       Overall Progression Towards Functional goals/ Treatment Progress Update:  [x] Patient is progressing slowly as expected towards functional goals listed. [] Progression is slowed due to complexities/Impairments listed. [] Progression has been slowed due to co-morbidities.   [] Plan just implemented, too soon to assess goals progression <30days   [] Goals require adjustment due to lack of progress  [] Patient is not progressing as expected and requires additional follow up with physician  [] Other    Persisting Functional Limitations/Impairments:  [x]Sleeping [x]Sitting               []Standing []Transfers        []Walking []Kneeling               []Stairs []Squatting / bending   [x]ADLs [x]Reaching  [x]Lifting  []Housework  [x]Driving [x]Job related tasks  [x]Sports/Recreation []Other:     ASSESSMENT:  Increased focus on manual this date and STM due to increased stress and heavy workout doing BLS training this date. Patient was able to perform additional progressions of scapulohumeral strengthening with focus on abductors, deltoids and triceps, as well as OH press sequencing without increased pain. The patient's HEP was updated. The patient demos improving spinal mobility, and exercise interventions were aimed at improving scapulohumeral strength, endurance, posture. The patient's symptoms remain tied to her stress levels, as well as resisted UE activities, and certain neck movements at this time. Treatment/Activity Tolerance:  [x] Patient able to complete tx [] Patient limited by fatigue  [] Patient limited by pain  [] Patient limited by other medical complications  [] Other:     Prognosis: [x] Good [x] Fair  [] Poor    Patient Requires Follow-up: [x] Yes  [] No    Plan for next treatment session: manual cervical/thoracic mobilization, shoulder/postural strengthening/endurance     PLAN: PT 1x / week for 4 weeks. [x] Continue per plan of care [] Alter current plan (see comments)  [] Plan of care initiated [] Hold pending MD visit [] Discharge    Electronically signed by: Jordin Flores PT PT, DPT, OMT-C    Note: If patient does not return for scheduled/ recommended follow up visits, this note will serve as a discharge from care along with most recent update on progress.

## 2022-12-20 ENCOUNTER — HOSPITAL ENCOUNTER (OUTPATIENT)
Dept: PHYSICAL THERAPY | Age: 62
Setting detail: THERAPIES SERIES
Discharge: HOME | End: 2022-12-20
Payer: COMMERCIAL

## 2022-12-20 PROCEDURE — 97530 THERAPEUTIC ACTIVITIES: CPT

## 2022-12-20 PROCEDURE — 97140 MANUAL THERAPY 1/> REGIONS: CPT

## 2022-12-20 NOTE — PLAN OF CARE
[facility]  Phone: [phone]   Fax: [phone]  Physical Therapy Re-Certification Plan of Care    Dear Amanda Mello, [address]  ,    We had the pleasure of treating the following patient for physical therapy services at [facility]. A summary of our findings can be found in the updated assessment below. This includes our plan of care. If you have any questions or concerns regarding these findings, please do not hesitate to contact me at the office phone number checked above. Thank you for the referral.     Physician Signature:________________________________Date:__________________  By signing above (or electronic signature), therapist's plan is approved by physician      Functional Outcome: FOTO: 55    Overall Response to Treatment:   [x]Patient is responding well to treatment and improvement is noted with regards to goals and would benefit from 1 addition visit to transition to [place] [address]. She may benefit from booster sessions of therapy in the future to manage what appears to be a chronic neck pain issue. The patient has demonstrated improved cervical and thoracic mobility, as well as reduction of pain levels, frequency of pain, and is functional. She has tolerated increased manual therapy grades with favorable outcomes over the last month. Several cavitations this date with marked relief in C/T and T/S, as well as increased ROM.    []Patient should continue to improve in reasonable time if they continue HEP   []Patient has plateaued and is no longer responding to skilled PT intervention    []Patient is getting worse and would benefit from return to referring MD   []Patient unable to adhere to initial POC   []Other:     Date range of Visits: 10/26/22 - 12/20/22  Total Visits: 8    Recommendation:    [x]Continue PT 1x / wk for 1 weeks              []Hold PT, pending MD visit      Physical Therapy Daily Treatment Note    Date:  2022     Patient Name:  Vicki March    :  1960  MRN: [id number]  Medical Diagnosis Information:  Cervical spondylosis without myelopathy [M47.812]  DDD (degenerative disc disease), cervical [M50.30]  Foraminal stenosis of cervical region [M48.02]            PT diagnosis: impaired cervical mobility, decreased spinal ROM, soft tissue inflexibility, L shoulder strain, L shoulder pain                                        Insurance information: PT Insurance Information: ANTHEM - DED NOT MET - NO COPAY - NO AUTH  Physician Information:  Lisa Vivar    Plan of care signed (Y/N): [x]  Yes []  No     Date of Patient follow up with Physician:      Progress Report: []  Yes  [x]  No     Date Range for reporting period:  Beginning: 10/25/2022  POC: 22  POC:   Ending: TBD    Progress report due (10 Rx/or 30 days whichever is less): visit #4 or     Recertification due (POC duration/ or 90 days whichever is less): visit #4 or 22 (date)     Visit # Insurance Allowable Auth required? Date Range    30V pcy []  Yes  [x]  No      Latex Allergy:  [x]NO      []YES  Preferred Language for Healthcare:   [x]English       []other:    Functional Scale:           Date assessed:  FOTO physical FS primary measure score = 58; Risk adjusted = 38  10/26/22  FOTO physical FS primary measure score = 53     22  FOTO physical FS primary measure score = 55     22    Pain level:  1-2/10    SUBJECTIVE: The patient had very intensive ab soreness from CPR training. Didn't have much in the way of symptoms otherwise in regards to neck. She has been doing HEP to manage, and even performed her program before therapy today. Patient would opt to continue with remaining scheduled visit.     OBJECTIVE:    - 40deg ext, 35deg retraction + ext, 50deg flex, 42deg SB R/L, 45deg L rotation, 50deg R rotation  UE MMT:   Right:  Left:  GH Flexion:   5  5  GH aBduction:  5  5  Elbow Flexion:  5  5  Elbow Extension:   5  5  MT    4-  4  Rhomboid   4  4    12/13 - mid-thoracic mobility = 3/6 after manual mobs, +2 TTP bilateral UT this date  12/6 - normal mobility L 1st rib, reduced mobility L 2nd rib, improving cervical mobility to 3/6 with sideglides and PA C3-6  11/29 - AROM cervical 50deg retraction + extension  11/22 - seated cervical AROM: 70deg flex, 45deg ext, 36 R SB, 42 L SB, 46 R rot, 35deg L rot  11/15 - SB R = 30deg, SB L = 40deg; Rot L = 30deg, Rot R = 25; supine rotation = 60R; 45L >> post-manual >> flex = 52, extension = 45; 44deg SB L, 30deg SB R  11/8 - 40ext, 50deg flex, 40deg R/L SB cervical AROM    RESTRICTIONS/PRECAUTIONS: anxiety    Exercises/Interventions:     Therapeutic Exercises (90827)  Resistance / level Sets/sec Reps Notes          UT Stretch     LS Stretch     TB HABD: palm up/down     TB BUE ER     BUE Pec Stretch (Doorframe)  Cervical Retraction    Cervical Retraction + Extension   Prone Row Prone Ext S/L ER UBE - Fwd/Retro - standing Serratus Punch Plus TB Low T's Triceps Extensions (rope attachment) Seated BUE OH press Seated BUE Abduction (to 90)               Therapeutic Activities (59160)       The patient was provided an assessment including evaluative tests and measures, as well as documentation to track progress.      32' 12/20 - includes discussion of POC, goals, future need for booster sessions PRN, scapular strengthening to improve efficiency of parascapulars and posterior spinal mm, FOTO system, PT recommendations for DC at next visit          Gait (37684)                                   Neuromuscular Re-ed (06901)       Seated Median Nerve Flossing Cervical Tucks w/ Biofeedback Cervical Tuck & Lift w/ Biofeedback                      Manual Intervention (19251) - 14'         Prone T/S mobs G2-4  G5 T2-9 6 MOBS ea, 2 PASSES  1 thrust 12/20 - several cavitations   Prone CT mobs G2-4 C6-T2 2 passes, 3x ea, R/L ea 12/20   Seated MWM upper T/S     Supine cervical sideglides - R/L S.O.R. (supine)  Capital Flexion  Supine PA C/S mobs C7-C2 G2-4 10 mobs ea; 2 passes 11/22 -   [facility] - [facility]tonino - bilateral UT Right Sidelying PA/Sideglides Supine O-A Gapping L Cervical Traction 1st / 2nd Rib Mobs - L G3-4  Seated C/T Distraction Mobs R mid-cervical locking & manip:     Modalities:     Pt. Education:  10/25/2022  -patient educated extensively on diagnosis, prognosis and expectations for rehab. Patient educated on what PT can provide for patient, and perceived gaps in interventions from prior therapies. - PT shared and educated on clinical presentation of TOS, cervical spondylosis, cervical radiculopathy, RTC tendonopathy versus shoulder strain and dizziness (cervicogenic versus peripheral). -all patient questions were answered  - 15'    Home Exercise Program:  Access Code: [email or web address] [email or web address]  URL: [email or web address]  Date: 12/13/2022  Prepared by: Falmouth Spire    Exercises  Prone Shoulder Row - 1 x daily - 4 x weekly - 2 sets - 10 reps  Prone Shoulder Extension - Single Arm - 1 x daily - 4 x weekly - 2 sets - 10 reps  Sidelying Shoulder ER with Towel and Dumbbell - 1 x daily - 4 x weekly - 2 sets - 10 reps  Low T's \"Pull Aparts\" - 1 x daily - 4 x weekly - 2 sets - 10 reps  Supine Scapular Protraction in Flexion with Dumbbells - 1 x daily - 4 x weekly - 2 sets - 10 reps  Doorway Pec Stretch at 90 Degrees Abduction - 1-2 x daily - 5 x weekly - 1 sets - 3 reps - 10 hold  Seated Overhead Press with Dumbbells - 1 x daily - 4 x weekly - 2 sets - 10 reps  CLX Tricep Pressdowns - 1 x daily - 4 x weekly - 2 sets - 10 reps  Seated Shoulder Abduction with Dumbbells - Thumbs Up - 1 x daily - 4 x weekly - 2 sets - 10 reps    Access Code: [email or web address]  URL: [email or web address]  Date: 12/06/2022  Prepared by: Yohan Spire    Exercises  Prone Shoulder Row - 1 x daily - 4 x weekly - 2 sets - 10 reps  Prone Shoulder Extension - Single Arm - 1 x daily - 4 x weekly - 2 sets - 10 reps  Sidelying Shoulder ER with Towel and Dumbbell - 1 x daily - 4 x weekly - 2 sets - 10 reps  Low T's \"Pull Aparts\" - 1 x daily - 4 x weekly - 2 sets - 10 reps  Supine Scapular Protraction in Flexion with Dumbbells - 1 x daily - 4 x weekly - 2 sets - 10 reps  Doorway Pec Stretch at 90 Degrees Abduction - 1-2 x daily - 5 x weekly - 1 sets - 3 reps - 10 hold    11/8 - Access Code: [email or web address]  URL: [email or web address]  Date: 11/08/2022  Prepared by: Tomasz Jacques    Exercises  Seated Cervical Sidebending Stretch - 2-3 x daily - 6 x weekly - 1 sets - 3 reps - 10 hold  Seated Levator Scapulae Stretch - 2-3 x daily - 6 x weekly - 1 sets - 3 reps - 10 hold  Supine Chin Tuck - 2-3 x daily - 6 x weekly - 1 sets - 5 reps - 3 hold  Median Nerve Flossing - Tray - 2 x daily - 5 x weekly - 1 sets - 8 reps  Shoulder External Rotation and Scapular Retraction with Resistance - 1 x daily - 4 x weekly - 2 sets - 10 reps  Standing Shoulder Horizontal Abduction with Resistance - 1 x daily - 4 x weekly - 2 sets - 10 reps    Therapeutic Exercise and NMR EXR  [] (33749) Provided verbal/tactile cueing for activities related to strengthening, flexibility, endurance, ROM for improvements in  [] LE / Lumbar: LE, proximal hip, and core control with self care, mobility, lifting, ambulation. [] UE / Cervical: cervical, postural, scapular, scapulothoracic and UE control with self care, reaching, carrying, lifting, house/yardwork, driving, computer work.  [] (03020) Provided verbal/tactile cueing for activities related to improving balance, coordination, kinesthetic sense, posture, motor skill, proprioception to assist with   [] LE / lumbar: LE, proximal hip, and core control in self care, mobility, lifting, ambulation and eccentric single leg control.    [] UE / cervical: cervical, scapular, scapulothoracic and UE control with self care, reaching, carrying, lifting, house/yardwork, driving, computer work.   [] (87008) Therapist is in constant attendance of 2 or more patients providing skilled therapy interventions, but not providing any significant amount of measurable one-on-one time to either patient, for improvements in  [] LE / lumbar: LE, proximal hip, and core control in self care, mobility, lifting, ambulation and eccentric single leg control. [] UE / cervical: cervical, scapular, scapulothoracic and UE control with self care, reaching, carrying, lifting, house/yardwork, driving, computer work. NMR and Therapeutic Activities:    [x] (31053 or 30294) Provided verbal/tactile cueing for activities related to improving balance, coordination, kinesthetic sense, posture, motor skill, proprioception and motor activation to allow for proper function of   [x] LE: / Lumbar core, proximal hip and LE with self care and ADLs  [] UE / Cervical: cervical, postural, scapular, scapulothoracic and UE control with self care, carrying, lifting, driving, computer work.   [] (45527) Gait Re-education- Provided training and instruction to the patient for proper LE, core and proximal hip recruitment and positioning and eccentric body weight control with ambulation re-education including up and down stairs     Home Management Training / Self Care:  [] (03968) Provided self-care/home management training related to activities of daily living and compensatory training, and/or use of adaptive equipment for improvement with: ADLs and compensatory training, meal preparation, safety procedures and instruction in use of adaptive equipment, including bathing, grooming, dressing, personal hygiene, basic household cleaning and chores.      Home Exercise Program:    [] (99846) Reviewed/Progressed HEP activities related to strengthening, flexibility, endurance, ROM of   [] LE / Lumbar: core, proximal hip and LE for functional self-care, mobility, lifting and ambulation/stair navigation   [] UE / Cervical: cervical, postural, scapular, scapulothoracic and UE control with self care, reaching, carrying, lifting, house/yardwork, driving, computer work  [] (74213)Reviewed/Progressed HEP activities related to improving balance, coordination, kinesthetic sense, posture, motor skill, proprioception of   [] LE: core, proximal hip and LE for self care, mobility, lifting, and ambulation/stair navigation    [] UE / Cervical: cervical, postural,  scapular, scapulothoracic and UE control with self care, reaching, carrying, lifting, house/yardwork, driving, computer work    Manual Treatments:  PROM / STM / Oscillations-Mobs:  G-I, II, III, IV (PA's, Inf., Post.)  [x] (93567) Provided manual therapy to mobilize LE, proximal hip and/or LS spine soft tissue/joints for the purpose of modulating pain, promoting relaxation,  increasing ROM, reducing/eliminating soft tissue swelling/inflammation/restriction, improving soft tissue extensibility and allowing for proper ROM for normal function with   [] LE / lumbar: self care, mobility, lifting and ambulation. [x] UE / Cervical: self care, reaching, carrying, lifting, house/yardwork, driving, computer work. Modalities:  [] (40392) Vasopneumatic compression: Utilized vasopneumatic compression to decrease edema / swelling for the purpose of improving mobility and quad tone / recruitment which will allow for increased overall function including but not limited to self-care, transfers, ambulation, and ascending / descending stairs.      Charges:  Timed Code Treatment Minutes: 40   Total Treatment Minutes: 40     [] EVAL - LOW (13536)   [] EVAL - MOD (57690)  [] EVAL - HIGH (78290)  [] RE-EVAL (66637)  [] SI(26700) x      [] Ionto  [] NMR (44413) x    [] Vaso  [x] Manual (00158) x 1    [] Ultrasound  [x] TA x 2      [] Mech Traction (60175)  [] Aquatic Therapy x     [] ES (un) (30719):   [] Home Management Training x  [] ES(attended) (48727)   [] Dry Needling 1-2 muscles (19720):  [] Dry Needling 3+ muscles (442194)  [] Group:      [] Other:     GOALS:  Patient stated goal: to reduce pain  [x] Progressing: [] Met: [] Not Met: [] Adjusted     Therapist goals for Patient:   Short Term Goals: To be achieved in: 2 weeks  1. Independent in HEP and progression per patient tolerance, in order to promote ROM and flexibility gains, as well as DCF endurance and activity tolerance  [] Progressing: [x] Met: [] Not Met: [] Adjusted  2. Patient will have a decrease in pain to <2/10 on average to facilitate improvement in movement, function, and ADLs  [] Progressing: [x] Met: [] Not Met: [] Adjusted     Long Term Goals: To be achieved in: 4 weeks  1. FOTO score of at least 63 to assist with reaching prior level of function. [x] Progressing: [] Met: [x] Not Met: [] Adjusted  2. Patient will demonstrate increased AROM symmetric cervical sidebend, rotation, 50deg ext, 60deg flex to allow for proper joint functioning needed at the neck for navigation of environment. [x] Progressing: [] Met: [] Not Met: [] Adjusted  3. Patient will demonstrate an increase in postural awareness and control and activation of Deep cervical stabilizers >30sec to allow for proper stabilization of cervical spine needed for loaded/standing tasks. [x] Progressing: [] Met: [] Not Met: [] Adjusted  4. Patient will return to functional activities including group workouts twice per week without restrictions. [x] Progressing: [] Met: [] Not Met: [] Adjusted  5. The patient will drive without increased LUE sxs for 1 week. [x] Progressing: [] Met: [] Not Met: [] Adjusted       6. The patient will sleep without neck-induced waking-pain for 1 week. [x] Progressing: [] Met: [] Not Met: [] Adjusted       7. The patient will have a decrease in pain to <1/10 on average with ADLs and iADLs. [] Progressing: [x] Met: [] Not Met: [] Adjusted       Overall Progression Towards Functional goals/ Treatment Progress Update:  [x] Patient is progressing slowly as expected towards functional goals listed. [] Progression is slowed due to complexities/Impairments listed. [] Progression has been slowed due to co-morbidities. [] Plan just implemented, too soon to assess goals progression <30days   [] Goals require adjustment due to lack of progress  [] Patient is not progressing as expected and requires additional follow up with physician  [] Other    Persisting Functional Limitations/Impairments:  [x]Sleeping []Sitting               []Standing []Transfers        []Walking []Kneeling               []Stairs []Squatting / bending   []ADLs [x]Reaching  [x]Lifting  []Housework  [x]Driving [x]Job related tasks  [x]Sports/Recreation []Other:     ASSESSMENT:  SEE POC ABOVE    Treatment/Activity Tolerance:  [x] Patient able to complete tx [] Patient limited by fatigue  [] Patient limited by pain  [] Patient limited by other medical complications  [] Other:     Prognosis: [x] Good [x] Fair  [] Poor    Patient Requires Follow-up: [x] Yes  [] No    Plan for next treatment session: manual, DC next visit with FINALIZED HEP    PLAN: PT 1x / week for 1 weeks. [x] Continue per plan of care [] Alter current plan (see comments)  [] Plan of care initiated [] Hold pending MD visit [] Discharge    Electronically signed by: Adrien Nair, PT PT, DPT, OMT-C    Note: If patient does not return for scheduled/ recommended follow up visits, this note will serve as a discharge from care along with most recent update on progress.

## 2022-12-28 ENCOUNTER — HOSPITAL ENCOUNTER (OUTPATIENT)
Dept: PHYSICAL THERAPY | Age: 62
Setting detail: THERAPIES SERIES
Discharge: HOME | End: 2022-12-28
Payer: COMMERCIAL

## 2022-12-28 PROCEDURE — 97110 THERAPEUTIC EXERCISES: CPT

## 2022-12-28 PROCEDURE — 97140 MANUAL THERAPY 1/> REGIONS: CPT

## 2022-12-28 PROCEDURE — 97530 THERAPEUTIC ACTIVITIES: CPT

## 2022-12-28 NOTE — PLAN OF CARE
[facility]  Phone: [phone]   Fax: [phone]   Physical Therapy Discharge Summary    Dear GUIDO Davis  ,    We had the pleasure of treating the following patient for physical therapy services at [facility]. A summary of our findings can be found in the discharge summary below. If you have any questions or concerns regarding these findings, please do not hesitate to contact me at the office phone number checked above. Thank you for the referral.     Physician Signature:________________________________Date:__________________  By signing above (or electronic signature), therapists plan is approved by physician      Functional Outcome: FOTO: 54 at 12/20/22 visit    Overall Response to Treatment:   []Patient is responding well to treatment and improvement is noted with regards  to goals   []Patient should continue to improve in reasonable time if they continue HEP   []Patient has plateaued and is no longer responding to skilled PT intervention    []Patient is getting worse and would benefit from return to referring MD   []Patient unable to adhere to initial POC   [x]Other: [facility]. Patient recommended to continue with chiro or masseuse for soft tissue and joint management unless new or unexpected symptoms return, in which case, she can return to MD/PT. Significant improvement in cervical mobility and ROM. She may benefit from booster sessions of therapy in the future to manage what appears to be a chronic neck pain issue. The patient has demonstrated improved cervical and thoracic mobility, as well as reduction of pain levels, frequency of pain, and is functional. She has tolerated increased manual therapy grades with favorable outcomes over the last month. Several cavitations this date with marked relief in C/T and T/S, as well as increased ROM.     Date range of Visits: 10/26/22 - 22  Total Visits: 9    Recommendation:    [x] Discharge to [facility]. Follow up with PT or MD PRN. Physical Therapy Daily Treatment Note    Date:  2022     Patient Name:  Maceo Dandy    :  1960  MRN: [id number]  Medical Diagnosis Information:  Cervical spondylosis without myelopathy [M47.812]  DDD (degenerative disc disease), cervical [M50.30]  Foraminal stenosis of cervical region [M48.02]            PT diagnosis: impaired cervical mobility, decreased spinal ROM, soft tissue inflexibility, L shoulder strain, L shoulder pain                                        Insurance information: PT Insurance Information: ANTHEM - DED NOT MET - NO COPAY - NO AUTH  Physician Information:  Lisa Latham    Plan of care signed (Y/N): [x]  Yes []  No     Date of Patient follow up with Physician:      Progress Report: []  Yes  [x]  No     Date Range for reporting period:  Beginning: 10/25/2022  POC: 22  POC:   Endin22    Progress report due (10 Rx/or 30 days whichever is less): visit #4 or     Recertification due (POC duration/ or 90 days whichever is less): visit #4 or 22 (date)     Visit # Insurance Allowable Auth required?  Date Range    +  30V pcy []  Yes  [x]  No      Latex Allergy:  [x]NO      []YES  Preferred Language for Healthcare:   [x]English       []other:    Functional Scale:           Date assessed:  FOTO physical FS primary measure score = 58; Risk adjusted = 38  10/26/22  FOTO physical FS primary measure score = 53     22  FOTO physical FS primary measure score = 55     22    Pain level:  1/10    SUBJECTIVE: SEE DC    OBJECTIVE:    - (post-manip) -- 52deg ext, 62deg ext + retraction, SB L = 34, R SB = 45, 45deg L rotation, 50deg R rotation     - 40deg ext, 35deg retraction + ext, 50deg flex, 42deg SB R/L, 45deg L rotation, 50deg R rotation  UE MMT:   Right:  Left:  [address] Flexion:   5  5  GH aBduction:   5  5  Elbow Flexion:   5  5  Elbow Extension:   5  5  MT    4-  4  Rhomboid   4  4    12/13 - mid-thoracic mobility = 3/6 after manual mobs, +2 TTP bilateral UT this date  12/6 - normal mobility L 1st rib, reduced mobility L 2nd rib, improving cervical mobility to 3/6 with sideglides and PA C3-6  11/29 - AROM cervical 50deg retraction + extension  11/22 - seated cervical AROM: 70deg flex, 45deg ext, 36 R SB, 42 L SB, 46 R rot, 35deg L rot  11/15 - SB R = 30deg, SB L = 40deg; Rot L = 30deg, Rot R = 25; supine rotation = 60R; 45L >> post-manual >> flex = 52, extension = 45; 44deg SB L, 30deg SB R  11/8 - 40ext, 50deg flex, 40deg R/L SB cervical AROM    RESTRICTIONS/PRECAUTIONS: anxiety    Exercises/Interventions:     Therapeutic Exercises (31124)  Resistance / level Sets/sec Reps Notes          UT Stretch     LS Stretch     TB HABD: palm up/down     TB BUE ER     BUE Pec Stretch (Doorframe)  Cervical Retraction    Cervical Retraction + Extension   Prone Row Prone Ext S/L ER UBE - Fwd/Retro - standing Serratus Punch Plus TB Low T's Triceps Extensions (rope attachment) Seated BUE OH press Seated BUE Abduction (to 90) Seated SNAGs  1 10 R/L 12/28   HEP final, review, discussion   8' 12/28          Therapeutic Activities (29483)       The patient was provided a final assessment including evaluative tests and measures, as well as discharge documentation to track progress. Final HEP developed and reviewed with patient. All final questions answered. Edu on UBE, guidance on maintaining joint mobility and soft tissue mobility with chiro or masseuse between now and if she follows up with PT again.      24' 12/28           Gait (46311)                                   Neuromuscular Re-ed (04465)       Seated Median Nerve Flossing Cervical Tucks w/ Biofeedback Cervical Tuck & Lift w/ Biofeedback                      Manual Intervention (70616) - 17'         Prone T/S mobs G2-4  G5 T2-9 6 MOBS ea, 2 PASSES  1 thrust ea (3 total) 12/28 - several cavitations   Prone CT mobs G2-5 C6-T2 2 passes, 3x ea, R/L ea  1 manip R/L ea 12/28 - 3 cavitations R/L   Seated MWM upper T/S     Supine cervical sideglides - R/L S.O.R. (supine)  Capital Flexion  Supine PA C/S mobs C7-C2 G2-4 5 mobs ea; 2 passes    STM - hawkgrips - bilateral UT Right Sidelying PA/Sideglides Supine O-A Gapping L Cervical Traction 1st / 2nd Rib Mobs - L G3-4  Seated C/T Distraction Mobs R mid-cervical locking & manip:     Modalities:     Pt. Education:  10/25/2022  -patient educated extensively on diagnosis, prognosis and expectations for rehab. Patient educated on what PT can provide for patient, and perceived gaps in interventions from prior therapies. - PT shared and educated on clinical presentation of TOS, cervical spondylosis, cervical radiculopathy, RTC tendonopathy versus shoulder strain and dizziness (cervicogenic versus peripheral). -all patient questions were answered  - 15'    Home Exercise Program:  Access Code: [email or web address]  URL: [email or web address]  Date: 12/28/2022  Prepared by: Zander Lennon    Program Notes  (2 X 10) --> (3 X 10) --> INCREASE WEIGHT @ 2 X 10 --> 3 X 10    Exercises  Prone Shoulder Row - 1 x daily - 4 x weekly - 2 sets - 10 reps  Prone Shoulder Extension - Single Arm - 1 x daily - 4 x weekly - 2 sets - 10 reps  Sidelying Shoulder ER with Towel and Dumbbell - 1 x daily - 4 x weekly - 2 sets - 10 reps  Low T's \"Pull Aparts\" - 1 x daily - 4 x weekly - 2 sets - 10 reps  Supine Scapular Protraction in Flexion with Dumbbells - 1 x daily - 4 x weekly - 2 sets - 10 reps  Doorway Pec Stretch at 90 Degrees Abduction - 1-2 x daily - 5 x weekly - 1 sets - 3 reps - 10 hold  Seated Overhead Press with Dumbbells - 1 x daily - 4 x weekly - 2 sets - 10 reps  CLX Tricep Pressdowns - 1 x daily - 4 x weekly - 2 sets - 10 reps  Seated Shoulder Abduction with Dumbbells - Thumbs Up - 1 x daily - 4 x weekly - 2 sets - 10 reps  Seated Assisted Cervical Rotation with Towel - 1-2 x daily - 5 x weekly - 1 sets - 10 reps - 2-3 hold  Seated Arm Bike - 1 x daily - 3-4 x weekly - 1 sets - 1 reps - 2-3 minutes    Access Code: [email or web address]  URL: [email or web address]  Date: 12/13/2022  Prepared by: Skeet Sow    Exercises  Prone Shoulder Row - 1 x daily - 4 x weekly - 2 sets - 10 reps  Prone Shoulder Extension - Single Arm - 1 x daily - 4 x weekly - 2 sets - 10 reps  Sidelying Shoulder ER with Towel and Dumbbell - 1 x daily - 4 x weekly - 2 sets - 10 reps  Low T's \"Pull Aparts\" - 1 x daily - 4 x weekly - 2 sets - 10 reps  Supine Scapular Protraction in Flexion with Dumbbells - 1 x daily - 4 x weekly - 2 sets - 10 reps  Doorway Pec Stretch at 90 Degrees Abduction - 1-2 x daily - 5 x weekly - 1 sets - 3 reps - 10 hold  Seated Overhead Press with Dumbbells - 1 x daily - 4 x weekly - 2 sets - 10 reps  CLX Tricep Pressdowns - 1 x daily - 4 x weekly - 2 sets - 10 reps  Seated Shoulder Abduction with Dumbbells - Thumbs Up - 1 x daily - 4 x weekly - 2 sets - 10 reps    Access Code: [email or web address]  URL: [email or web address]  Date: 12/06/2022  Prepared by: Skeet Sow    Exercises  Prone Shoulder Row - 1 x daily - 4 x weekly - 2 sets - 10 reps  Prone Shoulder Extension - Single Arm - 1 x daily - 4 x weekly - 2 sets - 10 reps  Sidelying Shoulder ER with Towel and Dumbbell - 1 x daily - 4 x weekly - 2 sets - 10 reps  Low T's \"Pull Aparts\" - 1 x daily - 4 x weekly - 2 sets - 10 reps  Supine Scapular Protraction in Flexion with Dumbbells - 1 x daily - 4 x weekly - 2 sets - 10 reps  Doorway Pec Stretch at 90 Degrees Abduction - 1-2 x daily - 5 x weekly - 1 sets - 3 reps - 10 hold    11/8 - Access Code: [email or web address]  URL: [email or web address]  Date: 11/08/2022  Prepared by: Vilma Cm    Exercises  Seated Cervical Sidebending Stretch - 2-3 x daily - 6 x weekly - 1 sets - 3 reps - 10 hold  Seated Levator Scapulae Stretch - 2-3 x daily - 6 x weekly - 1 sets - 3 reps - 10 hold  Supine Chin Tuck - 2-3 x daily - 6 x weekly - 1 sets - 5 reps - 3 hold  Median Nerve Flossing - Tray - 2 x daily - 5 x weekly - 1 sets - 8 reps  Shoulder External Rotation and Scapular Retraction with Resistance - 1 x daily - 4 x weekly - 2 sets - 10 reps  Standing Shoulder Horizontal Abduction with Resistance - 1 x daily - 4 x weekly - 2 sets - 10 reps    Therapeutic Exercise and NMR EXR  [] (33010) Provided verbal/tactile cueing for activities related to strengthening, flexibility, endurance, ROM for improvements in  [] LE / Lumbar: LE, proximal hip, and core control with self care, mobility, lifting, ambulation. [] UE / Cervical: cervical, postural, scapular, scapulothoracic and UE control with self care, reaching, carrying, lifting, house/yardwork, driving, computer work.  [] (73704) Provided verbal/tactile cueing for activities related to improving balance, coordination, kinesthetic sense, posture, motor skill, proprioception to assist with   [] LE / lumbar: LE, proximal hip, and core control in self care, mobility, lifting, ambulation and eccentric single leg control. [] UE / cervical: cervical, scapular, scapulothoracic and UE control with self care, reaching, carrying, lifting, house/yardwork, driving, computer work.   [] (59883) Therapist is in constant attendance of 2 or more patients providing skilled therapy interventions, but not providing any significant amount of measurable one-on-one time to either patient, for improvements in  [] LE / lumbar: LE, proximal hip, and core control in self care, mobility, lifting, ambulation and eccentric single leg control. [] UE / cervical: cervical, scapular, scapulothoracic and UE control with self care, reaching, carrying, lifting, house/yardwork, driving, computer work.      NMR and Therapeutic Activities:    [x] (61775 or 30269) Provided verbal/tactile cueing for activities related to improving balance, coordination, kinesthetic sense, posture, motor skill, proprioception and motor activation to allow for proper function of   [x] LE: / Lumbar core, proximal hip and LE with self care and ADLs  [] UE / Cervical: cervical, postural, scapular, scapulothoracic and UE control with self care, carrying, lifting, driving, computer work.   [] (68250) Gait Re-education- Provided training and instruction to the patient for proper LE, core and proximal hip recruitment and positioning and eccentric body weight control with ambulation re-education including up and down stairs     Home Management Training / Self Care:  [] (44515) Provided self-care/home management training related to activities of daily living and compensatory training, and/or use of adaptive equipment for improvement with: ADLs and compensatory training, meal preparation, safety procedures and instruction in use of adaptive equipment, including bathing, grooming, dressing, personal hygiene, basic household cleaning and chores.      Home Exercise Program:    [] (80584) Reviewed/Progressed HEP activities related to strengthening, flexibility, endurance, ROM of   [] LE / Lumbar: core, proximal hip and LE for functional self-care, mobility, lifting and ambulation/stair navigation   [] UE / Cervical: cervical, postural, scapular, scapulothoracic and UE control with self care, reaching, carrying, lifting, house/yardwork, driving, computer work  [] (87708)Reviewed/Progressed HEP activities related to improving balance, coordination, kinesthetic sense, posture, motor skill, proprioception of   [] LE: core, proximal hip and LE for self care, mobility, lifting, and ambulation/stair navigation    [] UE / Cervical: cervical, postural,  scapular, scapulothoracic and UE control with self care, reaching, carrying, lifting, house/yardwork, driving, computer work    Manual Treatments:  PROM / STM / Oscillations-Mobs:  G-I, II, III, IV (PA's, Inf., Post.)  [x] (15270) Provided manual therapy to mobilize LE, proximal hip and/or LS spine soft tissue/joints for the purpose of modulating pain, promoting relaxation,  increasing ROM, reducing/eliminating soft tissue swelling/inflammation/restriction, improving soft tissue extensibility and allowing for proper ROM for normal function with   [] LE / lumbar: self care, mobility, lifting and ambulation. [x] UE / Cervical: self care, reaching, carrying, lifting, house/yardwork, driving, computer work. Modalities:  [] (93429) Vasopneumatic compression: Utilized vasopneumatic compression to decrease edema / swelling for the purpose of improving mobility and quad tone / recruitment which will allow for increased overall function including but not limited to self-care, transfers, ambulation, and ascending / descending stairs. Charges:  Timed Code Treatment Minutes: 54   Total Treatment Minutes: 54     [] EVAL - LOW (43961)   [] EVAL - MOD (05597)  [] EVAL - HIGH (47617)  [] RE-EVAL (41563)  [x] SO(62240) x 1      [] Ionto  [] NMR (14340) x    [] Vaso  [x] Manual (08760) x 1    [] Ultrasound  [x] TA x 2      [] Mech Traction (05001)  [] Aquatic Therapy x     [] ES (un) (50050):   [] Home Management Training x  [] ES(attended) (55725)   [] Dry Needling 1-2 muscles (13068):  [] Dry Needling 3+ muscles (885407)  [] Group:      [] Other:     GOALS:  Patient stated goal: to reduce pain  [] Progressing: [x] Met: [] Not Met: [] Adjusted     Therapist goals for Patient:   Short Term Goals: To be achieved in: 2 weeks  1. Independent in HEP and progression per patient tolerance, in order to promote ROM and flexibility gains, as well as DCF endurance and activity tolerance  [] Progressing: [x] Met: [] Not Met: [] Adjusted  2. Patient will have a decrease in pain to <2/10 on average to facilitate improvement in movement, function, and ADLs  [] Progressing: [x] Met: [] Not Met: [] Adjusted     Long Term Goals: To be achieved in: 4 weeks  1.  FOTO score of at least 63 to assist with reaching prior level of function. [] Progressing: [] Met: [x] Not Met: [] Adjusted  2. Patient will demonstrate increased AROM symmetric cervical sidebend, rotation, 50deg ext, 60deg flex to allow for proper joint functioning needed at the neck for navigation of environment. [] Progressing: [x] Partially Met: [] Not Met: [] Adjusted  3. Patient will demonstrate an increase in postural awareness and control and activation of Deep cervical stabilizers >30sec to allow for proper stabilization of cervical spine needed for loaded/standing tasks. [] Progressing: [] Met: [x] Not Met: [] Adjusted  4. Patient will return to functional activities including group workouts twice per week without restrictions. [] Progressing: [x] Partially Met (1 class per week) [] Not Met: [] Adjusted  5. The patient will drive without increased LUE sxs for 1 week. [] Progressing: [x] Met: [] Not Met: [] Adjusted       6. The patient will sleep without neck-induced waking-pain for 1 week. [] Progressing: [] Met: [x] Not Met: [] Adjusted       7. The patient will have a decrease in pain to <1/10 on average with ADLs and iADLs. [] Progressing: [x] Met: [] Not Met: [] Adjusted       Overall Progression Towards Functional goals/ Treatment Progress Update:  [x] Patient is progressing slowly as expected towards functional goals listed. [] Progression is slowed due to complexities/Impairments listed. [] Progression has been slowed due to co-morbidities.   [] Plan just implemented, too soon to assess goals progression <30days   [] Goals require adjustment due to lack of progress  [] Patient is not progressing as expected and requires additional follow up with physician  [] Other    Persisting Functional Limitations/Impairments:  [x]Sleeping []Sitting               []Standing []Transfers        []Walking []Kneeling               []Stairs []Squatting / bending   []ADLs [x]Reaching  [x]Lifting  []Housework  [x]Driving [x]Job related tasks  [x]Sports/Recreation []Other:     ASSESSMENT:  SEE POC ABOVE    Treatment/Activity Tolerance:  [x] Patient able to complete tx [] Patient limited by fatigue  [] Patient limited by pain  [] Patient limited by other medical complications  [] Other:     Prognosis: [x] Good [x] Fair  [] Poor    Patient Requires Follow-up: [] Yes  [x] No    Plan for next treatment session: manual, DC next visit with FINALIZED HEP    PLAN: PT 1x / week for 1 weeks. [] Continue per plan of care [] Alter current plan (see comments)  [] Plan of care initiated [] Hold pending MD visit [x] Discharge    Electronically signed by: Kiah Comer, PT PT, DPT, OMT-C    Note: If patient does not return for scheduled/ recommended follow up visits, this note will serve as a discharge from care along with most recent update on progress.

## 2023-01-05 RX ORDER — CITALOPRAM 20 MG/1
TABLET ORAL
Qty: 21 TABLET | Refills: 0 | Status: SHIPPED | OUTPATIENT
Start: 2023-01-05

## 2023-01-20 ENCOUNTER — OFFICE VISIT (OUTPATIENT)
Dept: ORTHOPEDIC SURGERY | Age: 63
End: 2023-01-20
Payer: COMMERCIAL

## 2023-01-20 VITALS — HEIGHT: 62 IN | WEIGHT: 140 LBS | RESPIRATION RATE: 16 BRPM | BODY MASS INDEX: 25.76 KG/M2

## 2023-01-20 DIAGNOSIS — M50.30 DDD (DEGENERATIVE DISC DISEASE), CERVICAL: ICD-10-CM

## 2023-01-20 DIAGNOSIS — M47.812 CERVICAL SPONDYLOSIS WITHOUT MYELOPATHY: Primary | ICD-10-CM

## 2023-01-20 DIAGNOSIS — M79.18 MYOFASCIAL MUSCLE PAIN: ICD-10-CM

## 2023-01-20 DIAGNOSIS — M48.02 FORAMINAL STENOSIS OF CERVICAL REGION: ICD-10-CM

## 2023-01-20 PROCEDURE — 99214 OFFICE O/P EST MOD 30 MIN: CPT | Performed by: STUDENT IN AN ORGANIZED HEALTH CARE EDUCATION/TRAINING PROGRAM

## 2023-01-20 RX ORDER — MELOXICAM 15 MG/1
TABLET ORAL
Qty: 90 TABLET | Refills: 0 | Status: SHIPPED | OUTPATIENT
Start: 2023-01-20

## 2023-01-20 RX ORDER — BACLOFEN 10 MG/1
10 TABLET ORAL DAILY PRN
Qty: 90 TABLET | Refills: 0 | Status: SHIPPED | OUTPATIENT
Start: 2023-01-20 | End: 2023-04-20

## 2023-01-20 RX ORDER — GABAPENTIN 300 MG/1
CAPSULE ORAL
Qty: 90 CAPSULE | Refills: 0 | Status: SHIPPED | OUTPATIENT
Start: 2023-01-20 | End: 2023-04-20

## 2023-01-20 RX ORDER — TIZANIDINE HYDROCHLORIDE 2 MG/1
CAPSULE, GELATIN COATED ORAL
Qty: 90 CAPSULE | Refills: 0 | Status: SHIPPED | OUTPATIENT
Start: 2023-01-20

## 2023-01-20 RX ORDER — GABAPENTIN 100 MG/1
CAPSULE ORAL
Qty: 90 CAPSULE | Refills: 0 | Status: SHIPPED | OUTPATIENT
Start: 2023-01-20 | End: 2023-02-20

## 2023-01-20 NOTE — PROGRESS NOTES
Follow up: 2020 [facility]  1960  [id number]      CHIEF COMPLAINT:    Chief Complaint   Patient presents with    Neck Pain         HISTORY OF PRESENT ILLNESS:  Ms. Joselin Carroll is a [age] [facility] y.o. female returns for a follow up visit for multiple medical problems. Her current presenting problems are   1. Cervical spondylosis without myelopathy    2. Foraminal stenosis of cervical region    3. DDD (degenerative disc disease), cervical    4. Myofascial muscle pain    . As per information/history obtained from the PADT(patient assessment and documentation tool) - She complains of pain in the neck with radiation to the head and shoulders Left She rates the pain 3/10 and describes it as dull, aching. Pain is made worse by: lifting, using her head and upper body. She denies side effects from the current pain regimen. Patient reports that since the last follow up visit the physical functioning is unchanged, family/social relationships are unchanged, mood is unchanged and sleep patterns are unchanged, and that the overall functioning is unchanged. Patient denies neurological bowel or bladder. The patient presents for follow up of ongoing neck pain. 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 She presents today for refill of this medication. This helps her sleep at least 6hrs a night. It brings pain down to 3/10. She denies any medication side effects. She also takes tizanidine, meloxicam, and baclofen to help control her pain. She has recently been in physical therapy with great improvement of her pain.       Associated signs and symptoms:   Neurogenic bowel or bladder symptoms:  no   Perceived weakness:  no   Difficulty walking:  no              Past Medical History:   Past Medical History:   Diagnosis Date    Abnormal mammogram 6/8/2011    saw Dr. Felisha Augustine or associate 6/2011 Ok to return for regular mammogram's     Anxiety 6/8/2011    Hyperlipidemia     Kidney stone     Medical history reviewed with no changes     Pyelonephritis 7/28/2011    Recurrent Change to augmentin and levaquin and ck blood cx's 7/27/2011 On daily macrobid per Dr. Hannah Jimenez disorder, acute 3/23/2013    Due to 's infidelity and leaving her 3/2013 To see counselor- increase celexa   still living w the family as of 6/2013       Past Surgical History:     Past Surgical History:   Procedure Laterality Date    BREAST BIOPSY Left     benign core biopsy    CARPAL TUNNEL RELEASE  12/29/2011    Left    CARPAL TUNNEL RELEASE Right 12/30/2014    CYSTOSCOPY      with stent placement-x3    PAIN MANAGEMENT PROCEDURE Left 08/26/2020    LEFT C2, THIRD OCCIPITAL NERVE, C3, C4, C5 MEDIAL BRANCH BLOCK WITH FLUOROSCOPY (20096, 09991)  #1 performed by Brunilda Michaels MD at [address] [place] [address] Left 09/09/2020    LEFT C2,TON,C3,C4 C5 MEDIAL BRANCH BLOCKS WITH FLUOROSCOPY performed by Brunilda Michaels MD at [address] [place] [address]  10/07/2020    PAIN MANAGEMENT PROCEDURE Left 10/07/2020    LEFT C2,TON,C3,C4,C5 RADIOFREQUENCY ABLATION WITH FLUOROSCOPY performed by Brunilda Michaels MD at [address] [place] [address] 04/06/2021    LEFT C2, THIRD OCCIPITAL NERVE, C3, C4, C5  RADIOFREQUENCY ABLATION WITH FLUOROSCOPY performed by Leo Caban MD at [address] [facility] maxillary gland removal     Current Medications:     Current Outpatient Medications:     citalopram (CELEXA) 20 MG tablet, TAKE 1 TABLET BY MOUTH ONE TIME A DAY, Disp: 21 tablet, Rfl: 0    montelukast (SINGULAIR) 10 MG tablet, Take 1 tablet by mouth nightly, Disp: 90 tablet, Rfl: 0    tiZANidine (ZANAFLEX) 2 MG capsule, TAKE ONE CAPSULE BY MOUTH EVERY MORNING, Disp: 90 capsule, Rfl: 0    baclofen (LIORESAL) 10 MG tablet, TAKE 1 TABLET BY MOUTH 2 TIMES A DAY, Disp: 60 tablet, Rfl: 0    gabapentin (NEURONTIN) 300 MG capsule, TAKE ONE CAPSULE BY MOUTH NIGHTLY, Disp: 90 capsule, Rfl: 0    fluticasone (FLONASE) 50 MCG/ACT nasal spray, USE 1 SPRAYS IN EACH NOSTRIL DAILY, Disp: 1 each, Rfl: 0    meloxicam (MOBIC) 15 MG tablet, TAKE 1 TABLET BY MOUTH ONE TIME A DAY AS NEEDED FOR PAIN, Disp: 90 tablet, Rfl: 0    rizatriptan (MAXALT-MLT) 10 MG disintegrating tablet, TALE 1 TABLET BY MOUTH AS NEEDED FOR MIGRAINES MAY REPEAT 2 HOURS LATER IF NEEDED, Disp: 27 tablet, Rfl: 3    butalbital-acetaminophen-caffeine (FIORICET, ESGIC) -40 MG per tablet, TAKE ONE TABLET BY MOUTH EVERY 4 HOURS AS NEEDED FOR PAIN UP TO 10 DAYS, Disp: 30 tablet, Rfl: 3    potassium chloride (KLOR-CON M) 10 MEQ extended release tablet, TAKE 1 TABLET BY MOUTH ONE TIME A DAY, Disp: 90 tablet, Rfl: 3    cycloSPORINE (RESTASIS) 0.05 % ophthalmic emulsion, Restasis 0.05 % eye drops in a dropperette, Disp: , Rfl:     loratadine (CLARITIN) 10 MG tablet, Take 10 mg by mouth daily, Disp: , Rfl:     Vitamin D, Ergocalciferol, 50 MCG (2000 UT) CAPS, Take 4,000 Units by mouth, Disp: , Rfl:     folic acid (FOLVITE) 1 MG tablet, Take 1 mg by mouth daily, Disp: , Rfl:     calcium citrate-vitamin D (CITRACAL+D) 315-200 MG-UNIT per tablet, Take 1 tablet by mouth daily. , Disp: , Rfl:     gabapentin (NEURONTIN) 100 MG capsule, TAKE 1 CAPSULE BY MOUTH ONE TIME A DAY, Disp: 90 capsule, Rfl: 0  Allergies:  Statins  Social History:    reports that she has never smoked. She has never used smokeless tobacco. She reports that she does not drink alcohol and does not use drugs.   Family History:   Family History   Problem Relation Age of Onset    Diabetes Mother     Kidney Cancer Mother         RIGHT    Breast Cancer Mother 72    Cancer Maternal Grandmother     Diabetes Maternal Grandmother     Cancer Maternal Grandfather     Diabetes Maternal Grandfather     Heart Disease Other     High Blood Pressure Other        REVIEW OF SYSTEMS:   CONSTITUTIONAL: Denies unexplained weight loss, fevers, chills or fatigue  NEUROLOGICAL: Denies unsteady gait or progressive weakness  MUSCULOSKELETAL: Denies joint swelling or redness  GI: Denies nausea, vomiting, diarrhea   : Denies bowel or bladder issues       PHYSICAL EXAM:    Vitals: Resp. rate 16, height 5' 2\" (1.575 m), weight 140 lb (63.5 kg), last menstrual period 10/07/2012. GENERAL EXAM:  General Apparence: Patient is adequately groomed with no evidence of malnutrition. Psychiatric: Orientation: The patient is oriented to time, place and person. The patient's mood and affect are appropriate   Vascular: Examination reveals no swelling and palpation reveals no tenderness in upper or lower extremities. Good capillary refill. The lymphatic examination of the neck, axillae and groin reveals all areas to be without enlargement or induration  Sensation is intact without deficit in the upper and lower extremities to light touch and pinprick  Coordination of the upper and lower extremities are normal.    CERVICAL EXAMINATION:  Inspection: Local inspection shows no step-off or bruising. Cervical alignment is normal. No instability is noted. Palpation and Percussion: No evidence of tenderness at the midline. Paraspinal tenderness is not present. There is no paraspinal spasm. Skin:There are no rashes, ulcerations or lesions  Range of Motion:  limited by 25% in all planes due to pain  Strength: 5/5 bilateral upper extremities  Special Tests:   Spurling's and Nguyen's are negative bilaterally. Metcalf and Impingement tests are negative bilaterally. Skin:There are no rashes, ulcerations or lesions in right & left upper extremities. Reflexes: Bilaterally triceps, biceps and brachioradialis are 2+. Clonus absent bilaterally at the feet. No pathological reflexes are noted.   Gait & station: normal, patient ambulates without assistance  Additional Examinations:  RIGHT UPPER EXTREMITY:  Inspection/examination of the right upper extremity does not show any tenderness, deformity or injury. Range of motion is unremarkable and pain-free. There is no gross instability.  There are no rashes, ulcerations or lesions. Strength and tone are normal. No atrophy or abnormal movements are noted.  LEFT UPPER EXTREMITY: Inspection/examination of the left upper extremity does not show any tenderness, deformity or injury. Range of motion is unremarkable and pain-free. There is no gross instability.  There are no rashes, ulcerations or lesions. Strength and tone are normal. No atrophy or abnormal movements are noted.  RIGHT LOWER EXTREMITY: Inspection/examination of the right lower extremity does not show any tenderness, deformity or injury. Range of motion is normal and pain-free. There is no gross instability.  There are no rashes, ulcerations or lesions. Strength and tone are normal. No atrophy or abnormal movements are noted.  LEFT LOWER EXTREMITY:  Inspection/examination of the left lower extremity does not show any tenderness, deformity or injury. Range of motion is normal and pain-free. There is no gross instability. There are no rashes, ulcerations or lesions.  Strength and tone are normal. No atrophy or abnormal movements are noted.      Diagnostic Testing:    No new diagnostics  Results for orders placed or performed in visit on 07/26/22   Hemoglobin A1C   Result Value Ref Range    Hemoglobin A1C 5.6 See comment %    eAG 114.0 mg/dL     Impression:       1. Cervical spondylosis without myelopathy    2. Foraminal stenosis of cervical region    3. DDD (degenerative disc disease), cervical    4. Myofascial muscle pain        Plan:  Clinical Course: Above diagnoses are improving     I discussed the diagnosis and the treatment options with Veronika Lindsey today.     In Summary:  The various treatment options were outlined and discussed with Veronika Lindsey including:  Conservative care options: physical therapy, ice, medications, bracing, and activity modification. The indications for  therapeutic injections. The indications for additional imaging/laboratory studies. The indications for (possible future) interventions. After considering the various options discussed, Herminio Garcia elected to pursue a course of treatment that includes the followin. Medications: I will send gabapentin 300 mg and 100 mg to the pharmacy. PDMP reviewed. No adverse effects. She takes one tablet of gabapentin 300 mg daily and 100 mg daily unless she is on call, then she only takes 100 mg. She also takes baclofen 1 tablet as needed during the day and tizanidine 2 mg nightly. 2. PT:  She did really well with PT with Javi Capps. I agree with Placido Alexander that she would benefit from boost PT sessions in the future. 3. Follow up:   3 months  for med refill      Dejon RomeroannaCash was instructed to call the office if her symptoms worsen or if new symptoms appear prior to the next scheduled visit. She is specifically instructed to contact the office between now & her scheduled appointment if she has concerns related to her condition or if she needs assistance in scheduling the above tests. She is welcome to call for an appointment sooner if she has any additional concerns or questions. Ivan Cummings PA-C  Board Certified by the [employer] on Certification of [address] and [address] [address]               This dictation was performed with a verbal recognition program [facility]) and it was checked for errors. It is possible that there are still dictated errors within this office note. If so, please bring any errors to my attention for an addendum. All efforts were made to ensure that this office note is accurate.

## 2023-01-31 ENCOUNTER — OFFICE VISIT (OUTPATIENT)
Dept: INTERNAL MEDICINE CLINIC | Age: 63
End: 2023-01-31
Payer: COMMERCIAL

## 2023-01-31 ENCOUNTER — PATIENT MESSAGE (OUTPATIENT)
Dept: ORTHOPEDIC SURGERY | Age: 63
End: 2023-01-31

## 2023-01-31 ENCOUNTER — PATIENT MESSAGE (OUTPATIENT)
Dept: INTERNAL MEDICINE CLINIC | Age: 63
End: 2023-01-31

## 2023-01-31 VITALS
OXYGEN SATURATION: 98 % | HEIGHT: 62 IN | TEMPERATURE: 97.9 F | HEART RATE: 76 BPM | SYSTOLIC BLOOD PRESSURE: 140 MMHG | BODY MASS INDEX: 25.17 KG/M2 | WEIGHT: 136.8 LBS | DIASTOLIC BLOOD PRESSURE: 88 MMHG

## 2023-01-31 DIAGNOSIS — E78.5 HYPERLIPIDEMIA, UNSPECIFIED HYPERLIPIDEMIA TYPE: ICD-10-CM

## 2023-01-31 DIAGNOSIS — F41.9 ANXIETY: ICD-10-CM

## 2023-01-31 DIAGNOSIS — M77.11 LATERAL EPICONDYLITIS OF RIGHT ELBOW: ICD-10-CM

## 2023-01-31 DIAGNOSIS — Z79.1 NSAID LONG-TERM USE: Primary | ICD-10-CM

## 2023-01-31 DIAGNOSIS — Z00.00 ANNUAL PHYSICAL EXAM: ICD-10-CM

## 2023-01-31 DIAGNOSIS — G43.909 MIGRAINE WITHOUT STATUS MIGRAINOSUS, NOT INTRACTABLE, UNSPECIFIED MIGRAINE TYPE: ICD-10-CM

## 2023-01-31 DIAGNOSIS — J30.9 ALLERGIC RHINITIS, UNSPECIFIED SEASONALITY, UNSPECIFIED TRIGGER: ICD-10-CM

## 2023-01-31 PROCEDURE — 99396 PREV VISIT EST AGE 40-64: CPT | Performed by: INTERNAL MEDICINE

## 2023-01-31 RX ORDER — LEVOCETIRIZINE DIHYDROCHLORIDE 5 MG/1
5 TABLET, FILM COATED ORAL NIGHTLY
COMMUNITY

## 2023-01-31 RX ORDER — VENLAFAXINE HYDROCHLORIDE 37.5 MG/1
37.5 CAPSULE, EXTENDED RELEASE ORAL DAILY
Qty: 90 CAPSULE | Refills: 0 | Status: SHIPPED | OUTPATIENT
Start: 2023-01-31

## 2023-01-31 RX ORDER — FLUTICASONE PROPIONATE 50 MCG
1 SPRAY, SUSPENSION (ML) NASAL
COMMUNITY

## 2023-01-31 RX ORDER — AZELASTINE 1 MG/ML
1 SPRAY, METERED NASAL 2 TIMES DAILY
Qty: 30 ML | Refills: 1 | Status: SHIPPED | OUTPATIENT
Start: 2023-01-31

## 2023-01-31 RX ORDER — GABAPENTIN 300 MG/1
300 CAPSULE ORAL
COMMUNITY

## 2023-01-31 RX ORDER — POTASSIUM CITRATE 15 MEQ/1
TABLET, EXTENDED RELEASE ORAL DAILY
COMMUNITY
Start: 2022-12-01

## 2023-01-31 RX ORDER — MELOXICAM 15 MG/1
15 TABLET ORAL DAILY
COMMUNITY
End: 2023-02-01 | Stop reason: SDUPTHER

## 2023-01-31 RX ORDER — PSYLLIUM HUSK 0.4 G
CAPSULE ORAL
COMMUNITY

## 2023-01-31 SDOH — ECONOMIC STABILITY: FOOD INSECURITY: WITHIN THE PAST 12 MONTHS, YOU WORRIED THAT YOUR FOOD WOULD RUN OUT BEFORE YOU GOT MONEY TO BUY MORE.: NEVER TRUE

## 2023-01-31 SDOH — ECONOMIC STABILITY: FOOD INSECURITY: WITHIN THE PAST 12 MONTHS, THE FOOD YOU BOUGHT JUST DIDN'T LAST AND YOU DIDN'T HAVE MONEY TO GET MORE.: NEVER TRUE

## 2023-01-31 ASSESSMENT — ANXIETY QUESTIONNAIRES
1. FEELING NERVOUS, ANXIOUS, OR ON EDGE: 3
GAD7 TOTAL SCORE: 21
6. BECOMING EASILY ANNOYED OR IRRITABLE: 3
4. TROUBLE RELAXING: 3
2. NOT BEING ABLE TO STOP OR CONTROL WORRYING: 3
3. WORRYING TOO MUCH ABOUT DIFFERENT THINGS: 3
7. FEELING AFRAID AS IF SOMETHING AWFUL MIGHT HAPPEN: 3
5. BEING SO RESTLESS THAT IT IS HARD TO SIT STILL: 3

## 2023-01-31 ASSESSMENT — ENCOUNTER SYMPTOMS
EYE REDNESS: 0
BACK PAIN: 1
COLOR CHANGE: 0
BLOOD IN STOOL: 0
SHORTNESS OF BREATH: 0
DIARRHEA: 0
ABDOMINAL PAIN: 0
NAUSEA: 0
COUGH: 0

## 2023-01-31 ASSESSMENT — PATIENT HEALTH QUESTIONNAIRE - PHQ9
2. FEELING DOWN, DEPRESSED OR HOPELESS: 0
8. MOVING OR SPEAKING SO SLOWLY THAT OTHER PEOPLE COULD HAVE NOTICED. OR THE OPPOSITE, BEING SO FIGETY OR RESTLESS THAT YOU HAVE BEEN MOVING AROUND A LOT MORE THAN USUAL: 0
10. IF YOU CHECKED OFF ANY PROBLEMS, HOW DIFFICULT HAVE THESE PROBLEMS MADE IT FOR YOU TO DO YOUR WORK, TAKE CARE OF THINGS AT HOME, OR GET ALONG WITH OTHER PEOPLE: 0
4. FEELING TIRED OR HAVING LITTLE ENERGY: 0
5. POOR APPETITE OR OVEREATING: 0
1. LITTLE INTEREST OR PLEASURE IN DOING THINGS: 0
3. TROUBLE FALLING OR STAYING ASLEEP: 0
SUM OF ALL RESPONSES TO PHQ9 QUESTIONS 1 & 2: 0
7. TROUBLE CONCENTRATING ON THINGS, SUCH AS READING THE NEWSPAPER OR WATCHING TELEVISION: 0
SUM OF ALL RESPONSES TO PHQ QUESTIONS 1-9: 0
SUM OF ALL RESPONSES TO PHQ QUESTIONS 1-9: 0
9. THOUGHTS THAT YOU WOULD BE BETTER OFF DEAD, OR OF HURTING YOURSELF: 0
6. FEELING BAD ABOUT YOURSELF - OR THAT YOU ARE A FAILURE OR HAVE LET YOURSELF OR YOUR FAMILY DOWN: 0
SUM OF ALL RESPONSES TO PHQ QUESTIONS 1-9: 0
SUM OF ALL RESPONSES TO PHQ QUESTIONS 1-9: 0

## 2023-01-31 ASSESSMENT — SOCIAL DETERMINANTS OF HEALTH (SDOH): HOW HARD IS IT FOR YOU TO PAY FOR THE VERY BASICS LIKE FOOD, HOUSING, MEDICAL CARE, AND HEATING?: NOT HARD AT ALL

## 2023-01-31 NOTE — ASSESSMENT & PLAN NOTE
Uncontrolled, NEVIN 23,  was under a lot of stress with work during covid (works in [employer])  -on celexa 20 mg, will try to switch to effexor given profound anxiety: lower celexa to 10 mg for 2 weeks+strat effexor 37.5 mg then stop celexa and increase effexor to 75 mg    -Follow-up in 6 weeks

## 2023-01-31 NOTE — ASSESSMENT & PLAN NOTE
-hx of long standing migraine since her 25s  -takes rizatriptan 10 mg prn and fiorcest  -gets~2 migraines every 90 days, rare med use  -sees Dr. Silvia Lou with max

## 2023-01-31 NOTE — PROGRESS NOTES
[facility] Internal Medicine  Establish care visit / annual physical  2023    Ami Lindsey (:  1960) abbi 63 y.o. female, here to establish care. Chief Complaint   Patient presents with    Establish Care    Hyperlipidemia               Patient Active Problem List   Diagnosis    Kidney stones    Other headache syndrome    Anxiety    Insomnia    Carpal tunnel syndrome    Spondylolisthesis of lumbar region    Hyperlipidemia    Vaginal atrophy    Bilateral bunions    Arthralgia of both knees    Dry eyes    Lateral epicondylitis of right elbow    Neck pain    Left shoulder pain    Annual physical exam    Migraine    Allergic rhinitis       ASSESSMENT/ PLAN:    Annual physical exam  Here for annual physical exam, overall doing well from a clinical standpoint. As for health maintenance:  -cervical cancer screen: Reportedly last year, awaiting records  -breast cancer screen: Mammogram , mother had breast cancer in her 76s  -colon cancer screen: Had Cologuard in , maternal grandfather had colon cancer  -/88  -BMI 25.02, reviewed recent lipids and A1c (be well within)  -HIV and HCV negative  -negative depression and DV screen  -Advised to eat a healthy, well-balanced diet  -Advised to exercise at least 30min/day 5x/week for heart and bone health  -Check skin regularly for new moles or changes in moles.  wear sunscreen at least SPF 30 and don't forget to reapply every 3-4 hours or if you are in the water  -Follow up with dentist at least twice/year.  -Follow up with eye doctor at least once/year.  -Calcium goal is 5077-5296 mg a day ideally from diet (dairy, green leafy vegetables, nuts)     Anxiety  Uncontrolled, NEVIN 21,  was under a lot of stress with work during [employer] (works in [employer])  -on celexa 20 mg, will try to switch to effexor given profound anxiety: lower celexa to 10 mg for 2 weeks+strat effexor 37.5 mg then stop celexa and increase effexor to 75 mg    -Follow-up in 6 weeks    Hyperlipidemia  Intolerant of lipitor, simvastatin, pravastatin in the past, prefers to avoid tx.     Migraine  -hx of long standing migraine since her 20s  -takes rizatriptan 10 mg prn and fiorcest  -gets~2 migraines every 90 days, rare med use  -sees Dr. Paul with [facility]    Lateral epicondylitis of right elbow  - Multiple Ortho issues/injuries, on baclofen, tizanidine, meloxicam and gabapentin  -Check kidney function given on chronic meloxicam      Orders Placed This Encounter   Procedures    Comprehensive Metabolic Panel    CBC with Auto Differential     Orders Placed This Encounter   Medications    venlafaxine (EFFEXOR XR) 37.5 MG extended release capsule     Sig: Take 1 capsule by mouth daily lower celexa to 10 mg for 2 weeks+strat effexor 37.5 mg then stop celexa and increase effexor to 75 mg     Dispense:  90 capsule     Refill:  0    azelastine (ASTELIN) 0.1 % nasal spray     Si spray by Nasal route 2 times daily Use in each nostril as directed     Dispense:  30 mL     Refill:  1      Medications Discontinued During This Encounter   Medication Reason    loratadine (CLARITIN) 10 MG tablet Therapy completed    meloxicam (MOBIC) 15 MG tablet DOSE ADJUSTMENT    potassium chloride (KLOR-CON M) 10 MEQ extended release tablet Alternate therapy    fluticasone (FLONASE) 50 MCG/ACT nasal spray     citalopram (CELEXA) 20 MG tablet Alternate therapy        No follow-ups on file.    HPI  Here to establish care and for annual physical exam, prior patient of Dr. Keys who retired.  Chronic conditions and Medications reviewed and updated today.    Social and Lifestyle reviewed and updated today.   - Exercise: zoomba 2-3 /week, 60 mions   - Diet: well balanced   Other providers- ortho, urology    Screening:    Cancer:    - Cervical cancer: last 10/22    - Breast cancer:  No Fhx  (mother had breast cancer in her 70s)   - Colon cancer: cologuard   (maternal grandfather had colon cancer )   - lung  cancer: not indicatged  Other screening:   - Hypertension: BP WNL today    - Diabetes mellitus/ Hyperlipidemia:   Lab Results   Component Value Date/Time    CHOL 254 07/26/2022 06:04 AM    CHOL 270 11/04/2021 08:39 AM    CHOL 309 08/28/2021 09:12 AM    TRIG 167 07/26/2022 06:04 AM    TRIG 148 11/04/2021 08:39 AM    TRIG 169 08/28/2021 09:12 AM    HDL 57 07/26/2022 06:04 AM    HDL 55 11/04/2021 08:39 AM    HDL 57 08/28/2021 09:12 AM    HDL 65 09/29/2011 10:52 AM    LDLCALC 164 07/26/2022 06:04 AM    LDLCALC 185 11/04/2021 08:39 AM    LDLCALC 218 08/28/2021 09:12 AM    GLUCOSE 94 07/26/2022 06:04 AM    GLUCOSE 91 11/04/2021 08:39 AM    GLUCOSE 98 08/28/2021 09:12 AM    LABA1C 5.6 07/26/2022 06:04 AM    LABA1C 5.5 09/06/2019 08:03 AM    LABA1C 5.8 09/06/2018 08:10 AM     - HIV, STD: No results found for: RPR,   Lab Results   Component Value Date    HIV1X2 Non-reactive 09/06/2017      - LGD(3128-1346): Negative  - Domestic violence: pt denies   - Depression:  PHQ2 screen-neck  - Dementia: no issues with memory loss   - Vision/ hearing: No issue      Immunizations:   Immunization History   Administered Date(s) Administered    COVID-19, MODERNA [place] border, Primary or Immunocompromised, (age 12y+), IM, 100 mcg/0.5mL 01/05/2021, 02/02/2021, 11/15/2021    COVID-19, MODERNA Booster BLUE border, (age 18y+), IM, 50mcg/0.25mL 03/08/2022    Influenza 10/29/2015    Influenza Vaccine, unspecified formulation 10/01/2016    Influenza Virus Vaccine 10/29/2015, 10/03/2017, 10/12/2018, 10/07/2019, 10/01/2020, 10/01/2020, 09/19/2021, 10/19/2021, 10/19/2021    Influenza, Triv, 3 Years and older, IM (Afluria (5 yrs and older) 10/10/2022    Tdap (Boostrix, Adacel) 08/06/2012    Tetanus 06/08/2000         HISTORIES  Current Outpatient Medications on File Prior to Visit   Medication Sig Dispense Refill    gabapentin (NEURONTIN) 300 MG capsule Take 300 mg by mouth.  Takes a total of 400 mg daily takes 100 with mg      meloxicam (MOBIC) 15 MG tablet Take 15 mg by mouth daily      Potassium Citrate ER (UROCIT-K) 15 MEQ (1620 MG) TBCR extended release tablet daily      levocetirizine (XYZAL ALLERGY 24HR) 5 MG tablet Take 5 mg by mouth nightly      fluticasone (FLONASE) 50 MCG/ACT nasal spray 1 spray by Nasal route 4 times daily (after meals and at bedtime)      diphenhydrAMINE-APAP, sleep, (TYLENOL PM EXTRA STRENGTH)  MG/30ML LIQD Take by mouth daily      Psyllium (METAMUCIL) 0.36 g CAPS Take by mouth Take 3 caps twice a day      gabapentin (NEURONTIN) 100 MG capsule TAKE 1 CAPSULE BY MOUTH ONE TIME A DAY AS NEEDED 90 capsule 0    gabapentin (NEURONTIN) 300 MG capsule TAKE ONE CAPSULE BY MOUTH NIGHTLY 90 capsule 0    tiZANidine (ZANAFLEX) 2 MG capsule TAKE ONE CAPSULE BY MOUTH EVERY EVENING 90 capsule 0    baclofen (LIORESAL) 10 MG tablet Take 1 tablet by mouth daily as needed (prn) 90 tablet 0    montelukast (SINGULAIR) 10 MG tablet Take 1 tablet by mouth nightly 90 tablet 0    rizatriptan (MAXALT-MLT) 10 MG disintegrating tablet TALE 1 TABLET BY MOUTH AS NEEDED FOR MIGRAINES MAY REPEAT 2 HOURS LATER IF NEEDED 27 tablet 3    butalbital-acetaminophen-caffeine (FIORICET, ESGIC) -40 MG per tablet TAKE ONE TABLET BY MOUTH EVERY 4 HOURS AS NEEDED FOR PAIN UP TO 10 DAYS 30 tablet 3    cycloSPORINE (RESTASIS) 0.05 % ophthalmic emulsion Restasis 0.05 % eye drops in a dropperette      Vitamin D, Ergocalciferol, 50 MCG (2000 UT) CAPS Take 4,000 Units by mouth daily      folic acid (FOLVITE) 1 MG tablet Take 1 mg by mouth daily      calcium citrate-vitamin D (CITRACAL+D) 315-200 MG-UNIT per tablet Take 1 tablet by mouth daily. No current facility-administered medications on file prior to visit.         Allergies   Allergen Reactions    Statins      myalgias     Past Medical History:   Diagnosis Date    Abnormal mammogram 6/8/2011    saw Dr. MEIER[facility]S [facility] or associate 6/2011 Ok to return for regular mammogram's     Anxiety 6/8/2011    Hyperlipidemia Kidney stone     Medical history reviewed with no changes     Pyelonephritis 7/28/2011    Recurrent Change to augmentin and levaquin and ck blood cx's 7/27/2011 On daily macrobid per Dr. Zev Greenfield disorder, acute 3/23/2013    Due to 's infidelity and leaving her 3/2013 To see counselor- increase celexa   still living w the family as of 6/2013      Patient Active Problem List   Diagnosis    Kidney stones    Other headache syndrome    Anxiety    Insomnia    Carpal tunnel syndrome    Spondylolisthesis of lumbar region    Hyperlipidemia    Vaginal atrophy    Bilateral bunions    Arthralgia of both knees    Dry eyes    Lateral epicondylitis of right elbow    Neck pain    Left shoulder pain    Annual physical exam    Migraine    Allergic rhinitis     Past Surgical History:   Procedure Laterality Date    BREAST BIOPSY Left     benign core biopsy    CARPAL TUNNEL RELEASE  12/29/2011    Left    CARPAL TUNNEL RELEASE Right 12/30/2014    CYSTOSCOPY      with stent placement-x3    PAIN MANAGEMENT PROCEDURE Left 08/26/2020    LEFT C2, THIRD OCCIPITAL NERVE, C3, C4, C5 MEDIAL BRANCH BLOCK WITH FLUOROSCOPY (45823, [id number])  #1 performed by Geovanna Fung MD at [address] [place] [address] Left 09/09/2020    LEFT C2,TON,C3,C4 C5 MEDIAL BRANCH BLOCKS WITH FLUOROSCOPY performed by Geovanna Fung MD at [address] [place] [address]  10/07/2020    PAIN MANAGEMENT PROCEDURE Left 10/07/2020    LEFT C2,TON,C3,C4,C5 RADIOFREQUENCY ABLATION WITH FLUOROSCOPY performed by Geovanna Fung MD at [address] [place] [address] Left 04/06/2021    LEFT C2, THIRD OCCIPITAL NERVE, C3, C4, C5  RADIOFREQUENCY ABLATION WITH FLUOROSCOPY performed by Digna Chung MD at [facility] maxillary gland removal     Social History     Socioeconomic History    Marital status:      Spouse name: Not on file    Number of children: 5    Years of education: Not on file    Highest education level: Not on file   Occupational History    Occupation: LISANDRA     Employer: [employer]   Tobacco Use    Smoking status: Never    Smokeless tobacco: Never   Vaping Use    Vaping Use: Never used   Substance and Sexual Activity    Alcohol use: No    Drug use: No    Sexual activity: Not on file   Other Topics Concern    Not on file   Social History Narrative    Not on file     Social Determinants of Health     Financial Resource Strain: Low Risk     Difficulty of Paying Living Expenses: Not hard at all   Food Insecurity: No Food Insecurity    Worried About Running Out of Food in the Last Year: Never true    Ran Out of Food in the Last Year: Never true   Transportation Needs: Not on file   Physical Activity: Not on file   Stress: Not on file   Social Connections: Not on file   Intimate Partner Violence: Not on file   Housing Stability: Not on file      Family History   Problem Relation Age of Onset    Diabetes Mother     Kidney Cancer Mother         RIGHT    Breast Cancer Mother 72    Cancer Maternal Grandmother     Diabetes Maternal Grandmother     Cancer Maternal Grandfather     Diabetes Maternal Grandfather     Heart Disease Other     High Blood Pressure Other          ROS  Review of Systems   Constitutional:  Negative for chills, fever and unexpected weight change. Eyes:  Negative for redness. Respiratory:  Negative for cough and shortness of breath. Cardiovascular:  Negative for chest pain and leg swelling. Gastrointestinal:  Negative for abdominal pain, blood in stool, diarrhea and nausea. Genitourinary:  Negative for dysuria and hematuria. Musculoskeletal:  Positive for arthralgias and back pain. Negative for gait problem. Skin:  Negative for color change. Neurological:  Positive for headaches. Negative for dizziness, weakness and numbness. Psychiatric/Behavioral:  Negative for agitation and confusion.        PE  Vitals:    01/31/23 1001 01/31/23 1019   BP: (!) 140/88 (!) 140/88 Site: Left Upper Arm Left Upper Arm   Position: Sitting Sitting   Cuff Size: Large Adult Large Adult   Pulse: 76    Temp: 97.9 °F (36.6 °C)    TempSrc: Temporal    SpO2: 98%    Weight: 136 lb 12.8 oz (62.1 kg)    Height: 5' 2\" (1.575 m)      Estimated body mass index is 25.02 kg/m² as calculated from the following:    Height as of this encounter: 5' 2\" (1.575 m). Weight as of this encounter: 136 lb 12.8 oz (62.1 kg). Physical Exam  Vitals reviewed. Constitutional:       General: She is not in acute distress. Appearance: Normal appearance. She is well-developed. She is not ill-appearing, toxic-appearing or diaphoretic. HENT:      Head: Normocephalic and atraumatic. Eyes:      General: No scleral icterus. Conjunctiva/sclera: Conjunctivae normal.      Pupils: Pupils are equal, round, and reactive to light. Cardiovascular:      Rate and Rhythm: Normal rate and regular rhythm. Heart sounds: Normal heart sounds. Pulmonary:      Effort: Pulmonary effort is normal. No respiratory distress. Breath sounds: Normal breath sounds. Abdominal:      General: There is no distension. Palpations: Abdomen is soft. Tenderness: There is no abdominal tenderness. Musculoskeletal:      Cervical back: Normal range of motion and neck supple. Right lower leg: No edema. Left lower leg: No edema. Skin:     General: Skin is warm and dry. Coloration: Skin is not jaundiced. Neurological:      Mental Status: She is alert and oriented to person, place, and time.    Psychiatric:         Behavior: Behavior normal.       Immunization History   Administered Date(s) Administered    COVID-19, MODERNA BLUE border, Primary or Immunocompromised, (age 12y+), IM, 100 mcg/0.5mL 01/05/2021, 02/02/2021, 11/15/2021    COVID-19, MODERNA Booster BLUE border, (age 18y+), IM, 50mcg/0.25mL 03/08/2022    Influenza 10/29/2015    Influenza Vaccine, unspecified formulation 10/01/2016    Influenza Virus Vaccine 10/29/2015, 10/03/2017, 10/12/2018, 10/07/2019, 10/01/2020, 10/01/2020, 09/19/2021, 10/19/2021, 10/19/2021    Influenza, Triv, 3 Years and older, IM (Afluria (5 yrs and older) 10/10/2022    Tdap (Boostrix, Adacel) 08/06/2012    Tetanus 06/08/2000       Health Maintenance   Topic Date Due    Shingles vaccine (1 of 2) Never done    Cervical cancer screen  10/05/2021    Depression Monitoring  04/01/2022    COVID-19 Vaccine (5 - Booster for Moderna series) 05/03/2022    DTaP/Tdap/Td vaccine (2 - Td or Tdap) 08/06/2022    Breast cancer screen  07/29/2023    Colorectal Cancer Screen  12/01/2024    Lipids  07/26/2027    Flu vaccine  Completed    Hepatitis C screen  Completed    HIV screen  Completed    Hepatitis A vaccine  Aged Out    Hib vaccine  Aged Out    Meningococcal (ACWY) vaccine  Aged Out    Pneumococcal 0-64 years Vaccine  Aged Out       PSH, PMH, SH and FH reviewed and noted. Recent and past labs, tests and consults also reviewed. Recent or new meds also reviewed. Mckenzie Blancas MD    This dictation was generated by voice recognition computer software. Although all attempts are made to edit the dictation for accuracy, there may be errors in the transcription that are not intended.

## 2023-01-31 NOTE — ASSESSMENT & PLAN NOTE
Here for annual physical exam, overall doing well from a clinical standpoint. As for health maintenance:  -cervical cancer screen: Reportedly last year, awaiting records  -breast cancer screen: Mammogram 7/22, mother had breast cancer in her 76s  -colon cancer screen: Had Cologuard in 2021, maternal grandfather had colon cancer  -/88  -BMI 25.02, reviewed recent lipids and A1c (be well within)  -HIV and HCV negative  -negative depression and DV screen  -Advised to eat a healthy, well-balanced diet  -Advised to exercise at least 30min/day 5x/week for heart and bone health  -Check skin regularly for new moles or changes in moles.  wear sunscreen at least SPF 30 and don't forget to reapply every 3-4 hours or if you are in the water  -Follow up with dentist at least twice/year.  -Follow up with eye doctor at least once/year.  -Calcium goal is 6377-9911 mg a day ideally from diet (dairy, green leafy vegetables, nuts)

## 2023-01-31 NOTE — ASSESSMENT & PLAN NOTE
- Multiple Ortho issues/injuries, on baclofen, tizanidine, meloxicam and gabapentin  -Check kidney function given on chronic meloxicam

## 2023-01-31 NOTE — PATIENT INSTRUCTIONS
-lower celexa to 10 mg for 2 weeks+strat effexor 37.5 mg then stop celexa and increase effexor to 75 mg      -eat a healthy, well-balanced diet  -exercise at least 30min/day 5x/week for heart and bone health  -Check skin regularly for new moles or changes in moles. wear sunscreen at least SPF 30 and don't forget to reapply every 3-4 hours or if you are in the water  -Follow up with dentist at least twice/year.  -Follow up with eye doctor at least once/year.

## 2023-02-01 RX ORDER — MELOXICAM 15 MG/1
15 TABLET ORAL DAILY
Qty: 90 TABLET | Refills: 0 | Status: SHIPPED | OUTPATIENT
Start: 2023-02-01 | End: 2023-05-02

## 2023-02-01 NOTE — TELEPHONE ENCOUNTER
Patient last seen 1/20/2023 and medication last filled 1/20/2023:     Last lab result completed/reported on: 11/4/2021     From: Ponciano Ganser Schuh-Tegge  To: Greg Outhouse: 1/31/2023  7:04 PM EST  Subject: med refill    only 2 melxicom left need a refill thanks   Levy Mederos

## 2023-02-25 DIAGNOSIS — M50.30 DDD (DEGENERATIVE DISC DISEASE), CERVICAL: ICD-10-CM

## 2023-02-25 DIAGNOSIS — M48.02 FORAMINAL STENOSIS OF CERVICAL REGION: ICD-10-CM

## 2023-02-25 DIAGNOSIS — M47.812 CERVICAL SPONDYLOSIS WITHOUT MYELOPATHY: ICD-10-CM

## 2023-02-27 RX ORDER — MONTELUKAST SODIUM 10 MG/1
TABLET ORAL
Qty: 90 TABLET | Refills: 1 | Status: SHIPPED | OUTPATIENT
Start: 2023-02-27

## 2023-02-27 RX ORDER — GABAPENTIN 100 MG/1
CAPSULE ORAL
Qty: 90 CAPSULE | Refills: 0 | Status: SHIPPED | OUTPATIENT
Start: 2023-02-27 | End: 2023-03-27

## 2023-02-27 NOTE — TELEPHONE ENCOUNTER
Patient last seen 1/20/2023 and medication last filled 1/20/2023:     Last lab result completed/reported on: 7/26/2023 - labs ordered with PCP on 1/31/2023    Component      Latest Ref Rng & Units 7/26/2022           6:04 AM   Glucose, Random      70 - 99 mg/dL 94   CHOLESTEROL, TOTAL, [id number]      0 - 199 mg/dL 254 (H)   Triglycerides      0 - 150 mg/dL 167 (H)   HDL Cholesterol      40 - 60 mg/dL 57   LDL Calculated      <100 mg/dL 164 (H)

## 2023-03-02 PROBLEM — Z00.00 ANNUAL PHYSICAL EXAM: Status: RESOLVED | Noted: 2023-01-31 | Resolved: 2023-03-02

## 2023-03-06 ENCOUNTER — HOSPITAL ENCOUNTER (OUTPATIENT)
Age: 63
Discharge: HOME | End: 2023-03-06
Payer: COMMERCIAL

## 2023-03-06 ENCOUNTER — HOSPITAL ENCOUNTER (OUTPATIENT)
Dept: GENERAL RADIOLOGY | Age: 63
Discharge: HOME | End: 2023-03-06
Payer: COMMERCIAL

## 2023-03-06 DIAGNOSIS — F41.9 ANXIETY: ICD-10-CM

## 2023-03-06 DIAGNOSIS — Z79.1 NSAID LONG-TERM USE: ICD-10-CM

## 2023-03-06 DIAGNOSIS — E78.5 HYPERLIPIDEMIA, UNSPECIFIED HYPERLIPIDEMIA TYPE: ICD-10-CM

## 2023-03-06 DIAGNOSIS — G43.909 MIGRAINE WITHOUT STATUS MIGRAINOSUS, NOT INTRACTABLE, UNSPECIFIED MIGRAINE TYPE: ICD-10-CM

## 2023-03-06 DIAGNOSIS — N20.0 KIDNEY STONE: ICD-10-CM

## 2023-03-06 LAB
A/G RATIO: 1.5 (ref 1.1–2.2)
ALBUMIN SERPL-MCNC: 4.1 G/DL (ref 3.4–5)
ALP BLD-CCNC: 80 U/L (ref 40–129)
ALT SERPL-CCNC: 20 U/L (ref 10–40)
ANION GAP SERPL CALCULATED.3IONS-SCNC: 13 MMOL/L (ref 3–16)
AST SERPL-CCNC: 23 U/L (ref 15–37)
BASOPHILS ABSOLUTE: 0 K/UL (ref 0–0.2)
BASOPHILS RELATIVE PERCENT: 0.5 %
BILIRUB SERPL-MCNC: 0.3 MG/DL (ref 0–1)
BUN BLDV-MCNC: 16 MG/DL (ref 7–20)
CALCIUM SERPL-MCNC: 9.3 MG/DL (ref 8.3–10.6)
CHLORIDE BLD-SCNC: 101 MMOL/L (ref 99–110)
CO2: 30 MMOL/L (ref 21–32)
CREAT SERPL-MCNC: 0.7 MG/DL (ref 0.6–1.2)
EOSINOPHILS ABSOLUTE: 0.1 K/UL (ref 0–0.6)
EOSINOPHILS RELATIVE PERCENT: 2.1 %
GFR SERPL CREATININE-BSD FRML MDRD: >60 ML/MIN/{1.73_M2}
GLUCOSE BLD-MCNC: 95 MG/DL (ref 70–99)
HCT VFR BLD CALC: 43.3 % (ref 36–48)
HEMOGLOBIN: 14.6 G/DL (ref 12–16)
LYMPHOCYTES ABSOLUTE: 1.9 K/UL (ref 1–5.1)
LYMPHOCYTES RELATIVE PERCENT: 50.9 %
MCH RBC QN AUTO: 28.8 PG (ref 26–34)
MCHC RBC AUTO-ENTMCNC: 33.7 G/DL (ref 31–36)
MCV RBC AUTO: 85.4 FL (ref 80–100)
MONOCYTES ABSOLUTE: 0.4 K/UL (ref 0–1.3)
MONOCYTES RELATIVE PERCENT: 10.3 %
NEUTROPHILS ABSOLUTE: 1.4 K/UL (ref 1.7–7.7)
NEUTROPHILS RELATIVE PERCENT: 36.2 %
PDW BLD-RTO: 12.7 % (ref 12.4–15.4)
PLATELET # BLD: 223 K/UL (ref 135–450)
PMV BLD AUTO: 8 FL (ref 5–10.5)
POTASSIUM SERPL-SCNC: 3.4 MMOL/L (ref 3.5–5.1)
RBC # BLD: 5.08 M/UL (ref 4–5.2)
SODIUM BLD-SCNC: 144 MMOL/L (ref 136–145)
TOTAL PROTEIN: 6.8 G/DL (ref 6.4–8.2)
WBC # BLD: 3.8 K/UL (ref 4–11)

## 2023-03-06 PROCEDURE — 74018 RADEX ABDOMEN 1 VIEW: CPT

## 2023-03-07 PROBLEM — D72.819 LEUKOPENIA: Status: ACTIVE | Noted: 2023-03-07

## 2023-03-08 ENCOUNTER — OFFICE VISIT (OUTPATIENT)
Dept: INTERNAL MEDICINE CLINIC | Age: 63
End: 2023-03-08
Payer: COMMERCIAL

## 2023-03-08 VITALS
DIASTOLIC BLOOD PRESSURE: 90 MMHG | HEIGHT: 62 IN | OXYGEN SATURATION: 97 % | SYSTOLIC BLOOD PRESSURE: 144 MMHG | TEMPERATURE: 97.5 F | HEART RATE: 80 BPM | BODY MASS INDEX: 25.25 KG/M2 | WEIGHT: 137.2 LBS

## 2023-03-08 DIAGNOSIS — D72.819 LEUKOPENIA, UNSPECIFIED TYPE: ICD-10-CM

## 2023-03-08 DIAGNOSIS — M77.11 LATERAL EPICONDYLITIS OF RIGHT ELBOW: ICD-10-CM

## 2023-03-08 DIAGNOSIS — R03.0 ELEVATED BP WITHOUT DIAGNOSIS OF HYPERTENSION: ICD-10-CM

## 2023-03-08 DIAGNOSIS — E87.6 HYPOKALEMIA: ICD-10-CM

## 2023-03-08 DIAGNOSIS — F41.9 ANXIETY: Primary | ICD-10-CM

## 2023-03-08 PROCEDURE — 99214 OFFICE O/P EST MOD 30 MIN: CPT | Performed by: INTERNAL MEDICINE

## 2023-03-08 RX ORDER — CITALOPRAM 20 MG/1
20 TABLET ORAL DAILY
Qty: 90 TABLET | Refills: 1 | Status: SHIPPED | OUTPATIENT
Start: 2023-03-08

## 2023-03-08 RX ORDER — VENLAFAXINE 37.5 MG/1
37.5 TABLET ORAL DAILY
COMMUNITY

## 2023-03-08 RX ORDER — CITALOPRAM 10 MG/1
10 TABLET ORAL DAILY
COMMUNITY
End: 2023-03-08 | Stop reason: SDUPTHER

## 2023-03-08 SDOH — ECONOMIC STABILITY: FOOD INSECURITY: WITHIN THE PAST 12 MONTHS, YOU WORRIED THAT YOUR FOOD WOULD RUN OUT BEFORE YOU GOT MONEY TO BUY MORE.: NEVER TRUE

## 2023-03-08 SDOH — ECONOMIC STABILITY: FOOD INSECURITY: WITHIN THE PAST 12 MONTHS, THE FOOD YOU BOUGHT JUST DIDN'T LAST AND YOU DIDN'T HAVE MONEY TO GET MORE.: NEVER TRUE

## 2023-03-08 SDOH — ECONOMIC STABILITY: HOUSING INSECURITY
IN THE LAST 12 MONTHS, WAS THERE A TIME WHEN YOU DID NOT HAVE A STEADY PLACE TO SLEEP OR SLEPT IN A SHELTER (INCLUDING NOW)?: NO

## 2023-03-08 SDOH — ECONOMIC STABILITY: INCOME INSECURITY: HOW HARD IS IT FOR YOU TO PAY FOR THE VERY BASICS LIKE FOOD, HOUSING, MEDICAL CARE, AND HEATING?: NOT HARD AT ALL

## 2023-03-08 ASSESSMENT — ENCOUNTER SYMPTOMS: NAUSEA: 1

## 2023-03-08 NOTE — ASSESSMENT & PLAN NOTE
- Multiple Ortho issues/injuries, on baclofen, tizanidine, meloxicam and gabapentin  -After our discussion last visit regarding potential risks of long-term meloxicam had been weaning herself off and doing well.

## 2023-03-08 NOTE — ASSESSMENT & PLAN NOTE
- We tried to switch Celexa 20 mg to Effexor (by gradual cross taper) last visit but reports significant side effects with Efexor (GI upset). Therefore she self lowered the Effexor back to 37.5 mg and restarted Celexa 10 mg for the last 5 days  -Continue this dose for another week, then stop Effexor and increase Celexa back to 20 mg which she was on before  -We did discuss in length the option to try a different medication instead of Effexor or Celexa given anxiety was uncontrolled with NEVIN-7 score of 21 while on Celexa 20 mg last visit prompting this change trial, but today prefers to go back to Celexa as she was on before. Explained if anxiety uncontrolled, let me know and we can make a different medication change.

## 2023-03-08 NOTE — PROGRESS NOTES
[facility] Internal [facility]  Follow up visit   3/8/2023    [facility] (:  1960) is a 63 y.o. female, here for evaluation of the following medical concerns:    Chief Complaint   Patient presents with    Medication Check     Stopped 2 tablets Effexor due to nauseam went back to once day         ASSESSMENT/ PLAN:  Anxiety  - We tried to switch Celexa 20 mg to Effexor (by gradual cross taper) last visit but reports significant side effects with Efexor (GI upset). Therefore she self lowered the Effexor back to 37.5 mg and restarted Celexa 10 mg for the last 5 days  -Continue this dose for another week, then stop Effexor and increase Celexa back to 20 mg which she was on before  -We did discuss in length the option to try a different medication instead of Effexor or Celexa given anxiety was uncontrolled with NEVIN-7 score of 21 while on Celexa 20 mg last visit prompting this change trial, but today prefers to go back to Celexa as she was on before. Explained if anxiety uncontrolled, let me know and we can make a different medication change. Leukopenia  - Noted on recent labs, but also had URI symptoms at that time, likely related to viral illness, repeat CBC in a few weeks    Lateral epicondylitis of right elbow  - Multiple Ortho issues/injuries, on baclofen, tizanidine, meloxicam and gabapentin  -After our discussion last visit regarding potential risks of long-term meloxicam had been weaning herself off and doing well. Elevated BP without diagnosis of hypertension  - Known white coat associated with anxiety, home BPs 120s/80s.   We will continue to monitor clinically, bring home BP log for next visit      Orders Placed This Encounter   Procedures    Basic Metabolic Panel    CBC with Auto Differential     Orders Placed This Encounter   Medications    citalopram (CELEXA) 20 MG tablet     Sig: Take 1 tablet by mouth daily     Dispense:  90 tablet     Refill:  1      Medications Discontinued During This Encounter   Medication Reason    venlafaxine (EFFEXOR XR) 37.5 MG extended release capsule DOSE ADJUSTMENT    citalopram (CELEXA) 10 MG tablet REORDER        No follow-ups on file. HPI  Reports significant GI side effects since starting Effexor, nausea, vomiting. She self lowered Effexor dose back from 75 mg to 37.5 and restarted Celexa 10 mg for the last 5 days. Home /80      HISTORIES   Current Outpatient Medications on File Prior to Visit   Medication Sig Dispense Refill    venlafaxine (EFFEXOR) 37.5 MG tablet Take 37.5 mg by mouth daily      gabapentin (NEURONTIN) 100 MG capsule TAKE ONE CAPSULE BY MOUTH ONE TIME A DAY 90 capsule 0    montelukast (SINGULAIR) 10 MG tablet TAKE ONE TABLET BY MOUTH EVERY NIGHT 90 tablet 1    meloxicam (MOBIC) 15 MG tablet Take 1 tablet by mouth daily 90 tablet 0    gabapentin (NEURONTIN) 300 MG capsule Take 300 mg by mouth.  Takes a total of 400 mg daily takes 100 with mg      Potassium Citrate ER (UROCIT-K) 15 MEQ (1620 MG) TBCR extended release tablet daily      levocetirizine (XYZAL) 5 MG tablet Take 5 mg by mouth nightly      fluticasone (FLONASE) 50 MCG/ACT nasal spray 1 spray by Nasal route 4 times daily (after meals and at bedtime)      diphenhydrAMINE-APAP, sleep, (TYLENOL PM EXTRA STRENGTH)  MG/30ML LIQD Take by mouth daily      Psyllium (METAMUCIL) 0.36 g CAPS Take by mouth Take 3 caps twice a day      azelastine (ASTELIN) 0.1 % nasal spray 1 spray by Nasal route 2 times daily Use in each nostril as directed 30 mL 1    gabapentin (NEURONTIN) 300 MG capsule TAKE ONE CAPSULE BY MOUTH NIGHTLY 90 capsule 0    tiZANidine (ZANAFLEX) 2 MG capsule TAKE ONE CAPSULE BY MOUTH EVERY EVENING 90 capsule 0    baclofen (LIORESAL) 10 MG tablet Take 1 tablet by mouth daily as needed (prn) 90 tablet 0    rizatriptan (MAXALT-MLT) 10 MG disintegrating tablet TALE 1 TABLET BY MOUTH AS NEEDED FOR MIGRAINES MAY REPEAT 2 HOURS LATER IF NEEDED 27 tablet 3 butalbital-acetaminophen-caffeine (FIORICET, ESGIC) -40 MG per tablet TAKE ONE TABLET BY MOUTH EVERY 4 HOURS AS NEEDED FOR PAIN UP TO 10 DAYS 30 tablet 3    cycloSPORINE (RESTASIS) 0.05 % ophthalmic emulsion Restasis 0.05 % eye drops in a dropperette      Vitamin D, Ergocalciferol, 50 MCG (2000 UT) CAPS Take 4,000 Units by mouth daily      folic acid (FOLVITE) 1 MG tablet Take 1 mg by mouth daily      calcium citrate-vitamin D (CITRACAL+D) 315-200 MG-UNIT per tablet Take 1 tablet by mouth daily. No current facility-administered medications on file prior to visit.        Allergies   Allergen Reactions    Statins      myalgias     Past Medical History:   Diagnosis Date    Abnormal mammogram 6/8/2011    saw Dr. Neville wood or associate 6/2011 Ok to return for regular mammogram's     Anxiety 6/8/2011    Hyperlipidemia     Kidney stone     Medical history reviewed with no changes     Pyelonephritis 7/28/2011    Recurrent Change to augmentin and levaquin and ck blood cx's 7/27/2011 On daily macrobid per Dr. Montenegro Essex disorder, acute 3/23/2013    Due to 's infidelity and leaving her 3/2013 To see counselor- increase celexa   still living w the family as of 6/2013      Patient Active Problem List   Diagnosis    Kidney stones    Other headache syndrome    Anxiety    Insomnia    Carpal tunnel syndrome    Spondylolisthesis of lumbar region    Hyperlipidemia    Vaginal atrophy    Arthralgia of both knees    Dry eyes    Lateral epicondylitis of right elbow    Neck pain    Left shoulder pain    Migraine    Allergic rhinitis    Leukopenia    Elevated BP without diagnosis of hypertension     Past Surgical History:   Procedure Laterality Date    BREAST BIOPSY Left     benign core biopsy    CARPAL TUNNEL RELEASE  12/29/2011    Left    CARPAL TUNNEL RELEASE Right 12/30/2014    CYSTOSCOPY      with stent placement-x3    PAIN MANAGEMENT PROCEDURE Left 08/26/2020    LEFT C2, THIRD OCCIPITAL NERVE, C3, C4, C5 MEDIAL BRANCH BLOCK WITH FLUOROSCOPY (28741, 60294)  #1 performed by Valencia Hyatt MD at [address] Left 09/09/2020    LEFT C2,TON,C3,C4 C5 MEDIAL BRANCH BLOCKS WITH FLUOROSCOPY performed by Valencia Hyatt MD at [address]  10/07/2020    PAIN MANAGEMENT PROCEDURE Left 10/07/2020    LEFT C2,TON,C3,C4,C5 RADIOFREQUENCY ABLATION WITH FLUOROSCOPY performed by Valencia Hyatt MD at [address] Left 04/06/2021    LEFT C2, THIRD OCCIPITAL NERVE, C3, C4, C5  RADIOFREQUENCY ABLATION WITH FLUOROSCOPY performed by Damien Deluca MD at [address]      L maxillary gland removal     Social History     Socioeconomic History    Marital status:      Spouse name: Not on file    Number of children: 5    Years of education: Not on file    Highest education level: Not on file   Occupational History    Occupation: [employer]     Employer: [employer]   Tobacco Use    Smoking status: Never    Smokeless tobacco: Never   Vaping Use    Vaping Use: Never used   Substance and Sexual Activity    Alcohol use: No    Drug use: No    Sexual activity: Not on file   Other Topics Concern    Not on file   Social History Narrative    Not on file     Social Determinants of Health     Financial Resource Strain: Low Risk     Difficulty of Paying Living Expenses: Not hard at all   Food Insecurity: No Food Insecurity    Worried About [address] in the Last Year: Never true    [address] in the Last Year: Never true   Transportation Needs: Unknown    Lack of Transportation (Medical): Not on file    Lack of Transportation (Non-Medical):  No   Physical Activity: Not on file   Stress: Not on file   Social Connections: Not on file   Intimate Partner Violence: Not on file   Housing Stability: Unknown    Unable to Pay for Housing in the Last Year: Not on file    Number of Places Lived in the Last Year: Not on file    Unstable Housing in the Last Year: No      Family History   Problem Relation Age of Onset    Diabetes Mother     Kidney Cancer Mother         RIGHT    Breast Cancer Mother 72    Cancer Maternal Grandmother     Diabetes Maternal Grandmother     Cancer Maternal Grandfather     Diabetes Maternal Grandfather     Heart Disease Other     High Blood Pressure Other        ROS  Review of Systems   Gastrointestinal:  Positive for nausea. Musculoskeletal:  Positive for arthralgias. Psychiatric/Behavioral:  The patient is nervous/anxious. PE  Vitals:    03/08/23 1503 03/08/23 1511   BP: (!) 150/80 (!) 144/90   Site: Left Upper Arm Left Upper Arm   Position: Sitting Sitting   Cuff Size: Medium Adult Medium Adult   Pulse: 80    Temp: 97.5 °F (36.4 °C)    TempSrc: Temporal    SpO2: 97%    Weight: 137 lb 3.2 oz (62.2 kg)    Height: 5' 2\" (1.575 m)      Estimated body mass index is 25.09 kg/m² as calculated from the following:    Height as of this encounter: 5' 2\" (1.575 m). Weight as of this encounter: 137 lb 3.2 oz (62.2 kg). Physical Exam  Constitutional:       General: She is not in acute distress. Appearance: Normal appearance. She is not ill-appearing, toxic-appearing or diaphoretic. HENT:      Head: Normocephalic and atraumatic. Nose: Nose normal.   Eyes:      Extraocular Movements: Extraocular movements intact. Conjunctiva/sclera: Conjunctivae normal.      Pupils: Pupils are equal, round, and reactive to light. Pulmonary:      Effort: Pulmonary effort is normal. No respiratory distress. Musculoskeletal:      Cervical back: Normal range of motion and neck supple. Skin:     Coloration: Skin is not jaundiced or pale. Neurological:      Mental Status: She is alert and oriented to person, place, and time. Psychiatric:         Mood and Affect: Mood normal.         Behavior: Behavior normal.         Puja Garcia MD    This dictation was generated by voice recognition computer software.   Although all attempts are made to edit the dictation for accuracy, there may be errors in the transcription that are not intended.

## 2023-03-08 NOTE — ASSESSMENT & PLAN NOTE
- Noted on recent labs, but also had URI symptoms at that time, likely related to viral illness, repeat CBC in a few weeks

## 2023-03-08 NOTE — ASSESSMENT & PLAN NOTE
- Known white coat associated with anxiety, home BPs 120s/80s.   We will continue to monitor clinically, bring home BP log for next visit

## 2023-03-08 NOTE — PATIENT INSTRUCTIONS
Continue Effexor 1 pill + Celexa 10 mg for 1 week then stop Effexor and increase Celexa back to 20 mg

## 2023-03-15 ENCOUNTER — TELEPHONE (OUTPATIENT)
Dept: ORTHOPEDIC SURGERY | Age: 63
End: 2023-03-15

## 2023-03-15 NOTE — TELEPHONE ENCOUNTER
Lvm for patient regarding appt on 4/21/23 at 9:15am    Patient needs to be scheduled in a spine FU slot per clinic.  Upon return call please reschedule in correct time slot

## 2023-03-28 DIAGNOSIS — M48.02 FORAMINAL STENOSIS OF CERVICAL REGION: ICD-10-CM

## 2023-03-28 DIAGNOSIS — M50.30 DDD (DEGENERATIVE DISC DISEASE), CERVICAL: ICD-10-CM

## 2023-03-28 DIAGNOSIS — M47.812 CERVICAL SPONDYLOSIS WITHOUT MYELOPATHY: ICD-10-CM

## 2023-03-29 RX ORDER — GABAPENTIN 300 MG/1
300 CAPSULE ORAL NIGHTLY
Qty: 90 CAPSULE | Refills: 0 | Status: SHIPPED | OUTPATIENT
Start: 2023-04-20 | End: 2023-07-19

## 2023-03-29 NOTE — TELEPHONE ENCOUNTER
Patient last seen 1/20/2023 and medication last filled 1/20/2023:     Last lab result completed/reported on: 3/6/2023

## 2023-04-21 ENCOUNTER — OFFICE VISIT (OUTPATIENT)
Dept: ORTHOPEDIC SURGERY | Age: 63
End: 2023-04-21
Payer: COMMERCIAL

## 2023-04-21 ENCOUNTER — PATIENT MESSAGE (OUTPATIENT)
Dept: ORTHOPEDIC SURGERY | Age: 63
End: 2023-04-21

## 2023-04-21 VITALS — BODY MASS INDEX: 24.84 KG/M2 | RESPIRATION RATE: 16 BRPM | WEIGHT: 135 LBS | HEIGHT: 62 IN

## 2023-04-21 DIAGNOSIS — M79.18 MYOFASCIAL MUSCLE PAIN: ICD-10-CM

## 2023-04-21 DIAGNOSIS — M50.30 DDD (DEGENERATIVE DISC DISEASE), CERVICAL: ICD-10-CM

## 2023-04-21 DIAGNOSIS — M77.11 LATERAL EPICONDYLITIS OF RIGHT ELBOW: ICD-10-CM

## 2023-04-21 DIAGNOSIS — M48.02 FORAMINAL STENOSIS OF CERVICAL REGION: ICD-10-CM

## 2023-04-21 DIAGNOSIS — M47.812 CERVICAL SPONDYLOSIS WITHOUT MYELOPATHY: Primary | ICD-10-CM

## 2023-04-21 DIAGNOSIS — M47.812 CERVICAL SPONDYLOSIS WITHOUT MYELOPATHY: ICD-10-CM

## 2023-04-21 PROCEDURE — 99214 OFFICE O/P EST MOD 30 MIN: CPT | Performed by: STUDENT IN AN ORGANIZED HEALTH CARE EDUCATION/TRAINING PROGRAM

## 2023-04-21 RX ORDER — GABAPENTIN 100 MG/1
CAPSULE ORAL
Qty: 90 CAPSULE | Refills: 0 | Status: SHIPPED | OUTPATIENT
Start: 2023-04-21 | End: 2023-05-19

## 2023-04-21 RX ORDER — GABAPENTIN 300 MG/1
CAPSULE ORAL
Qty: 90 CAPSULE | Refills: 0 | Status: SHIPPED | OUTPATIENT
Start: 2023-04-21 | End: 2023-07-20

## 2023-04-21 RX ORDER — METHYLPREDNISOLONE 4 MG/1
TABLET ORAL
Qty: 1 KIT | Refills: 0 | Status: SHIPPED | OUTPATIENT
Start: 2023-04-21 | End: 2023-04-27

## 2023-04-21 RX ORDER — TIZANIDINE HYDROCHLORIDE 2 MG/1
CAPSULE, GELATIN COATED ORAL
Qty: 90 CAPSULE | Refills: 0 | Status: SHIPPED | OUTPATIENT
Start: 2023-04-21

## 2023-04-22 ENCOUNTER — PATIENT MESSAGE (OUTPATIENT)
Dept: INTERNAL MEDICINE CLINIC | Age: 63
End: 2023-04-22

## 2023-04-24 ENCOUNTER — TELEPHONE (OUTPATIENT)
Dept: INTERNAL MEDICINE CLINIC | Age: 63
End: 2023-04-24

## 2023-04-24 RX ORDER — GABAPENTIN 300 MG/1
300 CAPSULE ORAL NIGHTLY
Qty: 90 CAPSULE | Refills: 0 | Status: SHIPPED | OUTPATIENT
Start: 2023-04-24 | End: 2023-07-23

## 2023-04-24 RX ORDER — GABAPENTIN 100 MG/1
CAPSULE ORAL
Qty: 90 CAPSULE | Refills: 0 | Status: SHIPPED | OUTPATIENT
Start: 2023-04-24 | End: 2023-05-22

## 2023-04-25 NOTE — TELEPHONE ENCOUNTER
From: Juliet Lindsey  To: Dr. Jose Thomas: 4/22/2023 1:43 PM EDT  Subject: Schedule follow up after repeat labs    Trying to schedule with Dr. Tevin Real . 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 i'm off by @ 2:30pm can make a 2:45 or 3pm appointment. she told me she can see me after the @;30 times she just has to ok it it. I could do July 10th, or 11th, or 13th or 17th 2:45pm or later let me know which you pick . Thanks José Calderon   Also can she put me back on flonase please send to [email or web address] [facility] [employer]. Had my neck follow up 4/21, 2023 we are just going to do melxican prn , had some issues with my neck and my workman's comp. 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 waiting a few weeks to see if this gets better, if not she has a referral to Dr. Reji Alvarado ( see her notes) I'll get Dr. Betti Cabot repeat blood work after vacation in mid-late June 2023.  thanks mason

## 2023-04-26 RX ORDER — FLUTICASONE PROPIONATE 50 MCG
2 SPRAY, SUSPENSION (ML) NASAL 2 TIMES DAILY
Qty: 3 EACH | Refills: 1 | Status: SHIPPED | OUTPATIENT
Start: 2023-04-26

## 2023-04-26 RX ORDER — FLUTICASONE PROPIONATE 50 MCG
1 SPRAY, SUSPENSION (ML) NASAL
Qty: 16 G | Refills: 3 | Status: SHIPPED | OUTPATIENT
Start: 2023-04-26 | End: 2023-04-26

## 2023-04-26 NOTE — TELEPHONE ENCOUNTER
Pharmacy called to say that insurance will only cover 4 sprays a day for the Flonase.  We need to change the quantity and resubmit to them.  Pt would also like a 90 day supply which would be 3 bottles.  Please call

## 2023-05-05 ENCOUNTER — PATIENT MESSAGE (OUTPATIENT)
Dept: INTERNAL MEDICINE CLINIC | Age: 63
End: 2023-05-05

## 2023-05-08 RX ORDER — FLUTICASONE PROPIONATE 50 MCG
2 SPRAY, SUSPENSION (ML) NASAL DAILY
Qty: 3 EACH | Refills: 1 | Status: SHIPPED | OUTPATIENT
Start: 2023-05-08

## 2023-05-08 NOTE — TELEPHONE ENCOUNTER
From: Emma Lindsey  To: Dr. Olive Bosch: 5/5/2023 10:32 AM EDT  Subject: medication    [address] called me the flonase nasal spray ordered has to may sprays ordered yes too many and the insurance won't cover it. needs to say 2 sprays daily to be covered. They say that have reached out to you all with no response . can you fix this please thanks have a great week-end.  Rosa Giron

## 2023-06-14 DIAGNOSIS — D72.819 LEUKOPENIA, UNSPECIFIED TYPE: ICD-10-CM

## 2023-06-14 DIAGNOSIS — E87.6 HYPOKALEMIA: ICD-10-CM

## 2023-06-14 LAB
ANION GAP SERPL CALCULATED.3IONS-SCNC: 12 MMOL/L (ref 3–16)
BASOPHILS # BLD: 0 K/UL (ref 0–0.2)
BASOPHILS NFR BLD: 0.8 %
BUN SERPL-MCNC: 16 MG/DL (ref 7–20)
CALCIUM SERPL-MCNC: 9.5 MG/DL (ref 8.3–10.6)
CHLORIDE SERPL-SCNC: 99 MMOL/L (ref 99–110)
CO2 SERPL-SCNC: 31 MMOL/L (ref 21–32)
CREAT SERPL-MCNC: 0.8 MG/DL (ref 0.6–1.2)
DEPRECATED RDW RBC AUTO: 13.5 % (ref 12.4–15.4)
EOSINOPHIL # BLD: 0.1 K/UL (ref 0–0.6)
EOSINOPHIL NFR BLD: 1.5 %
GFR SERPLBLD CREATININE-BSD FMLA CKD-EPI: >60 ML/MIN/{1.73_M2}
GLUCOSE SERPL-MCNC: 89 MG/DL (ref 70–99)
HCT VFR BLD AUTO: 43.7 % (ref 36–48)
HGB BLD-MCNC: 15.2 G/DL (ref 12–16)
LYMPHOCYTES # BLD: 1.5 K/UL (ref 1–5.1)
LYMPHOCYTES NFR BLD: 28 %
MCH RBC QN AUTO: 29.9 PG (ref 26–34)
MCHC RBC AUTO-ENTMCNC: 34.7 G/DL (ref 31–36)
MCV RBC AUTO: 86.3 FL (ref 80–100)
MONOCYTES # BLD: 0.5 K/UL (ref 0–1.3)
MONOCYTES NFR BLD: 10.3 %
NEUTROPHILS # BLD: 3.2 K/UL (ref 1.7–7.7)
NEUTROPHILS NFR BLD: 59.4 %
PLATELET # BLD AUTO: 198 K/UL (ref 135–450)
PMV BLD AUTO: 8.2 FL (ref 5–10.5)
POTASSIUM SERPL-SCNC: 3.4 MMOL/L (ref 3.5–5.1)
RBC # BLD AUTO: 5.07 M/UL (ref 4–5.2)
SODIUM SERPL-SCNC: 142 MMOL/L (ref 136–145)
WBC # BLD AUTO: 5.3 K/UL (ref 4–11)

## 2023-07-11 ENCOUNTER — OFFICE VISIT (OUTPATIENT)
Dept: INTERNAL MEDICINE CLINIC | Age: 63
End: 2023-07-11
Payer: COMMERCIAL

## 2023-07-11 VITALS
HEIGHT: 62 IN | DIASTOLIC BLOOD PRESSURE: 80 MMHG | SYSTOLIC BLOOD PRESSURE: 146 MMHG | OXYGEN SATURATION: 94 % | TEMPERATURE: 97.7 F | HEART RATE: 69 BPM | WEIGHT: 134.2 LBS | BODY MASS INDEX: 24.69 KG/M2

## 2023-07-11 DIAGNOSIS — Z13.220 LIPID SCREENING: ICD-10-CM

## 2023-07-11 DIAGNOSIS — Z13.1 DIABETES MELLITUS SCREENING: ICD-10-CM

## 2023-07-11 DIAGNOSIS — I10 PRIMARY HYPERTENSION: Primary | ICD-10-CM

## 2023-07-11 PROCEDURE — 3077F SYST BP >= 140 MM HG: CPT | Performed by: INTERNAL MEDICINE

## 2023-07-11 PROCEDURE — 3079F DIAST BP 80-89 MM HG: CPT | Performed by: INTERNAL MEDICINE

## 2023-07-11 PROCEDURE — 99214 OFFICE O/P EST MOD 30 MIN: CPT | Performed by: INTERNAL MEDICINE

## 2023-07-11 RX ORDER — LISINOPRIL 5 MG/1
2.5 TABLET ORAL DAILY
Qty: 45 TABLET | Refills: 1 | Status: SHIPPED | OUTPATIENT
Start: 2023-07-11

## 2023-07-11 RX ORDER — MELOXICAM 15 MG/1
15 TABLET ORAL DAILY PRN
COMMUNITY

## 2023-07-11 RX ORDER — LORATADINE 10 MG/1
10 TABLET ORAL DAILY
COMMUNITY

## 2023-07-11 ASSESSMENT — ENCOUNTER SYMPTOMS: SHORTNESS OF BREATH: 0

## 2023-07-11 NOTE — ASSESSMENT & PLAN NOTE
Newly diagnosed, confirmed on repeat testing at home and office with home BP mostly 140s-160s/70s. Prior believed to be mostly whitecoat given normal home Bps.    -Very reluctant to start any medications, start lisinopril 2.5 mg for now, will send me BP log in a few weeks for further dose adjustments  -She has history of chronic hypokalemia raising concern for primary hyper Kervin-we will check renin activity and aldosterone.   If suggestive of primary hyper Kervin will change treatment to aldosterone antagonist.  Abdominal CT 2021 noted no adrenal nodules  -Meloxicam can contribute to elevated BP, again advise to minimize chronic use if possible  -Check TSH  -Repeat BMP in 2 weeks from starting lisinopril  -Negative NANDO screen  -Urine protein

## 2023-07-11 NOTE — PROGRESS NOTES
[facility] Internal Medicine  Follow up visit   2023    Tootie Lindsey (:  1960) is a 63 y.o. female, here for evaluation of the following medical concerns:    Chief Complaint   Patient presents with    Hyperlipidemia               ASSESSMENT/ PLAN:    Primary hypertension  Newly diagnosed, confirmed on repeat testing at home and office with home BP mostly 140s-160s/70s. Prior believed to be mostly whitecoat given normal home Bps.    -Very reluctant to start any medications, start lisinopril 2.5 mg for now, will send me BP log in a few weeks for further dose adjustments  -She has history of chronic hypokalemia raising concern for primary hyper Kervin-we will check renin activity and aldosterone.   If suggestive of primary hyper Kervin will change treatment to aldosterone antagonist.  Abdominal CT  noted no adrenal nodules  -Meloxicam can contribute to elevated BP, again advise to minimize chronic use if possible  -Check TSH  -Repeat BMP in 2 weeks from starting lisinopril  -Negative NANDO screen  -Urine protein    No hctz due to peeing   Orders Placed This Encounter   Procedures    TSH with Reflex    [facility] Well Health Screen    MICROALBUMIN / CREATININE URINE RATIO    Renin Activity and Aldosterone    Basic Metabolic Panel     Orders Placed This Encounter   Medications    lisinopril (PRINIVIL;ZESTRIL) 5 MG tablet     Sig: Take 0.5 tablets by mouth daily     Dispense:  45 tablet     Refill:  1      Medications Discontinued During This Encounter   Medication Reason    meloxicam (MOBIC) 15 MG tablet DUPLICATE    venlafaxine (EFFEXOR) 37.5 MG tablet Therapy completed    levocetirizine (XYZAL) 5 MG tablet Therapy completed    azelastine (ASTELIN) 0.1 % nasal spray Alternate therapy    diphenhydrAMINE-APAP, sleep, (TYLENOL PM EXTRA STRENGTH)  MG/30ML LIQD Therapy completed    Potassium Citrate ER (UROCIT-K) 15 MEQ (1620 MG) TBCR extended release tablet Therapy completed        No follow-ups on

## 2023-07-11 NOTE — PATIENT INSTRUCTIONS
- START LISINOPRIL 2.5 MG STEPHENIE, SEND ME A MSG IN A FEW WEEKS WITH bp LOG  - Please complete labs (blood and urine)  - Low salt diet will help with controlling your blood pressure. The DASH diet was shown to be helpful with blood pressure control.   - Regular exercise is important for your heart and general health and can help lower you blood pressure.  - Recommend smoking cessation  - Keep a log of your blood pressures checked first thing in the morning prior to any caffeine, cigarettes or medications.  - Bring log and machine with you to next visit.

## 2023-07-14 ENCOUNTER — OFFICE VISIT (OUTPATIENT)
Dept: ORTHOPEDIC SURGERY | Age: 63
End: 2023-07-14
Payer: COMMERCIAL

## 2023-07-14 VITALS — WEIGHT: 132 LBS | RESPIRATION RATE: 16 BRPM | BODY MASS INDEX: 24.29 KG/M2 | HEIGHT: 62 IN

## 2023-07-14 DIAGNOSIS — M48.02 FORAMINAL STENOSIS OF CERVICAL REGION: ICD-10-CM

## 2023-07-14 DIAGNOSIS — M50.30 DDD (DEGENERATIVE DISC DISEASE), CERVICAL: ICD-10-CM

## 2023-07-14 DIAGNOSIS — M47.812 CERVICAL SPONDYLOSIS WITHOUT MYELOPATHY: ICD-10-CM

## 2023-07-14 DIAGNOSIS — M79.18 MYOFASCIAL MUSCLE PAIN: ICD-10-CM

## 2023-07-14 PROCEDURE — 99214 OFFICE O/P EST MOD 30 MIN: CPT | Performed by: STUDENT IN AN ORGANIZED HEALTH CARE EDUCATION/TRAINING PROGRAM

## 2023-07-14 RX ORDER — GABAPENTIN 100 MG/1
CAPSULE ORAL
Qty: 90 CAPSULE | Refills: 0 | Status: SHIPPED | OUTPATIENT
Start: 2023-07-14 | End: 2023-09-30

## 2023-07-14 RX ORDER — BACLOFEN 10 MG/1
10 TABLET ORAL DAILY PRN
Qty: 30 TABLET | Refills: 0 | Status: SHIPPED | OUTPATIENT
Start: 2023-07-14

## 2023-07-14 RX ORDER — MELOXICAM 15 MG/1
15 TABLET ORAL DAILY
Qty: 30 TABLET | Refills: 0 | Status: SHIPPED | OUTPATIENT
Start: 2023-07-14

## 2023-07-14 RX ORDER — GABAPENTIN 300 MG/1
300 CAPSULE ORAL NIGHTLY
Qty: 90 CAPSULE | Refills: 0 | Status: SHIPPED | OUTPATIENT
Start: 2023-07-14 | End: 2023-10-12

## 2023-07-14 NOTE — PROGRESS NOTES
Follow up: Марина MOE [facility]  1960  [id number]      CHIEF COMPLAINT:    No chief complaint on file. HISTORY OF PRESENT ILLNESS:  Ms. Celi Jack is a 63 y.o. female returns for a follow up visit for multiple medical problems. Her current presenting problems are   No diagnosis found. .    As per information/history obtained from the PADT(patient assessment and documentation tool) - She complains of pain in the neck with radiation to the head and shoulders Left She rates the pain 3/10 and describes it as dull, aching. Pain is made worse by: lifting, using her head and upper body. She denies side effects from the current pain regimen. Patient reports that since the last follow up visit the physical functioning is unchanged, family/social relationships are unchanged, mood is unchanged and sleep patterns are unchanged, and that the overall functioning is unchanged. Patient denies neurological bowel or bladder. The patient presents for follow up of ongoing neck pain. 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 She presents today for refill of this medication. This helps her sleep at least 6hrs a night. It brings pain down to 3/10. She denies any medication side effects. She also takes meloxicam and baclofen to help control her pain as needed. She recently stopped the meloxicam per a recommendation from her PCP. She only uses it intermittently. She went on a road trip to [place] recently that was an 8 hour drive and her neck has been a little sore since.        Associated signs and symptoms:   Neurogenic bowel or bladder symptoms:  no   Perceived weakness:  no   Difficulty walking:  no              Past Medical History:   Past Medical History:   Diagnosis Date    Abnormal mammogram 6/8/2011    saw Dr. Redd Bowman or associate 6/2011 Ok to return for regular mammogram's     Anxiety 6/8/2011    Hyperlipidemia     Kidney

## 2023-07-25 ENCOUNTER — PATIENT MESSAGE (OUTPATIENT)
Dept: INTERNAL MEDICINE CLINIC | Age: 63
End: 2023-07-25

## 2023-07-25 DIAGNOSIS — I10 PRIMARY HYPERTENSION: ICD-10-CM

## 2023-07-25 DIAGNOSIS — Z13.1 DIABETES MELLITUS SCREENING: ICD-10-CM

## 2023-07-25 DIAGNOSIS — R73.01 IFG (IMPAIRED FASTING GLUCOSE): ICD-10-CM

## 2023-07-25 DIAGNOSIS — I10 PRIMARY HYPERTENSION: Primary | ICD-10-CM

## 2023-07-25 DIAGNOSIS — Z13.220 LIPID SCREENING: ICD-10-CM

## 2023-07-26 ENCOUNTER — TELEPHONE (OUTPATIENT)
Dept: INTERNAL MEDICINE CLINIC | Age: 63
End: 2023-07-26

## 2023-07-26 LAB
ANION GAP SERPL CALCULATED.3IONS-SCNC: 14 MMOL/L (ref 3–16)
BUN SERPL-MCNC: 18 MG/DL (ref 7–20)
CALCIUM SERPL-MCNC: 9.6 MG/DL (ref 8.3–10.6)
CHLORIDE SERPL-SCNC: 99 MMOL/L (ref 99–110)
CHOLEST SERPL-MCNC: 275 MG/DL (ref 0–199)
CO2 SERPL-SCNC: 28 MMOL/L (ref 21–32)
CREAT SERPL-MCNC: 0.8 MG/DL (ref 0.6–1.2)
CREAT UR-MCNC: 71.2 MG/DL (ref 28–259)
GFR SERPLBLD CREATININE-BSD FMLA CKD-EPI: >60 ML/MIN/{1.73_M2}
GLUCOSE SERPL-MCNC: 94 MG/DL (ref 70–99)
GLUCOSE SERPL-MCNC: 94 MG/DL (ref 70–99)
HDLC SERPL-MCNC: 55 MG/DL (ref 40–60)
LDLC SERPL CALC-MCNC: 189 MG/DL
MICROALBUMIN UR DL<=1MG/L-MCNC: <1.2 MG/DL
MICROALBUMIN/CREAT UR: NORMAL MG/G (ref 0–30)
POTASSIUM SERPL-SCNC: 3.7 MMOL/L (ref 3.5–5.1)
REASON FOR REJECTION: NORMAL
REJECTED TEST: NORMAL
SODIUM SERPL-SCNC: 141 MMOL/L (ref 136–145)
TRIGL SERPL-MCNC: 157 MG/DL (ref 0–150)
TSH SERPL DL<=0.005 MIU/L-ACNC: 3.07 UIU/ML (ref 0.27–4.2)

## 2023-07-26 NOTE — TELEPHONE ENCOUNTER
From: Migue Lindsey  To: Dr. Jim Thomas: 7/25/2023 4:22 PM EDT  Subject: Be well with in testing    UP-DATE  They where able to do all the extra add lab on's you ordered except renin activity. ... so I went over to the lab in office building after work got stuck a 2nd time to complete lab work. It still showed urine needed but I gave sample to [facility] well with in team in am. Also my B/P was 114/60 (YES)with NO B/P meds in use. this is why I'm leary to start them cause I do get reading like this . ..it would have been lower if I had med on board.  Master Estrada

## 2023-07-26 NOTE — TELEPHONE ENCOUNTER
Nava from [facility] called in stating that the aldosterone renin was rejected due to it not being frozen. Pls call and advise.

## 2023-07-27 ENCOUNTER — TELEPHONE (OUTPATIENT)
Dept: INTERNAL MEDICINE CLINIC | Age: 63
End: 2023-07-27

## 2023-07-27 DIAGNOSIS — R73.01 IFG (IMPAIRED FASTING GLUCOSE): ICD-10-CM

## 2023-07-27 LAB
EST. AVERAGE GLUCOSE BLD GHB EST-MCNC: 119.8 MG/DL
HBA1C MFR BLD: 5.8 %

## 2023-07-27 NOTE — TELEPHONE ENCOUNTER
The A1C that was added on yesterday (07/26/23) could not be done because they did not receive a lavender tube.

## 2023-07-31 ENCOUNTER — HOSPITAL ENCOUNTER (OUTPATIENT)
Dept: MAMMOGRAPHY | Age: 63
Discharge: HOME | End: 2023-07-31
Payer: COMMERCIAL

## 2023-07-31 VITALS — WEIGHT: 132 LBS | BODY MASS INDEX: 24.29 KG/M2 | HEIGHT: 62 IN

## 2023-07-31 DIAGNOSIS — Z12.31 VISIT FOR SCREENING MAMMOGRAM: ICD-10-CM

## 2023-07-31 PROCEDURE — 77067 SCR MAMMO BI INCL CAD: CPT

## 2023-08-02 ENCOUNTER — HOSPITAL ENCOUNTER (OUTPATIENT)
Dept: PHYSICAL THERAPY | Age: 63
Setting detail: THERAPIES SERIES
Discharge: HOME | End: 2023-08-02
Payer: COMMERCIAL

## 2023-08-02 LAB
ALDOST SERPL-MCNC: 50 NG/DL
ALDOST/RENIN PLAS-RTO: 9.1 RATIO
RENIN PLAS-CCNC: 5.5 NG/ML/HR

## 2023-08-02 PROCEDURE — 97162 PT EVAL MOD COMPLEX 30 MIN: CPT

## 2023-08-02 PROCEDURE — 97110 THERAPEUTIC EXERCISES: CPT

## 2023-08-02 NOTE — FLOWSHEET NOTE
Therapeutic Activities (98355)                                   Gait (58411)                                   Neuromuscular Re-ed (33230)       Cervical tuck and lift       Prone Y progressions       Prone T progressions                            Manual Intervention (06624)       Manual T/S mobilization (prone)       Manual C/S mobilization:  Sideglides, PA       Manual C/T mobilization (consider prone)       tpDN:  L UT  L middle scalene  L suboccipitals                         Modalities:     Pt. Education:  08/02/2023  -patient extensively educated on diagnosis, prognosis and expectations for rehab  - educated on trigger point dry needling, use of IASTM  - educated on tempo for lifting in classes  - all patient questions were answered    Home Exercise Program:  Access Code: [email or web address]  URL: [employer]/  Date: 08/02/2023  Prepared by: Corky Coto     Exercises  - Seated Cervical Sidebending Stretch (Mirrored)  - 2-3 x daily - 6 x weekly - 1 sets - 3 reps - 8 hold  - Seated Scalene Stretch with Towel (Mirrored)  - 2-3 x daily - 6 x weekly - 1 sets - 3 reps - 8 hold    Therapeutic Exercise and NMR EXR  [x] (76197) Provided verbal/tactile cueing for activities related to strengthening, flexibility, endurance, ROM for improvements in  [] LE / Lumbar: LE, proximal hip, and core control with self care, mobility, lifting, ambulation. [x] UE / Cervical: cervical, postural, scapular, scapulothoracic and UE control with self care, reaching, carrying, lifting, house/yardwork, driving, computer work.  [] (25719) Provided verbal/tactile cueing for activities related to improving balance, coordination, kinesthetic sense, posture, motor skill, proprioception to assist with   [] LE / lumbar: LE, proximal hip, and core control in self care, mobility, lifting, ambulation and eccentric single leg control.    [] UE / cervical: cervical, scapular, scapulothoracic and UE control with self

## 2023-08-03 DIAGNOSIS — M47.812 CERVICAL SPONDYLOSIS WITHOUT MYELOPATHY: ICD-10-CM

## 2023-08-03 DIAGNOSIS — M79.18 MYOFASCIAL MUSCLE PAIN: ICD-10-CM

## 2023-08-03 DIAGNOSIS — M50.30 DDD (DEGENERATIVE DISC DISEASE), CERVICAL: ICD-10-CM

## 2023-08-03 DIAGNOSIS — M48.02 FORAMINAL STENOSIS OF CERVICAL REGION: ICD-10-CM

## 2023-08-03 NOTE — TELEPHONE ENCOUNTER
Kervin/renin ratio is normal. Kidney function also normal. Start Lisinopril. If she hesitated to start medication, please advise her to check blood pressure at home consistently for 2 weeks, 2 times a day once in the morning once in the evening. As her blood pressure can be fluctuating throughout the day. Call back with r BP reading.     For the rest of the labs, will have her discuss with her PCP

## 2023-08-04 RX ORDER — GABAPENTIN 300 MG/1
CAPSULE ORAL
Qty: 90 CAPSULE | Refills: 0 | Status: SHIPPED | OUTPATIENT
Start: 2023-08-04 | End: 2023-09-03

## 2023-08-07 NOTE — PROGRESS NOTES
diagnostics  Results for orders placed or performed in visit on 03/06/23   Comprehensive Metabolic Panel   Result Value Ref Range    Sodium 144 136 - 145 mmol/L    Potassium 3.4 (L) 3.5 - 5.1 mmol/L    Chloride 101 99 - 110 mmol/L    CO2 30 21 - 32 mmol/L    Anion Gap 13 3 - 16    Glucose 95 70 - 99 mg/dL    BUN 16 7 - 20 mg/dL    Creatinine 0.7 0.6 - 1.2 mg/dL    Est, Glom Filt Rate >60 >60    Calcium 9.3 8.3 - 10.6 mg/dL    Total Protein 6.8 6.4 - 8.2 g/dL    Albumin 4.1 3.4 - 5.0 g/dL    Albumin/Globulin Ratio 1.5 1.1 - 2.2    Total Bilirubin 0.3 0.0 - 1.0 mg/dL    Alkaline Phosphatase 80 40 - 129 U/L    ALT 20 10 - 40 U/L    AST 23 15 - 37 U/L   CBC with Auto Differential   Result Value Ref Range    WBC 3.8 (L) 4.0 - 11.0 K/uL    RBC 5.08 4.00 - 5.20 M/uL    Hemoglobin 14.6 12.0 - 16.0 g/dL    Hematocrit 43.3 36.0 - 48.0 %    MCV 85.4 80.0 - 100.0 fL    MCH 28.8 26.0 - 34.0 pg    MCHC 33.7 31.0 - 36.0 g/dL    RDW 12.7 12.4 - 15.4 %    Platelets 503 893 - 801 K/uL    MPV 8.0 5.0 - 10.5 fL    Neutrophils % 36.2 %    Lymphocytes % 50.9 %    Monocytes % 10.3 %    Eosinophils % 2.1 %    Basophils % 0.5 %    Neutrophils Absolute 1.4 (L) 1.7 - 7.7 K/uL    Lymphocytes Absolute 1.9 1.0 - 5.1 K/uL    Monocytes Absolute 0.4 0.0 - 1.3 K/uL    Eosinophils Absolute 0.1 0.0 - 0.6 K/uL    Basophils Absolute 0.0 0.0 - 0.2 K/uL     Impression:       1. Cervical spondylosis without myelopathy    2. DDD (degenerative disc disease), cervical    3. Lateral epicondylitis of right elbow      Acute flare up of facet pain due to recent fall. Plan:  Clinical Course: Above diagnoses are improving     I discussed the diagnosis and the treatment options with Shani Lindsey today. In Summary:  The various treatment options were outlined and discussed with Shani Lindsey including:  Conservative care options: physical therapy, ice, medications, bracing, and activity modification. The indications for therapeutic injections.
1

## 2023-08-15 DIAGNOSIS — I10 PRIMARY HYPERTENSION: Primary | ICD-10-CM

## 2023-08-15 RX ORDER — MONTELUKAST SODIUM 10 MG/1
TABLET ORAL
Qty: 90 TABLET | Refills: 1 | Status: SHIPPED | OUTPATIENT
Start: 2023-08-15

## 2023-08-28 ENCOUNTER — HOSPITAL ENCOUNTER (OUTPATIENT)
Dept: PHYSICAL THERAPY | Age: 63
Setting detail: THERAPIES SERIES
Discharge: HOME | End: 2023-08-28
Payer: COMMERCIAL

## 2023-08-28 NOTE — FLOWSHEET NOTE
[facility]     Physical Therapy  Cancellation/No-show Note  Patient Name:  Duncan Moss  :  1960   Date:  2023  Cancelled visits to date: 0  No-shows to date: 1    Patient status for today's appointment patient:  []  Cancelled  []  Rescheduled appointment  [x]  No-show 23     Reason given by patient:  []  Patient ill  []  Conflicting appointment  []  No transportation    []  Conflict with work  [x]  No reason given  []  Other:     Comments:      Phone call information:   []  Phone call made today to patient at _ time at number provided:      []  Patient answered, conversation as follows:    []  Patient did not answer, message left as follows:  [x]  Phone call not made today  []  Phone call not needed - pt contacted us to cancel and provided reason for cancellation.      Electronically signed by:  Amish Mac PT

## 2023-09-05 ENCOUNTER — HOSPITAL ENCOUNTER (OUTPATIENT)
Dept: PHYSICAL THERAPY | Age: 63
Setting detail: THERAPIES SERIES
Discharge: HOME | End: 2023-09-05
Payer: COMMERCIAL

## 2023-09-05 PROCEDURE — 97112 NEUROMUSCULAR REEDUCATION: CPT

## 2023-09-05 PROCEDURE — 97140 MANUAL THERAPY 1/> REGIONS: CPT

## 2023-09-05 PROCEDURE — 97530 THERAPEUTIC ACTIVITIES: CPT

## 2023-09-05 NOTE — PROGRESS NOTES
[address] [facility]  Phone: [phone]   Fax: [phone]  Physical Therapy Re-Certification Plan of Care    Dear Keyona Pearce  ,    We had the pleasure of treating the following patient for physical therapy services at [facility]. A summary of our findings can be found in the updated assessment below. This includes our plan of care. If you have any questions or concerns regarding these findings, please do not hesitate to contact me at the office phone number checked above. Thank you for the referral.     Physician Signature:________________________________Date:__________________  By signing above (or electronic signature), therapist's plan is approved by physician      Functional Outcome: NDI: raw score = 6; dysfunction = 12%                     Overall Response to Treatment:   [x]Patient is responding well to treatment and improvement is noted with regards  to goals (written below). The patient has improvement in cervical ROM and joint mobility, as well as pain reduction. []Patient should continue to improve in reasonable time if they continue HEP   []Patient has plateaued and is no longer responding to skilled PT intervention    []Patient is getting worse and would benefit from return to referring MD   []Patient unable to adhere to initial POC   []Other:     Date range of Visits: 23 - 23  Total Visits: 2    Recommendation:    [x]Continue PT 1-2x / wk for 2 weeks.                []Hold PT, pending MD visit      Physical Therapy Daily Treatment Note    Date:  2023     Patient Name:  Giovanna Barker    :  1960  MRN: [id number]  Medical Diagnosis Information:  Cervical spondylosis without myelopathy [M47.812]  DDD (degenerative disc disease), cervical [M50.30]  Foraminal stenosis of cervical region [M48.02]  Myofascial muscle pain [M79.18]         PT diagnosis: myofascial

## 2023-09-08 ENCOUNTER — HOSPITAL ENCOUNTER (OUTPATIENT)
Dept: PHYSICAL THERAPY | Age: 63
Setting detail: THERAPIES SERIES
Discharge: HOME | End: 2023-09-08
Payer: COMMERCIAL

## 2023-09-08 NOTE — FLOWSHEET NOTE
[address] [facility]     Physical Therapy  Cancellation/No-show Note  Patient Name:  Ava Rosenthal  :  1960   Date:  2023  Cancelled visits to date: 0  No-shows to date: 2    Patient status for today's appointment patient:  []  Cancelled  []  Rescheduled appointment  [x]  No-show 23,      Reason given by patient:  []  Patient ill  []  Conflicting appointment  []  No transportation    []  Conflict with work  [x]  No reason given  []  Other:     Comments:      Phone call information:   []  Phone call made today to patient at _ time at number provided:      []  Patient answered, conversation as follows:    []  Patient did not answer, message left as follows:  [x]  Phone call not made today  []  Phone call not needed - pt contacted us to cancel and provided reason for cancellation.      Electronically signed by:  Eleuterio Keenan PT

## 2023-09-12 ENCOUNTER — HOSPITAL ENCOUNTER (OUTPATIENT)
Dept: PHYSICAL THERAPY | Age: 63
Setting detail: THERAPIES SERIES
Discharge: HOME | End: 2023-09-12
Payer: COMMERCIAL

## 2023-09-12 NOTE — PROGRESS NOTES
[facility]     Physical Therapy  Cancellation/No-show Note  Patient Name:  Blayne Beltran  :  1960   Date:  2023  Cancelled visits to date: 0  No-shows to date: 3    Patient status for today's appointment patient:  []  Cancelled  []  Rescheduled appointment  [x]  No-show 23, ,      Reason given by patient:  []  Patient ill  []  Conflicting appointment  []  No transportation    []  Conflict with work  [x]  No reason given  []  Other:     Comments:      Phone call information:   [x]  Phone call made today to patient at 4:50p time at number provided:      []  Patient answered, conversation as follows:    [x]  Patient did not answer, message left as follows: PT left voicemail to indicate missed appointment time and to check patient status. The patient was provided clinical phone number for contact if patient would like to re-schedule, and the patient was provided with next scheduled appointment date and time. []  Phone call not made today  []  Phone call not needed - pt contacted us to cancel and provided reason for cancellation.      Electronically signed by:  Saul Thompson PT

## 2023-10-04 DIAGNOSIS — M48.02 FORAMINAL STENOSIS OF CERVICAL REGION: ICD-10-CM

## 2023-10-04 DIAGNOSIS — M47.812 CERVICAL SPONDYLOSIS WITHOUT MYELOPATHY: ICD-10-CM

## 2023-10-04 DIAGNOSIS — M50.30 DDD (DEGENERATIVE DISC DISEASE), CERVICAL: ICD-10-CM

## 2023-10-04 DIAGNOSIS — M79.18 MYOFASCIAL MUSCLE PAIN: ICD-10-CM

## 2023-10-05 RX ORDER — GABAPENTIN 300 MG/1
CAPSULE ORAL
Qty: 90 CAPSULE | Refills: 0 | Status: SHIPPED | OUTPATIENT
Start: 2023-10-05 | End: 2024-01-03

## 2023-10-10 RX ORDER — CITALOPRAM 20 MG/1
20 TABLET ORAL DAILY
Qty: 90 TABLET | Refills: 1 | Status: SHIPPED | OUTPATIENT
Start: 2023-10-10

## 2023-10-13 ENCOUNTER — OFFICE VISIT (OUTPATIENT)
Dept: ORTHOPEDIC SURGERY | Age: 63
End: 2023-10-13
Payer: COMMERCIAL

## 2023-10-13 VITALS — HEIGHT: 62 IN | BODY MASS INDEX: 23.74 KG/M2 | WEIGHT: 129 LBS | RESPIRATION RATE: 16 BRPM

## 2023-10-13 DIAGNOSIS — M79.18 MYOFASCIAL MUSCLE PAIN: ICD-10-CM

## 2023-10-13 DIAGNOSIS — M50.30 DDD (DEGENERATIVE DISC DISEASE), CERVICAL: ICD-10-CM

## 2023-10-13 DIAGNOSIS — M47.812 CERVICAL SPONDYLOSIS WITHOUT MYELOPATHY: Primary | ICD-10-CM

## 2023-10-13 DIAGNOSIS — M48.02 FORAMINAL STENOSIS OF CERVICAL REGION: ICD-10-CM

## 2023-10-13 PROCEDURE — 99214 OFFICE O/P EST MOD 30 MIN: CPT | Performed by: STUDENT IN AN ORGANIZED HEALTH CARE EDUCATION/TRAINING PROGRAM

## 2023-10-13 RX ORDER — BACLOFEN 10 MG/1
10 TABLET ORAL DAILY PRN
Qty: 30 TABLET | Refills: 0 | Status: SHIPPED | OUTPATIENT
Start: 2023-10-13

## 2023-10-13 NOTE — PROGRESS NOTES
per tablet, Take 1 tablet by mouth daily, Disp: , Rfl:   Allergies:  Statins  Social History:    reports that she has never smoked. She has never used smokeless tobacco. She reports that she does not drink alcohol and does not use drugs. Family History:   Family History   Problem Relation Age of Onset    Diabetes Mother     Kidney Cancer Mother         RIGHT    Breast Cancer Mother 72    Cancer Maternal Grandmother     Diabetes Maternal Grandmother     Cancer Maternal Grandfather     Diabetes Maternal Grandfather     Heart Disease Other     High Blood Pressure Other        REVIEW OF SYSTEMS:   CONSTITUTIONAL: Denies unexplained weight loss, fevers, chills or fatigue  NEUROLOGICAL: Denies unsteady gait or progressive weakness  MUSCULOSKELETAL: Denies joint swelling or redness  GI: Denies nausea, vomiting, diarrhea   : Denies bowel or bladder issues       PHYSICAL EXAM:    Vitals: Resp. rate 16, height 5' 2\" (1.575 m), weight 129 lb (58.5 kg), last menstrual period 04/30/2010. GENERAL EXAM:  General Apparence: Patient is adequately groomed with no evidence of malnutrition. Psychiatric: Orientation: The patient is oriented to time, place and person. The patient's mood and affect are appropriate   Vascular: Examination reveals no swelling and palpation reveals no tenderness in upper or lower extremities. Good capillary refill. The lymphatic examination of the neck, axillae and groin reveals all areas to be without enlargement or induration  Sensation is intact without deficit in the upper and lower extremities to light touch and pinprick  Coordination of the upper and lower extremities are normal.    CERVICAL EXAMINATION:  Inspection: Local inspection shows no step-off or bruising. Cervical alignment is normal. No instability is noted. Palpation and Percussion: No evidence of tenderness at the midline. Paraspinal tenderness is not present. There is no paraspinal spasm.    Skin:There are no rashes, ulcerations

## 2023-10-19 ENCOUNTER — OFFICE VISIT (OUTPATIENT)
Dept: INTERNAL MEDICINE CLINIC | Age: 63
End: 2023-10-19
Payer: COMMERCIAL

## 2023-10-19 VITALS
HEART RATE: 78 BPM | BODY MASS INDEX: 23.96 KG/M2 | OXYGEN SATURATION: 96 % | DIASTOLIC BLOOD PRESSURE: 98 MMHG | TEMPERATURE: 96.9 F | WEIGHT: 130.2 LBS | HEIGHT: 62 IN | SYSTOLIC BLOOD PRESSURE: 142 MMHG

## 2023-10-19 DIAGNOSIS — F41.9 ANXIETY: ICD-10-CM

## 2023-10-19 DIAGNOSIS — I10 PRIMARY HYPERTENSION: ICD-10-CM

## 2023-10-19 PROCEDURE — 3075F SYST BP GE 130 - 139MM HG: CPT | Performed by: INTERNAL MEDICINE

## 2023-10-19 PROCEDURE — 99214 OFFICE O/P EST MOD 30 MIN: CPT | Performed by: INTERNAL MEDICINE

## 2023-10-19 PROCEDURE — 3079F DIAST BP 80-89 MM HG: CPT | Performed by: INTERNAL MEDICINE

## 2023-10-19 ASSESSMENT — ENCOUNTER SYMPTOMS: SHORTNESS OF BREATH: 0

## 2023-10-19 NOTE — ASSESSMENT & PLAN NOTE
Stable, though not optimally controlled   -We discussed option to try a change in treatment, but she prefers to not make any changes and stay on celexa 20 mg  -was intolerant of Effexor (significan GI upset).     -anxiety is likely contributing to her elevated BP

## 2023-10-19 NOTE — ASSESSMENT & PLAN NOTE
Recently diagnosed, confirmed on repeat testing at home and office with home BP mostly 140s-160s/70s. Prior believed to be mostly whitecoat given normal home Bps.   -did not tolerate lisinopril due to GI issues, will stop  -we decided to obtain 24 hour bp monitoring to see how BP truly is, and whether we lena to try different treatment as she is reluctant to try another med. -She has history of chronic hypokalemia but renin activity and aldosterone are not suggestive of hyperaldo. Abdominal CT 2021 noted no adrenal nodules. Normal TSH. Negative NANDO screen.  Meloxicam stopped  -no Urine protein

## 2023-10-19 NOTE — PROGRESS NOTES
[address] [facility] Internal Medicine  Follow up visit   10/19/2023    Zacarias Lindsey (:  1960) is a 66 y.o. female, here for evaluation of the following medical concerns:    Chief Complaint   Patient presents with    Hypertension        ASSESSMENT/ PLAN:  Primary hypertension  Recently diagnosed, confirmed on repeat testing at home and office with home BP mostly 140s-160s/70s. Prior believed to be mostly whitecoat given normal home Bps.   -did not tolerate lisinopril due to GI issues, will stop  -we decided to obtain 24 hour bp monitoring to see how BP truly is, and whether we lena to try different treatment as she is reluctant to try another med. -She has history of chronic hypokalemia but renin activity and aldosterone are not suggestive of hyperaldo. Abdominal CT  noted no adrenal nodules. Normal TSH. Negative NANDO screen. Meloxicam stopped  -no Urine protein    Anxiety  Stable, though not optimally controlled   -We discussed option to try a change in treatment, but she prefers to not make any changes and stay on celexa 20 mg  -was intolerant of Effexor (significan GI upset). -anxiety is likely contributing to her elevated BP      Orders Placed This Encounter   Procedures    24 hour blood pressure monitor     No orders of the defined types were placed in this encounter. Medications Discontinued During This Encounter   Medication Reason    lisinopril (PRINIVIL;ZESTRIL) 5 MG tablet Side effects        No follow-ups on file. HPI  Here for follow-up. Overall doing okay. A lot of stress at work pulled home BP has been 130s-150s mostly . anxiety is uncontrolled but does not want to change treatment.   No chest pain or discomfort, dyspnea, dyspnea on exertion    HISTORIES   Current Outpatient Medications on File Prior to Visit   Medication Sig Dispense Refill    baclofen (LIORESAL) 10 MG tablet Take 1 tablet by mouth daily as needed (prn) 30 tablet 0    citalopram (CELEXA) 20 MG tablet TAKE ONE

## 2023-10-20 ENCOUNTER — PATIENT MESSAGE (OUTPATIENT)
Dept: INTERNAL MEDICINE CLINIC | Age: 63
End: 2023-10-20

## 2023-10-20 ENCOUNTER — TELEPHONE (OUTPATIENT)
Dept: CARDIOLOGY CLINIC | Age: 63
End: 2023-10-20

## 2023-10-20 NOTE — TELEPHONE ENCOUNTER
Patient called stating that Dr. Ailyn Rosenthal (PCP) put in  an order for her to wear a 24  hour BP monitor, but her insurance needs a PA before she can have it placed.      [phone]

## 2023-10-20 NOTE — TELEPHONE ENCOUNTER
Informed pt to call Dr. Christian Cartwright office fot the PA since Dr. Gabbi Valiente ordered the monitor. Pt verbalized understanding and will call Dr. Christian Cartwright office.

## 2023-10-23 NOTE — TELEPHONE ENCOUNTER
[facility]  To: Dr. Katya Winston: 10/20/2023 4:05 PM EDT  Subject: 24hr B/P monitor    I called my insurance to see if this was covered. It is but needs precert . The cardiology office was called I used Dr. Robel Yang nurse for the message. She just returned the call and states it your office 's responsibility to get precert. first and then send the prescript/order to them, but here is the catch THERE IS NO SUCH THING AS A 24HR B/P MONITOR, ONLY A TELEMETRY EKG MONITOR, SHE HAS NEVER HEARD OF IT NOR COULD DIRECT ME WHERE TO GO. Let me know what the plan is now. Veronika 3am b/p 140/88 after climbing stairs working in house today 150/98. ...................... Landon Brush

## 2023-10-23 NOTE — TELEPHONE ENCOUNTER
If need preauthorization, we can complete what ever documentation is needed.   We do a 24-hour blood pressure monitoring all the time, not sure what they told her, we can check with the cardiology office how to schedule it

## 2023-10-23 NOTE — TELEPHONE ENCOUNTER
Spoke with Sharif Nava (scheduling at [facility], [facility].) and [facility] is the only hospital where the 24 hour B/P monitors are placed. Per Sharif Nava no pre-certification is needed. I will send a message to pt per Nicolasa to call and schedule.

## 2023-11-10 LAB
HPV HR 12 DNA SPEC QL NAA+PROBE: NOT DETECTED
HPV16 DNA SPEC QL NAA+PROBE: NOT DETECTED
HPV16+18+H RISK 12 DNA SPEC-IMP: NORMAL
HPV18 DNA SPEC QL NAA+PROBE: NOT DETECTED

## 2023-12-26 ENCOUNTER — HOSPITAL ENCOUNTER (OUTPATIENT)
Dept: NON INVASIVE DIAGNOSTICS | Age: 63
Discharge: HOME | End: 2023-12-26
Payer: COMMERCIAL

## 2023-12-26 PROCEDURE — 93786 AMBL BP MNTR W/SW REC ONLY: CPT

## 2023-12-26 PROCEDURE — 93788 AMBL BP MNTR W/SW A/R: CPT

## 2023-12-26 NOTE — PROGRESS NOTES
24 hour ambulatory blood pressure monitor #4 applied to patient's left arm with size 1 cuff. Patient shown how to remove and drop off bin for after hours return.

## 2024-01-04 PROBLEM — M25.512 LEFT SHOULDER PAIN: Status: RESOLVED | Noted: 2021-04-01 | Resolved: 2024-01-04

## 2024-01-04 NOTE — PROGRESS NOTES
Reviewed home 24 hours BP testing, confirms hypertension with average /90, average while awake 158/91, average while asleep 144/82.  Given this with blood pressures noted in our office I think we should start blood pressure medication, can offer to come see me sooner than currently scheduled in May, we can start low-dose amlodipine or hydrochlorothiazide

## 2024-01-05 ENCOUNTER — PATIENT MESSAGE (OUTPATIENT)
Dept: INTERNAL MEDICINE CLINIC | Age: 64
End: 2024-01-05

## 2024-01-05 NOTE — TELEPHONE ENCOUNTER
From: Veronika Lindsey  To: Dr. Otis Rodriguez  Sent: 1/5/2024 4:06 PM EST  Subject: B/P    I've been looking on my chart for the 24hr/b/p results never loaded in my chart. Got message to call office, I did . Sevier your options will try low dose norvasc, Won't do HCTZ ( already on K+ support. and not able to tolerate bid K+, as it is , water pill will deplete K+, can't take any more additional K+, and can't be in Bathroom all day voiding , I work in Same Day/ End[employer] no time to void on the regular then add a water pill no way unless your placing a carrillo :) :) . T[facility] MA [facility] try 30 day supply, and check my b/p as I always do if it works out will send to [facility] later. I also tried lisinopril for the 4th time at end of Nov , same issue..(side effect) feel I've given it a fair attempt...... thanks have a great week-end!!!! PST

## 2024-01-05 NOTE — PROGRESS NOTES
Patient advised per Dr. Rodriguez, pt states she does not want to take Hydrochlorothiazide but she is willing to try Amlodipine. Would like to have a prescription sent to the [facility] pharmacy for a 30 day supply to try and then follow-up in the office.

## 2024-01-12 ENCOUNTER — OFFICE VISIT (OUTPATIENT)
Dept: ORTHOPEDIC SURGERY | Age: 64
End: 2024-01-12

## 2024-01-12 VITALS — BODY MASS INDEX: 24.11 KG/M2 | HEIGHT: 62 IN | WEIGHT: 131 LBS

## 2024-01-12 DIAGNOSIS — M47.812 CERVICAL SPONDYLOSIS WITHOUT MYELOPATHY: ICD-10-CM

## 2024-01-12 DIAGNOSIS — M50.30 DDD (DEGENERATIVE DISC DISEASE), CERVICAL: ICD-10-CM

## 2024-01-12 DIAGNOSIS — M76.61 TENDONITIS, ACHILLES, RIGHT: ICD-10-CM

## 2024-01-12 DIAGNOSIS — M48.02 FORAMINAL STENOSIS OF CERVICAL REGION: Primary | ICD-10-CM

## 2024-01-12 DIAGNOSIS — M79.18 MYOFASCIAL MUSCLE PAIN: ICD-10-CM

## 2024-01-12 DIAGNOSIS — R29.2 GENERALIZED HYPERREFLEXIA: ICD-10-CM

## 2024-01-12 RX ORDER — GABAPENTIN 300 MG/1
CAPSULE ORAL
Qty: 90 CAPSULE | Refills: 2 | Status: SHIPPED | OUTPATIENT
Start: 2024-01-12 | End: 2024-03-12

## 2024-01-12 RX ORDER — BACLOFEN 10 MG/1
10 TABLET ORAL DAILY PRN
Qty: 30 TABLET | Refills: 0 | Status: SHIPPED | OUTPATIENT
Start: 2024-01-12

## 2024-01-12 RX ORDER — GABAPENTIN 100 MG/1
CAPSULE ORAL
Qty: 90 CAPSULE | Refills: 2 | Status: SHIPPED | OUTPATIENT
Start: 2024-01-12 | End: 2024-04-18

## 2024-01-12 NOTE — PROGRESS NOTES
Follow up: SP[facility]    Veronika Lindsey  1960  [id number]      CHIEF COMPLAINT:    Chief Complaint   Patient presents with    Neck Pain         HISTORY OF PRESENT ILLNESS:  Ms. Lindsey is a 63 y.o. female returns for a follow up visit for multiple medical problems.  Her current presenting problems are   1. Cervical spondylosis without myelopathy    2. Foraminal stenosis of cervical region    3. DDD (degenerative disc disease), cervical    4. Tendonitis, Achilles, right        .    As per information/history obtained from the PADT(patient assessment and documentation tool) - She complains of pain in the neck with radiation to the head and shoulders Left She rates the pain 3/10 and describes it as dull, aching.  Pain is made worse by: lifting, using her head and upper body.   She denies side effects from the current pain regimen.Patient reports that since the last follow up visit the physical functioning is unchanged, family/social relationships are unchanged, mood is unchanged and sleep patterns are unchanged, and that the overall functioning is unchanged.  Patient denies neurological bowel or bladder.     The patient presents for follow up of ongoing neck pain. 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 She presents today for refill of this medication. This helps her sleep at least 6hrs a night. It brings pain down to 1/10. She denies any medication side effects. She also takes baclofen to help control her pain as needed. She stopped the meloxicam per a recommendation from her PCP. She only uses it intermittently. Of note, she has developed a tremor of the left hand in the middle and index fingers. She notices it when using her hand to steady during placing IV's with her right hand. She follows with a neurologist regularly.       Associated signs and symptoms:   Neurogenic bowel or bladder symptoms:  no   Perceived weakness:

## 2024-01-18 ENCOUNTER — NURSE TRIAGE (OUTPATIENT)
Dept: OTHER | Facility: CLINIC | Age: 64
End: 2024-01-18

## 2024-01-19 ENCOUNTER — PATIENT MESSAGE (OUTPATIENT)
Dept: INTERNAL MEDICINE CLINIC | Age: 64
End: 2024-01-19

## 2024-01-19 RX ORDER — BENZONATATE 100 MG/1
100 CAPSULE ORAL 2 TIMES DAILY PRN
Qty: 20 CAPSULE | Refills: 0 | Status: SHIPPED | OUTPATIENT
Start: 2024-01-19 | End: 2024-01-26

## 2024-01-19 NOTE — TELEPHONE ENCOUNTER
From: Veronika Lindsey  To: Dr. Otis Rodriguez  Sent: 1/19/2024 10:56 AM EST  Subject: covid    Hi, I tested + for COVID yesterday. SX; awful body ache bad head ache, constant coughing, congestion . Can you call in some Tessalon Perles to [facility]? Also what's you thoughts on Paxlovid. thanks home in bed. I'll send my b/p's to you later after 1 week on norvasc. Veronika

## 2024-01-19 NOTE — TELEPHONE ENCOUNTER
Location of patient: [place]    Patient calling because she tested positive for covid and was told by manager to call to report.  Patient states she does not need to see her provider and will take care of herself at home.  Writer informed patient how to report her positive covid test on Workday and to expect an email about what steps they need to take and when they can go back to work.    Subjective: Caller states \"just tested positive for covid.  Wasn't feeling good yesterday evening.  Persistent drip back of throat.\"     Current Symptoms:   Cough  Chills  Fever 100.2    Reason for Disposition   Health Information question, no triage required and triager able to answer question    Protocols used: Information Only Call - No Triage-ADULT-

## 2024-01-29 RX ORDER — MONTELUKAST SODIUM 10 MG/1
TABLET ORAL
Qty: 90 TABLET | Refills: 1 | Status: SHIPPED | OUTPATIENT
Start: 2024-01-29

## 2024-02-05 ENCOUNTER — OFFICE VISIT (OUTPATIENT)
Dept: INTERNAL MEDICINE CLINIC | Age: 64
End: 2024-02-05
Payer: COMMERCIAL

## 2024-02-05 VITALS
WEIGHT: 131.2 LBS | TEMPERATURE: 97.8 F | OXYGEN SATURATION: 97 % | HEIGHT: 62 IN | DIASTOLIC BLOOD PRESSURE: 74 MMHG | BODY MASS INDEX: 24.14 KG/M2 | HEART RATE: 85 BPM | SYSTOLIC BLOOD PRESSURE: 130 MMHG

## 2024-02-05 DIAGNOSIS — D72.819 LEUKOPENIA, UNSPECIFIED TYPE: ICD-10-CM

## 2024-02-05 DIAGNOSIS — R73.03 PREDIABETES: ICD-10-CM

## 2024-02-05 DIAGNOSIS — E78.5 HYPERLIPIDEMIA, UNSPECIFIED HYPERLIPIDEMIA TYPE: ICD-10-CM

## 2024-02-05 DIAGNOSIS — I10 PRIMARY HYPERTENSION: Primary | ICD-10-CM

## 2024-02-05 DIAGNOSIS — R73.09 ELEVATED HEMOGLOBIN A1C: ICD-10-CM

## 2024-02-05 PROBLEM — H04.123 DRY EYES: Status: RESOLVED | Noted: 2018-09-12 | Resolved: 2024-02-05

## 2024-02-05 PROCEDURE — 99214 OFFICE O/P EST MOD 30 MIN: CPT | Performed by: INTERNAL MEDICINE

## 2024-02-05 PROCEDURE — 3075F SYST BP GE 130 - 139MM HG: CPT | Performed by: INTERNAL MEDICINE

## 2024-02-05 PROCEDURE — 3078F DIAST BP <80 MM HG: CPT | Performed by: INTERNAL MEDICINE

## 2024-02-05 ASSESSMENT — ENCOUNTER SYMPTOMS
COUGH: 1
SHORTNESS OF BREATH: 0

## 2024-02-05 ASSESSMENT — PATIENT HEALTH QUESTIONNAIRE - PHQ9
SUM OF ALL RESPONSES TO PHQ QUESTIONS 1-9: 0
2. FEELING DOWN, DEPRESSED OR HOPELESS: 0
1. LITTLE INTEREST OR PLEASURE IN DOING THINGS: 0
SUM OF ALL RESPONSES TO PHQ9 QUESTIONS 1 & 2: 0
SUM OF ALL RESPONSES TO PHQ QUESTIONS 1-9: 0

## 2024-02-05 NOTE — PROGRESS NOTES
[facility]  Follow up visit   2024    Veronika Lindsey (:  1960) is a 63 y.o. female, here for evaluation of the following medical concerns:    Chief Complaint   Patient presents with    Hypertension        ASSESSMENT/ PLAN:    Primary hypertension  Now controlled  -Confirmed on 24h ambulatory BP average overall 156/90, awake average 158/91, asleep average 144/82  -we started amlodipine 2.5 mg 23, BP better with treatment, at home 130/80s, high here but likely white coat component, will be following home BP.   -f/u in ~5 months with home log   -BMP and urine protein     Hyperlipidemia  -repeat lipids  -Intolerant of lipitor, simvastatin, pravastatin in the past, prefers to avoid tx.     Prediabetes  -repeat A1c before next visit  -continue to work on lifestyle modifications - low fat/ carb diet, regular physical activity, wt loss     For post-COVID cough can continue with Flonase nasal spray and nasal saline rinses for PND, prefer to avoid decongestions due to BP    Orders Placed This Encounter   Procedures    Comprehensive Metabolic Panel    Hemoglobin A1C    Lipid Panel    CBC with Auto Differential     No orders of the defined types were placed in this encounter.     Medications Discontinued During This Encounter   Medication Reason    meloxicam (MOBIC) 15 MG tablet DUPLICATE        No follow-ups on file.    HPI  Here for follow-up.  Overall doing better now.  Recently recovered from COVID, still has some cough and postnasal drip.  She uses Flonase.  BP is better since she stopped Paxlovid, she is on 2.5 mg of amlodipine, reviewed home log with her today    HISTORIES   Current Outpatient Medications on File Prior to Visit   Medication Sig Dispense Refill    montelukast (SINGULAIR) 10 MG tablet TAKE ONE TABLET BY MOUTH EVERY NIGHT 90 tablet 1    gabapentin (NEURONTIN) 300 MG capsule TAKE ONE CAPSULE BY MOUTH NIGHTLY 90 capsule 2    gabapentin (NEURONTIN) 100 MG capsule TAKE

## 2024-02-05 NOTE — ASSESSMENT & PLAN NOTE
-repeat lipids  -Intolerant of lipitor, simvastatin, pravastatin in the past, prefers to avoid tx.

## 2024-02-05 NOTE — ASSESSMENT & PLAN NOTE
-repeat A1c before next visit  -continue to work on lifestyle modifications - low fat/ carb diet, regular physical activity, wt loss

## 2024-02-05 NOTE — ASSESSMENT & PLAN NOTE
Now controlled  -Confirmed on 24h ambulatory BP average overall 156/90, awake average 158/91, asleep average 144/82  -we started amlodipine 2.5 mg 01/08/23, BP better with treatment, at home 130/80s, high here but likely white coat component, will be following home BP.   -f/u in ~5 months with home log   -BMP and urine protein

## 2024-03-25 ENCOUNTER — HOSPITAL ENCOUNTER (OUTPATIENT)
Dept: GENERAL RADIOLOGY | Age: 64
Discharge: HOME | End: 2024-03-25
Payer: COMMERCIAL

## 2024-03-25 ENCOUNTER — HOSPITAL ENCOUNTER (OUTPATIENT)
Age: 64
Discharge: HOME | End: 2024-03-25
Payer: COMMERCIAL

## 2024-03-25 PROCEDURE — 74018 RADEX ABDOMEN 1 VIEW: CPT

## 2024-03-26 RX ORDER — CITALOPRAM 20 MG/1
20 TABLET ORAL DAILY
Qty: 90 TABLET | Refills: 1 | Status: SHIPPED | OUTPATIENT
Start: 2024-03-26

## 2024-04-12 ENCOUNTER — OFFICE VISIT (OUTPATIENT)
Dept: ORTHOPEDIC SURGERY | Age: 64
End: 2024-04-12
Payer: COMMERCIAL

## 2024-04-12 VITALS — HEIGHT: 62 IN | WEIGHT: 132 LBS | RESPIRATION RATE: 16 BRPM | BODY MASS INDEX: 24.29 KG/M2

## 2024-04-12 DIAGNOSIS — M50.30 DDD (DEGENERATIVE DISC DISEASE), CERVICAL: ICD-10-CM

## 2024-04-12 DIAGNOSIS — M47.812 CERVICAL SPONDYLOSIS WITHOUT MYELOPATHY: ICD-10-CM

## 2024-04-12 DIAGNOSIS — M48.02 FORAMINAL STENOSIS OF CERVICAL REGION: Primary | ICD-10-CM

## 2024-04-12 PROCEDURE — 99214 OFFICE O/P EST MOD 30 MIN: CPT | Performed by: STUDENT IN AN ORGANIZED HEALTH CARE EDUCATION/TRAINING PROGRAM

## 2024-04-12 RX ORDER — MELOXICAM 15 MG/1
15 TABLET ORAL DAILY PRN
Qty: 30 TABLET | Refills: 2 | Status: SHIPPED | OUTPATIENT
Start: 2024-04-12 | End: 2024-07-11
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
## 2024-05-14 ENCOUNTER — PATIENT MESSAGE (OUTPATIENT)
Dept: INTERNAL MEDICINE CLINIC | Age: 64
End: 2024-05-14

## 2024-05-15 NOTE — TELEPHONE ENCOUNTER
From: Veronika Lindsey  To: Dr. Otis Rodriguez  Sent: 5/14/2024 6:23 PM EDT  Subject: appointment     Trying to reschedule 7/16/24 appointment. Placing new schedule at work by 5-17-24. If I know a day available agin I can request the day off since 245pm appointments are gone. self schedule didn't show any opening, said to reach out for availability. sorry B/P's have been ok. My be well with in is scheduled for 6-13-24, so I'm adding your blood work to this....thanks Veronika

## 2024-06-12 ENCOUNTER — PATIENT MESSAGE (OUTPATIENT)
Dept: ORTHOPEDIC SURGERY | Age: 64
End: 2024-06-12

## 2024-06-13 ENCOUNTER — PATIENT MESSAGE (OUTPATIENT)
Dept: INTERNAL MEDICINE CLINIC | Age: 64
End: 2024-06-13

## 2024-06-13 LAB
CHOLEST SERPL-MCNC: 253 MG/DL (ref 0–199)
GLUCOSE SERPL-MCNC: 92 MG/DL (ref 70–99)
HDLC SERPL-MCNC: 58 MG/DL (ref 40–60)
LDLC SERPL CALC-MCNC: 161 MG/DL
TRIGL SERPL-MCNC: 172 MG/DL (ref 0–150)

## 2024-06-14 RX ORDER — METHYLPREDNISOLONE 4 MG/1
TABLET ORAL
Qty: 1 KIT | Refills: 0 | Status: SHIPPED | OUTPATIENT
Start: 2024-06-14 | End: 2024-06-20
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
## 2024-06-14 NOTE — TELEPHONE ENCOUNTER
From: Veronika Lindsey  To: Dr. Otis Rodriguez  Sent: 6/13/2024 9:28 PM EDT  Subject: labs    Had my be well with in screening b/p was AWESOME, 112/72 brought your lab orders but they where doing the same labs. As of this evening 930 pm only lipid panel, blood sugar back, not sure why the rest hasn't been resulted.......CBC, A1C..Renal........

## 2024-07-19 ENCOUNTER — OFFICE VISIT (OUTPATIENT)
Dept: ORTHOPEDIC SURGERY | Age: 64
End: 2024-07-19
Payer: COMMERCIAL

## 2024-07-19 VITALS — HEIGHT: 62 IN | RESPIRATION RATE: 16 BRPM | BODY MASS INDEX: 23.92 KG/M2 | WEIGHT: 130 LBS

## 2024-07-19 DIAGNOSIS — M47.812 CERVICAL SPONDYLOSIS WITHOUT MYELOPATHY: Primary | ICD-10-CM

## 2024-07-19 DIAGNOSIS — M48.02 FORAMINAL STENOSIS OF CERVICAL REGION: ICD-10-CM

## 2024-07-19 PROCEDURE — 99214 OFFICE O/P EST MOD 30 MIN: CPT | Performed by: STUDENT IN AN ORGANIZED HEALTH CARE EDUCATION/TRAINING PROGRAM

## 2024-07-19 RX ORDER — GABAPENTIN 300 MG/1
300 CAPSULE ORAL NIGHTLY
Qty: 30 CAPSULE | Refills: 2 | Status: SHIPPED | OUTPATIENT
Start: 2024-07-19 | End: 2024-10-17

## 2024-07-19 RX ORDER — GABAPENTIN 100 MG/1
100 CAPSULE ORAL 2 TIMES DAILY
Qty: 60 CAPSULE | Refills: 2 | Status: SHIPPED | OUTPATIENT
Start: 2024-07-19 | End: 2024-10-17

## 2024-07-19 NOTE — PROGRESS NOTES
Above diagnoses are improving     I discussed the diagnosis and the treatment options with Veronika Lindsey today.     In Summary:  The various treatment options were outlined and discussed with Veronika Lindsey including:  Conservative care options: physical therapy, ice, medications, bracing, and activity modification. The indications for therapeutic injections. The indications for additional imaging/laboratory studies.  The indications for (possible future) interventions.     After considering the various options discussed, Veronika Lindsey elected to pursue a course of treatment that includes the followin. Medications: I will send gabapentin 300 mg and 100 mg BID to the pharmacy when she needs a refill. We are increasing the frequency of 100 mg so she can decrease the 300 mg to 200 mg if pain levels are controlled. PDMP reviewed. No adverse effects.Refill of Meloxicam sent. Discussed GI and  side effects.     2. PT:  Continue HEP.     3. Follow up:   3 months for med refill .       Veronika Lindsey was instructed to call the office if her symptoms worsen or if new symptoms appear prior to the next scheduled visit. She is specifically instructed to contact the office between now & her scheduled appointment if she has concerns related to her condition or if she needs assistance in scheduling the above tests. She is welcome to call for an appointment sooner if she has any additional concerns or questions.           HANNAH Alfredo, PA-C  Board Certified by the National Committee on Certification of Physician Assistants  [facility]               This dictation was performed with a verbal recognition program (DRAGON) and it was checked for errors. It is possible that there are still dictated errors within this office note. If so, please bring any errors to my attention for an addendum. All efforts were made to ensure that this office note is accurate.

## 2024-07-24 ENCOUNTER — PATIENT MESSAGE (OUTPATIENT)
Dept: INTERNAL MEDICINE CLINIC | Age: 64
End: 2024-07-24

## 2024-07-24 NOTE — TELEPHONE ENCOUNTER
From: Veronika Lindsey  To: Dr. Otis Rodriguez  Sent: 7/24/2024 12:48 PM EDT  Subject: re-schedule appointment    Johnna, your message David Banda,     I can put you in on 9/3/24 at 2:40 pm.     Johnna     Thats great !! 9/3/24 2:40 pm works

## 2024-08-07 ENCOUNTER — HOSPITAL ENCOUNTER (OUTPATIENT)
Dept: MAMMOGRAPHY | Age: 64
Discharge: HOME | End: 2024-08-07
Payer: COMMERCIAL

## 2024-08-07 VITALS — HEIGHT: 62 IN | WEIGHT: 130 LBS | BODY MASS INDEX: 23.92 KG/M2

## 2024-08-07 DIAGNOSIS — Z12.31 VISIT FOR SCREENING MAMMOGRAM: ICD-10-CM

## 2024-08-07 PROCEDURE — 77063 BREAST TOMOSYNTHESIS BI: CPT

## 2024-08-13 DIAGNOSIS — I10 PRIMARY HYPERTENSION: ICD-10-CM

## 2024-08-15 RX ORDER — MONTELUKAST SODIUM 10 MG/1
TABLET ORAL
Qty: 90 TABLET | Refills: 1 | Status: SHIPPED | OUTPATIENT
Start: 2024-08-15

## 2024-08-15 RX ORDER — AMLODIPINE BESYLATE 2.5 MG/1
2.5 TABLET ORAL DAILY
Qty: 90 TABLET | Refills: 1 | Status: SHIPPED | OUTPATIENT
Start: 2024-08-15

## 2024-08-18 ENCOUNTER — PATIENT MESSAGE (OUTPATIENT)
Dept: ORTHOPEDIC SURGERY | Age: 64
End: 2024-08-18

## 2024-08-26 ENCOUNTER — PATIENT MESSAGE (OUTPATIENT)
Dept: ORTHOPEDIC SURGERY | Age: 64
End: 2024-08-26

## 2024-09-03 ENCOUNTER — OFFICE VISIT (OUTPATIENT)
Dept: INTERNAL MEDICINE CLINIC | Age: 64
End: 2024-09-03
Payer: COMMERCIAL

## 2024-09-03 VITALS
HEIGHT: 62 IN | OXYGEN SATURATION: 95 % | HEART RATE: 66 BPM | BODY MASS INDEX: 24.14 KG/M2 | SYSTOLIC BLOOD PRESSURE: 138 MMHG | WEIGHT: 131.2 LBS | DIASTOLIC BLOOD PRESSURE: 80 MMHG | TEMPERATURE: 97.7 F

## 2024-09-03 DIAGNOSIS — I10 PRIMARY HYPERTENSION: ICD-10-CM

## 2024-09-03 DIAGNOSIS — Z12.11 COLON CANCER SCREENING: ICD-10-CM

## 2024-09-03 DIAGNOSIS — R73.03 PREDIABETES: ICD-10-CM

## 2024-09-03 DIAGNOSIS — Z00.00 ANNUAL PHYSICAL EXAM: Primary | ICD-10-CM

## 2024-09-03 PROBLEM — M25.562 ARTHRALGIA OF BOTH KNEES: Status: RESOLVED | Noted: 2017-05-04 | Resolved: 2024-09-03

## 2024-09-03 PROBLEM — M25.561 ARTHRALGIA OF BOTH KNEES: Status: RESOLVED | Noted: 2017-05-04 | Resolved: 2024-09-03

## 2024-09-03 PROCEDURE — 99396 PREV VISIT EST AGE 40-64: CPT | Performed by: INTERNAL MEDICINE

## 2024-09-03 PROCEDURE — 3079F DIAST BP 80-89 MM HG: CPT | Performed by: INTERNAL MEDICINE

## 2024-09-03 PROCEDURE — 3075F SYST BP GE 130 - 139MM HG: CPT | Performed by: INTERNAL MEDICINE

## 2024-09-03 RX ORDER — GABAPENTIN 300 MG/1
300 CAPSULE ORAL DAILY PRN
COMMUNITY

## 2024-09-03 SDOH — ECONOMIC STABILITY: INCOME INSECURITY: HOW HARD IS IT FOR YOU TO PAY FOR THE VERY BASICS LIKE FOOD, HOUSING, MEDICAL CARE, AND HEATING?: NOT HARD AT ALL

## 2024-09-03 SDOH — ECONOMIC STABILITY: FOOD INSECURITY: WITHIN THE PAST 12 MONTHS, THE FOOD YOU BOUGHT JUST DIDN'T LAST AND YOU DIDN'T HAVE MONEY TO GET MORE.: NEVER TRUE

## 2024-09-03 SDOH — ECONOMIC STABILITY: FOOD INSECURITY: WITHIN THE PAST 12 MONTHS, YOU WORRIED THAT YOUR FOOD WOULD RUN OUT BEFORE YOU GOT MONEY TO BUY MORE.: NEVER TRUE

## 2024-09-03 ASSESSMENT — ENCOUNTER SYMPTOMS
COUGH: 0
ABDOMINAL PAIN: 0
DIARRHEA: 0
NAUSEA: 0
BLOOD IN STOOL: 0
BACK PAIN: 1
SHORTNESS OF BREATH: 0
COLOR CHANGE: 0
EYE REDNESS: 0

## 2024-09-03 NOTE — ASSESSMENT & PLAN NOTE
Here for annual physical exam, overall doing well from a clinical standpoint.  As for health maintenance:  -cervical cancer screen: neg 11/23  -breast cancer screen: Mammogram neg 08/24, mother had breast cancer in her 70s  -colon cancer screen: Had Cologuard in 2021, due 12/24, ordered. maternal grandfather had colon cancer  -BP within goal   -BMI 24, reviewed recent lipids and glucose (be well within)  -HIV and HCV negative  -negative depression and DV screen  -Advised to eat a healthy, well-balanced diet  -Advised to exercise at least 30min/day 5x/week for heart and bone health  -Check skin regularly for new moles or changes in moles. wear sunscreen at least SPF 30 and don't forget to reapply every 3-4 hours or if you are in the water  -Follow up with dentist at least twice/year.  -Follow up with eye doctor at least once/year.  -Calcium goal is 1200 mg a day ideally from diet (dairy, green leafy vegetables, nuts)

## 2024-09-03 NOTE — PROGRESS NOTES
of 2) Never done    DTaP/Tdap/Td vaccine (2 - Td or Tdap) 08/06/2022    Respiratory Syncytial Virus (RSV) Pregnant or age 60 yrs+ (1 - 1-dose 60+ series) Never done    A1C test (Diabetic or Prediabetic)  07/25/2024    Flu vaccine (1) 08/01/2024    COVID-19 Vaccine (6 - 2023-24 season) 09/01/2024    Colorectal Cancer Screen  12/01/2024    Depression Screen  02/05/2025    Breast cancer screen  08/07/2025    Cervical cancer screen  11/08/2028    Lipids  06/13/2029    Hepatitis C screen  Completed    HIV screen  Completed    Hepatitis A vaccine  Aged Out    Hepatitis B vaccine  Aged Out    Hib vaccine  Aged Out    Polio vaccine  Aged Out    Meningococcal (ACWY) vaccine  Aged Out    Pneumococcal 0-64 years Vaccine  Aged Out    Depression Monitoring  Discontinued       PSH, PMH, SH and FH reviewed and noted.  Recent and past labs, tests and consults also reviewed.  Recent or new meds also reviewed.    Otis Rodriguez MD    This dictation was generated by voice recognition computer software.  Although all attempts are made to edit the dictation for accuracy, there may be errors in the transcription that are not intended.

## 2024-09-03 NOTE — ASSESSMENT & PLAN NOTE
-recent fasting glucose normal   -continue to work on lifestyle modifications - low fat/ carb diet, regular physical activity, wt loss

## 2024-09-04 ENCOUNTER — PATIENT MESSAGE (OUTPATIENT)
Dept: INTERNAL MEDICINE CLINIC | Age: 64
End: 2024-09-04

## 2024-09-04 DIAGNOSIS — I10 PRIMARY HYPERTENSION: Primary | ICD-10-CM

## 2024-09-04 DIAGNOSIS — E87.6 HYPOKALEMIA: ICD-10-CM

## 2024-09-04 LAB
ALBUMIN SERPL-MCNC: 4.5 G/DL (ref 3.4–5)
ALBUMIN/GLOB SERPL: 1.9 {RATIO} (ref 1.1–2.2)
ALP SERPL-CCNC: 83 U/L (ref 40–129)
ALT SERPL-CCNC: 27 U/L (ref 10–40)
ANION GAP SERPL CALCULATED.3IONS-SCNC: 10 MMOL/L (ref 3–16)
AST SERPL-CCNC: 28 U/L (ref 15–37)
BASOPHILS # BLD: 0.1 K/UL (ref 0–0.2)
BASOPHILS NFR BLD: 0.9 %
BILIRUB SERPL-MCNC: 0.3 MG/DL (ref 0–1)
BUN SERPL-MCNC: 17 MG/DL (ref 7–20)
CALCIUM SERPL-MCNC: 9.7 MG/DL (ref 8.3–10.6)
CHLORIDE SERPL-SCNC: 96 MMOL/L (ref 99–110)
CO2 SERPL-SCNC: 31 MMOL/L (ref 21–32)
CREAT SERPL-MCNC: 0.7 MG/DL (ref 0.6–1.2)
CREAT UR-MCNC: 94 MG/DL (ref 28–259)
DEPRECATED RDW RBC AUTO: 13.5 % (ref 12.4–15.4)
EOSINOPHIL # BLD: 0.1 K/UL (ref 0–0.6)
EOSINOPHIL NFR BLD: 2.1 %
GFR SERPLBLD CREATININE-BSD FMLA CKD-EPI: >90 ML/MIN/{1.73_M2}
GLUCOSE SERPL-MCNC: 98 MG/DL (ref 70–99)
HCT VFR BLD AUTO: 44.4 % (ref 36–48)
HGB BLD-MCNC: 14.8 G/DL (ref 12–16)
LYMPHOCYTES # BLD: 1.7 K/UL (ref 1–5.1)
LYMPHOCYTES NFR BLD: 28.7 %
MCH RBC QN AUTO: 29.5 PG (ref 26–34)
MCHC RBC AUTO-ENTMCNC: 33.4 G/DL (ref 31–36)
MCV RBC AUTO: 88.2 FL (ref 80–100)
MICROALBUMIN UR DL<=1MG/L-MCNC: <1.2 MG/DL
MICROALBUMIN/CREAT UR: NORMAL MG/G (ref 0–30)
MONOCYTES # BLD: 0.5 K/UL (ref 0–1.3)
MONOCYTES NFR BLD: 8.6 %
NEUTROPHILS # BLD: 3.6 K/UL (ref 1.7–7.7)
NEUTROPHILS NFR BLD: 59.7 %
PLATELET # BLD AUTO: 211 K/UL (ref 135–450)
PMV BLD AUTO: 8 FL (ref 5–10.5)
POTASSIUM SERPL-SCNC: 3.3 MMOL/L (ref 3.5–5.1)
PROT SERPL-MCNC: 6.9 G/DL (ref 6.4–8.2)
RBC # BLD AUTO: 5.03 M/UL (ref 4–5.2)
SODIUM SERPL-SCNC: 137 MMOL/L (ref 136–145)
WBC # BLD AUTO: 6 K/UL (ref 4–11)

## 2024-09-05 NOTE — TELEPHONE ENCOUNTER
Recommend to continue with potassium citrate regularly to ensure potassium levels adequate, as above recommend another testing

## 2024-09-17 DIAGNOSIS — E87.6 HYPOKALEMIA: ICD-10-CM

## 2024-09-17 DIAGNOSIS — I10 PRIMARY HYPERTENSION: ICD-10-CM

## 2024-09-20 LAB
ALDOST SERPL-MCNC: 44.5 NG/DL
ALDOST/RENIN PLAS-RTO: 9.9 RATIO
RENIN PLAS-CCNC: 4.5 NG/ML/HR

## 2024-10-03 PROBLEM — Z00.00 ANNUAL PHYSICAL EXAM: Status: RESOLVED | Noted: 2023-01-31 | Resolved: 2024-10-03

## 2024-10-15 RX ORDER — CITALOPRAM HYDROBROMIDE 20 MG/1
20 TABLET ORAL DAILY
Qty: 90 TABLET | Refills: 1 | Status: SHIPPED | OUTPATIENT
Start: 2024-10-15

## 2024-10-22 RX ORDER — GABAPENTIN 100 MG/1
100 CAPSULE ORAL 2 TIMES DAILY
Qty: 60 CAPSULE | Refills: 2 | Status: SHIPPED | OUTPATIENT
Start: 2024-10-22 | End: 2025-01-20

## 2024-10-25 ENCOUNTER — OFFICE VISIT (OUTPATIENT)
Dept: ORTHOPEDIC SURGERY | Age: 64
End: 2024-10-25
Payer: COMMERCIAL

## 2024-10-25 VITALS — WEIGHT: 131 LBS | RESPIRATION RATE: 16 BRPM | HEIGHT: 62 IN | BODY MASS INDEX: 24.11 KG/M2

## 2024-10-25 DIAGNOSIS — M47.812 CERVICAL SPONDYLOSIS WITHOUT MYELOPATHY: ICD-10-CM

## 2024-10-25 DIAGNOSIS — M48.02 FORAMINAL STENOSIS OF CERVICAL REGION: Primary | ICD-10-CM

## 2024-10-25 DIAGNOSIS — G89.29 CHRONIC PAIN OF LEFT KNEE: ICD-10-CM

## 2024-10-25 DIAGNOSIS — M25.562 CHRONIC PAIN OF LEFT KNEE: ICD-10-CM

## 2024-10-25 PROCEDURE — 99214 OFFICE O/P EST MOD 30 MIN: CPT | Performed by: STUDENT IN AN ORGANIZED HEALTH CARE EDUCATION/TRAINING PROGRAM

## 2024-10-25 NOTE — PROGRESS NOTES
Follow up: SPINE    Veronika Lindsey  1960  [id number]      CHIEF COMPLAINT:    Chief Complaint   Patient presents with    Neck Pain         HISTORY OF PRESENT ILLNESS:  Ms. Lindsey is a 64 y.o. female returns for a follow up visit for multiple medical problems.  Her current presenting problems are   1. Foraminal stenosis of cervical region    2. Cervical spondylosis without myelopathy    3. Chronic pain of left knee            As per information/history obtained from the PADT(patient assessment and documentation tool) - She complains of pain in the neck with radiation to the head and shoulders Left She rates the pain 3/10 and describes it as dull, aching.  Pain is made worse by: lifting, using her head and upper body.   She denies side effects from the current pain regimen.Patient reports that since the last follow up visit the physical functioning is better, family/social relationships are better, mood is better and sleep patterns are better, and that the overall functioning is better.  Patient denies neurological bowel or bladder.     The patient presents for follow up of ongoing neck pain. 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 She has been able to decrease her dose from 300 mg to 200 mg.  She presents today for refill of this medication. This helps her sleep at least 6hrs a night. It brings pain down to 1-2/10. She denies any medication side effects. She no longer needs baclofen. She stopped the meloxicam per a recommendation from her PCP. She only uses it intermittently. She has been able to do her boot camp exercises regularly she just modifies overhead movement.     Of note, she is complaining of left knee pain located at the quadriceps tendon. Worse with exercise.       Associated signs and symptoms:   Neurogenic bowel or bladder symptoms:  no   Perceived weakness:  no   Difficulty walking:  no              Past

## 2024-10-26 ENCOUNTER — PATIENT MESSAGE (OUTPATIENT)
Dept: INTERNAL MEDICINE CLINIC | Age: 64
End: 2024-10-26

## 2024-10-29 ENCOUNTER — HOSPITAL ENCOUNTER (OUTPATIENT)
Dept: PHYSICAL THERAPY | Age: 64
Setting detail: THERAPIES SERIES
Discharge: HOME | End: 2024-10-29
Payer: COMMERCIAL

## 2024-10-29 DIAGNOSIS — M25.562 ACUTE PAIN OF LEFT KNEE: ICD-10-CM

## 2024-10-29 DIAGNOSIS — M25.561 CHRONIC PAIN OF BOTH KNEES: Primary | ICD-10-CM

## 2024-10-29 DIAGNOSIS — G89.29 CHRONIC PAIN OF BOTH KNEES: Primary | ICD-10-CM

## 2024-10-29 DIAGNOSIS — M25.562 CHRONIC PAIN OF BOTH KNEES: Primary | ICD-10-CM

## 2024-10-29 PROCEDURE — 97162 PT EVAL MOD COMPLEX 30 MIN: CPT

## 2024-10-29 PROCEDURE — 97112 NEUROMUSCULAR REEDUCATION: CPT

## 2024-10-29 NOTE — PLAN OF CARE
TREATMENT PLAN     Frequency/Duration: 1-2x/week for 4 weeks for the following treatment interventions:    Interventions:  Therapeutic Exercise (58704) including: strength training, ROM, and functional mobility  Therapeutic Activities (77533) including: functional mobility training and education.  Neuromuscular Re-education (18456) activation and proprioception, including postural re-education.    Gait Training (43586) for normalization of ambulation patterns and AD training.   Manual Therapy (82615) as indicated to include: Passive Range of Motion, Gr I-IV mobilizations, Grade V Manipulation, Soft Tissue Mobilization, Dry Needling/IASTM, Trigger Point Release, and Myofascial Release  Modalities as needed that may include: Cryotherapy, Electrical Stimulation, and Vasoneumatic Compression  Patient education on joint protection, postural re-education, activity modification, and progression of HEP    Plan: POC initiated as per evaluation    Electronically Signed by Justin Fernandes, PT, DPT, OCS, OMT-C  Date: 10/29/2024     Note: Portions of this note have been templated and/or copied from initial evaluation, reassessments and prior notes for documentation efficiency.    Note: If patient does not return for scheduled/recommended follow up visits, this note will serve as a discharge from care along with the most recent update on progress.

## 2024-10-31 ENCOUNTER — TELEPHONE (OUTPATIENT)
Dept: INTERNAL MEDICINE CLINIC | Age: 64
End: 2024-10-31

## 2024-10-31 NOTE — TELEPHONE ENCOUNTER
I spoke with pt and gave Dr Rodriguez's advise. She said that she told the  that she had chills yesterday and that the puss was yellow. Unfortunately there are no available appointment with Dr Rodriguez or any provider in this office. The NP schedule is full also. Pt informed and stated that she will have to go back to the [facility].

## 2024-10-31 NOTE — TELEPHONE ENCOUNTER
Reviewed [facility], it appears she is adequately treated.  If any signs of worsening infection-spreading of redness, pussy discharge, worsening swelling or pain, fever, chills should be evaluated again immediately

## 2024-10-31 NOTE — TELEPHONE ENCOUNTER
Pt states she was bite by a dog on Saturday  Went to [facility] in [place] and has gotten Tetanus shot and agaumentin. Pt states there is drainage coming from her right upper arm hurts and itches. Would like to speak with Dr. Rodriguez about this. Pt works at [employer] across the street and is there now    [phone]  Pls call and advise

## 2024-11-04 NOTE — TELEPHONE ENCOUNTER
I did not understand from original message patient was requesting to be seen or has alarming signs.  Happy to hear she was already examined and managed.  To make sure I understand this message correctly, does she have any current concerns and feels she needs to be seen?

## 2024-11-04 NOTE — TELEPHONE ENCOUNTER
She went back to [facility] on 10/31/24 and the notes have been received. Please see my chart message sent by pt today 11/4/24. (There are 2 ongoing encounters addressing this problem. This encounter will be closed.

## 2024-11-06 ENCOUNTER — HOSPITAL ENCOUNTER (OUTPATIENT)
Dept: PHYSICAL THERAPY | Age: 64
Setting detail: THERAPIES SERIES
Discharge: HOME | End: 2024-11-06
Payer: COMMERCIAL

## 2024-11-06 PROCEDURE — 97110 THERAPEUTIC EXERCISES: CPT

## 2024-11-06 PROCEDURE — 97112 NEUROMUSCULAR REEDUCATION: CPT

## 2024-11-06 PROCEDURE — 97140 MANUAL THERAPY 1/> REGIONS: CPT

## 2024-11-06 NOTE — FLOWSHEET NOTE
[facility] - Outpatient Rehabilitation and Therapy [address], [place] [address] office: [phone] fax: [phone]       Physical Therapy: TREATMENT/PROGRESS NOTE   Patient: Veronika Lindsey (64 y.o. female)   Examination Date: 2024   :  1960 MRN: [id number]   Visit #: 2   Insurance Allowable Auth Needed   30 []Yes    []No    Insurance: Payor: [employer] / Plan: [facility] EMPLOYEES / Product Type: *No Product type* /   Insurance ID: [id number] - (Commercial)  Secondary Insurance (if applicable):    Treatment Diagnosis:     ICD-10-CM    1. Chronic pain of both knees  M25.561     M25.562     G89.29       2. Acute pain of left knee  M25.562          Medical Diagnosis:  Chronic pain of left knee [M25.562, G89.29]   Referring Physician: Ursula Del Rio PA  PCP: Otis Rodriguez MD     Plan of care signed (Y/N): Y    Date of Patient follow up with Physician:      Plan of Care Report: NO  POC update due: (10 visits /OR AUTH LIMITS, whichever is less)  2024                                             Medical History:  Comorbidities:  Anxiety  Other: hyperlipidemia  Relevant Medical History:                                          Precautions/ Contra-indications:           Latex allergy:  NO  Pacemaker:    NO  Contraindications for Manipulation: None  Date of Surgery:   Other:    Red Flags:  None    Suicide Screening:   The patient did not verbalize a primary behavioral concern, suicidal ideation, suicidal intent, or demonstrate suicidal behaviors.    Preferred Language for Healthcare:   [x] English       [] other:    SUBJECTIVE EXAMINATION     Patient stated complaint: Pt reports she is doing OK however with one of the exercises she does she has some pain on her inner knee and another with balance has significant stress on her L hip. Unsure if she is doing them right and would like to review. Current pain is mild inner knee/quad.        Test used Initial score  10/29/24

## 2024-11-07 DIAGNOSIS — I10 PRIMARY HYPERTENSION: ICD-10-CM

## 2024-11-08 RX ORDER — AMLODIPINE BESYLATE 2.5 MG/1
2.5 TABLET ORAL DAILY
Qty: 90 TABLET | Refills: 1 | Status: SHIPPED | OUTPATIENT
Start: 2024-11-08

## 2024-11-13 ENCOUNTER — HOSPITAL ENCOUNTER (OUTPATIENT)
Dept: PHYSICAL THERAPY | Age: 64
Setting detail: THERAPIES SERIES
Discharge: HOME | End: 2024-11-13
Payer: COMMERCIAL

## 2024-11-13 PROCEDURE — 97112 NEUROMUSCULAR REEDUCATION: CPT

## 2024-11-13 PROCEDURE — 97110 THERAPEUTIC EXERCISES: CPT

## 2024-11-13 PROCEDURE — 97530 THERAPEUTIC ACTIVITIES: CPT

## 2024-11-13 PROCEDURE — 97140 MANUAL THERAPY 1/> REGIONS: CPT

## 2024-11-13 NOTE — FLOWSHEET NOTE
[facility] - Outpatient Rehabilitation and Therapy [address], [place] [address] office: [phone] fax: [phone]       Physical Therapy: TREATMENT/PROGRESS NOTE   Patient: Veronika Lindsey (64 y.o. female)   Examination Date: 2024   :  1960 MRN: [id number]   Visit #: 3   Insurance Allowable Auth Needed   30 []Yes    []No    Insurance: Payor: [facility] / Plan: [facility] EMPLOYEES / Product Type: *No Product type* /   Insurance ID: [id number] - (Commercial)  Secondary Insurance (if applicable):    Treatment Diagnosis:     ICD-10-CM    1. Chronic pain of both knees  M25.561     M25.562     G89.29       2. Acute pain of left knee  M25.562          Medical Diagnosis:  Chronic pain of left knee [M25.562, G89.29]   Referring Physician: Ursula Del Rio PA  PCP: Otis Rodriguez MD     Plan of care signed (Y/N): Y    Date of Patient follow up with Physician:      Plan of Care Report: NO  POC update due: (10 visits /OR AUTH LIMITS, whichever is less)  2024                                             Medical History:  Comorbidities:  Anxiety  Other: hyperlipidemia  Relevant Medical History:                                          Precautions/ Contra-indications:           Latex allergy:  NO  Pacemaker:    NO  Contraindications for Manipulation: None  Date of Surgery:   Other:    Red Flags:  None    Suicide Screening:   The patient did not verbalize a primary behavioral concern, suicidal ideation, suicidal intent, or demonstrate suicidal behaviors.    Preferred Language for Healthcare:   [x] English       [] other:    SUBJECTIVE EXAMINATION     Patient stated complaint: Pt reports after last session she felt good just tired in the quads. The day after she did notice bruising around her inner thigh where the manual work was. It was not sore or anything just bruised which continues mildly today. Overall feels better but still having the pain on the inner knee variable with 
deep  Pain decreases with: Resting, Medication, and stretching  Pain increases with:  pivoting, deep knee flexion, impactful activities     Living status: ind. W/ family    Occupation/School:  Work/School Status: Full time - [employer]  Job Duties/Demands: standing, walking, charting    Hand Dominance: NA    Sport/ Recreation/ Leisure/ Hobbies: fitness classes    Review Of Systems (ROS):  [x] Performed Review of systems (Integumentary, CardioPulmonary, Neurological) by intake and observation. Intake form has been scanned into medical record. Patient has been instructed to contact their primary care physician regarding ROS issues if not already being addressed at this time.    [x] Patient history, allergies, meds reviewed. Medical chart reviewed. See intake form.     OBJECTIVE EXAMINATION     11/6: Considerable tenderness with soft tissue restrictions medial quads     10/29/24  ROM/Strength: (Blank cells denote NT)      Mvmt (norm) AROM L AROM R Notes PROM L PROM R Notes     LUMBAR Flex (90)         Ext (25)         SB (25)          Rotation (30)             HIP Flex (120) WNL WNL        Abd (45)          ER (50)          IR (45)          Ext (20)         KNEE Flex (140) 140 140        Ext (0) 0 0         ANKLE DF (20)          PF (50)          Inversion (30)          Eversion (20)             MMT L          MMT  R Notes     LUMBAR  Flexion       Extension       Lateral flexion        Rotation          MMT L MMT R Notes       HIP  Flexion 4+ 5      Abduction 4 4+      ER        IR        Extension 4 4    KNEE  Flexion 4+ 5      Extension 4+ 5      ANKLE  DF        PF        Inversion        Eversion        Repeated Movements: [] Normal  [] Abnormal [] N/A    Palpation:   Patient reported tenderness with palpation  Location: medial joint line left knee    Posture:   WNL    Bandages/Dressings/Incisions:  Not Applicable    Dermatomes: Abnormal findings listed below  All WNL    Myotomes: Abnormal findings listed below  All

## 2024-11-19 ENCOUNTER — APPOINTMENT (OUTPATIENT)
Dept: PHYSICAL THERAPY | Age: 64
End: 2024-11-19
Payer: COMMERCIAL

## 2024-11-20 ENCOUNTER — HOSPITAL ENCOUNTER (OUTPATIENT)
Dept: PHYSICAL THERAPY | Age: 64
Setting detail: THERAPIES SERIES
Discharge: HOME | End: 2024-11-20
Payer: COMMERCIAL

## 2024-11-20 PROCEDURE — 97530 THERAPEUTIC ACTIVITIES: CPT

## 2024-11-20 PROCEDURE — 97110 THERAPEUTIC EXERCISES: CPT

## 2024-11-20 NOTE — FLOWSHEET NOTE
intervention: [x] Yes  [] No      CHARGE CAPTURE     PT CHARGE GRID   CPT Code (TIMED) minutes # CPT Code (UNTIMED) #     Therex (62028)  48 3  EVAL:MODERATE (63257 - Typically 30 minutes face-to-face)     Neuromusc. Re-ed (19313)    Re-Eval (98548)     Manual (92129)    Estim Unattended (92464)     Ther. Act (87019) 12 1  Mech. Traction (67820)     Gait (25439)    Dry Needle 1-2 muscle (78652)     Aquatic Therex (25193)    Dry Needle 3+ muscle (63310)     Iontophoresis (31177)    VASO (92075)     Ultrasound (01829)    Group Therapy (96774)     Estim Attended (92886)    Canalith Repositioning (24265)     Physical Performance Test (39307)         Other:    Other:    Total Timed Code Tx Minutes 60 4       Total Treatment Minutes 60      Charge Justification:  (39499) THERAPEUTIC EXERCISE - Provided verbal/tactile cueing for activities related to strengthening, flexibility, endurance, ROM performed to prevent loss of range of motion, maintain or improve muscular strength or increase flexibility, following either an injury or surgery.   (56745) HOME EXERCISE PROGRAM - Reviewed/Progressed HEP activities related to strengthening, flexibility, endurance, ROM performed to prevent loss of range of motion, maintain or improve muscular strength or increase flexibility, following either an injury or surgery.  (90496) THERAPEUTIC ACTIVITY - use of dynamic activities to improve functional performance. (Ex include squatting, ascending/descending stairs, walking, bending, lifting, catching, throwing, pushing, pulling, jumping.)  Direct, one on one contact, billed in 15-minute increments.    GOALS     GOALS:  Patient stated goal: to get pain relief; restore mobility w/o pain in order to return to workouts without pain  [] Progressing: [] Met: [] Not Met: [] Adjusted    Therapist goals for Patient:   Short Term Goals: To be achieved in: 2 weeks  1. Independent and compliant (minimum of 3 days per week) with HEP and progressions to

## 2024-11-26 ENCOUNTER — HOSPITAL ENCOUNTER (OUTPATIENT)
Dept: PHYSICAL THERAPY | Age: 64
Setting detail: THERAPIES SERIES
Discharge: HOME | End: 2024-11-26
Payer: COMMERCIAL

## 2024-11-26 PROCEDURE — 97140 MANUAL THERAPY 1/> REGIONS: CPT

## 2024-11-26 PROCEDURE — 97110 THERAPEUTIC EXERCISES: CPT

## 2024-11-26 NOTE — FLOWSHEET NOTE
assist in transition to [facility] and independent management.    Medical Necessity Documentation:  I certify that this patient meets the below criteria necessary for medical necessity for care and/or justification of therapy services:  The patient has functional impairments and/or activity limitations and would benefit from continued outpatient therapy services to address the deficits outlined in the patients goals    Return to Play: Stage 1: Intro to Strength    Prognosis for POC: [x] Good [] Fair  [] Poor    Patient requires continued skilled intervention: [x] Yes  [] No      CHARGE CAPTURE     PT CHARGE GRID   CPT Code (TIMED) minutes # CPT Code (UNTIMED) #     Therex (34816)  42 2  EVAL:MODERATE (73985 - Typically 30 minutes face-to-face)     Neuromusc. Re-ed (83347)    Re-Eval (77836)     Manual (53732) 8 1  Estim Unattended (89031)     Ther. Act (91554)    Mech. Traction (08973)     Gait (24512)    Dry Needle 1-2 muscle (04350)     Aquatic Therex (49699)    Dry Needle 3+ muscle (20561)     Iontophoresis (78413)    VASO (87287)     Ultrasound (62417)    Group Therapy (28858)     Estim Attended (69260)    Canalith Repositioning (85859)     Physical Performance Test (90128)         Other:    Other:    Total Timed Code Tx Minutes 50 3       Total Treatment Minutes 50      Charge Justification:  (69220) THERAPEUTIC EXERCISE - Provided verbal/tactile cueing for activities related to strengthening, flexibility, endurance, ROM performed to prevent loss of range of motion, maintain or improve muscular strength or increase flexibility, following either an injury or surgery.   (84692) HOME EXERCISE PROGRAM - Reviewed/Progressed HEP activities related to strengthening, flexibility, endurance, ROM performed to prevent loss of range of motion, maintain or improve muscular strength or increase flexibility, following either an injury or surgery.  (72628) THERAPEUTIC ACTIVITY - use of dynamic activities to improve functional

## 2024-12-18 ENCOUNTER — HOSPITAL ENCOUNTER (OUTPATIENT)
Dept: PHYSICAL THERAPY | Age: 64
Setting detail: THERAPIES SERIES
Discharge: HOME | End: 2024-12-18
Payer: COMMERCIAL

## 2024-12-18 PROCEDURE — 97530 THERAPEUTIC ACTIVITIES: CPT

## 2024-12-18 PROCEDURE — 97110 THERAPEUTIC EXERCISES: CPT

## 2024-12-18 NOTE — PLAN OF CARE
been instructed to contact their primary care physician regarding ROS issues if not already being addressed at this time.    [x] Patient history, allergies, meds reviewed. Medical chart reviewed. See intake form.     OBJECTIVE EXAMINATION     12/18 - 34cm suprapatellar; able to do single leg sit to stand from low surface   LE MMT:   Left:  Hip Flexion:   4+    Hip aBduction:  4+   Hip Extension:  4  Hip IR:   5  Hip ER:   5  Knee Extension:  5  Knee Flexion:   5    11/26 - hip scour + for pain; VILMA + L; difficulty with SL sit <> stand    11/20: no limitations to deadlift; pain with catcher's squat    11/13: Tenderness continues around VMO however minimal restrictions compared to last week. Still mild visible bruising.   11/6: Considerable tenderness with soft tissue restrictions medial quads     10/29/24  ROM/Strength: (Blank cells denote NT)      Mvmt (norm) AROM L AROM R Notes PROM L PROM R Notes     LUMBAR Flex (90)         Ext (25)         SB (25)          Rotation (30)             HIP Flex (120) WNL WNL        Abd (45)          ER (50)          IR (45)          Ext (20)         KNEE Flex (140) 140 140        Ext (0) 0 0         ANKLE DF (20)          PF (50)          Inversion (30)          Eversion (20)             MMT L MMT R Notes       HIP  Flexion 4+ 5      Abduction 4 4+      ER        IR        Extension 4 4    KNEE  Flexion 4+ 5      Extension 4+ 5      ANKLE  DF        PF        Inversion        Eversion        Special Tests:  Anterior drawer test (ACL): Negative  Posterior drawer test (PCL): Negative  Valgus stress test (MCL): Negative  Varus stress test (LCL): Negative  Dial Test (post-lat corner): Negative  Joint line tenderness: + left  Medial Owen test:Positive  Lateral Owen test:Negative  Thessaly (meniscus): Negative  Patellar grinding: + Left    Girth: 35cm suprapatellar bilaterally; however, L knee has visible swelling in posterior knee joint    Exercises/Interventions

## 2024-12-23 LAB — NONINV COLON CA DNA+OCC BLD SCRN STL QL: NEGATIVE

## 2024-12-24 ENCOUNTER — HOSPITAL ENCOUNTER (OUTPATIENT)
Dept: PHYSICAL THERAPY | Age: 64
Setting detail: THERAPIES SERIES
Discharge: HOME | End: 2024-12-24
Payer: COMMERCIAL

## 2024-12-24 PROCEDURE — 97110 THERAPEUTIC EXERCISES: CPT

## 2024-12-24 NOTE — FLOWSHEET NOTE
Trap Bar RDLs 45# 11/26   Incline IB DL Deadlift Hinging  12/18   Manual Intervention (39572) -  TIME         Therabar rolling for VMO on L      Assessment of L Hip  LLE LAD (open pack)   11/26 - Scour + for pain, VILMA + for pain; ROM                 NMR re-education (69344) resistance Sets/time Reps CUES NEEDED   BFR   Forward step up to SLS    Lateral step up to SLS    TKE on L     Mini lunge and hold with hip abd/add in CKC with Rogue Loop pulling into valgus Red 11/13 11/20 - discussed; NP'd - but added to HEP                        Therapeutic Activity (58534) -   Sets/time     Alter - G 75%    2-12% grade 11/20   Lateral heel taps 4 inch 11/13   Forward heel taps 4 inch 11/13   Sit to stand with ball squeeze  11/13   Sit to stand with band around knees blue 11/13                       BFR Smart Phase Protocol                    BFR Session1                       30 sec rest period between each set of repetitions.  3 Min rest/reperfusion between    each exercises protocol perfromed                      BFR Locations L thigh BFR set at: 148 mmHg  (60%)                    Protocol: 30/15/15/15           Exercises:   Reps 30 sec Reps 30 sec Reps 30 sec Reps Notes                Quad set # of reps required  30 Rest 15 Rest 15 Rest 15     completed            LAQ reps required  30  15  15  15     completed             reps required             completed             reps required             completed              Modalities:    No modalities applied this session    Education/Home Exercise Program:   Access Code: [email or web address]  URL: [email or web address]  Date: 12/18/2024  Prepared by: Arnel Fernandes    Program Notes  HAMSTRING, QUAD, ADDUCTOR, CALF STRETCHES DAILY AS NEEDED!!30'' HOLD. 2-3 TIMES!    Exercises  - Supine Active Straight Leg Raise  - 1 x daily - 4 x weekly - 3 sets - 10-15 reps  - Sidelying Hip Abduction  - 1 x daily - 4 x weekly - 3 sets - 10-15 reps  - Heel Raise  - 1 x daily - 4 x weekly - 2

## 2025-01-02 ENCOUNTER — HOSPITAL ENCOUNTER (OUTPATIENT)
Dept: PHYSICAL THERAPY | Age: 65
Setting detail: THERAPIES SERIES
Discharge: HOME | End: 2025-01-02

## 2025-01-02 PROCEDURE — 97110 THERAPEUTIC EXERCISES: CPT

## 2025-01-02 PROCEDURE — 20560 NDL INSJ W/O NJX 1 OR 2 MUSC: CPT

## 2025-01-02 NOTE — FLOWSHEET NOTE
[facility] - Outpatient Rehabilitation and Therapy [address], [place] [address] office: [phone] fax: [phone]     Physical Therapy: TREATMENT/PROGRESS NOTE   Patient: Veronika Lindsey (64 y.o. female)   Examination Date: 2025   :  1960 MRN: [id number]   Visit #: 8 /10   Insurance Allowable Auth Needed   30 []Yes    []No    Insurance: Payor: /   Insurance ID: No Subscriber Number on File  Secondary Insurance (if applicable):    Treatment Diagnosis:     ICD-10-CM    1. Chronic pain of both knees  M25.561     M25.562     G89.29       2. Acute pain of left knee  M25.562          Medical Diagnosis:  Chronic pain of left knee [M25.562, G89.29]   Referring Physician: Ursula Del Rio PA  PCP: Otis Rodriguez MD     Plan of care signed (Y/N): Y    Date of Patient follow up with Physician:      Plan of Care Report: NO  POC update due: (10 visits /OR AUTH LIMITS, whichever is less) #10 or 25                                            Medical History:  Comorbidities:  Anxiety  Other: hyperlipidemia  Relevant Medical History:                                          Precautions/ Contra-indications:           Latex allergy:  NO  Pacemaker:    NO  Contraindications for Manipulation: None  Date of Surgery:   Other:    Red Flags:  None    Suicide Screening:   The patient did not verbalize a primary behavioral concern, suicidal ideation, suicidal intent, or demonstrate suicidal behaviors.    Preferred Language for Healthcare:   [x] English       [] other:    SUBJECTIVE EXAMINATION     Patient stated complaint: Pt reports she felt good after last session and each week things get better. Still with a deep squat/lunge her pain increases very specifically around the inner knee. 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      Test used Initial score  10/29/24 2025   Pain Summary VAS 2-5 0/10 @ rest  1-2/10 with deep bend   Functional questionnaire LEFS 56/80

## 2025-01-10 ENCOUNTER — HOSPITAL ENCOUNTER (OUTPATIENT)
Dept: PHYSICAL THERAPY | Age: 65
Setting detail: THERAPIES SERIES
Discharge: HOME | End: 2025-01-10
Payer: COMMERCIAL

## 2025-01-10 ENCOUNTER — APPOINTMENT (OUTPATIENT)
Dept: PHYSICAL THERAPY | Age: 65
End: 2025-01-10
Payer: COMMERCIAL

## 2025-01-10 PROCEDURE — 20560 NDL INSJ W/O NJX 1 OR 2 MUSC: CPT

## 2025-01-10 PROCEDURE — 97110 THERAPEUTIC EXERCISES: CPT

## 2025-01-10 NOTE — FLOWSHEET NOTE
[facility] - Outpatient Rehabilitation and Therapy [address], [place] [address] office: [phone] fax: [phone]     Physical Therapy: TREATMENT/PROGRESS NOTE   Patient: Veronika Lindsey (64 y.o. female)   Examination Date: 01/10/2025   :  1960 MRN: [id number]   Visit #: 9 /10   Insurance Allowable Auth Needed   30 []Yes    []No    Insurance: Payor: /   Insurance ID: No Subscriber Number on File  Secondary Insurance (if applicable):    Treatment Diagnosis:     ICD-10-CM    1. Chronic pain of both knees  M25.561     M25.562     G89.29       2. Acute pain of left knee  M25.562          Medical Diagnosis:  Chronic pain of left knee [M25.562, G89.29]   Referring Physician: Ursula Del Rio PA  PCP: Otis Rodriguez MD     Plan of care signed (Y/N): Y    Date of Patient follow up with Physician:      Plan of Care Report: NO  POC update due: (10 visits /OR AUTH LIMITS, whichever is less) #10 or 25                                            Medical History:  Comorbidities:  Anxiety  Other: hyperlipidemia  Relevant Medical History:                                          Precautions/ Contra-indications:           Latex allergy:  NO  Pacemaker:    NO  Contraindications for Manipulation: None  Date of Surgery:   Other:    Red Flags:  None    Suicide Screening:   The patient did not verbalize a primary behavioral concern, suicidal ideation, suicidal intent, or demonstrate suicidal behaviors.    Preferred Language for Healthcare:   [x] English       [] other:    SUBJECTIVE EXAMINATION     Patient stated complaint: 48 hours of quad soreness after last session. Still getting better weekly. 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 She remains concerned that she is doing something \"wrong\" or \"improperly\" that may continue to aggravate her knee.     Test used Initial score  10/29/24 01/10/2025   Pain Summary VAS 2-5 0/10   Functional

## 2025-01-17 ENCOUNTER — HOSPITAL ENCOUNTER (OUTPATIENT)
Dept: PHYSICAL THERAPY | Age: 65
Setting detail: THERAPIES SERIES
Discharge: HOME | End: 2025-01-17
Payer: COMMERCIAL

## 2025-01-17 ENCOUNTER — OFFICE VISIT (OUTPATIENT)
Dept: ORTHOPEDIC SURGERY | Age: 65
End: 2025-01-17
Payer: COMMERCIAL

## 2025-01-17 VITALS — WEIGHT: 135 LBS | HEIGHT: 62 IN | BODY MASS INDEX: 24.84 KG/M2

## 2025-01-17 DIAGNOSIS — M47.812 CERVICAL SPONDYLOSIS WITHOUT MYELOPATHY: Primary | ICD-10-CM

## 2025-01-17 DIAGNOSIS — M48.02 FORAMINAL STENOSIS OF CERVICAL REGION: ICD-10-CM

## 2025-01-17 PROCEDURE — 97110 THERAPEUTIC EXERCISES: CPT

## 2025-01-17 PROCEDURE — 20560 NDL INSJ W/O NJX 1 OR 2 MUSC: CPT

## 2025-01-17 PROCEDURE — 99214 OFFICE O/P EST MOD 30 MIN: CPT | Performed by: STUDENT IN AN ORGANIZED HEALTH CARE EDUCATION/TRAINING PROGRAM

## 2025-01-17 PROCEDURE — 97750 PHYSICAL PERFORMANCE TEST: CPT

## 2025-01-17 RX ORDER — GABAPENTIN 300 MG/1
300 CAPSULE ORAL NIGHTLY
Qty: 30 CAPSULE | Refills: 2 | Status: SHIPPED | OUTPATIENT
Start: 2025-01-17 | End: 2025-04-17

## 2025-01-17 RX ORDER — GABAPENTIN 100 MG/1
100 CAPSULE ORAL 3 TIMES DAILY
Qty: 90 CAPSULE | Refills: 2 | Status: SHIPPED | OUTPATIENT
Start: 2025-01-17 | End: 2025-04-17

## 2025-01-17 NOTE — PLAN OF CARE
[facility] - Outpatient Rehabilitation and Therapy [address], [place] [address] office: [phone] fax: [phone]  Physical Therapy Re-Certification Plan of Care    Dear GUIDO Alfredo  ,    We had the pleasure of treating the following patient for physical therapy services at [facility]. A summary of our findings can be found in the updated assessment below.  This includes our plan of care.  If you have any questions or concerns regarding these findings, please do not hesitate to contact me at the office phone number checked above.  Thank you for the referral.     Physician Signature:________________________________Date:__________________  By signing above (or electronic signature), therapist's plan is approved by physician    Total Visits: 10     Overall Response to Treatment:  Patient is responding well to treatment and improvement is noted with regards to goals. She has apparent strength and endurance deficits of L quad compared to uninvolved leg. She would benefit from additional PT services for tpDN, ongoing biodex testing to ensure improvement of quad strength/endurance, and reduction of pain to PLOF.    Isokinetic Test Results:  Bilateral Difference:  Quadricep 180 deg/sec: 7.1% [x] Deficit   [] Surplus 300 deg/sec:   18.5% [x] Deficit   [] Surplus   Hamstring 180 deg/sec: 4.3% [] Deficit   [x] Surplus 300 deg/sec:   13.5% [] Deficit   [x] Surplus     Normative Data, 180 degrees/second:  Quadricep Normal: 60% Patient: 29.9%   Hamstring Normal: 45% Patient: 12%     Normative Data, 300 degrees/second:  Quadricep Normal: 60% Patient: 22.5%   Hamstring Normal: 45% Patient: 9%     Recommendation:    [x] Continue PT 1x / wk for 3 weeks.   [] Hold PT, pending MD visit   [] Discharge to [facility]. Follow up with PT or MD PRN.      Physical Therapy: TREATMENT/PROGRESS NOTE   Patient: Veronika Lindsey (64 y.o. female)   Examination Date: 01/17/2025

## 2025-01-17 NOTE — PROGRESS NOTES
brachioradialis are abnormal 3-4+.  Clonus absent bilaterally at the feet. No pathological reflexes are noted.  Gait & station: normal, patient ambulates without assistance  Additional Examinations:  RIGHT UPPER EXTREMITY:  Inspection/examination of the right upper extremity does not show any tenderness, deformity or injury. Range of motion is unremarkable and pain-free. There is no gross instability.  There are no rashes, ulcerations or lesions. Strength and tone are normal. No atrophy or abnormal movements are noted.  LEFT UPPER EXTREMITY: Inspection/examination of the left upper extremity does not show any tenderness, deformity or injury. Range of motion is unremarkable and pain-free. There is no gross instability.  There are no rashes, ulcerations or lesions. Strength and tone are normal. No atrophy or abnormal movements are noted.  RIGHT LOWER EXTREMITY: Inspection/examination of the right lower extremity does not show any tenderness, deformity or injury. Range of motion is normal and pain-free. There is no gross instability.  There are no rashes, ulcerations or lesions. Strength and tone are normal. No atrophy or abnormal movements are noted.  LEFT LOWER EXTREMITY:  Inspection/examination of the left lower extremity does not show any tenderness, deformity or injury. Range of motion is normal and pain-free. There is no gross instability. There are no rashes, ulcerations or lesions.  Strength and tone are normal. No atrophy or abnormal movements are noted.      Diagnostic Testing:    No new diagnostics  Results for orders placed or performed in visit on 09/17/24   Renin Activity and Aldosterone   Result Value Ref Range    Aldosterone 44.5 ng/dL    Aldosterone/Renin Activity Calculation 9.9 <=25.0 ratio    Renin Activity 4.5 ng/mL/hr     Impression:       No diagnosis found.          Plan:  Clinical Course: Above diagnoses are improving     I discussed the diagnosis and the treatment options with Veronika MOE

## 2025-01-20 ENCOUNTER — PATIENT MESSAGE (OUTPATIENT)
Dept: INTERNAL MEDICINE CLINIC | Age: 65
End: 2025-01-20

## 2025-01-20 ENCOUNTER — PATIENT MESSAGE (OUTPATIENT)
Dept: ORTHOPEDIC SURGERY | Age: 65
End: 2025-01-20

## 2025-01-20 RX ORDER — MONTELUKAST SODIUM 10 MG/1
10 TABLET ORAL NIGHTLY
Qty: 90 TABLET | Refills: 1 | Status: CANCELLED | OUTPATIENT
Start: 2025-01-20

## 2025-01-23 RX ORDER — MONTELUKAST SODIUM 10 MG/1
10 TABLET ORAL NIGHTLY
Qty: 90 TABLET | Refills: 0 | Status: SHIPPED | OUTPATIENT
Start: 2025-01-23

## 2025-01-23 RX ORDER — CITALOPRAM HYDROBROMIDE 20 MG/1
20 TABLET ORAL DAILY
Qty: 90 TABLET | Refills: 0 | Status: SHIPPED | OUTPATIENT
Start: 2025-01-23

## 2025-01-29 ENCOUNTER — HOSPITAL ENCOUNTER (OUTPATIENT)
Dept: PHYSICAL THERAPY | Age: 65
Setting detail: THERAPIES SERIES
Discharge: HOME | End: 2025-01-29
Payer: COMMERCIAL

## 2025-01-29 PROCEDURE — 20560 NDL INSJ W/O NJX 1 OR 2 MUSC: CPT

## 2025-01-29 PROCEDURE — 97110 THERAPEUTIC EXERCISES: CPT

## 2025-01-29 NOTE — FLOWSHEET NOTE
[facility] - Outpatient Rehabilitation and Therapy [address], [place] [address] office: [phone] fax: [phone]     Physical Therapy: TREATMENT/PROGRESS NOTE   Patient: Veronika Lindsey (64 y.o. female)   Examination Date: 2025   :  1960 MRN: [id number]   Visit #:   Insurance Allowable Auth Needed   30 []Yes    []No    Insurance: Payor: AETNA / Plan: AETNA HMO / Product Type: *No Product type* /   Insurance ID: [id number] - (Commercial)  Secondary Insurance (if applicable):    Treatment Diagnosis:     ICD-10-CM    1. Chronic pain of both knees  M25.561     M25.562     G89.29       2. Acute pain of left knee  M25.562          Medical Diagnosis:  Chronic pain of left knee [M25.562, G89.29]   Referring Physician: Ursula Del Rio PA  PCP: Otis Rodriguez MD     Plan of care signed (Y/N): Y    Date of Patient follow up with Physician:      Plan of Care Report: NO  POC update due: (10 visits /OR AUTH LIMITS, whichever is less) 25                                            Medical History:  Comorbidities:  Anxiety  Other: hyperlipidemia  Relevant Medical History:                                          Precautions/ Contra-indications:           Latex allergy:  NO  Pacemaker:    NO  Contraindications for Manipulation: None  Date of Surgery:   Other:    Red Flags:  None    Suicide Screening:   The patient did not verbalize a primary behavioral concern, suicidal ideation, suicidal intent, or demonstrate suicidal behaviors.    Preferred Language for Healthcare:   [x] English       [] other:    SUBJECTIVE EXAMINATION     Patient stated complaint: She notices only a little bit of tightness when at deepest squat position (with knees to floor). She reports less pain overall. She was able to perform burpees in her group class with some fatigue. She reports occasionally her neck and back prevents her from performing certain oblique work in class, resulting in oblique

## 2025-02-03 ENCOUNTER — HOSPITAL ENCOUNTER (OUTPATIENT)
Dept: PHYSICAL THERAPY | Age: 65
Setting detail: THERAPIES SERIES
Discharge: HOME | End: 2025-02-03
Payer: COMMERCIAL

## 2025-02-03 PROCEDURE — 97110 THERAPEUTIC EXERCISES: CPT

## 2025-02-03 PROCEDURE — 97530 THERAPEUTIC ACTIVITIES: CPT

## 2025-02-03 NOTE — FLOWSHEET NOTE
[facility] - Outpatient Rehabilitation and Therapy [address], [place] [address] office: [phone] fax: [phone]     Physical Therapy: TREATMENT/PROGRESS NOTE   Patient: Veronika Lindsey (64 y.o. female)   Examination Date: 2025   :  1960 MRN: [id number]   Visit #:   Insurance Allowable Auth Needed   30 []Yes    []No    Insurance: Payor: AETNA / Plan: AETNA HMO / Product Type: *No Product type* /   Insurance ID: [id number] - (Commercial)  Secondary Insurance (if applicable):    Treatment Diagnosis:     ICD-10-CM    1. Chronic pain of both knees  M25.561     M25.562     G89.29       2. Acute pain of left knee  M25.562          Medical Diagnosis:  Chronic pain of left knee [M25.562, G89.29]   Referring Physician: Ursula Del Rio PA  PCP: Otis Rodriguez MD     Plan of care signed (Y/N): Y    Date of Patient follow up with Physician:      Plan of Care Report: NO  POC update due: (10 visits /OR AUTH LIMITS, whichever is less) 25                                            Medical History:  Comorbidities:  Anxiety  Other: hyperlipidemia  Relevant Medical History:                                          Precautions/ Contra-indications:           Latex allergy:  NO  Pacemaker:    NO  Contraindications for Manipulation: None  Date of Surgery:   Other:    Red Flags:  None    Suicide Screening:   The patient did not verbalize a primary behavioral concern, suicidal ideation, suicidal intent, or demonstrate suicidal behaviors.    Preferred Language for Healthcare:   [x] English       [] other:    SUBJECTIVE EXAMINATION     Patient stated complaint: She reports she recovered fairly quickly after tpDN. Each week her knee gets better. She is agreeable to more intensive exercise today.     Test used Initial score  10/29/24 2025   Pain Summary VAS 2-5 0/10   Functional questionnaire LEFS 56/80 = 30% dys 73/80 = 9% dys   Other:              Pain:  Pain location:

## 2025-02-04 ENCOUNTER — OFFICE VISIT (OUTPATIENT)
Dept: ORTHOPEDIC SURGERY | Age: 65
End: 2025-02-04

## 2025-02-04 VITALS — BODY MASS INDEX: 24.84 KG/M2 | WEIGHT: 135 LBS | HEIGHT: 62 IN

## 2025-02-04 DIAGNOSIS — M65.331 TRIGGER MIDDLE FINGER OF RIGHT HAND: Primary | ICD-10-CM

## 2025-02-04 RX ORDER — BETAMETHASONE SODIUM PHOSPHATE AND BETAMETHASONE ACETATE 3; 3 MG/ML; MG/ML
3 INJECTION, SUSPENSION INTRA-ARTICULAR; INTRALESIONAL; INTRAMUSCULAR; SOFT TISSUE ONCE
Status: COMPLETED | OUTPATIENT
Start: 2025-02-04 | End: 2025-02-04

## 2025-02-04 RX ORDER — LIDOCAINE HYDROCHLORIDE 10 MG/ML
0.5 INJECTION, SOLUTION INFILTRATION; PERINEURAL ONCE
Status: COMPLETED | OUTPATIENT
Start: 2025-02-04 | End: 2025-02-04

## 2025-02-04 RX ADMIN — BETAMETHASONE SODIUM PHOSPHATE AND BETAMETHASONE ACETATE 3 MG: 3; 3 INJECTION, SUSPENSION INTRA-ARTICULAR; INTRALESIONAL; INTRAMUSCULAR; SOFT TISSUE at 09:53

## 2025-02-04 RX ADMIN — LIDOCAINE HYDROCHLORIDE 0.5 ML: 10 INJECTION, SOLUTION INFILTRATION; PERINEURAL at 09:54

## 2025-02-04 NOTE — PROGRESS NOTES
Hand, Upper Extremity and Reconstructive Surgery                Priscilla Lora MD                                             History of Present Illness  The patient is a 64-year-old right-hand dominant female presenting with right middle finger trigger finger. The patient reports experiencing intense pain in her right middle finger since Thanksgiving, which has been severe enough to disrupt her sleep.  She has been wearing splints at night but frequently takes them off middle the night. There is no history of trauma to the finger. She describes a sensation of the finger being stuck, which impairs her ability to perform tasks such as pulling and holding objects. The stiffness and pain occur every other night and have progressively worsened, significantly affecting her sleep quality.     She has a history of bilateral carpal tunnel release surgeries performed 10 years ago.       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                                                                                          Allergies     Allergies   Allergen Reactions    Statins      myalgias       Home Medications     Current Outpatient Medications   Medication Instructions    amLODIPine (NORVASC) 2.5 mg, Oral, DAILY    baclofen (LIORESAL) 10 mg, Oral, DAILY PRN    butalbital-acetaminophen-caffeine (FIORICET, ESGIC) -40 MG per tablet TAKE ONE TABLET BY MOUTH EVERY 4 HOURS AS NEEDED FOR PAIN UP TO 10 DAYS    calcium citrate-vitamin D (CITRACAL+D) 315-200 MG-UNIT per tablet 1 tablet, Oral, DAILY    citalopram (CELEXA) 20 mg, Oral, DAILY    Coenzyme Q10 (CO Q-10) 100 MG CHEW Oral, DAILY    cycloSPORINE (RESTASIS) 0.05 % ophthalmic emulsion Restasis 0.05 % eye drops in a dropperette    fluticasone (FLONASE) 50 MCG/ACT nasal spray 2 sprays, Nasal, DAILY    folic acid (FOLVITE) 1 mg, Oral, DAILY    gabapentin (NEURONTIN) 300 mg, Oral, DAILY PRN    gabapentin

## 2025-02-11 ENCOUNTER — HOSPITAL ENCOUNTER (OUTPATIENT)
Dept: PHYSICAL THERAPY | Age: 65
Setting detail: THERAPIES SERIES
Discharge: HOME | End: 2025-02-11
Payer: COMMERCIAL

## 2025-02-11 PROCEDURE — 97110 THERAPEUTIC EXERCISES: CPT

## 2025-02-11 PROCEDURE — 97530 THERAPEUTIC ACTIVITIES: CPT

## 2025-02-11 NOTE — DISCHARGE SUMMARY
Motion, Gr I-IV mobilizations, Grade V Manipulation, Soft Tissue Mobilization, Dry Needling/IASTM, Trigger Point Release, and Myofascial Release  Modalities as needed that may include: Cryotherapy, Electrical Stimulation, and Vasoneumatic Compression  Patient education on joint protection, postural re-education, activity modification, and progression of HEP    Plan:  DC    Electronically Signed by Justin Fernandes, PT, DPT, OCS, OMT-C  Date: 02/11/2025     Note: Portions of this note have been templated and/or copied from initial evaluation, reassessments and prior notes for documentation efficiency.    Note: If patient does not return for scheduled/recommended follow up visits, this note will serve as a discharge from care along with the most recent update on progress.

## 2025-02-28 DIAGNOSIS — I10 PRIMARY HYPERTENSION: ICD-10-CM

## 2025-02-28 RX ORDER — MONTELUKAST SODIUM 10 MG/1
TABLET ORAL
Qty: 90 TABLET | Refills: 0 | Status: SHIPPED | OUTPATIENT
Start: 2025-02-28

## 2025-03-03 RX ORDER — AMLODIPINE BESYLATE 2.5 MG/1
2.5 TABLET ORAL DAILY
Qty: 90 TABLET | Refills: 1 | Status: SHIPPED | OUTPATIENT
Start: 2025-03-03

## 2025-03-06 ENCOUNTER — OFFICE VISIT (OUTPATIENT)
Dept: INTERNAL MEDICINE CLINIC | Age: 65
End: 2025-03-06
Payer: COMMERCIAL

## 2025-03-06 VITALS
TEMPERATURE: 98.1 F | OXYGEN SATURATION: 98 % | BODY MASS INDEX: 24.55 KG/M2 | HEART RATE: 78 BPM | SYSTOLIC BLOOD PRESSURE: 134 MMHG | WEIGHT: 134.2 LBS | DIASTOLIC BLOOD PRESSURE: 74 MMHG

## 2025-03-06 DIAGNOSIS — M48.02 CERVICAL STENOSIS OF SPINE: ICD-10-CM

## 2025-03-06 DIAGNOSIS — I10 PRIMARY HYPERTENSION: Primary | ICD-10-CM

## 2025-03-06 DIAGNOSIS — E78.5 HYPERLIPIDEMIA, UNSPECIFIED HYPERLIPIDEMIA TYPE: ICD-10-CM

## 2025-03-06 DIAGNOSIS — G43.909 MIGRAINE WITHOUT STATUS MIGRAINOSUS, NOT INTRACTABLE, UNSPECIFIED MIGRAINE TYPE: ICD-10-CM

## 2025-03-06 DIAGNOSIS — F41.9 ANXIETY: ICD-10-CM

## 2025-03-06 DIAGNOSIS — R73.03 PREDIABETES: ICD-10-CM

## 2025-03-06 DIAGNOSIS — K21.9 GASTROESOPHAGEAL REFLUX DISEASE, UNSPECIFIED WHETHER ESOPHAGITIS PRESENT: ICD-10-CM

## 2025-03-06 PROCEDURE — 3075F SYST BP GE 130 - 139MM HG: CPT | Performed by: INTERNAL MEDICINE

## 2025-03-06 PROCEDURE — 3078F DIAST BP <80 MM HG: CPT | Performed by: INTERNAL MEDICINE

## 2025-03-06 PROCEDURE — G2211 COMPLEX E/M VISIT ADD ON: HCPCS | Performed by: INTERNAL MEDICINE

## 2025-03-06 PROCEDURE — 99214 OFFICE O/P EST MOD 30 MIN: CPT | Performed by: INTERNAL MEDICINE

## 2025-03-06 SDOH — ECONOMIC STABILITY: FOOD INSECURITY: WITHIN THE PAST 12 MONTHS, THE FOOD YOU BOUGHT JUST DIDN'T LAST AND YOU DIDN'T HAVE MONEY TO GET MORE.: NEVER TRUE

## 2025-03-06 SDOH — ECONOMIC STABILITY: FOOD INSECURITY: WITHIN THE PAST 12 MONTHS, YOU WORRIED THAT YOUR FOOD WOULD RUN OUT BEFORE YOU GOT MONEY TO BUY MORE.: NEVER TRUE

## 2025-03-06 ASSESSMENT — PATIENT HEALTH QUESTIONNAIRE - PHQ9
SUM OF ALL RESPONSES TO PHQ QUESTIONS 1-9: 0
SUM OF ALL RESPONSES TO PHQ QUESTIONS 1-9: 0
1. LITTLE INTEREST OR PLEASURE IN DOING THINGS: NOT AT ALL
SUM OF ALL RESPONSES TO PHQ QUESTIONS 1-9: 0
2. FEELING DOWN, DEPRESSED OR HOPELESS: NOT AT ALL
SUM OF ALL RESPONSES TO PHQ QUESTIONS 1-9: 0

## 2025-03-06 ASSESSMENT — ENCOUNTER SYMPTOMS: SHORTNESS OF BREATH: 0

## 2025-03-06 NOTE — ASSESSMENT & PLAN NOTE
-hx of long standing migraine since her 20s  -takes rizatriptan 10 mg prn and fiorces, rare   -sees Dr. LUJAN with max

## 2025-03-06 NOTE — ASSESSMENT & PLAN NOTE
-repeat a1c  -continue to work on lifestyle modifications - low fat/ carb diet, regular physical activity, wt loss

## 2025-03-06 NOTE — ASSESSMENT & PLAN NOTE
-Reports 1 week of new GERD symptoms for which she started taking PPI OTC.  Okay to continue for 6 weeks, then recommend to stop.  If symptoms persist I would recommend EGD given age of new symptoms, though today she declines EGD evaluation

## 2025-03-06 NOTE — PROGRESS NOTES
Ergocalciferol, 50 MCG (2000 UT) CAPS Take 4,000 Units by mouth daily      folic acid (FOLVITE) 1 MG tablet Take 1 tablet by mouth daily       No current facility-administered medications on file prior to visit.       Allergies   Allergen Reactions    Statins      myalgias     Past Medical History:   Diagnosis Date    Abnormal mammogram 6/8/2011    saw Dr. Valle or associate 6/2011 Ok to return for regular mammogram's     Anxiety 6/8/2011    GERD (gastroesophageal reflux disease) 3/6/2025    Hyperlipidemia     Kidney stone     Medical history reviewed with no changes     Pyelonephritis 7/28/2011    Recurrent Change to augmentin and levaquin and ck blood cx's 7/27/2011 On daily macrobid per Dr. Domingo     Stress disorder, acute 3/23/2013    Due to 's infidelity and leaving her 3/2013 To see counselor- increase celexa   still living w the family as of 6/2013      Patient Active Problem List   Diagnosis    Kidney stones    Anxiety    Insomnia    Spondylolisthesis of lumbar region    Hyperlipidemia    Vaginal atrophy    Lateral epicondylitis of right elbow    Cervical stenosis of spine    Migraine    Allergic rhinitis    Leukopenia    Primary hypertension    Prediabetes    GERD (gastroesophageal reflux disease)     Past Surgical History:   Procedure Laterality Date    BREAST BIOPSY Left     benign core biopsy    CARPAL TUNNEL RELEASE  12/29/2011    Left    CARPAL TUNNEL RELEASE Right 12/30/2014    CYSTOSCOPY      with stent placement-x3    PAIN MANAGEMENT PROCEDURE Left 08/26/2020    LEFT C2, THIRD OCCIPITAL NERVE, C3, C4, C5 MEDIAL BRANCH BLOCK WITH FLUOROSCOPY (48548, [id number])  #1 performed by Yen Chan MD at [facility]    PAIN MANAGEMENT PROCEDURE Left 09/09/2020    LEFT C2,TON,C3,C4 C5 MEDIAL BRANCH BLOCKS WITH FLUOROSCOPY performed by Yen Chan MD at [facility] [address]    PAIN MANAGEMENT PROCEDURE  10/07/2020    PAIN MANAGEMENT PROCEDURE Left 10/07/2020    LEFT C2,TON,C3,C4,C5 RADIOFREQUENCY ABLATION

## 2025-03-17 ENCOUNTER — PATIENT MESSAGE (OUTPATIENT)
Dept: INTERNAL MEDICINE CLINIC | Age: 65
End: 2025-03-17

## 2025-03-18 DIAGNOSIS — M48.02 FORAMINAL STENOSIS OF CERVICAL REGION: ICD-10-CM

## 2025-03-18 DIAGNOSIS — M50.30 DDD (DEGENERATIVE DISC DISEASE), CERVICAL: ICD-10-CM

## 2025-03-18 DIAGNOSIS — M47.812 CERVICAL SPONDYLOSIS WITHOUT MYELOPATHY: Primary | ICD-10-CM

## 2025-03-18 NOTE — TELEPHONE ENCOUNTER
Prescription Refill     Medication Name:  GABAPENTIN 100MG   Pharmacy:[place] [address] [address] PHONE NUMBER [phone]  Patient Contact Number:  [phone]     PATIENT STATES THAT SHE CAN'T GET ANOTHER REFILL FROM [facility]..  LOOKING TO SPEAK TO SOMEONE IN THE OFFICE. SHE HAS A FEW DAYS LEFT OF PILLS...

## 2025-03-19 ENCOUNTER — OFFICE VISIT (OUTPATIENT)
Dept: ORTHOPEDIC SURGERY | Age: 65
End: 2025-03-19
Payer: COMMERCIAL

## 2025-03-19 VITALS — WEIGHT: 135 LBS | HEIGHT: 62 IN | BODY MASS INDEX: 24.84 KG/M2

## 2025-03-19 DIAGNOSIS — M65.331 TRIGGER MIDDLE FINGER OF RIGHT HAND: Primary | ICD-10-CM

## 2025-03-19 PROCEDURE — 99204 OFFICE O/P NEW MOD 45 MIN: CPT | Performed by: STUDENT IN AN ORGANIZED HEALTH CARE EDUCATION/TRAINING PROGRAM

## 2025-03-19 RX ORDER — GABAPENTIN 100 MG/1
CAPSULE ORAL
Qty: 90 CAPSULE | Refills: 0 | Status: SHIPPED | OUTPATIENT
Start: 2025-03-19 | End: 2025-04-19

## 2025-03-19 RX ORDER — GABAPENTIN 300 MG/1
300 CAPSULE ORAL NIGHTLY
Qty: 30 CAPSULE | Refills: 0 | Status: CANCELLED | OUTPATIENT
Start: 2025-03-19 | End: 2025-04-18

## 2025-03-19 NOTE — TELEPHONE ENCOUNTER
Patient of Ursula Del Rio, PAC - last OV 1/17/2025    FOLLOW UP WITH MANNY CASTRO, BRANDON 4/21/2025

## 2025-03-19 NOTE — TELEPHONE ENCOUNTER
Prescription Refill     Medication Name:  GABAPENTIN   Pharmacy: [facility] ON [address]   Patient Contact Number:  [phone]      PATIENT CALLED REQ A UPDATE ON HER MEDICATION BEING SENT TO THE PHARMACY    SHE STATED SHE WILL BE OUT OF MEDICATION TOMORROW MORNING     SHE IS REQ SOMEONE TO REACH OUT TODAY IF POSSIBLE AS SHE WORKS FROM 5:30 AM-2:30 PM    PLEASE CALL BACK THE ABOVE NUMBER ONCE HANDLED

## 2025-03-19 NOTE — PROGRESS NOTES
her schedule prior to scheduling.  Card for my surgery schedule was provided.      No orders of the defined types were placed in this encounter.      The patient (or guardian, if applicable) and other individuals in attendance with the patient were advised that Artificial Intelligence will be utilized during this visit to record, process the conversation to generate a clinical note, and support improvement of the AI technology. The patient (or guardian, if applicable) and other individuals in attendance at the appointment consented to the use of AI, including the recording.

## 2025-03-20 ENCOUNTER — TELEPHONE (OUTPATIENT)
Dept: ORTHOPEDIC SURGERY | Age: 65
End: 2025-03-20

## 2025-03-20 RX ORDER — CITALOPRAM HYDROBROMIDE 20 MG/1
20 TABLET ORAL DAILY
Qty: 90 TABLET | Refills: 1 | Status: SHIPPED | OUTPATIENT
Start: 2025-03-20

## 2025-03-20 NOTE — TELEPHONE ENCOUNTER
Spoke with Hussain at [facility] and scripts will be ready for pick-up today. Patient advised.

## 2025-03-20 NOTE — TELEPHONE ENCOUNTER
S/W the patient yesterday regarding her message. Discussed clarification on her medication dosage. We voiced understanding with each other. She was informed refill will be sent to her pharmacy. She was instructed to keep her follow up appointment with Priscilla on Monday 4/21 at [place]. She voiced understanding. She may call back with any questions or concerns.

## 2025-03-24 ENCOUNTER — TELEPHONE (OUTPATIENT)
Dept: ORTHOPEDIC SURGERY | Age: 65
End: 2025-03-24

## 2025-03-24 ENCOUNTER — PREP FOR PROCEDURE (OUTPATIENT)
Dept: ORTHOPEDIC SURGERY | Age: 65
End: 2025-03-24

## 2025-03-24 DIAGNOSIS — M65.331 TRIGGER MIDDLE FINGER OF RIGHT HAND: Primary | ICD-10-CM

## 2025-03-26 ENCOUNTER — TELEPHONE (OUTPATIENT)
Dept: ORTHOPEDIC SURGERY | Age: 65
End: 2025-03-26

## 2025-03-28 ENCOUNTER — PREP FOR PROCEDURE (OUTPATIENT)
Dept: ORTHOPEDIC SURGERY | Age: 65
End: 2025-03-28

## 2025-04-04 ENCOUNTER — TRANSCRIBE ORDERS (OUTPATIENT)
Dept: GENERAL RADIOLOGY | Age: 65
End: 2025-04-04

## 2025-04-04 ENCOUNTER — HOSPITAL ENCOUNTER (OUTPATIENT)
Age: 65
Discharge: HOME | End: 2025-04-04
Payer: COMMERCIAL

## 2025-04-04 ENCOUNTER — HOSPITAL ENCOUNTER (OUTPATIENT)
Dept: GENERAL RADIOLOGY | Age: 65
Discharge: HOME | End: 2025-04-04
Payer: COMMERCIAL

## 2025-04-04 PROCEDURE — 74018 RADEX ABDOMEN 1 VIEW: CPT

## 2025-04-09 ENCOUNTER — OFFICE VISIT (OUTPATIENT)
Dept: FAMILY MEDICINE CLINIC | Age: 65
End: 2025-04-09
Payer: COMMERCIAL

## 2025-04-09 VITALS
WEIGHT: 133.2 LBS | RESPIRATION RATE: 16 BRPM | BODY MASS INDEX: 24.51 KG/M2 | HEART RATE: 75 BPM | SYSTOLIC BLOOD PRESSURE: 118 MMHG | HEIGHT: 62 IN | DIASTOLIC BLOOD PRESSURE: 82 MMHG | OXYGEN SATURATION: 95 % | TEMPERATURE: 96.9 F

## 2025-04-09 DIAGNOSIS — Z01.818 PREOP EXAM FOR INTERNAL MEDICINE: Primary | ICD-10-CM

## 2025-04-09 DIAGNOSIS — I10 PRIMARY HYPERTENSION: ICD-10-CM

## 2025-04-09 PROCEDURE — 3074F SYST BP LT 130 MM HG: CPT | Performed by: NURSE PRACTITIONER

## 2025-04-09 PROCEDURE — 3079F DIAST BP 80-89 MM HG: CPT | Performed by: NURSE PRACTITIONER

## 2025-04-09 PROCEDURE — 99214 OFFICE O/P EST MOD 30 MIN: CPT | Performed by: NURSE PRACTITIONER

## 2025-04-09 ASSESSMENT — ENCOUNTER SYMPTOMS
SINUS PRESSURE: 0
RHINORRHEA: 0
SHORTNESS OF BREATH: 0
NAUSEA: 0
COLOR CHANGE: 0
WHEEZING: 0
CHEST TIGHTNESS: 0
ABDOMINAL PAIN: 0
EYE REDNESS: 0
BACK PAIN: 0
CONSTIPATION: 0
SORE THROAT: 0
COUGH: 0
EYE ITCHING: 0
BLOOD IN STOOL: 0
DIARRHEA: 0
VOMITING: 0

## 2025-04-09 NOTE — PROGRESS NOTES
leg swelling is reported. There are no false or loose teeth that could pose a concern during the procedure.    A consultation with Dr. Viramontes occurred in 03/2025 for blood pressure management, and blood work was completed in the fall.      Current Outpatient Medications   Medication Sig Dispense Refill    citalopram (CELEXA) 20 MG tablet TAKE 1 TABLET BY MOUTH DAILY (Patient taking differently: Take 1 tablet by mouth nightly) 90 tablet 1    gabapentin (NEURONTIN) 100 MG capsule I PO TID PRN 90 capsule 0    Calcium Carbonate (CALCARB 600 PO) Take 2 tablets by mouth daily      amLODIPine (NORVASC) 2.5 MG tablet TAKE 1 TABLET BY MOUTH DAILY (Patient taking differently: Take 1 tablet by mouth nightly) 90 tablet 1    montelukast (SINGULAIR) 10 MG tablet TAKE 1 TABLET BY MOUTH ONCE NIGHTLY 90 tablet 0    baclofen (LIORESAL) 10 MG tablet Take 1 tablet by mouth daily as needed (prn) 30 tablet 0    Potassium Citrate ER (UROCIT-K) 15 MEQ (1620 MG) TBCR extended release tablet in the morning and at bedtime      loratadine (CLARITIN) 10 MG tablet Take 1 tablet by mouth daily      Coenzyme Q10 (CO Q-10) 100 MG CHEW Take by mouth daily as needed      Polyethylene Glycol 3350 (MIRALAX PO) Take by mouth as needed      Multiple Vitamins-Minerals (CENTRUM ADULTS PO) Take by mouth daily      fluticasone (FLONASE) 50 MCG/ACT nasal spray 2 sprays by Nasal route daily (Patient taking differently: 2 sprays by Nasal route daily as needed) 3 each 1    tiZANidine (ZANAFLEX) 2 MG capsule TAKE ONE CAPSULE BY MOUTH EVERY EVENING (Patient taking differently: TAKE ONE CAPSULE BY MOUTH EVERY EVENING PRN NECK) 90 capsule 0    Psyllium (METAMUCIL) 0.36 g CAPS Take by mouth as needed      rizatriptan (MAXALT-MLT) 10 MG disintegrating tablet TALE 1 TABLET BY MOUTH AS NEEDED FOR MIGRAINES MAY REPEAT 2 HOURS LATER IF NEEDED 27 tablet 3    butalbital-acetaminophen-caffeine (FIORICET, ESGIC) -40 MG per tablet TAKE ONE TABLET BY MOUTH EVERY 4 HOURS AS

## 2025-04-09 NOTE — ASSESSMENT & PLAN NOTE
- Vital signs are within normal limits: /82, HR 75, O2 saturation 95%.  - No issues with anesthesia or history of blood clots or bleeding.  - Recent blood work in the fall and a blood pressure check in 03/2025; no additional blood work or EKG required.  - Advised to discontinue anti-inflammatory medications; preoperative clearance will be faxed to Dr. Priscilla Lora  Perioperative risk related to the patient's upcoming surgery is considered low. she is cleared for surgery.  Pre-op exam was completed on 4/9/25 10:46 AM.

## 2025-04-09 NOTE — PROGRESS NOTES
Date and time of surgery :  4/14/25 at 1220            Arrival Time: 1020      Bring Picture ID and insurance card.  Please wear simple, loose fitting clothing to the hospital.   Do not bring valuables (money, credit cards, checkbooks, etc.)   Do not wear any makeup (including  eye makeup) and no nail polish or artificial nails on your fingers or toes.  DO NOT wear any jewelry or piercings on day of surgery.  All body piercing jewelry must be removed.  If you have dentures, they will be removed before going to the OR; we will provide you a container.  If you wear contact lenses or glasses, they will be removed; please bring a case for them.  Shower the evening before or morning of surgery with antibacterial soap.  Nothing to eat or drink after midnight the day before surgery.   You may brush your teeth and gargle the morning of surgery.  DO NOT SWALLOW WATER.   Do not take any morning meds the day of your surgery.  Aspirin, Ibuprofen, Advil, Naproxen, Vitamin E and other Anti-inflammatory products and supplements should be stopped for 5 -7days before surgery or as directed by your physician.  Do not smoke or drink any alcoholic beverages 24 hours prior to surgery.  This includes NA Beer. Refrain from the usage of any recreational drugs, including non-prescribed prescription drugs.   You MUST plan for a responsible adult to stay on site while you are here and take you home after your surgery. You will not be allowed to leave alone or drive yourself home. It is strongly suggested someone stay with you the first 24 hrs. Your surgery will be cancelled if you do not have a ride home.  To help prevent infection, change your sheets the night before surgery.   If you  have a Living Will and Durable Power of  for Healthcare, please bring in a copy.  Notify your Surgeon if you develop any illness between now and time of surgery. Cough, cold, fever, sore throat, nausea, vomiting, etc.  Please notify your surgeon if

## 2025-04-09 NOTE — ASSESSMENT & PLAN NOTE
- Currently managed with amlodipine.  - Blood pressure was checked recently in 03/2025 and remains stable.  - No new symptoms or complications reported.  - Continue current antihypertensive regimen and monitor blood pressure regularly.

## 2025-04-11 ENCOUNTER — ANESTHESIA EVENT (OUTPATIENT)
Dept: OPERATING ROOM | Age: 65
End: 2025-04-11
Payer: COMMERCIAL

## 2025-04-14 ENCOUNTER — HOSPITAL ENCOUNTER (OUTPATIENT)
Age: 65
Setting detail: OUTPATIENT SURGERY
Discharge: HOME | End: 2025-04-14
Attending: STUDENT IN AN ORGANIZED HEALTH CARE EDUCATION/TRAINING PROGRAM | Admitting: STUDENT IN AN ORGANIZED HEALTH CARE EDUCATION/TRAINING PROGRAM
Payer: COMMERCIAL

## 2025-04-14 ENCOUNTER — ANESTHESIA (OUTPATIENT)
Dept: OPERATING ROOM | Age: 65
End: 2025-04-14
Payer: COMMERCIAL

## 2025-04-14 VITALS
WEIGHT: 132 LBS | DIASTOLIC BLOOD PRESSURE: 74 MMHG | SYSTOLIC BLOOD PRESSURE: 151 MMHG | RESPIRATION RATE: 14 BRPM | HEART RATE: 60 BPM | OXYGEN SATURATION: 94 % | BODY MASS INDEX: 24.29 KG/M2 | HEIGHT: 62 IN | TEMPERATURE: 98.2 F

## 2025-04-14 DIAGNOSIS — M65.331 TRIGGER MIDDLE FINGER OF RIGHT HAND: Primary | ICD-10-CM

## 2025-04-14 PROCEDURE — 2709999900 HC NON-CHARGEABLE SUPPLY: Performed by: STUDENT IN AN ORGANIZED HEALTH CARE EDUCATION/TRAINING PROGRAM

## 2025-04-14 PROCEDURE — 6360000002 HC RX W HCPCS: Performed by: STUDENT IN AN ORGANIZED HEALTH CARE EDUCATION/TRAINING PROGRAM

## 2025-04-14 PROCEDURE — 3700000001 HC ADD 15 MINUTES (ANESTHESIA): Performed by: STUDENT IN AN ORGANIZED HEALTH CARE EDUCATION/TRAINING PROGRAM

## 2025-04-14 PROCEDURE — 3700000000 HC ANESTHESIA ATTENDED CARE: Performed by: STUDENT IN AN ORGANIZED HEALTH CARE EDUCATION/TRAINING PROGRAM

## 2025-04-14 PROCEDURE — 3600000013 HC SURGERY LEVEL 3 ADDTL 15MIN: Performed by: STUDENT IN AN ORGANIZED HEALTH CARE EDUCATION/TRAINING PROGRAM

## 2025-04-14 PROCEDURE — 6360000002 HC RX W HCPCS: Performed by: NURSE ANESTHETIST, CERTIFIED REGISTERED

## 2025-04-14 PROCEDURE — 3600000003 HC SURGERY LEVEL 3 BASE: Performed by: STUDENT IN AN ORGANIZED HEALTH CARE EDUCATION/TRAINING PROGRAM

## 2025-04-14 PROCEDURE — 2580000003 HC RX 258: Performed by: ANESTHESIOLOGY

## 2025-04-14 PROCEDURE — 6360000002 HC RX W HCPCS: Performed by: ANESTHESIOLOGY

## 2025-04-14 PROCEDURE — 2500000003 HC RX 250 WO HCPCS: Performed by: STUDENT IN AN ORGANIZED HEALTH CARE EDUCATION/TRAINING PROGRAM

## 2025-04-14 PROCEDURE — 7100000010 HC PHASE II RECOVERY - FIRST 15 MIN: Performed by: STUDENT IN AN ORGANIZED HEALTH CARE EDUCATION/TRAINING PROGRAM

## 2025-04-14 PROCEDURE — 26055 INCISE FINGER TENDON SHEATH: CPT | Performed by: STUDENT IN AN ORGANIZED HEALTH CARE EDUCATION/TRAINING PROGRAM

## 2025-04-14 PROCEDURE — 7100000011 HC PHASE II RECOVERY - ADDTL 15 MIN: Performed by: STUDENT IN AN ORGANIZED HEALTH CARE EDUCATION/TRAINING PROGRAM

## 2025-04-14 PROCEDURE — 6360000002 HC RX W HCPCS

## 2025-04-14 RX ORDER — ONDANSETRON 2 MG/ML
4 INJECTION INTRAMUSCULAR; INTRAVENOUS
Status: DISCONTINUED | OUTPATIENT
Start: 2025-04-14 | End: 2025-04-14 | Stop reason: HOSPADM

## 2025-04-14 RX ORDER — ONDANSETRON 2 MG/ML
INJECTION INTRAMUSCULAR; INTRAVENOUS
Status: DISCONTINUED | OUTPATIENT
Start: 2025-04-14 | End: 2025-04-14 | Stop reason: SDUPTHER

## 2025-04-14 RX ORDER — SODIUM CHLORIDE 9 MG/ML
INJECTION, SOLUTION INTRAVENOUS PRN
Status: DISCONTINUED | OUTPATIENT
Start: 2025-04-14 | End: 2025-04-14 | Stop reason: HOSPADM

## 2025-04-14 RX ORDER — NALOXONE HYDROCHLORIDE 0.4 MG/ML
INJECTION, SOLUTION INTRAMUSCULAR; INTRAVENOUS; SUBCUTANEOUS PRN
Status: DISCONTINUED | OUTPATIENT
Start: 2025-04-14 | End: 2025-04-14 | Stop reason: HOSPADM

## 2025-04-14 RX ORDER — OXYCODONE HYDROCHLORIDE 5 MG/1
5 TABLET ORAL PRN
Status: DISCONTINUED | OUTPATIENT
Start: 2025-04-14 | End: 2025-04-14 | Stop reason: HOSPADM

## 2025-04-14 RX ORDER — PROPOFOL 10 MG/ML
INJECTION, EMULSION INTRAVENOUS
Status: DISCONTINUED | OUTPATIENT
Start: 2025-04-14 | End: 2025-04-14 | Stop reason: SDUPTHER

## 2025-04-14 RX ORDER — FAMOTIDINE 10 MG/ML
INJECTION, SOLUTION INTRAVENOUS
Status: DISCONTINUED
Start: 2025-04-14 | End: 2025-04-14 | Stop reason: HOSPADM

## 2025-04-14 RX ORDER — SODIUM CHLORIDE, SODIUM LACTATE, POTASSIUM CHLORIDE, CALCIUM CHLORIDE 600; 310; 30; 20 MG/100ML; MG/100ML; MG/100ML; MG/100ML
INJECTION, SOLUTION INTRAVENOUS CONTINUOUS
Status: DISCONTINUED | OUTPATIENT
Start: 2025-04-14 | End: 2025-04-14 | Stop reason: HOSPADM

## 2025-04-14 RX ORDER — MEPERIDINE HYDROCHLORIDE 50 MG/ML
12.5 INJECTION INTRAMUSCULAR; INTRAVENOUS; SUBCUTANEOUS EVERY 5 MIN PRN
Status: DISCONTINUED | OUTPATIENT
Start: 2025-04-14 | End: 2025-04-14 | Stop reason: HOSPADM

## 2025-04-14 RX ORDER — FENTANYL CITRATE 50 UG/ML
INJECTION, SOLUTION INTRAMUSCULAR; INTRAVENOUS
Status: DISCONTINUED | OUTPATIENT
Start: 2025-04-14 | End: 2025-04-14 | Stop reason: SDUPTHER

## 2025-04-14 RX ORDER — LIDOCAINE HYDROCHLORIDE 20 MG/ML
INJECTION, SOLUTION INFILTRATION; PERINEURAL
Status: DISCONTINUED | OUTPATIENT
Start: 2025-04-14 | End: 2025-04-14 | Stop reason: SDUPTHER

## 2025-04-14 RX ORDER — GUAIFENESIN 400 MG/1
400 TABLET ORAL 4 TIMES DAILY PRN
COMMUNITY

## 2025-04-14 RX ORDER — SODIUM CHLORIDE 0.9 % (FLUSH) 0.9 %
5-40 SYRINGE (ML) INJECTION PRN
Status: DISCONTINUED | OUTPATIENT
Start: 2025-04-14 | End: 2025-04-14 | Stop reason: HOSPADM

## 2025-04-14 RX ORDER — MAGNESIUM HYDROXIDE 1200 MG/15ML
LIQUID ORAL CONTINUOUS PRN
Status: COMPLETED | OUTPATIENT
Start: 2025-04-14 | End: 2025-04-14

## 2025-04-14 RX ORDER — SODIUM CHLORIDE 0.9 % (FLUSH) 0.9 %
5-40 SYRINGE (ML) INJECTION EVERY 12 HOURS SCHEDULED
Status: DISCONTINUED | OUTPATIENT
Start: 2025-04-14 | End: 2025-04-14 | Stop reason: HOSPADM

## 2025-04-14 RX ORDER — MIDAZOLAM HYDROCHLORIDE 1 MG/ML
INJECTION, SOLUTION INTRAMUSCULAR; INTRAVENOUS
Status: DISCONTINUED | OUTPATIENT
Start: 2025-04-14 | End: 2025-04-14 | Stop reason: SDUPTHER

## 2025-04-14 RX ORDER — HYDROCODONE BITARTRATE AND ACETAMINOPHEN 5; 325 MG/1; MG/1
1 TABLET ORAL EVERY 6 HOURS PRN
Qty: 10 TABLET | Refills: 0 | Status: SHIPPED | OUTPATIENT
Start: 2025-04-14 | End: 2025-04-21

## 2025-04-14 RX ORDER — OXYCODONE HYDROCHLORIDE 5 MG/1
10 TABLET ORAL PRN
Status: DISCONTINUED | OUTPATIENT
Start: 2025-04-14 | End: 2025-04-14 | Stop reason: HOSPADM

## 2025-04-14 RX ORDER — DEXAMETHASONE SODIUM PHOSPHATE 4 MG/ML
INJECTION, SOLUTION INTRA-ARTICULAR; INTRALESIONAL; INTRAMUSCULAR; INTRAVENOUS; SOFT TISSUE
Status: DISCONTINUED | OUTPATIENT
Start: 2025-04-14 | End: 2025-04-14 | Stop reason: SDUPTHER

## 2025-04-14 RX ORDER — APREPITANT 40 MG/1
40 CAPSULE ORAL ONCE
Status: COMPLETED | OUTPATIENT
Start: 2025-04-14 | End: 2025-04-14

## 2025-04-14 RX ORDER — APREPITANT 40 MG/1
CAPSULE ORAL
Status: COMPLETED
Start: 2025-04-14 | End: 2025-04-14

## 2025-04-14 RX ORDER — LIDOCAINE HYDROCHLORIDE 10 MG/ML
1 INJECTION, SOLUTION EPIDURAL; INFILTRATION; INTRACAUDAL; PERINEURAL
Status: DISCONTINUED | OUTPATIENT
Start: 2025-04-14 | End: 2025-04-14 | Stop reason: HOSPADM

## 2025-04-14 RX ADMIN — FENTANYL CITRATE 50 MCG: 50 INJECTION, SOLUTION INTRAMUSCULAR; INTRAVENOUS at 13:07

## 2025-04-14 RX ADMIN — FENTANYL CITRATE 50 MCG: 50 INJECTION, SOLUTION INTRAMUSCULAR; INTRAVENOUS at 13:05

## 2025-04-14 RX ADMIN — ONDANSETRON 4 MG: 2 INJECTION, SOLUTION INTRAMUSCULAR; INTRAVENOUS at 13:09

## 2025-04-14 RX ADMIN — PROPOFOL 20 MG: 10 INJECTION, EMULSION INTRAVENOUS at 13:08

## 2025-04-14 RX ADMIN — APREPITANT 40 MG: 40 CAPSULE ORAL at 11:36

## 2025-04-14 RX ADMIN — SODIUM CHLORIDE, SODIUM LACTATE, POTASSIUM CHLORIDE, AND CALCIUM CHLORIDE: .6; .31; .03; .02 INJECTION, SOLUTION INTRAVENOUS at 10:47

## 2025-04-14 RX ADMIN — LIDOCAINE HYDROCHLORIDE 7 MG: 20 INJECTION, SOLUTION INFILTRATION; PERINEURAL at 13:08

## 2025-04-14 RX ADMIN — FAMOTIDINE 20 MG: 10 INJECTION, SOLUTION INTRAVENOUS at 11:37

## 2025-04-14 RX ADMIN — DEXAMETHASONE SODIUM PHOSPHATE 4 MG: 4 INJECTION, SOLUTION INTRAMUSCULAR; INTRAVENOUS at 13:09

## 2025-04-14 RX ADMIN — PROPOFOL 15 MG: 10 INJECTION, EMULSION INTRAVENOUS at 13:15

## 2025-04-14 RX ADMIN — LIDOCAINE HYDROCHLORIDE 4 MG: 20 INJECTION, SOLUTION INFILTRATION; PERINEURAL at 13:15

## 2025-04-14 RX ADMIN — MIDAZOLAM 2 MG: 1 INJECTION INTRAMUSCULAR; INTRAVENOUS at 13:02

## 2025-04-14 ASSESSMENT — PAIN - FUNCTIONAL ASSESSMENT
PAIN_FUNCTIONAL_ASSESSMENT: 0-10
PAIN_FUNCTIONAL_ASSESSMENT: ACTIVITIES ARE NOT PREVENTED

## 2025-04-14 ASSESSMENT — LIFESTYLE VARIABLES: SMOKING_STATUS: 0

## 2025-04-14 ASSESSMENT — ENCOUNTER SYMPTOMS: SHORTNESS OF BREATH: 0

## 2025-04-14 ASSESSMENT — PAIN DESCRIPTION - DESCRIPTORS: DESCRIPTORS: DULL

## 2025-04-14 NOTE — ANESTHESIA POSTPROCEDURE EVALUATION
Department of Anesthesiology  Postprocedure Note    Patient: Veronika Lindsey  MRN: [id number]  YOB: 1960  Date of evaluation: 4/14/2025    Procedure Summary       Date: 04/14/25 Room / Location: [address]    Anesthesia Start: 1303 Anesthesia Stop: 1335    Procedure: RIGHT MIDDLE FINGER TRIGGER FINGER RELEASE (Right: Wrist) Diagnosis:       Trigger middle finger of right hand      (Trigger middle finger of right hand [M65.331])    Surgeons: Priscilla Lora MD Responsible Provider: Arthur Lechuga MD    Anesthesia Type: TIVA ASA Status: 2            Anesthesia Type: No value filed.    Megan Phase I: Megan Score: 10    Megan Phase II: Megan Score: 10    Anesthesia Post Evaluation    Patient location during evaluation: bedside  Patient participation: complete - patient participated  Level of consciousness: awake and alert  Airway patency: patent  Nausea & Vomiting: no nausea  Cardiovascular status: hemodynamically stable  Respiratory status: acceptable  Hydration status: euvolemic  Pain management: adequate      Vitals:    04/14/25 1347 04/14/25 1352 04/14/25 1357 04/14/25 1402   BP: (!) 162/83 (!) 159/77 (!) 147/79 (!) 151/74   Pulse: 63 56 67 60   Resp: 16 16 14 14   Temp:   98.2 °F (36.8 °C)    TempSrc:   Oral    SpO2: 93% 92% 93% 94%   Weight:       Height:          No notable events documented.

## 2025-04-14 NOTE — DISCHARGE INSTRUCTIONS
occurs after surgery in the part of the body where the surgery took place. Most patients who have surgery do not develop an infection. However, infections can develop in about 1-3 cases for every 100 patients who have had surgery.  Our goal is for you to NOT experience any complications and be completely satisfied with your care!    However, some signs or symptoms to look for and report immediately to your doctor are:   1. Fever above 101 degrees    2. Redness and increasing pain around the area  where you had surgery   3. Drainage of cloudy fluid or pus coming from the surgical area    Some of the things we/ you can do to prevent SSI's are:   1. Clean hands with soap and water or an alcohol-based hand rub before and after caring for the operative area. This occurs the day of surgery and for the next 2 weeks.   2.Sometimes you receive an appropriate antibiotic within 60 minutes before your surgery or take one for several days after surgery depending on your surgeon's instructions and/or the type of surgery you are having.    3. Family and/or friends who visit you should NOT touch the surgical wound or dressings until advised by your surgeon.    4. Be sure to elevate and decrease the swelling after your surgery to help prevent infection.   5. If you are a diabetic, you need to closely monitor your blood sugar levels and report any significant increases or changes to your surgeon to help promote the healing process.

## 2025-04-14 NOTE — ANESTHESIA PRE PROCEDURE
Department of Anesthesiology  Preprocedure Note       Name:  Veronika Lindsey   Age:  64 y.o.  :  1960                                          MRN:  [id number]         Date:  2025      Surgeon: Surgeon(s):  Priscilla Lora MD    Procedure: Procedure(s):  RIGHT MIDDLE FINGER TRIGGER FINGER RELEASE    Medications prior to admission:   Prior to Admission medications    Medication Sig Start Date End Date Taking? Authorizing Provider   guaiFENesin 400 MG tablet Take 1 tablet by mouth 4 times daily as needed for Cough   Yes Carl Qureshi MD   citalopram (CELEXA) 20 MG tablet TAKE 1 TABLET BY MOUTH DAILY  Patient taking differently: Take 1 tablet by mouth nightly 3/20/25  Yes Otis Rodriguez MD   gabapentin (NEURONTIN) 100 MG capsule I PO TID PRN 3/19/25 4/19/25 Yes Priscilla Cloud PA-C   amLODIPine (NORVASC) 2.5 MG tablet TAKE 1 TABLET BY MOUTH DAILY  Patient taking differently: Take 1 tablet by mouth nightly 3/3/25  Yes Otis Rodriguez MD   montelukast (SINGULAIR) 10 MG tablet TAKE 1 TABLET BY MOUTH ONCE NIGHTLY 25  Yes Otis Rodriguez MD   baclofen (LIORESAL) 10 MG tablet Take 1 tablet by mouth daily as needed (prn) 24  Yes Ursula Del Rio PA   Potassium Citrate ER (UROCIT-K) 15 MEQ (1620 MG) TBCR extended release tablet in the morning and at bedtime 21  Yes Carl Qureshi MD   loratadine (CLARITIN) 10 MG tablet Take 1 tablet by mouth daily   Yes Carl Qureshi MD   Coenzyme Q10 (CO Q-10) 100 MG CHEW Take by mouth daily as needed   Yes Carl Qureshi MD   Polyethylene Glycol 3350 (MIRALAX PO) Take by mouth as needed   Yes Carl Qureshi MD   Multiple Vitamins-Minerals (CENTRUM ADULTS PO) Take by mouth daily   Yes Carl Qureshi MD   fluticasone (FLONASE) 50 MCG/ACT nasal spray 2 sprays by Nasal route daily  Patient taking differently: 2 sprays by Nasal route daily as needed 23  Yes Otis Rodriguez MD   tiZANidine (ZANAFLEX) 2 MG capsule TAKE ONE

## 2025-04-14 NOTE — H&P
Preoperative H&P Update    The patient's History and Physical in the medical record was reviewed by me today.  I reviewed the HPI, medications, allergies, reason for surgery, diagnosis and treatment plan and there has been no interval change.    The patient was examined by me today. Physical exam findings for this update include:    BP (!) 151/87   Pulse 68   Temp 98.4 °F (36.9 °C) (Oral)   Resp 16   Ht 1.575 m (5' 2\")   Wt 59.9 kg (132 lb)   LMP 04/30/2010   SpO2 97%   BMI 24.14 kg/m²   Airway is intact  Chest: breathing comfortably  Heart: regular rate  Findings on exam of the body region where surgery is to be performed include: see last office and/or consult note      Electronically signed by Priscilla Lora MD on 4/14/2025 at 12:36 PM

## 2025-04-14 NOTE — PROGRESS NOTES
Pre and post operative expectations and routines explained to patient, verbalized understanding.Oriented pt to call light  (in patients reach) / verbalized understanding /demonstrates use  Bed position is in low   Side rails up X 2 /gurney locked

## 2025-04-14 NOTE — OP NOTE
OPERATIVE NOTE     Patient Name: Veronika Lindsey   YOB: 1960  MRN:  [id number]    Date of Procedure:  4/14/2025  Surgeon: Priscilla Lora MD    PRE-OPERATIVE DIAGNOSIS:  Right middle finger trigger finger    POST-OPERATIVE DIAGNOSIS:  Right middle finger trigger finger    PROCEDURE:  Right middle finger trigger finger release    ANESTHESIA: MAC and local    OPERATIVE INDICATIONS: The patient is a very pleasant 64 y.o. female who developed catching and triggering of their right middle finger. They failed conservative management and desired operative release. They present today for release of A1 pulley. Risks of surgery were discussed which include but not limited to pain, bleeding, infection, recurrence and injury to tendon or nerve.     OPERATIVE PROCEDURE: The patient was seen in the preoperative holding area. The operative procedure confirmed and the operative site was marked with an indelible marker. The patient was brought to the operating room and placed supine on the table. A hand table was placed on the patient's side.  A tourniquet was placed on the patient's forearm.  Next, the arm was prepped and draped in a sterile fashion.  A timeout was performed which correctly identified the team members, the procedure to be performed as well as the operative site.  A digital block to the right middle finger was performed using 10 cc of 1:1 1% lidocaine and 0.25% bupivacaine.    The arm was exsanguinated using an Esmarch bandage and then tourniquet was inflated to 250 mmHg.  Attention was turned to the right middle finger. An incision in line with the flexor tendon was designed over A1 pulley. Incision was made with a 15 blade through skin. Dissection down to A1 pulley was performed with a tenotomy scissor. The A1 pulley was exposed and the digital nerves were protected on either side of the flexor tendon. The A1 pulley was incised with a 15 blade. The pulley was fully released with tenotomy

## 2025-04-14 NOTE — PROGRESS NOTES
at bedside now, daughter stepped out. Instructed written d/c instructions in folder.  Patient drank cup of ice water and few cookies, no nausea.IV removed, tolerated well. Dsg remains dry to right hand ice pack on instructed was free to leave when feels ready to get dressed. Voicing understanding.

## 2025-04-17 RX ORDER — GABAPENTIN 300 MG/1
300 CAPSULE ORAL NIGHTLY
Qty: 30 CAPSULE | Refills: 2 | Status: CANCELLED | OUTPATIENT
Start: 2025-04-17 | End: 2025-07-16

## 2025-04-21 ENCOUNTER — OFFICE VISIT (OUTPATIENT)
Dept: ORTHOPEDIC SURGERY | Age: 65
End: 2025-04-21
Payer: COMMERCIAL

## 2025-04-21 VITALS — HEIGHT: 62 IN | BODY MASS INDEX: 24.48 KG/M2 | WEIGHT: 133 LBS

## 2025-04-21 DIAGNOSIS — M54.2 CHRONIC NECK PAIN: ICD-10-CM

## 2025-04-21 DIAGNOSIS — M50.30 DDD (DEGENERATIVE DISC DISEASE), CERVICAL: Primary | ICD-10-CM

## 2025-04-21 DIAGNOSIS — M48.02 FORAMINAL STENOSIS OF CERVICAL REGION: ICD-10-CM

## 2025-04-21 DIAGNOSIS — G89.29 CHRONIC NECK PAIN: ICD-10-CM

## 2025-04-21 PROCEDURE — 99214 OFFICE O/P EST MOD 30 MIN: CPT | Performed by: PHYSICIAN ASSISTANT

## 2025-04-21 RX ORDER — GABAPENTIN 100 MG/1
CAPSULE ORAL
Qty: 60 CAPSULE | Refills: 2 | Status: SHIPPED | OUTPATIENT
Start: 2025-04-21 | End: 2025-07-21

## 2025-04-21 NOTE — PROGRESS NOTES
SPINE: Follow Up     CHIEF COMPLAINT:    Chief Complaint   Patient presents with    New Patient     CERVICAL SPINE     PATIENT OF [facility]       HISTORY OF PRESENT ILLNESS:                The patient is a 64 y.o. female patient of [facility] here for chronic neck pain.  She reports chronic dull aching left-sided neck/shoulder pain.  She states symptoms began in 2019 and increased when needing to wear PAPR at work during COVID. Her symptoms are increased with prolonged activity and cervical range of motion.  She reports relief with taking gabapentin, rest, ice, heat.  Conservative management includes extensive physical therapy, oral steroids, remote neurotomy (Dr. Vega), Mobic.  She does report good improvement taking gabapentin now doing 100mg II po qHS.  She does feel this medication allows her to be more functional and she is able to sleep at least 6 hours at nighttime.  This medication does cause some drowsiness.  She states she is interested in weaning off this medication.  At this current time she denies any distal radiating pain.  She denies any new or progressive numbness tingling weakness.  She denies any fine motor difficulty or gait instability.  No recent injury or trauma.  She did recently undergo trigger finger release on the right hand with Dr. Lora and is doing well postoperatively.    Current/Past Treatment:   Physical Therapy: Yes  Chiropractic:     Injection:     8/26/2020 Cervical Medial Branch Blocks - Left C2, Third Occipital Nerve, C3, C4, C5 - Yen Chan MD, CESIA  9/9/2020 Cervical Medial Branch Blocks - left C2, Third Occipital Nerve, C3, C4, and C5 - Yen Chan MD, CESIA  10/7/2020 Cervical Medial Branch Radiofrequency ablation- Left C2, Third Occipital Nerve, C3, C4 and C5 - Yen Chan MD, CESIA  4/6/2021 LEFT C2, THIRD OCCIPITAL NERVE, C3, C4, C5 RADIOFREQUENCY ABLATION - DR. VEGA   Medications:            NSAIDS: Mobic            Muscle relaxer:

## 2025-04-28 ENCOUNTER — OFFICE VISIT (OUTPATIENT)
Dept: ORTHOPEDIC SURGERY | Age: 65
End: 2025-04-28

## 2025-04-28 ENCOUNTER — TELEPHONE (OUTPATIENT)
Dept: ORTHOPEDIC SURGERY | Age: 65
End: 2025-04-28

## 2025-04-28 VITALS — WEIGHT: 133 LBS | BODY MASS INDEX: 24.48 KG/M2 | HEIGHT: 62 IN

## 2025-04-28 DIAGNOSIS — M65.331 TRIGGER MIDDLE FINGER OF RIGHT HAND: Primary | ICD-10-CM

## 2025-04-28 PROCEDURE — 99024 POSTOP FOLLOW-UP VISIT: CPT | Performed by: STUDENT IN AN ORGANIZED HEALTH CARE EDUCATION/TRAINING PROGRAM

## 2025-04-28 RX ORDER — PROPRANOLOL HYDROCHLORIDE 10 MG/1
TABLET ORAL
COMMUNITY
Start: 2025-04-22

## 2025-04-28 NOTE — PROGRESS NOTES
(RESTASIS) 0.05 % ophthalmic emulsion Place into both eyes every 12 hours    fluticasone (FLONASE) 50 MCG/ACT nasal spray 2 sprays, Nasal, DAILY    folic acid (FOLVITE) 1 mg, DAILY    gabapentin (NEURONTIN) 100 MG capsule Two capsules at bedtime as needed.    guaiFENesin 400 mg, 4 TIMES DAILY PRN    loratadine (CLARITIN) 10 mg, DAILY    meloxicam (MOBIC) 15 mg, Oral, DAILY PRN    montelukast (SINGULAIR) 10 MG tablet TAKE 1 TABLET BY MOUTH ONCE NIGHTLY    Multiple Vitamins-Minerals (CENTRUM ADULTS PO) DAILY    Polyethylene Glycol 3350 (MIRALAX PO) PRN    Potassium Citrate ER (UROCIT-K) 15 MEQ (1620 MG) TBCR extended release tablet 2 times daily    propranolol (INDERAL) 10 MG tablet     Psyllium (METAMUCIL) 0.36 g CAPS PRN    rizatriptan (MAXALT-MLT) 10 MG disintegrating tablet TALE 1 TABLET BY MOUTH AS NEEDED FOR MIGRAINES MAY REPEAT 2 HOURS LATER IF NEEDED    tiZANidine (ZANAFLEX) 2 MG capsule TAKE ONE CAPSULE BY MOUTH EVERY EVENING    Vitamin D (Ergocalciferol) 4,000 Units, DAILY       Past Medical History     Past Medical History:   Diagnosis Date    Abnormal mammogram 06/08/2011    saw Dr. Valle or associate 6/2011 Ok to return for regular mammogram's     Anxiety 06/08/2011    GERD (gastroesophageal reflux disease) 03/06/2025    Hyperlipidemia     Hypertension     Kidney stone     Pyelonephritis 07/28/2011    Recurrent Change to augmentin and levaquin and ck blood cx's 7/27/2011 On daily macrobid per Dr. Domingo     Stress disorder, acute 03/23/2013    Due to 's infidelity and leaving her 3/2013 To see counselor- increase celexa   still living w the family as of 6/2013        Past Surgical History     Past Surgical History:   Procedure Laterality Date    BREAST BIOPSY Left     benign core biopsy    CARPAL TUNNEL RELEASE Left 12/29/2011    Left    CARPAL TUNNEL RELEASE Right 12/30/2014    CYSTOSCOPY      with stent placement-x3    HAND SURGERY Right 4/14/2025    RIGHT MIDDLE FINGER TRIGGER FINGER

## 2025-05-06 ENCOUNTER — OFFICE VISIT (OUTPATIENT)
Dept: ORTHOPEDIC SURGERY | Age: 65
End: 2025-05-06

## 2025-05-06 ENCOUNTER — TELEPHONE (OUTPATIENT)
Dept: ORTHOPEDIC SURGERY | Age: 65
End: 2025-05-06

## 2025-05-06 VITALS — WEIGHT: 133 LBS | HEIGHT: 62 IN | BODY MASS INDEX: 24.48 KG/M2

## 2025-05-06 DIAGNOSIS — M65.331 TRIGGER MIDDLE FINGER OF RIGHT HAND: Primary | ICD-10-CM

## 2025-05-06 PROCEDURE — 99024 POSTOP FOLLOW-UP VISIT: CPT | Performed by: STUDENT IN AN ORGANIZED HEALTH CARE EDUCATION/TRAINING PROGRAM

## 2025-05-06 NOTE — PROGRESS NOTES
Hand, Upper Extremity and Reconstructive Surgery                Priscilla Lora MD                                           Surgery-right middle finger trigger finger release, date of surgery 4/14/2025  History of Present Illness  Interval update 5/6/2025-patient is currently 3 weeks postop.  Patient has had some soreness and swelling in her hand.  She has not been back at work.  Her work would like her to return without any restrictions.      History-  The patient is a 64-year-old right-hand dominant female presenting with right middle finger trigger finger. The patient reports experiencing intense pain in her right middle finger since Thanksgiving, which has been severe enough to disrupt her sleep.  She has been wearing splints at night but frequently takes them off middle the night. There is no history of trauma to the finger. She describes a sensation of the finger being stuck, which impairs her ability to perform tasks such as pulling and holding objects. The stiffness and pain occur every other night and have progressively worsened, significantly affecting her sleep quality.     She has a history of bilateral carpal tunnel release surgeries performed 10 years ago.    SOCIAL HISTORY  Does not smoke.     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

## 2025-05-09 PROBLEM — Z01.818 PREOP EXAM FOR INTERNAL MEDICINE: Status: RESOLVED | Noted: 2025-04-09 | Resolved: 2025-05-09

## 2025-05-15 RX ORDER — MONTELUKAST SODIUM 10 MG/1
TABLET ORAL
Qty: 30 TABLET | Refills: 0 | Status: SHIPPED | OUTPATIENT
Start: 2025-05-15

## 2025-05-19 ENCOUNTER — TELEPHONE (OUTPATIENT)
Dept: ORTHOPEDIC SURGERY | Age: 65
End: 2025-05-19

## 2025-05-19 NOTE — TELEPHONE ENCOUNTER
Faxed completd return to work form to [employer] at [phone]

## 2025-07-14 RX ORDER — MONTELUKAST SODIUM 10 MG/1
TABLET ORAL
Qty: 30 TABLET | Refills: 5 | Status: SHIPPED | OUTPATIENT
Start: 2025-07-14

## 2025-07-18 ENCOUNTER — OFFICE VISIT (OUTPATIENT)
Dept: ORTHOPEDIC SURGERY | Age: 65
End: 2025-07-18
Payer: COMMERCIAL

## 2025-07-18 VITALS — BODY MASS INDEX: 23.55 KG/M2 | HEIGHT: 62 IN | WEIGHT: 128 LBS

## 2025-07-18 DIAGNOSIS — M48.02 FORAMINAL STENOSIS OF CERVICAL REGION: Primary | ICD-10-CM

## 2025-07-18 DIAGNOSIS — M50.30 DDD (DEGENERATIVE DISC DISEASE), CERVICAL: ICD-10-CM

## 2025-07-18 PROCEDURE — 99214 OFFICE O/P EST MOD 30 MIN: CPT | Performed by: STUDENT IN AN ORGANIZED HEALTH CARE EDUCATION/TRAINING PROGRAM

## 2025-07-18 NOTE — PROGRESS NOTES
Follow up: SPINE    Veronika Lindsey  1960  [id number]      CHIEF COMPLAINT:    Chief Complaint   Patient presents with    Follow-up     Cervical         HISTORY OF PRESENT ILLNESS:  Ms. Lindsey is a 64 y.o. female returns for a follow up visit for multiple medical problems.  Her current presenting problems are   1. Foraminal stenosis of cervical region    2. DDD (degenerative disc disease), cervical          As per information/history obtained from the PADT(patient assessment and documentation tool) - She complains of pain in the neck with radiation to the head and shoulders Left She rates the pain 3/10 and describes it as dull, aching.  Pain is made worse by: lifting, using her head and upper body.   She denies side effects from the current pain regimen.Patient reports that since the last follow up visit the physical functioning is better, family/social relationships are better, mood is better and sleep patterns are better, and that the overall functioning is better.  Patient denies neurological bowel or bladder.     The patient presents for follow up of ongoing neck pain. She saw Priscilla Cloud while I was out on maternity leave. Priscilla sent a prescription for gabapnetin 100 mg two capsules at bedtime. This is a decrease in dosage from what I had previously prescribed and it sounds like there was a miscommunication. However, the patient has been working on weaning down her dose so this seems to suffice at this time. She rates pain today 1/10. She does use baclofen or tizanidine for flare ups as needed. We also discussed increasing gabapentin dose as needed for flare ups which is something we are both comfortable with and have discussed several times in the past as I have been seeing this patient and managing her gabapentin since 2020.       Associated signs and symptoms:   Neurogenic bowel or bladder symptoms:  no   Perceived weakness:  no   Difficulty walking:  no              Past Medical History:

## 2025-08-11 ENCOUNTER — HOSPITAL ENCOUNTER (OUTPATIENT)
Dept: MAMMOGRAPHY | Age: 65
Discharge: HOME | End: 2025-08-11
Payer: COMMERCIAL

## 2025-08-11 VITALS — BODY MASS INDEX: 23.55 KG/M2 | WEIGHT: 128 LBS | HEIGHT: 62 IN

## 2025-08-11 DIAGNOSIS — Z12.31 VISIT FOR SCREENING MAMMOGRAM: ICD-10-CM

## 2025-08-11 PROCEDURE — 77067 SCR MAMMO BI INCL CAD: CPT

## (undated) DEVICE — STERILE POLYISOPRENE POWDER-FREE SURGICAL GLOVES: Brand: PROTEXIS

## (undated) DEVICE — SHEET,DRAPE,53X77,STERILE: Brand: MEDLINE

## (undated) DEVICE — GAUZE,SPONGE,4"X4",8PLY,STRL,LF,10/TRAY: Brand: MEDLINE

## (undated) DEVICE — SKIN PREP TRAY W/CHG: Brand: MEDLINE INDUSTRIES, INC.

## (undated) DEVICE — NEEDLE HYPO 25GA L1.5IN BLU POLYPR HUB S STL REG BVL STR

## (undated) DEVICE — SET EXTN 0.4ML L7IN MINIBOR PRSS RATE W/ SLDE CLMP SPIN M

## (undated) DEVICE — UNDERPAD HOSP W30XL36IN WHT SUP ABSRB POLYMER AIR PERM DISP

## (undated) DEVICE — BANDAGE COMPR W2INXL5YD WHT BGE POLY COT M E WRP WV HK AND

## (undated) DEVICE — SYRINGE MED 10ML LUERLOCK TIP W/O SFTY DISP

## (undated) DEVICE — CORD ES L12FT BPLR FRCP

## (undated) DEVICE — CHLORAPREP 26ML ORANGE

## (undated) DEVICE — PAD GRND NEUT ELECTRD AD DISP

## (undated) DEVICE — Z CONVERTED USE 2275871 SPONGE GZ W4XL4IN WHT 8 PLY CURITY

## (undated) DEVICE — STANDARD HYPODERMIC NEEDLE,POLYPROPYLENE HUB: Brand: MONOJECT

## (undated) DEVICE — PEN: MARKING STD 100/CS: Brand: MEDICAL ACTION INDUSTRIES

## (undated) DEVICE — Device

## (undated) DEVICE — NEEDLE RF 20GA L10CM TIP L10MM CVD ACT TIP SL

## (undated) DEVICE — NEEDLE RF 22GA L5CM TIP L5MM CVD ACT TIP SL

## (undated) DEVICE — GOWN,SIRUS,NON REINFRCD,LARGE,SET IN SL: Brand: MEDLINE

## (undated) DEVICE — ZIMMER® STERILE DISPOSABLE TOURNIQUET CUFF WITH PLC, DUAL PORT, SINGLE BLADDER, 18 IN. (46 CM)

## (undated) DEVICE — GLOVE,SURG,SENSICARE,ALOE,LF,PF,7: Brand: MEDLINE

## (undated) DEVICE — UNIVERSAL BLOCK TRAY: Brand: AVANOS*

## (undated) DEVICE — DISPOSABLE OR TOWEL: Brand: CARDINAL HEALTH

## (undated) DEVICE — SYRINGE, LUER LOCK, 10ML: Brand: MEDLINE

## (undated) DEVICE — STERILE LATEX POWDER-FREE SURGICAL GLOVESWITH NITRILE COATING: Brand: PROTEXIS

## (undated) DEVICE — DRESSING,GAUZE,XEROFORM,CURAD,1"X8",ST: Brand: CURAD

## (undated) DEVICE — NEEDLE SPNL 22GA L2.5IN BLK QNCKE BVL DISP

## (undated) DEVICE — APPLICATOR MEDICATED 3 CC SOLUTION CLR STRL CHLORAPREP

## (undated) DEVICE — NEEDLE HYPO 18GA L1.5IN PNK POLYPR HUB S STL REG BVL STR

## (undated) DEVICE — SET EXTN L7IN PRIMING VOL 0.5ML PRSS RATE STD BOR 1 REM

## (undated) DEVICE — BANDAGE GZ W2XL75IN ST RAYON POLY CNFRM STRTCH LTWT

## (undated) DEVICE — 3 ML SYRINGE LUER-LOCK TIP: Brand: MONOJECT

## (undated) DEVICE — GLOVE SURG SZ 65 L12IN FNGR THK79MIL GRN LTX FREE

## (undated) DEVICE — PADDING CAST W4INXL4YD NONSTERILE COT RAYON MICROPLEATED

## (undated) DEVICE — COVER,MAYO STAND,STERILE: Brand: MEDLINE

## (undated) DEVICE — SUTURE N ABSRB L 18 IN SZ 4-0 NDL L 19 MM NYL MONOFILAMENT

## (undated) DEVICE — MATERIAL PD W2XL4YD ST COT CAST SPLNT NONADHESIVE SPEC

## (undated) DEVICE — STERILE DISPOSABLE EQUIPMENT COVER - DOME COVER 22" DEPTH: Brand: PREFERRED MEDICAL PRODUCTS, LLC

## (undated) DEVICE — Device: Brand: JELCO

## (undated) DEVICE — 6 ML SYRINGE LUER-LOCK TIP: Brand: MONOJECT

## (undated) DEVICE — NEEDLE SPNL 22GA L3.5IN BLK HUB S STL REG WALL FIT STYL W/

## (undated) DEVICE — SOLUTION IV 500ML 0.9% SOD BOTTLE CHL LTWT DURABLE SHATTERPROOF

## (undated) DEVICE — GLOVE SURG SZ 65 L12IN FNGR THK94MIL STD WHT LTX FREE

## (undated) DEVICE — MEDIA CONTRAST RX ISOVUE-300 61% 30ML VIALS

## (undated) DEVICE — PORT VLV 2 W NDL FREE SMRTSITE